# Patient Record
Sex: MALE | Race: WHITE | NOT HISPANIC OR LATINO | Employment: OTHER | URBAN - METROPOLITAN AREA
[De-identification: names, ages, dates, MRNs, and addresses within clinical notes are randomized per-mention and may not be internally consistent; named-entity substitution may affect disease eponyms.]

---

## 2019-01-01 ENCOUNTER — APPOINTMENT (OUTPATIENT)
Dept: ONCOLOGY | Facility: HOSPITAL | Age: 69
End: 2019-01-01

## 2019-01-01 ENCOUNTER — APPOINTMENT (OUTPATIENT)
Dept: GENERAL RADIOLOGY | Facility: HOSPITAL | Age: 69
End: 2019-01-01

## 2019-01-01 ENCOUNTER — APPOINTMENT (OUTPATIENT)
Dept: CT IMAGING | Facility: HOSPITAL | Age: 69
End: 2019-01-01

## 2019-01-01 ENCOUNTER — READMISSION MANAGEMENT (OUTPATIENT)
Dept: CALL CENTER | Facility: HOSPITAL | Age: 69
End: 2019-01-01

## 2019-01-01 ENCOUNTER — DOCUMENTATION (OUTPATIENT)
Dept: PULMONOLOGY | Facility: CLINIC | Age: 69
End: 2019-01-01

## 2019-01-01 ENCOUNTER — TELEPHONE (OUTPATIENT)
Dept: ONCOLOGY | Facility: CLINIC | Age: 69
End: 2019-01-01

## 2019-01-01 ENCOUNTER — HOSPITAL ENCOUNTER (OUTPATIENT)
Dept: ONCOLOGY | Facility: HOSPITAL | Age: 69
Setting detail: INFUSION SERIES
Discharge: HOME | End: 2019-10-28

## 2019-01-01 ENCOUNTER — OFFICE VISIT (OUTPATIENT)
Dept: ONCOLOGY | Facility: CLINIC | Age: 69
End: 2019-01-01

## 2019-01-01 ENCOUNTER — HOSPITAL ENCOUNTER (OUTPATIENT)
Dept: ONCOLOGY | Facility: HOSPITAL | Age: 69
Setting detail: INFUSION SERIES
Discharge: HOME | End: 2019-06-19

## 2019-01-01 ENCOUNTER — HOSPITAL ENCOUNTER (OUTPATIENT)
Dept: ONCOLOGY | Facility: HOSPITAL | Age: 69
Setting detail: INFUSION SERIES
Discharge: HOME | End: 2019-09-16

## 2019-01-01 ENCOUNTER — EDUCATION (OUTPATIENT)
Dept: ONCOLOGY | Facility: HOSPITAL | Age: 69
End: 2019-01-01

## 2019-01-01 ENCOUNTER — HOSPITAL ENCOUNTER (OUTPATIENT)
Dept: ONCOLOGY | Facility: HOSPITAL | Age: 69
Setting detail: INFUSION SERIES
Discharge: HOME | End: 2019-06-21

## 2019-01-01 ENCOUNTER — DOCUMENTATION (OUTPATIENT)
Dept: NUTRITION | Facility: HOSPITAL | Age: 69
End: 2019-01-01

## 2019-01-01 ENCOUNTER — HOSPITAL ENCOUNTER (OUTPATIENT)
Dept: ONCOLOGY | Facility: HOSPITAL | Age: 69
Setting detail: INFUSION SERIES
Discharge: HOME | End: 2019-11-19

## 2019-01-01 ENCOUNTER — HOSPITAL ENCOUNTER (OUTPATIENT)
Dept: MRI IMAGING | Facility: HOSPITAL | Age: 69
Discharge: HOME | End: 2019-06-17
Admitting: INTERNAL MEDICINE

## 2019-01-01 ENCOUNTER — HOSPITAL ENCOUNTER (OUTPATIENT)
Dept: ONCOLOGY | Facility: HOSPITAL | Age: 69
Setting detail: INFUSION SERIES
Discharge: HOME | End: 2019-07-11

## 2019-01-01 ENCOUNTER — HOSPITAL ENCOUNTER (OUTPATIENT)
Dept: ONCOLOGY | Facility: HOSPITAL | Age: 69
Discharge: HOME | End: 2019-11-19

## 2019-01-01 ENCOUNTER — HOSPITAL ENCOUNTER (INPATIENT)
Facility: HOSPITAL | Age: 69
LOS: 7 days | Discharge: HOME | End: 2019-06-11
Attending: EMERGENCY MEDICINE | Admitting: INTERNAL MEDICINE

## 2019-01-01 ENCOUNTER — HOSPITAL ENCOUNTER (OUTPATIENT)
Dept: ONCOLOGY | Facility: HOSPITAL | Age: 69
Setting detail: INFUSION SERIES
Discharge: HOME | End: 2019-08-26

## 2019-01-01 ENCOUNTER — HOSPITAL ENCOUNTER (OUTPATIENT)
Dept: NUCLEAR MEDICINE | Facility: HOSPITAL | Age: 69
Discharge: HOME | End: 2019-06-17

## 2019-01-01 ENCOUNTER — ANESTHESIA (OUTPATIENT)
Dept: GASTROENTEROLOGY | Facility: HOSPITAL | Age: 69
End: 2019-01-01

## 2019-01-01 ENCOUNTER — HOSPITAL ENCOUNTER (OUTPATIENT)
Dept: ONCOLOGY | Facility: HOSPITAL | Age: 69
Setting detail: INFUSION SERIES
Discharge: HOME | End: 2019-08-27

## 2019-01-01 ENCOUNTER — HOSPITAL ENCOUNTER (INPATIENT)
Facility: HOSPITAL | Age: 69
LOS: 7 days | Discharge: HOME | End: 2019-11-12
Attending: EMERGENCY MEDICINE | Admitting: INTERNAL MEDICINE

## 2019-01-01 ENCOUNTER — LAB (OUTPATIENT)
Dept: LAB | Facility: HOSPITAL | Age: 69
End: 2019-01-01

## 2019-01-01 ENCOUNTER — HOSPITAL ENCOUNTER (OUTPATIENT)
Dept: ONCOLOGY | Facility: HOSPITAL | Age: 69
Setting detail: INFUSION SERIES
Discharge: HOME | End: 2019-08-28

## 2019-01-01 ENCOUNTER — HOSPITAL ENCOUNTER (OUTPATIENT)
Dept: CT IMAGING | Facility: HOSPITAL | Age: 69
Discharge: HOME | End: 2019-08-21
Admitting: NURSE PRACTITIONER

## 2019-01-01 ENCOUNTER — MDT ASSESSMENT (OUTPATIENT)
Dept: ONCOLOGY | Facility: CLINIC | Age: 69
End: 2019-01-01

## 2019-01-01 ENCOUNTER — HOSPITAL ENCOUNTER (OUTPATIENT)
Dept: ONCOLOGY | Facility: HOSPITAL | Age: 69
Setting detail: INFUSION SERIES
Discharge: HOME | End: 2019-07-09

## 2019-01-01 ENCOUNTER — HOSPITAL ENCOUNTER (OUTPATIENT)
Dept: ONCOLOGY | Facility: HOSPITAL | Age: 69
Setting detail: INFUSION SERIES
Discharge: HOME | End: 2019-10-07

## 2019-01-01 ENCOUNTER — HOSPITAL ENCOUNTER (OUTPATIENT)
Dept: RADIATION ONCOLOGY | Facility: HOSPITAL | Age: 69
Setting detail: RADIATION/ONCOLOGY SERIES
Discharge: HOME | End: 2019-12-12

## 2019-01-01 ENCOUNTER — HOSPITAL ENCOUNTER (OUTPATIENT)
Dept: ONCOLOGY | Facility: HOSPITAL | Age: 69
Setting detail: INFUSION SERIES
Discharge: HOME | End: 2019-08-07

## 2019-01-01 ENCOUNTER — HOSPITAL ENCOUNTER (OUTPATIENT)
Dept: ONCOLOGY | Facility: HOSPITAL | Age: 69
Setting detail: INFUSION SERIES
Discharge: HOME | End: 2019-08-05

## 2019-01-01 ENCOUNTER — HOSPITAL ENCOUNTER (OUTPATIENT)
Dept: ONCOLOGY | Facility: HOSPITAL | Age: 69
Setting detail: INFUSION SERIES
Discharge: HOME | End: 2019-06-20

## 2019-01-01 ENCOUNTER — HOSPITAL ENCOUNTER (INPATIENT)
Facility: HOSPITAL | Age: 69
LOS: 5 days | Discharge: HOME | End: 2019-12-31
Attending: EMERGENCY MEDICINE | Admitting: INTERNAL MEDICINE

## 2019-01-01 ENCOUNTER — HOSPITAL ENCOUNTER (OUTPATIENT)
Dept: CT IMAGING | Facility: HOSPITAL | Age: 69
Discharge: HOME | End: 2019-06-17

## 2019-01-01 ENCOUNTER — HOSPITAL ENCOUNTER (OUTPATIENT)
Dept: ONCOLOGY | Facility: HOSPITAL | Age: 69
Setting detail: INFUSION SERIES
Discharge: HOME | End: 2019-07-10

## 2019-01-01 ENCOUNTER — HOSPITAL ENCOUNTER (OUTPATIENT)
Dept: ONCOLOGY | Facility: HOSPITAL | Age: 69
Setting detail: INFUSION SERIES
Discharge: HOME | End: 2019-12-18

## 2019-01-01 ENCOUNTER — HOSPITAL ENCOUNTER (OUTPATIENT)
Dept: ONCOLOGY | Facility: HOSPITAL | Age: 69
Setting detail: INFUSION SERIES
Discharge: HOME | End: 2019-08-06

## 2019-01-01 ENCOUNTER — ANESTHESIA EVENT (OUTPATIENT)
Dept: GASTROENTEROLOGY | Facility: HOSPITAL | Age: 69
End: 2019-01-01

## 2019-01-01 ENCOUNTER — HOSPITAL ENCOUNTER (OUTPATIENT)
Dept: ONCOLOGY | Facility: HOSPITAL | Age: 69
Setting detail: INFUSION SERIES
Discharge: HOME | End: 2019-07-29

## 2019-01-01 VITALS
RESPIRATION RATE: 16 BRPM | TEMPERATURE: 96.7 F | DIASTOLIC BLOOD PRESSURE: 73 MMHG | OXYGEN SATURATION: 98 % | SYSTOLIC BLOOD PRESSURE: 121 MMHG | WEIGHT: 194 LBS | HEART RATE: 86 BPM | BODY MASS INDEX: 29.4 KG/M2 | HEIGHT: 68 IN

## 2019-01-01 VITALS
SYSTOLIC BLOOD PRESSURE: 130 MMHG | DIASTOLIC BLOOD PRESSURE: 53 MMHG | RESPIRATION RATE: 16 BRPM | BODY MASS INDEX: 32.13 KG/M2 | WEIGHT: 212 LBS | HEART RATE: 74 BPM | HEIGHT: 68 IN | OXYGEN SATURATION: 94 % | TEMPERATURE: 98.3 F

## 2019-01-01 VITALS
DIASTOLIC BLOOD PRESSURE: 63 MMHG | OXYGEN SATURATION: 98 % | SYSTOLIC BLOOD PRESSURE: 129 MMHG | WEIGHT: 202 LBS | HEIGHT: 68 IN | RESPIRATION RATE: 16 BRPM | HEART RATE: 77 BPM | TEMPERATURE: 97.6 F | BODY MASS INDEX: 30.62 KG/M2

## 2019-01-01 VITALS
HEIGHT: 68 IN | TEMPERATURE: 97.6 F | SYSTOLIC BLOOD PRESSURE: 166 MMHG | HEART RATE: 65 BPM | WEIGHT: 204 LBS | RESPIRATION RATE: 20 BRPM | DIASTOLIC BLOOD PRESSURE: 74 MMHG | SYSTOLIC BLOOD PRESSURE: 120 MMHG | TEMPERATURE: 97.6 F | DIASTOLIC BLOOD PRESSURE: 64 MMHG | HEIGHT: 68 IN | RESPIRATION RATE: 20 BRPM | BODY MASS INDEX: 30.92 KG/M2 | WEIGHT: 202 LBS | HEART RATE: 98 BPM | BODY MASS INDEX: 30.62 KG/M2

## 2019-01-01 VITALS
TEMPERATURE: 96.8 F | BODY MASS INDEX: 30.62 KG/M2 | DIASTOLIC BLOOD PRESSURE: 67 MMHG | SYSTOLIC BLOOD PRESSURE: 150 MMHG | RESPIRATION RATE: 12 BRPM | OXYGEN SATURATION: 97 % | HEIGHT: 68 IN | HEART RATE: 74 BPM | WEIGHT: 202 LBS

## 2019-01-01 VITALS
BODY MASS INDEX: 31.67 KG/M2 | HEIGHT: 68 IN | WEIGHT: 209 LBS | HEART RATE: 94 BPM | RESPIRATION RATE: 18 BRPM | DIASTOLIC BLOOD PRESSURE: 60 MMHG | SYSTOLIC BLOOD PRESSURE: 116 MMHG | TEMPERATURE: 97.8 F

## 2019-01-01 VITALS
OXYGEN SATURATION: 97 % | DIASTOLIC BLOOD PRESSURE: 66 MMHG | SYSTOLIC BLOOD PRESSURE: 144 MMHG | BODY MASS INDEX: 31.33 KG/M2 | WEIGHT: 206.7 LBS | TEMPERATURE: 97.9 F | HEART RATE: 75 BPM | HEIGHT: 68 IN | RESPIRATION RATE: 18 BRPM

## 2019-01-01 VITALS
BODY MASS INDEX: 27.28 KG/M2 | RESPIRATION RATE: 16 BRPM | OXYGEN SATURATION: 95 % | DIASTOLIC BLOOD PRESSURE: 67 MMHG | HEART RATE: 85 BPM | HEIGHT: 68 IN | SYSTOLIC BLOOD PRESSURE: 105 MMHG | WEIGHT: 180 LBS | TEMPERATURE: 98 F

## 2019-01-01 VITALS
SYSTOLIC BLOOD PRESSURE: 136 MMHG | DIASTOLIC BLOOD PRESSURE: 63 MMHG | HEART RATE: 79 BPM | RESPIRATION RATE: 16 BRPM | HEIGHT: 68 IN | TEMPERATURE: 97.5 F | BODY MASS INDEX: 30.77 KG/M2 | WEIGHT: 203 LBS

## 2019-01-01 VITALS
DIASTOLIC BLOOD PRESSURE: 74 MMHG | SYSTOLIC BLOOD PRESSURE: 147 MMHG | WEIGHT: 205 LBS | HEART RATE: 101 BPM | HEIGHT: 68 IN | TEMPERATURE: 97.1 F | OXYGEN SATURATION: 99 % | BODY MASS INDEX: 31.07 KG/M2 | RESPIRATION RATE: 16 BRPM

## 2019-01-01 VITALS
HEIGHT: 68 IN | WEIGHT: 201 LBS | TEMPERATURE: 98.1 F | DIASTOLIC BLOOD PRESSURE: 64 MMHG | BODY MASS INDEX: 30.46 KG/M2 | RESPIRATION RATE: 18 BRPM | SYSTOLIC BLOOD PRESSURE: 151 MMHG | HEART RATE: 86 BPM

## 2019-01-01 VITALS
DIASTOLIC BLOOD PRESSURE: 65 MMHG | HEIGHT: 68 IN | WEIGHT: 190 LBS | RESPIRATION RATE: 18 BRPM | TEMPERATURE: 97.9 F | HEART RATE: 133 BPM | BODY MASS INDEX: 28.79 KG/M2 | SYSTOLIC BLOOD PRESSURE: 123 MMHG

## 2019-01-01 VITALS
BODY MASS INDEX: 32.13 KG/M2 | HEIGHT: 68 IN | HEART RATE: 72 BPM | RESPIRATION RATE: 16 BRPM | DIASTOLIC BLOOD PRESSURE: 63 MMHG | OXYGEN SATURATION: 99 % | SYSTOLIC BLOOD PRESSURE: 140 MMHG | TEMPERATURE: 97.4 F | WEIGHT: 212 LBS

## 2019-01-01 VITALS
WEIGHT: 204 LBS | HEIGHT: 68 IN | HEART RATE: 69 BPM | SYSTOLIC BLOOD PRESSURE: 152 MMHG | RESPIRATION RATE: 16 BRPM | BODY MASS INDEX: 30.92 KG/M2 | DIASTOLIC BLOOD PRESSURE: 65 MMHG | TEMPERATURE: 97.4 F

## 2019-01-01 VITALS
WEIGHT: 197 LBS | RESPIRATION RATE: 16 BRPM | HEIGHT: 68 IN | DIASTOLIC BLOOD PRESSURE: 52 MMHG | OXYGEN SATURATION: 95 % | HEART RATE: 93 BPM | BODY MASS INDEX: 29.86 KG/M2 | SYSTOLIC BLOOD PRESSURE: 138 MMHG

## 2019-01-01 VITALS
TEMPERATURE: 97.4 F | SYSTOLIC BLOOD PRESSURE: 148 MMHG | DIASTOLIC BLOOD PRESSURE: 63 MMHG | HEART RATE: 87 BPM | BODY MASS INDEX: 31.37 KG/M2 | DIASTOLIC BLOOD PRESSURE: 62 MMHG | HEIGHT: 68 IN | TEMPERATURE: 97.6 F | SYSTOLIC BLOOD PRESSURE: 139 MMHG | RESPIRATION RATE: 16 BRPM | RESPIRATION RATE: 20 BRPM | WEIGHT: 207 LBS | OXYGEN SATURATION: 98 % | HEART RATE: 93 BPM | BODY MASS INDEX: 30.92 KG/M2 | HEIGHT: 68 IN | WEIGHT: 204 LBS

## 2019-01-01 VITALS
BODY MASS INDEX: 30.16 KG/M2 | DIASTOLIC BLOOD PRESSURE: 61 MMHG | SYSTOLIC BLOOD PRESSURE: 139 MMHG | TEMPERATURE: 97.9 F | WEIGHT: 199 LBS | RESPIRATION RATE: 20 BRPM | HEIGHT: 68 IN | HEART RATE: 103 BPM

## 2019-01-01 VITALS
DIASTOLIC BLOOD PRESSURE: 66 MMHG | SYSTOLIC BLOOD PRESSURE: 152 MMHG | HEART RATE: 76 BPM | TEMPERATURE: 97.4 F | OXYGEN SATURATION: 96 % | BODY MASS INDEX: 32.89 KG/M2 | HEIGHT: 68 IN | WEIGHT: 217 LBS | RESPIRATION RATE: 16 BRPM

## 2019-01-01 VITALS
BODY MASS INDEX: 31.98 KG/M2 | HEIGHT: 68 IN | HEART RATE: 82 BPM | RESPIRATION RATE: 15 BRPM | DIASTOLIC BLOOD PRESSURE: 63 MMHG | TEMPERATURE: 97.2 F | WEIGHT: 211 LBS | SYSTOLIC BLOOD PRESSURE: 157 MMHG

## 2019-01-01 VITALS
DIASTOLIC BLOOD PRESSURE: 62 MMHG | HEART RATE: 75 BPM | HEIGHT: 68 IN | RESPIRATION RATE: 16 BRPM | TEMPERATURE: 97.6 F | SYSTOLIC BLOOD PRESSURE: 136 MMHG | WEIGHT: 206 LBS | BODY MASS INDEX: 31.22 KG/M2

## 2019-01-01 VITALS
WEIGHT: 199 LBS | TEMPERATURE: 97.9 F | BODY MASS INDEX: 30.16 KG/M2 | RESPIRATION RATE: 16 BRPM | DIASTOLIC BLOOD PRESSURE: 67 MMHG | HEIGHT: 68 IN | SYSTOLIC BLOOD PRESSURE: 155 MMHG | OXYGEN SATURATION: 98 % | HEART RATE: 84 BPM

## 2019-01-01 VITALS
HEART RATE: 83 BPM | SYSTOLIC BLOOD PRESSURE: 159 MMHG | DIASTOLIC BLOOD PRESSURE: 70 MMHG | BODY MASS INDEX: 29.8 KG/M2 | TEMPERATURE: 97.1 F | WEIGHT: 196 LBS | RESPIRATION RATE: 20 BRPM | OXYGEN SATURATION: 97 %

## 2019-01-01 DIAGNOSIS — C34.90 SMALL CELL LUNG CANCER (HCC): Primary | ICD-10-CM

## 2019-01-01 DIAGNOSIS — C34.90 SMALL CELL LUNG CANCER (HCC): ICD-10-CM

## 2019-01-01 DIAGNOSIS — E87.1 HYPONATREMIA: Primary | ICD-10-CM

## 2019-01-01 DIAGNOSIS — Z74.09 IMPAIRED MOBILITY AND ADLS: ICD-10-CM

## 2019-01-01 DIAGNOSIS — I10 ESSENTIAL HYPERTENSION: ICD-10-CM

## 2019-01-01 DIAGNOSIS — R50.9 FEVER, UNSPECIFIED FEVER CAUSE: ICD-10-CM

## 2019-01-01 DIAGNOSIS — J18.9 PNEUMONIA OF RIGHT LUNG DUE TO INFECTIOUS ORGANISM, UNSPECIFIED PART OF LUNG: ICD-10-CM

## 2019-01-01 DIAGNOSIS — J18.9 PNEUMONIA OF RIGHT LUNG DUE TO INFECTIOUS ORGANISM, UNSPECIFIED PART OF LUNG: Primary | ICD-10-CM

## 2019-01-01 DIAGNOSIS — Z85.118 HISTORY OF LUNG CANCER: ICD-10-CM

## 2019-01-01 DIAGNOSIS — R91.8 LUNG INFILTRATE ON CT: ICD-10-CM

## 2019-01-01 DIAGNOSIS — Z78.9 IMPAIRED MOBILITY AND ADLS: ICD-10-CM

## 2019-01-01 DIAGNOSIS — J96.01 ACUTE RESPIRATORY FAILURE WITH HYPOXIA (HCC): ICD-10-CM

## 2019-01-01 DIAGNOSIS — R91.8 LUNG MASS: ICD-10-CM

## 2019-01-01 DIAGNOSIS — R50.9 FEVER, UNSPECIFIED FEVER CAUSE: Primary | ICD-10-CM

## 2019-01-01 DIAGNOSIS — C34.90 MALIGNANT NEOPLASM OF LUNG, UNSPECIFIED LATERALITY, UNSPECIFIED PART OF LUNG (HCC): ICD-10-CM

## 2019-01-01 DIAGNOSIS — R53.1 WEAKNESS: Primary | ICD-10-CM

## 2019-01-01 DIAGNOSIS — E87.1 HYPONATREMIA: ICD-10-CM

## 2019-01-01 LAB
ALBUMIN SERPL-MCNC: 2.5 G/DL (ref 3.5–5.2)
ALBUMIN SERPL-MCNC: 2.8 G/DL (ref 3.5–5.2)
ALBUMIN SERPL-MCNC: 2.8 G/DL (ref 3.5–5.2)
ALBUMIN SERPL-MCNC: 3.5 G/DL (ref 3.5–5.2)
ALBUMIN SERPL-MCNC: 4.1 G/DL (ref 3.5–5.2)
ALBUMIN SERPL-MCNC: 4.1 G/DL (ref 3.5–5.2)
ALBUMIN SERPL-MCNC: 4.2 G/DL (ref 3.5–5.2)
ALBUMIN SERPL-MCNC: 4.3 G/DL (ref 3.5–5.2)
ALBUMIN SERPL-MCNC: 4.4 G/DL (ref 3.5–5.2)
ALBUMIN SERPL-MCNC: 4.5 G/DL (ref 3.5–5.2)
ALBUMIN SERPL-MCNC: 4.5 G/DL (ref 3.5–5.2)
ALBUMIN SERPL-MCNC: 4.6 G/DL (ref 3.5–5.2)
ALBUMIN/GLOB SERPL: 0.6 G/DL
ALBUMIN/GLOB SERPL: 0.7 G/DL
ALBUMIN/GLOB SERPL: 0.8 G/DL
ALBUMIN/GLOB SERPL: 0.9 G/DL
ALBUMIN/GLOB SERPL: 1.3 G/DL
ALBUMIN/GLOB SERPL: 1.4 G/DL
ALBUMIN/GLOB SERPL: 1.5 G/DL
ALBUMIN/GLOB SERPL: 1.6 G/DL
ALBUMIN/GLOB SERPL: 1.6 G/DL
ALBUMIN/GLOB SERPL: 1.7 G/DL
ALP SERPL-CCNC: 100 U/L (ref 39–117)
ALP SERPL-CCNC: 106 U/L (ref 39–117)
ALP SERPL-CCNC: 110 U/L (ref 39–117)
ALP SERPL-CCNC: 114 U/L (ref 39–117)
ALP SERPL-CCNC: 47 U/L (ref 39–117)
ALP SERPL-CCNC: 59 U/L (ref 39–117)
ALP SERPL-CCNC: 68 U/L (ref 39–117)
ALP SERPL-CCNC: 69 U/L (ref 39–117)
ALP SERPL-CCNC: 74 U/L (ref 39–117)
ALP SERPL-CCNC: 79 U/L (ref 39–117)
ALP SERPL-CCNC: 79 U/L (ref 39–117)
ALP SERPL-CCNC: 81 U/L (ref 39–117)
ALP SERPL-CCNC: 83 U/L (ref 39–117)
ALP SERPL-CCNC: 86 U/L (ref 39–117)
ALT SERPL W P-5'-P-CCNC: 16 U/L (ref 1–41)
ALT SERPL W P-5'-P-CCNC: 161 U/L (ref 1–41)
ALT SERPL W P-5'-P-CCNC: 18 U/L (ref 1–41)
ALT SERPL W P-5'-P-CCNC: 18 U/L (ref 1–41)
ALT SERPL W P-5'-P-CCNC: 19 U/L (ref 1–41)
ALT SERPL W P-5'-P-CCNC: 20 U/L (ref 1–41)
ALT SERPL W P-5'-P-CCNC: 21 U/L (ref 1–41)
ALT SERPL W P-5'-P-CCNC: 23 U/L (ref 1–41)
ALT SERPL W P-5'-P-CCNC: 24 U/L (ref 1–41)
ALT SERPL W P-5'-P-CCNC: 26 U/L (ref 1–41)
ALT SERPL W P-5'-P-CCNC: 51 U/L (ref 1–41)
ALT SERPL W P-5'-P-CCNC: 53 U/L (ref 1–41)
ALT SERPL W P-5'-P-CCNC: 65 U/L (ref 1–41)
ALT SERPL W P-5'-P-CCNC: 79 U/L (ref 1–41)
AMPHET+METHAMPHET UR QL: NEGATIVE
AMPHETAMINES UR QL: NEGATIVE
ANION GAP SERPL CALCULATED.3IONS-SCNC: 10 MMOL/L (ref 5–15)
ANION GAP SERPL CALCULATED.3IONS-SCNC: 11 MMOL/L
ANION GAP SERPL CALCULATED.3IONS-SCNC: 11 MMOL/L (ref 5–15)
ANION GAP SERPL CALCULATED.3IONS-SCNC: 12 MMOL/L
ANION GAP SERPL CALCULATED.3IONS-SCNC: 12 MMOL/L (ref 5–15)
ANION GAP SERPL CALCULATED.3IONS-SCNC: 13 MMOL/L
ANION GAP SERPL CALCULATED.3IONS-SCNC: 13 MMOL/L (ref 5–15)
ANION GAP SERPL CALCULATED.3IONS-SCNC: 14 MMOL/L
ANION GAP SERPL CALCULATED.3IONS-SCNC: 14 MMOL/L (ref 5–15)
ANION GAP SERPL CALCULATED.3IONS-SCNC: 15 MMOL/L
ANION GAP SERPL CALCULATED.3IONS-SCNC: 15 MMOL/L
ANION GAP SERPL CALCULATED.3IONS-SCNC: 15 MMOL/L (ref 5–15)
ANION GAP SERPL CALCULATED.3IONS-SCNC: 15 MMOL/L (ref 5–15)
ANION GAP SERPL CALCULATED.3IONS-SCNC: 16 MMOL/L (ref 5–15)
APTT PPP: 27.7 SECONDS (ref 24–37)
ARTERIAL PATENCY WRIST A: ABNORMAL
AST SERPL-CCNC: 17 U/L (ref 1–40)
AST SERPL-CCNC: 19 U/L (ref 1–40)
AST SERPL-CCNC: 20 U/L (ref 1–40)
AST SERPL-CCNC: 21 U/L (ref 1–40)
AST SERPL-CCNC: 24 U/L (ref 1–40)
AST SERPL-CCNC: 27 U/L (ref 1–40)
AST SERPL-CCNC: 32 U/L (ref 1–40)
AST SERPL-CCNC: 41 U/L (ref 1–40)
AST SERPL-CCNC: 49 U/L (ref 1–40)
AST SERPL-CCNC: 56 U/L (ref 1–40)
ATMOSPHERIC PRESS: ABNORMAL MM[HG]
B PARAPERT DNA SPEC QL NAA+PROBE: NOT DETECTED
B PERT DNA SPEC QL NAA+PROBE: NOT DETECTED
BACTERIA SPEC AEROBE CULT: ABNORMAL
BACTERIA SPEC AEROBE CULT: NORMAL
BACTERIA SPEC RESP CULT: NORMAL
BACTERIA SPEC RESP CULT: NORMAL
BARBITURATES UR QL SCN: NEGATIVE
BASE EXCESS BLDA CALC-SCNC: 1.1 MMOL/L (ref 0–2)
BASOPHILS # BLD AUTO: 0 10*3/MM3 (ref 0–0.2)
BASOPHILS # BLD AUTO: 0.01 10*3/MM3 (ref 0–0.2)
BASOPHILS # BLD AUTO: 0.02 10*3/MM3 (ref 0–0.2)
BASOPHILS # BLD MANUAL: 0 10*3/MM3 (ref 0–0.2)
BASOPHILS NFR BLD AUTO: 0 % (ref 0–1.5)
BASOPHILS NFR BLD AUTO: 0 % (ref 0–1.5)
BASOPHILS NFR BLD AUTO: 0.1 % (ref 0–1.5)
BASOPHILS NFR BLD AUTO: 0.1 % (ref 0–1.5)
BASOPHILS NFR BLD AUTO: 0.2 % (ref 0–1.5)
BASOPHILS NFR BLD AUTO: 0.2 % (ref 0–1.5)
BASOPHILS NFR BLD AUTO: 0.3 % (ref 0–1.5)
BASOPHILS NFR BLD AUTO: 0.4 % (ref 0–1.5)
BDY SITE: ABNORMAL
BENZODIAZ UR QL SCN: NEGATIVE
BILIRUB SERPL-MCNC: 0.3 MG/DL (ref 0.2–1.2)
BILIRUB SERPL-MCNC: 0.4 MG/DL (ref 0.2–1.2)
BILIRUB SERPL-MCNC: 0.5 MG/DL (ref 0.2–1.2)
BILIRUB SERPL-MCNC: 0.5 MG/DL (ref 0.2–1.2)
BILIRUB SERPL-MCNC: 0.6 MG/DL (ref 0.2–1.2)
BILIRUB SERPL-MCNC: 0.6 MG/DL (ref 0.2–1.2)
BILIRUB SERPL-MCNC: 0.7 MG/DL (ref 0.2–1.2)
BILIRUB UR QL STRIP: NEGATIVE
BODY TEMPERATURE: 37 C
BUN BLD-MCNC: 11 MG/DL (ref 8–23)
BUN BLD-MCNC: 12 MG/DL (ref 8–23)
BUN BLD-MCNC: 13 MG/DL (ref 8–23)
BUN BLD-MCNC: 14 MG/DL (ref 8–23)
BUN BLD-MCNC: 15 MG/DL (ref 8–23)
BUN BLD-MCNC: 16 MG/DL (ref 8–23)
BUN BLD-MCNC: 17 MG/DL (ref 8–23)
BUN BLD-MCNC: 17 MG/DL (ref 8–23)
BUN BLD-MCNC: 19 MG/DL (ref 8–23)
BUN BLD-MCNC: 20 MG/DL (ref 8–23)
BUN BLD-MCNC: 20 MG/DL (ref 8–23)
BUN BLD-MCNC: 21 MG/DL (ref 8–23)
BUN BLD-MCNC: 22 MG/DL (ref 8–23)
BUN BLD-MCNC: 22 MG/DL (ref 8–23)
BUN BLD-MCNC: 23 MG/DL (ref 8–23)
BUN BLD-MCNC: 24 MG/DL (ref 8–23)
BUN BLD-MCNC: 25 MG/DL (ref 8–23)
BUN BLD-MCNC: 27 MG/DL (ref 8–23)
BUN BLD-MCNC: 27 MG/DL (ref 8–23)
BUN BLD-MCNC: 33 MG/DL (ref 8–23)
BUN/CREAT SERPL: 10 (ref 7–25)
BUN/CREAT SERPL: 11.2 (ref 7–25)
BUN/CREAT SERPL: 12.1 (ref 7–25)
BUN/CREAT SERPL: 12.4 (ref 7–25)
BUN/CREAT SERPL: 12.7 (ref 7–25)
BUN/CREAT SERPL: 13 (ref 7–25)
BUN/CREAT SERPL: 13.3 (ref 7–25)
BUN/CREAT SERPL: 13.8 (ref 7–25)
BUN/CREAT SERPL: 13.9 (ref 7–25)
BUN/CREAT SERPL: 14 (ref 7–25)
BUN/CREAT SERPL: 14.3 (ref 7–25)
BUN/CREAT SERPL: 14.4 (ref 7–25)
BUN/CREAT SERPL: 14.7 (ref 7–25)
BUN/CREAT SERPL: 14.8 (ref 7–25)
BUN/CREAT SERPL: 15.1 (ref 7–25)
BUN/CREAT SERPL: 15.2 (ref 7–25)
BUN/CREAT SERPL: 15.2 (ref 7–25)
BUN/CREAT SERPL: 15.3 (ref 7–25)
BUN/CREAT SERPL: 15.4 (ref 7–25)
BUN/CREAT SERPL: 15.7 (ref 7–25)
BUN/CREAT SERPL: 15.8 (ref 7–25)
BUN/CREAT SERPL: 16 (ref 7–25)
BUN/CREAT SERPL: 16 (ref 7–25)
BUN/CREAT SERPL: 16.2 (ref 7–25)
BUN/CREAT SERPL: 16.4 (ref 7–25)
BUN/CREAT SERPL: 16.5 (ref 7–25)
BUN/CREAT SERPL: 16.5 (ref 7–25)
BUN/CREAT SERPL: 16.7 (ref 7–25)
BUN/CREAT SERPL: 17 (ref 7–25)
BUN/CREAT SERPL: 17.1 (ref 7–25)
BUN/CREAT SERPL: 17.1 (ref 7–25)
BUN/CREAT SERPL: 17.2 (ref 7–25)
BUN/CREAT SERPL: 17.6 (ref 7–25)
BUN/CREAT SERPL: 17.8 (ref 7–25)
BUN/CREAT SERPL: 17.9 (ref 7–25)
BUN/CREAT SERPL: 18.1 (ref 7–25)
BUN/CREAT SERPL: 18.6 (ref 7–25)
BUN/CREAT SERPL: 18.8 (ref 7–25)
BUN/CREAT SERPL: 19.3 (ref 7–25)
BUN/CREAT SERPL: 20.3 (ref 7–25)
BUN/CREAT SERPL: 20.4 (ref 7–25)
BUN/CREAT SERPL: 20.7 (ref 7–25)
BUN/CREAT SERPL: 22.1 (ref 7–25)
BUN/CREAT SERPL: 22.1 (ref 7–25)
BUN/CREAT SERPL: 22.2 (ref 7–25)
BUN/CREAT SERPL: 23 (ref 7–25)
BUN/CREAT SERPL: 23.9 (ref 7–25)
BUN/CREAT SERPL: 25 (ref 7–25)
BUN/CREAT SERPL: 25.3 (ref 7–25)
BUN/CREAT SERPL: 26.9 (ref 7–25)
BUN/CREAT SERPL: 27 (ref 7–25)
BUN/CREAT SERPL: 27.4 (ref 7–25)
BUN/CREAT SERPL: 30.3 (ref 7–25)
BUN/CREAT SERPL: 31 (ref 7–25)
BUN/CREAT SERPL: 37.1 (ref 7–25)
BUN/CREAT SERPL: 9.7 (ref 7–25)
BUPRENORPHINE SERPL-MCNC: NEGATIVE NG/ML
C PNEUM DNA NPH QL NAA+NON-PROBE: NOT DETECTED
CALCIUM SPEC-SCNC: 10 MG/DL (ref 8.6–10.5)
CALCIUM SPEC-SCNC: 10.1 MG/DL (ref 8.6–10.5)
CALCIUM SPEC-SCNC: 10.2 MG/DL (ref 8.6–10.5)
CALCIUM SPEC-SCNC: 8.2 MG/DL (ref 8.6–10.5)
CALCIUM SPEC-SCNC: 8.3 MG/DL (ref 8.6–10.5)
CALCIUM SPEC-SCNC: 8.4 MG/DL (ref 8.6–10.5)
CALCIUM SPEC-SCNC: 8.5 MG/DL (ref 8.6–10.5)
CALCIUM SPEC-SCNC: 8.6 MG/DL (ref 8.6–10.5)
CALCIUM SPEC-SCNC: 8.7 MG/DL (ref 8.6–10.5)
CALCIUM SPEC-SCNC: 8.8 MG/DL (ref 8.6–10.5)
CALCIUM SPEC-SCNC: 8.9 MG/DL (ref 8.6–10.5)
CALCIUM SPEC-SCNC: 9 MG/DL (ref 8.6–10.5)
CALCIUM SPEC-SCNC: 9.1 MG/DL (ref 8.6–10.5)
CALCIUM SPEC-SCNC: 9.2 MG/DL (ref 8.6–10.5)
CALCIUM SPEC-SCNC: 9.2 MG/DL (ref 8.6–10.5)
CALCIUM SPEC-SCNC: 9.3 MG/DL (ref 8.6–10.5)
CALCIUM SPEC-SCNC: 9.4 MG/DL (ref 8.6–10.5)
CALCIUM SPEC-SCNC: 9.5 MG/DL (ref 8.6–10.5)
CALCIUM SPEC-SCNC: 9.6 MG/DL (ref 8.6–10.5)
CALCIUM SPEC-SCNC: 9.7 MG/DL (ref 8.6–10.5)
CALCIUM SPEC-SCNC: 9.7 MG/DL (ref 8.6–10.5)
CALCIUM SPEC-SCNC: 9.8 MG/DL (ref 8.6–10.5)
CALCIUM SPEC-SCNC: 9.9 MG/DL (ref 8.6–10.5)
CANNABINOIDS SERPL QL: NEGATIVE
CHLORIDE SERPL-SCNC: 100 MMOL/L (ref 98–107)
CHLORIDE SERPL-SCNC: 100 MMOL/L (ref 98–107)
CHLORIDE SERPL-SCNC: 101 MMOL/L (ref 98–107)
CHLORIDE SERPL-SCNC: 81 MMOL/L (ref 98–107)
CHLORIDE SERPL-SCNC: 82 MMOL/L (ref 98–107)
CHLORIDE SERPL-SCNC: 83 MMOL/L (ref 98–107)
CHLORIDE SERPL-SCNC: 84 MMOL/L (ref 98–107)
CHLORIDE SERPL-SCNC: 85 MMOL/L (ref 98–107)
CHLORIDE SERPL-SCNC: 86 MMOL/L (ref 98–107)
CHLORIDE SERPL-SCNC: 87 MMOL/L (ref 98–107)
CHLORIDE SERPL-SCNC: 88 MMOL/L (ref 98–107)
CHLORIDE SERPL-SCNC: 89 MMOL/L (ref 98–107)
CHLORIDE SERPL-SCNC: 90 MMOL/L (ref 98–107)
CHLORIDE SERPL-SCNC: 90 MMOL/L (ref 98–107)
CHLORIDE SERPL-SCNC: 91 MMOL/L (ref 98–107)
CHLORIDE SERPL-SCNC: 91 MMOL/L (ref 98–107)
CHLORIDE SERPL-SCNC: 93 MMOL/L (ref 98–107)
CHLORIDE SERPL-SCNC: 93 MMOL/L (ref 98–107)
CHLORIDE SERPL-SCNC: 94 MMOL/L (ref 98–107)
CHLORIDE SERPL-SCNC: 95 MMOL/L (ref 98–107)
CHLORIDE SERPL-SCNC: 96 MMOL/L (ref 98–107)
CHLORIDE SERPL-SCNC: 97 MMOL/L (ref 98–107)
CHLORIDE SERPL-SCNC: 98 MMOL/L (ref 98–107)
CHLORIDE SERPL-SCNC: 98 MMOL/L (ref 98–107)
CHLORIDE SERPL-SCNC: 99 MMOL/L (ref 98–107)
CK SERPL-CCNC: 318 U/L (ref 20–200)
CLARITY UR: CLEAR
CO2 BLDA-SCNC: 24.8 MMOL/L (ref 22–33)
CO2 SERPL-SCNC: 17 MMOL/L (ref 22–29)
CO2 SERPL-SCNC: 18 MMOL/L (ref 22–29)
CO2 SERPL-SCNC: 19 MMOL/L (ref 22–29)
CO2 SERPL-SCNC: 20 MMOL/L (ref 22–29)
CO2 SERPL-SCNC: 21 MMOL/L (ref 22–29)
CO2 SERPL-SCNC: 22 MMOL/L (ref 22–29)
CO2 SERPL-SCNC: 23 MMOL/L (ref 22–29)
CO2 SERPL-SCNC: 24 MMOL/L (ref 22–29)
CO2 SERPL-SCNC: 25 MMOL/L (ref 22–29)
CO2 SERPL-SCNC: 26 MMOL/L (ref 22–29)
COCAINE UR QL: NEGATIVE
COHGB MFR BLD: 0.8 % (ref 0–2)
COLOR UR: YELLOW
CORTIS AM PEAK SERPL-MCNC: 1.52 MCG/DL
CREAT BLD-MCNC: 0.62 MG/DL (ref 0.76–1.27)
CREAT BLD-MCNC: 0.68 MG/DL (ref 0.76–1.27)
CREAT BLD-MCNC: 0.72 MG/DL (ref 0.76–1.27)
CREAT BLD-MCNC: 0.73 MG/DL (ref 0.76–1.27)
CREAT BLD-MCNC: 0.74 MG/DL (ref 0.76–1.27)
CREAT BLD-MCNC: 0.74 MG/DL (ref 0.76–1.27)
CREAT BLD-MCNC: 0.76 MG/DL (ref 0.76–1.27)
CREAT BLD-MCNC: 0.78 MG/DL (ref 0.76–1.27)
CREAT BLD-MCNC: 0.79 MG/DL (ref 0.76–1.27)
CREAT BLD-MCNC: 0.82 MG/DL (ref 0.76–1.27)
CREAT BLD-MCNC: 0.83 MG/DL (ref 0.76–1.27)
CREAT BLD-MCNC: 0.84 MG/DL (ref 0.76–1.27)
CREAT BLD-MCNC: 0.85 MG/DL (ref 0.76–1.27)
CREAT BLD-MCNC: 0.85 MG/DL (ref 0.76–1.27)
CREAT BLD-MCNC: 0.86 MG/DL (ref 0.76–1.27)
CREAT BLD-MCNC: 0.86 MG/DL (ref 0.76–1.27)
CREAT BLD-MCNC: 0.87 MG/DL (ref 0.76–1.27)
CREAT BLD-MCNC: 0.87 MG/DL (ref 0.76–1.27)
CREAT BLD-MCNC: 0.88 MG/DL (ref 0.76–1.27)
CREAT BLD-MCNC: 0.9 MG/DL (ref 0.76–1.27)
CREAT BLD-MCNC: 0.91 MG/DL (ref 0.76–1.27)
CREAT BLD-MCNC: 0.92 MG/DL (ref 0.76–1.27)
CREAT BLD-MCNC: 0.92 MG/DL (ref 0.76–1.27)
CREAT BLD-MCNC: 0.93 MG/DL (ref 0.76–1.27)
CREAT BLD-MCNC: 0.94 MG/DL (ref 0.76–1.27)
CREAT BLD-MCNC: 0.95 MG/DL (ref 0.76–1.27)
CREAT BLD-MCNC: 0.95 MG/DL (ref 0.76–1.27)
CREAT BLD-MCNC: 0.96 MG/DL (ref 0.76–1.27)
CREAT BLD-MCNC: 0.98 MG/DL (ref 0.76–1.27)
CREAT BLD-MCNC: 0.99 MG/DL (ref 0.76–1.27)
CREAT BLD-MCNC: 0.99 MG/DL (ref 0.76–1.27)
CREAT BLD-MCNC: 1 MG/DL (ref 0.76–1.27)
CREAT BLD-MCNC: 1.02 MG/DL (ref 0.76–1.27)
CREAT BLD-MCNC: 1.03 MG/DL (ref 0.76–1.27)
CREAT BLD-MCNC: 1.04 MG/DL (ref 0.76–1.27)
CREAT BLD-MCNC: 1.05 MG/DL (ref 0.76–1.27)
CREAT BLD-MCNC: 1.07 MG/DL (ref 0.76–1.27)
CREAT BLD-MCNC: 1.08 MG/DL (ref 0.76–1.27)
CREAT BLD-MCNC: 1.09 MG/DL (ref 0.76–1.27)
CREAT BLD-MCNC: 1.13 MG/DL (ref 0.76–1.27)
CREAT BLD-MCNC: 1.2 MG/DL (ref 0.76–1.27)
CREAT BLDA-MCNC: 0.7 MG/DL (ref 0.6–1.3)
CREAT BLDA-MCNC: 0.9 MG/DL
CREAT BLDA-MCNC: 0.9 MG/DL (ref 0.6–1.3)
CREAT BLDA-MCNC: 1 MG/DL (ref 0.6–1.3)
CREAT SERPL-MCNC: 0.9 MG/DL
CYTO UR: NORMAL
D-LACTATE SERPL-SCNC: 0.9 MMOL/L (ref 0.5–2)
D-LACTATE SERPL-SCNC: 1.4 MMOL/L (ref 0.5–2)
DEPRECATED RDW RBC AUTO: 34.9 FL (ref 37–54)
DEPRECATED RDW RBC AUTO: 35.2 FL (ref 37–54)
DEPRECATED RDW RBC AUTO: 35.7 FL (ref 37–54)
DEPRECATED RDW RBC AUTO: 35.9 FL (ref 37–54)
DEPRECATED RDW RBC AUTO: 36.3 FL (ref 37–54)
DEPRECATED RDW RBC AUTO: 37.8 FL (ref 37–54)
DEPRECATED RDW RBC AUTO: 38.8 FL (ref 37–54)
DEPRECATED RDW RBC AUTO: 38.8 FL (ref 37–54)
DEPRECATED RDW RBC AUTO: 41.6 FL (ref 37–54)
DEPRECATED RDW RBC AUTO: 42.6 FL (ref 37–54)
DEPRECATED RDW RBC AUTO: 43 FL (ref 37–54)
DEPRECATED RDW RBC AUTO: 43.1 FL (ref 37–54)
DEPRECATED RDW RBC AUTO: 43.5 FL (ref 37–54)
DEPRECATED RDW RBC AUTO: 46.9 FL (ref 37–54)
DEPRECATED RDW RBC AUTO: 47.9 FL (ref 37–54)
DEPRECATED RDW RBC AUTO: 49.2 FL (ref 37–54)
DEPRECATED RDW RBC AUTO: 50.1 FL (ref 37–54)
EOSINOPHIL # BLD AUTO: 0 10*3/MM3 (ref 0–0.4)
EOSINOPHIL # BLD AUTO: 0.02 10*3/MM3 (ref 0–0.4)
EOSINOPHIL # BLD MANUAL: 0 10*3/MM3 (ref 0–0.4)
EOSINOPHIL NFR BLD AUTO: 0 % (ref 0.3–6.2)
EOSINOPHIL NFR BLD AUTO: 0.2 % (ref 0.3–6.2)
EOSINOPHIL NFR BLD MANUAL: 0 % (ref 0.3–6.2)
EPAP: 0
ERYTHROCYTE [DISTWIDTH] IN BLOOD BY AUTOMATED COUNT: 11.5 % (ref 12.3–15.4)
ERYTHROCYTE [DISTWIDTH] IN BLOOD BY AUTOMATED COUNT: 11.6 % (ref 12.3–15.4)
ERYTHROCYTE [DISTWIDTH] IN BLOOD BY AUTOMATED COUNT: 11.7 % (ref 12.3–15.4)
ERYTHROCYTE [DISTWIDTH] IN BLOOD BY AUTOMATED COUNT: 11.8 % (ref 12.3–15.4)
ERYTHROCYTE [DISTWIDTH] IN BLOOD BY AUTOMATED COUNT: 11.9 % (ref 12.3–15.4)
ERYTHROCYTE [DISTWIDTH] IN BLOOD BY AUTOMATED COUNT: 12 % (ref 12.3–15.4)
ERYTHROCYTE [DISTWIDTH] IN BLOOD BY AUTOMATED COUNT: 12.6 % (ref 12.3–15.4)
ERYTHROCYTE [DISTWIDTH] IN BLOOD BY AUTOMATED COUNT: 12.8 % (ref 12.3–15.4)
ERYTHROCYTE [DISTWIDTH] IN BLOOD BY AUTOMATED COUNT: 12.9 % (ref 12.3–15.4)
ERYTHROCYTE [DISTWIDTH] IN BLOOD BY AUTOMATED COUNT: 13 % (ref 12.3–15.4)
ERYTHROCYTE [DISTWIDTH] IN BLOOD BY AUTOMATED COUNT: 13.8 % (ref 12.3–15.4)
ERYTHROCYTE [DISTWIDTH] IN BLOOD BY AUTOMATED COUNT: 14.6 % (ref 12.3–15.4)
ERYTHROCYTE [DISTWIDTH] IN BLOOD BY AUTOMATED COUNT: 14.8 % (ref 12.3–15.4)
ERYTHROCYTE [DISTWIDTH] IN BLOOD BY AUTOMATED COUNT: 14.8 % (ref 12.3–15.4)
ERYTHROCYTE [DISTWIDTH] IN BLOOD BY AUTOMATED COUNT: 14.9 % (ref 12.3–15.4)
ERYTHROCYTE [DISTWIDTH] IN BLOOD BY AUTOMATED COUNT: 15 % (ref 12.3–15.4)
ERYTHROCYTE [DISTWIDTH] IN BLOOD BY AUTOMATED COUNT: 15.1 % (ref 12.3–15.4)
ERYTHROCYTE [DISTWIDTH] IN BLOOD BY AUTOMATED COUNT: 15.5 % (ref 12.3–15.4)
ERYTHROCYTE [DISTWIDTH] IN BLOOD BY AUTOMATED COUNT: 15.8 % (ref 12.3–15.4)
ERYTHROCYTE [DISTWIDTH] IN BLOOD BY AUTOMATED COUNT: 15.8 % (ref 12.3–15.4)
ERYTHROCYTE [DISTWIDTH] IN BLOOD BY AUTOMATED COUNT: 18.2 % (ref 12.3–15.4)
ERYTHROCYTE [DISTWIDTH] IN BLOOD BY AUTOMATED COUNT: 18.3 % (ref 12.3–15.4)
ERYTHROCYTE [DISTWIDTH] IN BLOOD BY AUTOMATED COUNT: 22.5 % (ref 12.3–15.4)
ERYTHROCYTE [DISTWIDTH] IN BLOOD BY AUTOMATED COUNT: 24.8 % (ref 12.3–15.4)
ETHANOL BLD-MCNC: <10 MG/DL (ref 0–10)
FERRITIN SERPL-MCNC: 865 NG/ML (ref 30–400)
FLUAV AG NPH QL: NEGATIVE
FLUAV H1 2009 PAND RNA NPH QL NAA+PROBE: NOT DETECTED
FLUAV H1 HA GENE NPH QL NAA+PROBE: NOT DETECTED
FLUAV H3 RNA NPH QL NAA+PROBE: NOT DETECTED
FLUAV SUBTYP SPEC NAA+PROBE: NOT DETECTED
FLUBV AG NPH QL IA: NEGATIVE
FLUBV RNA ISLT QL NAA+PROBE: NOT DETECTED
FOLATE BLD-MCNC: 469.7 NG/ML
FOLATE RBC-MCNC: 1472 NG/ML
FOLATE SERPL-MCNC: 16.9 NG/ML (ref 4.78–24.2)
FUNGUS WND CULT: NORMAL
GFR SERPL CREATININE-BSD FRML MDRD: 102 ML/MIN/1.73
GFR SERPL CREATININE-BSD FRML MDRD: 105 ML/MIN/1.73
GFR SERPL CREATININE-BSD FRML MDRD: 105 ML/MIN/1.73
GFR SERPL CREATININE-BSD FRML MDRD: 107 ML/MIN/1.73
GFR SERPL CREATININE-BSD FRML MDRD: 109 ML/MIN/1.73
GFR SERPL CREATININE-BSD FRML MDRD: 116 ML/MIN/1.73
GFR SERPL CREATININE-BSD FRML MDRD: 129 ML/MIN/1.73
GFR SERPL CREATININE-BSD FRML MDRD: 60 ML/MIN/1.73
GFR SERPL CREATININE-BSD FRML MDRD: 65 ML/MIN/1.73
GFR SERPL CREATININE-BSD FRML MDRD: 67 ML/MIN/1.73
GFR SERPL CREATININE-BSD FRML MDRD: 68 ML/MIN/1.73
GFR SERPL CREATININE-BSD FRML MDRD: 69 ML/MIN/1.73
GFR SERPL CREATININE-BSD FRML MDRD: 70 ML/MIN/1.73
GFR SERPL CREATININE-BSD FRML MDRD: 71 ML/MIN/1.73
GFR SERPL CREATININE-BSD FRML MDRD: 72 ML/MIN/1.73
GFR SERPL CREATININE-BSD FRML MDRD: 73 ML/MIN/1.73
GFR SERPL CREATININE-BSD FRML MDRD: 74 ML/MIN/1.73
GFR SERPL CREATININE-BSD FRML MDRD: 75 ML/MIN/1.73
GFR SERPL CREATININE-BSD FRML MDRD: 75 ML/MIN/1.73
GFR SERPL CREATININE-BSD FRML MDRD: 76 ML/MIN/1.73
GFR SERPL CREATININE-BSD FRML MDRD: 78 ML/MIN/1.73
GFR SERPL CREATININE-BSD FRML MDRD: 79 ML/MIN/1.73
GFR SERPL CREATININE-BSD FRML MDRD: 79 ML/MIN/1.73
GFR SERPL CREATININE-BSD FRML MDRD: 80 ML/MIN/1.73
GFR SERPL CREATININE-BSD FRML MDRD: 81 ML/MIN/1.73
GFR SERPL CREATININE-BSD FRML MDRD: 82 ML/MIN/1.73
GFR SERPL CREATININE-BSD FRML MDRD: 82 ML/MIN/1.73
GFR SERPL CREATININE-BSD FRML MDRD: 83 ML/MIN/1.73
GFR SERPL CREATININE-BSD FRML MDRD: 84 ML/MIN/1.73
GFR SERPL CREATININE-BSD FRML MDRD: 86 ML/MIN/1.73
GFR SERPL CREATININE-BSD FRML MDRD: 87 ML/MIN/1.73
GFR SERPL CREATININE-BSD FRML MDRD: 87 ML/MIN/1.73
GFR SERPL CREATININE-BSD FRML MDRD: 88 ML/MIN/1.73
GFR SERPL CREATININE-BSD FRML MDRD: 88 ML/MIN/1.73
GFR SERPL CREATININE-BSD FRML MDRD: 90 ML/MIN/1.73
GFR SERPL CREATININE-BSD FRML MDRD: 90 ML/MIN/1.73
GFR SERPL CREATININE-BSD FRML MDRD: 91 ML/MIN/1.73
GFR SERPL CREATININE-BSD FRML MDRD: 92 ML/MIN/1.73
GFR SERPL CREATININE-BSD FRML MDRD: 93 ML/MIN/1.73
GFR SERPL CREATININE-BSD FRML MDRD: 98 ML/MIN/1.73
GFR SERPL CREATININE-BSD FRML MDRD: 99 ML/MIN/1.73
GIE STN SPEC: NORMAL
GIE STN SPEC: NORMAL
GLOBULIN UR ELPH-MCNC: 2.4 GM/DL
GLOBULIN UR ELPH-MCNC: 2.4 GM/DL
GLOBULIN UR ELPH-MCNC: 2.6 GM/DL
GLOBULIN UR ELPH-MCNC: 2.7 GM/DL
GLOBULIN UR ELPH-MCNC: 2.8 GM/DL
GLOBULIN UR ELPH-MCNC: 2.8 GM/DL
GLOBULIN UR ELPH-MCNC: 2.9 GM/DL
GLOBULIN UR ELPH-MCNC: 3.1 GM/DL
GLOBULIN UR ELPH-MCNC: 3.4 GM/DL
GLOBULIN UR ELPH-MCNC: 3.8 GM/DL
GLOBULIN UR ELPH-MCNC: 4 GM/DL
GLOBULIN UR ELPH-MCNC: 4.4 GM/DL
GLUCOSE BLD-MCNC: 101 MG/DL (ref 65–99)
GLUCOSE BLD-MCNC: 101 MG/DL (ref 65–99)
GLUCOSE BLD-MCNC: 103 MG/DL (ref 65–99)
GLUCOSE BLD-MCNC: 105 MG/DL (ref 65–99)
GLUCOSE BLD-MCNC: 107 MG/DL (ref 65–99)
GLUCOSE BLD-MCNC: 108 MG/DL (ref 65–99)
GLUCOSE BLD-MCNC: 108 MG/DL (ref 65–99)
GLUCOSE BLD-MCNC: 109 MG/DL (ref 65–99)
GLUCOSE BLD-MCNC: 111 MG/DL (ref 65–99)
GLUCOSE BLD-MCNC: 113 MG/DL (ref 65–99)
GLUCOSE BLD-MCNC: 113 MG/DL (ref 65–99)
GLUCOSE BLD-MCNC: 114 MG/DL (ref 65–99)
GLUCOSE BLD-MCNC: 116 MG/DL (ref 65–99)
GLUCOSE BLD-MCNC: 116 MG/DL (ref 65–99)
GLUCOSE BLD-MCNC: 117 MG/DL (ref 65–99)
GLUCOSE BLD-MCNC: 118 MG/DL (ref 65–99)
GLUCOSE BLD-MCNC: 119 MG/DL (ref 65–99)
GLUCOSE BLD-MCNC: 119 MG/DL (ref 65–99)
GLUCOSE BLD-MCNC: 121 MG/DL (ref 65–99)
GLUCOSE BLD-MCNC: 121 MG/DL (ref 65–99)
GLUCOSE BLD-MCNC: 124 MG/DL (ref 65–99)
GLUCOSE BLD-MCNC: 125 MG/DL (ref 65–99)
GLUCOSE BLD-MCNC: 128 MG/DL (ref 65–99)
GLUCOSE BLD-MCNC: 129 MG/DL (ref 65–99)
GLUCOSE BLD-MCNC: 129 MG/DL (ref 65–99)
GLUCOSE BLD-MCNC: 130 MG/DL (ref 65–99)
GLUCOSE BLD-MCNC: 131 MG/DL (ref 65–99)
GLUCOSE BLD-MCNC: 132 MG/DL (ref 65–99)
GLUCOSE BLD-MCNC: 133 MG/DL (ref 65–99)
GLUCOSE BLD-MCNC: 133 MG/DL (ref 65–99)
GLUCOSE BLD-MCNC: 135 MG/DL (ref 65–99)
GLUCOSE BLD-MCNC: 137 MG/DL (ref 65–99)
GLUCOSE BLD-MCNC: 138 MG/DL (ref 65–99)
GLUCOSE BLD-MCNC: 139 MG/DL (ref 65–99)
GLUCOSE BLD-MCNC: 140 MG/DL (ref 65–99)
GLUCOSE BLD-MCNC: 141 MG/DL (ref 65–99)
GLUCOSE BLD-MCNC: 144 MG/DL (ref 65–99)
GLUCOSE BLD-MCNC: 144 MG/DL (ref 65–99)
GLUCOSE BLD-MCNC: 145 MG/DL (ref 65–99)
GLUCOSE BLD-MCNC: 145 MG/DL (ref 65–99)
GLUCOSE BLD-MCNC: 152 MG/DL (ref 65–99)
GLUCOSE BLD-MCNC: 156 MG/DL (ref 65–99)
GLUCOSE BLD-MCNC: 159 MG/DL (ref 65–99)
GLUCOSE BLD-MCNC: 160 MG/DL (ref 65–99)
GLUCOSE BLD-MCNC: 168 MG/DL (ref 65–99)
GLUCOSE BLD-MCNC: 168 MG/DL (ref 65–99)
GLUCOSE BLD-MCNC: 171 MG/DL (ref 65–99)
GLUCOSE BLD-MCNC: 184 MG/DL (ref 65–99)
GLUCOSE BLD-MCNC: 241 MG/DL (ref 65–99)
GLUCOSE BLD-MCNC: 273 MG/DL (ref 65–99)
GLUCOSE BLD-MCNC: 396 MG/DL (ref 65–99)
GLUCOSE BLD-MCNC: 449 MG/DL (ref 65–99)
GLUCOSE BLD-MCNC: 563 MG/DL (ref 65–99)
GLUCOSE BLD-MCNC: 83 MG/DL (ref 65–99)
GLUCOSE BLD-MCNC: 90 MG/DL (ref 65–99)
GLUCOSE BLD-MCNC: 91 MG/DL (ref 65–99)
GLUCOSE BLD-MCNC: 94 MG/DL (ref 65–99)
GLUCOSE BLD-MCNC: 96 MG/DL (ref 65–99)
GLUCOSE BLD-MCNC: 96 MG/DL (ref 65–99)
GLUCOSE BLD-MCNC: 97 MG/DL (ref 65–99)
GLUCOSE BLD-MCNC: 98 MG/DL (ref 65–99)
GLUCOSE BLD-MCNC: 98 MG/DL (ref 65–99)
GLUCOSE BLDC GLUCOMTR-MCNC: 114 MG/DL (ref 70–130)
GLUCOSE BLDC GLUCOMTR-MCNC: 116 MG/DL (ref 70–130)
GLUCOSE BLDC GLUCOMTR-MCNC: 137 MG/DL (ref 70–130)
GLUCOSE BLDC GLUCOMTR-MCNC: 162 MG/DL (ref 70–130)
GLUCOSE BLDC GLUCOMTR-MCNC: 163 MG/DL (ref 70–130)
GLUCOSE BLDC GLUCOMTR-MCNC: 189 MG/DL (ref 70–130)
GLUCOSE BLDC GLUCOMTR-MCNC: 232 MG/DL (ref 70–130)
GLUCOSE BLDC GLUCOMTR-MCNC: 234 MG/DL (ref 70–130)
GLUCOSE BLDC GLUCOMTR-MCNC: 256 MG/DL (ref 70–130)
GLUCOSE BLDC GLUCOMTR-MCNC: 256 MG/DL (ref 70–130)
GLUCOSE BLDC GLUCOMTR-MCNC: 275 MG/DL (ref 70–130)
GLUCOSE BLDC GLUCOMTR-MCNC: 284 MG/DL (ref 70–130)
GLUCOSE BLDC GLUCOMTR-MCNC: 293 MG/DL (ref 70–130)
GLUCOSE BLDC GLUCOMTR-MCNC: 306 MG/DL (ref 70–130)
GLUCOSE BLDC GLUCOMTR-MCNC: 318 MG/DL (ref 70–130)
GLUCOSE BLDC GLUCOMTR-MCNC: 336 MG/DL (ref 70–130)
GLUCOSE BLDC GLUCOMTR-MCNC: 375 MG/DL (ref 70–130)
GLUCOSE BLDC GLUCOMTR-MCNC: 429 MG/DL (ref 70–130)
GLUCOSE BLDC GLUCOMTR-MCNC: 445 MG/DL (ref 70–130)
GLUCOSE BLDC GLUCOMTR-MCNC: 446 MG/DL (ref 70–130)
GLUCOSE UR STRIP-MCNC: NEGATIVE MG/DL
GRAM STN SPEC: NORMAL
HADV DNA SPEC NAA+PROBE: NOT DETECTED
HBA1C MFR BLD: 10.7 % (ref 4.8–5.6)
HBA1C MFR BLD: 5.5 % (ref 4.8–5.6)
HCO3 BLDA-SCNC: 23.9 MMOL/L (ref 20–26)
HCOV 229E RNA SPEC QL NAA+PROBE: NOT DETECTED
HCOV HKU1 RNA SPEC QL NAA+PROBE: NOT DETECTED
HCOV NL63 RNA SPEC QL NAA+PROBE: NOT DETECTED
HCOV OC43 RNA SPEC QL NAA+PROBE: NOT DETECTED
HCT VFR BLD AUTO: 24.6 % (ref 37.5–51)
HCT VFR BLD AUTO: 27.8 % (ref 37.5–51)
HCT VFR BLD AUTO: 28.8 % (ref 37.5–51)
HCT VFR BLD AUTO: 29.9 % (ref 37.5–51)
HCT VFR BLD AUTO: 30 % (ref 37.5–51)
HCT VFR BLD AUTO: 30.8 % (ref 37.5–51)
HCT VFR BLD AUTO: 31.5 % (ref 37.5–51)
HCT VFR BLD AUTO: 31.9 % (ref 37.5–51)
HCT VFR BLD AUTO: 31.9 % (ref 37.5–51)
HCT VFR BLD AUTO: 32 % (ref 37.5–51)
HCT VFR BLD AUTO: 32 % (ref 37.5–51)
HCT VFR BLD AUTO: 32.1 % (ref 37.5–51)
HCT VFR BLD AUTO: 32.1 % (ref 37.5–51)
HCT VFR BLD AUTO: 32.2 % (ref 37.5–51)
HCT VFR BLD AUTO: 32.2 % (ref 37.5–51)
HCT VFR BLD AUTO: 33.1 % (ref 37.5–51)
HCT VFR BLD AUTO: 33.3 % (ref 37.5–51)
HCT VFR BLD AUTO: 33.7 % (ref 37.5–51)
HCT VFR BLD AUTO: 33.9 % (ref 37.5–51)
HCT VFR BLD AUTO: 34.2 % (ref 37.5–51)
HCT VFR BLD AUTO: 35.1 % (ref 37.5–51)
HCT VFR BLD AUTO: 35.5 % (ref 37.5–51)
HCT VFR BLD AUTO: 36.5 % (ref 37.5–51)
HCT VFR BLD AUTO: 37 % (ref 37.5–51)
HCT VFR BLD AUTO: 37.1 % (ref 37.5–51)
HCT VFR BLD AUTO: 38.6 % (ref 37.5–51)
HCT VFR BLD AUTO: 39.6 % (ref 37.5–51)
HCT VFR BLD CALC: 30.5 %
HGB BLD-MCNC: 10.1 G/DL (ref 13–17.7)
HGB BLD-MCNC: 10.5 G/DL (ref 13–17.7)
HGB BLD-MCNC: 10.5 G/DL (ref 13–17.7)
HGB BLD-MCNC: 10.7 G/DL (ref 13–17.7)
HGB BLD-MCNC: 10.7 G/DL (ref 13–17.7)
HGB BLD-MCNC: 10.8 G/DL (ref 13–17.7)
HGB BLD-MCNC: 10.9 G/DL (ref 13–17.7)
HGB BLD-MCNC: 11 G/DL (ref 13–17.7)
HGB BLD-MCNC: 11.2 G/DL (ref 13–17.7)
HGB BLD-MCNC: 11.2 G/DL (ref 13–17.7)
HGB BLD-MCNC: 11.4 G/DL (ref 13–17.7)
HGB BLD-MCNC: 11.5 G/DL (ref 13–17.7)
HGB BLD-MCNC: 11.6 G/DL (ref 13–17.7)
HGB BLD-MCNC: 11.6 G/DL (ref 13–17.7)
HGB BLD-MCNC: 11.8 G/DL (ref 13–17.7)
HGB BLD-MCNC: 12 G/DL (ref 13–17.7)
HGB BLD-MCNC: 12.1 G/DL (ref 13–17.7)
HGB BLD-MCNC: 12.8 G/DL (ref 13–17.7)
HGB BLD-MCNC: 12.9 G/DL (ref 13–17.7)
HGB BLD-MCNC: 12.9 G/DL (ref 13–17.7)
HGB BLD-MCNC: 8.3 G/DL (ref 13–17.7)
HGB BLD-MCNC: 9 G/DL (ref 13–17.7)
HGB BLD-MCNC: 9.6 G/DL (ref 13–17.7)
HGB BLD-MCNC: 9.8 G/DL (ref 13–17.7)
HGB BLDA-MCNC: 9.9 G/DL (ref 13.5–17.5)
HGB UR QL STRIP.AUTO: NEGATIVE
HMPV RNA NPH QL NAA+NON-PROBE: NOT DETECTED
HOLD SPECIMEN: NORMAL
HOROWITZ INDEX BLD+IHG-RTO: 80 %
HPIV1 RNA SPEC QL NAA+PROBE: NOT DETECTED
HPIV2 RNA SPEC QL NAA+PROBE: NOT DETECTED
HPIV3 RNA NPH QL NAA+PROBE: NOT DETECTED
HPIV4 P GENE NPH QL NAA+PROBE: NOT DETECTED
IMM GRANULOCYTES # BLD AUTO: 0.02 10*3/MM3 (ref 0–0.05)
IMM GRANULOCYTES # BLD AUTO: 0.02 10*3/MM3 (ref 0–0.05)
IMM GRANULOCYTES # BLD AUTO: 0.03 10*3/MM3 (ref 0–0.05)
IMM GRANULOCYTES # BLD AUTO: 0.05 10*3/MM3 (ref 0–0.05)
IMM GRANULOCYTES # BLD AUTO: 0.09 10*3/MM3 (ref 0–0.05)
IMM GRANULOCYTES # BLD AUTO: 0.11 10*3/MM3 (ref 0–0.05)
IMM GRANULOCYTES # BLD AUTO: 0.14 10*3/MM3 (ref 0–0.05)
IMM GRANULOCYTES NFR BLD AUTO: 0.2 % (ref 0–0.5)
IMM GRANULOCYTES NFR BLD AUTO: 0.3 % (ref 0–0.5)
IMM GRANULOCYTES NFR BLD AUTO: 0.3 % (ref 0–0.5)
IMM GRANULOCYTES NFR BLD AUTO: 0.5 % (ref 0–0.5)
IMM GRANULOCYTES NFR BLD AUTO: 1.6 % (ref 0–0.5)
IMM GRANULOCYTES NFR BLD AUTO: 1.7 % (ref 0–0.5)
IMM GRANULOCYTES NFR BLD AUTO: 2.1 % (ref 0–0.5)
INR PPP: 1.13 (ref 0.85–1.16)
IPAP: 0
IRON 24H UR-MRATE: 48 MCG/DL (ref 59–158)
IRON SATN MFR SERPL: 15 % (ref 20–50)
KETONES UR QL STRIP: NEGATIVE
L PNEUMO1 AG UR QL IA: NEGATIVE
LAB AP CASE REPORT: NORMAL
LAB AP CASE REPORT: NORMAL
LAB AP CLINICAL INFORMATION: NORMAL
LAB AP DIAGNOSIS COMMENT: NORMAL
LDH SERPL-CCNC: 564 U/L (ref 135–225)
LEUKOCYTE ESTERASE UR QL STRIP.AUTO: NEGATIVE
LYMPHOCYTES # BLD AUTO: 0.27 10*3/MM3 (ref 0.7–3.1)
LYMPHOCYTES # BLD AUTO: 0.38 10*3/MM3 (ref 0.7–3.1)
LYMPHOCYTES # BLD AUTO: 0.4 10*3/MM3 (ref 0.7–3.1)
LYMPHOCYTES # BLD AUTO: 0.5 10*3/MM3 (ref 0.7–3.1)
LYMPHOCYTES # BLD AUTO: 0.6 10*3/MM3 (ref 0.7–3.1)
LYMPHOCYTES # BLD AUTO: 0.7 10*3/MM3 (ref 0.7–3.1)
LYMPHOCYTES # BLD AUTO: 0.78 10*3/MM3 (ref 0.7–3.1)
LYMPHOCYTES # BLD AUTO: 1.1 10*3/MM3 (ref 0.7–3.1)
LYMPHOCYTES # BLD AUTO: 1.5 10*3/MM3 (ref 0.7–3.1)
LYMPHOCYTES # BLD AUTO: 1.52 10*3/MM3 (ref 0.7–3.1)
LYMPHOCYTES # BLD AUTO: 1.6 10*3/MM3 (ref 0.7–3.1)
LYMPHOCYTES # BLD AUTO: 1.7 10*3/MM3 (ref 0.7–3.1)
LYMPHOCYTES # BLD AUTO: 1.8 10*3/MM3 (ref 0.7–3.1)
LYMPHOCYTES # BLD AUTO: 1.8 10*3/MM3 (ref 0.7–3.1)
LYMPHOCYTES # BLD AUTO: 1.9 10*3/MM3 (ref 0.7–3.1)
LYMPHOCYTES # BLD AUTO: 2.1 10*3/MM3 (ref 0.7–3.1)
LYMPHOCYTES # BLD AUTO: 2.28 10*3/MM3 (ref 0.7–3.1)
LYMPHOCYTES # BLD AUTO: 2.4 10*3/MM3 (ref 0.7–3.1)
LYMPHOCYTES # BLD MANUAL: 0.64 10*3/MM3 (ref 0.7–3.1)
LYMPHOCYTES NFR BLD AUTO: 11.5 % (ref 19.6–45.3)
LYMPHOCYTES NFR BLD AUTO: 16.1 % (ref 19.6–45.3)
LYMPHOCYTES NFR BLD AUTO: 16.7 % (ref 19.6–45.3)
LYMPHOCYTES NFR BLD AUTO: 24.6 % (ref 19.6–45.3)
LYMPHOCYTES NFR BLD AUTO: 25.8 % (ref 19.6–45.3)
LYMPHOCYTES NFR BLD AUTO: 26.1 % (ref 19.6–45.3)
LYMPHOCYTES NFR BLD AUTO: 31.3 % (ref 19.6–45.3)
LYMPHOCYTES NFR BLD AUTO: 33.1 % (ref 19.6–45.3)
LYMPHOCYTES NFR BLD AUTO: 4.3 % (ref 19.6–45.3)
LYMPHOCYTES NFR BLD AUTO: 41.9 % (ref 19.6–45.3)
LYMPHOCYTES NFR BLD AUTO: 5.2 % (ref 19.6–45.3)
LYMPHOCYTES NFR BLD AUTO: 5.3 % (ref 19.6–45.3)
LYMPHOCYTES NFR BLD AUTO: 51.6 % (ref 19.6–45.3)
LYMPHOCYTES NFR BLD AUTO: 52.1 % (ref 19.6–45.3)
LYMPHOCYTES NFR BLD AUTO: 6.5 % (ref 19.6–45.3)
LYMPHOCYTES NFR BLD AUTO: 61.1 % (ref 19.6–45.3)
LYMPHOCYTES NFR BLD AUTO: 7.9 % (ref 19.6–45.3)
LYMPHOCYTES NFR BLD AUTO: 9.5 % (ref 19.6–45.3)
LYMPHOCYTES NFR BLD MANUAL: 1 % (ref 5–12)
LYMPHOCYTES NFR BLD MANUAL: 9 % (ref 19.6–45.3)
M PNEUMO IGG SER IA-ACNC: NOT DETECTED
M PNEUMONIAE IGG ABS: <100 U/ML (ref 0–99)
M PNEUMONIAE IGM ABS: <770 U/ML (ref 0–769)
MAGNESIUM SERPL-MCNC: 1.5 MG/DL (ref 1.6–2.4)
MAGNESIUM SERPL-MCNC: 1.7 MG/DL (ref 1.6–2.4)
MAGNESIUM SERPL-MCNC: 1.7 MG/DL (ref 1.6–2.4)
MAGNESIUM SERPL-MCNC: 2.5 MG/DL (ref 1.6–2.4)
MCH RBC QN AUTO: 28.1 PG (ref 26.6–33)
MCH RBC QN AUTO: 28.2 PG (ref 26.6–33)
MCH RBC QN AUTO: 28.6 PG (ref 26.6–33)
MCH RBC QN AUTO: 28.6 PG (ref 26.6–33)
MCH RBC QN AUTO: 28.9 PG (ref 26.6–33)
MCH RBC QN AUTO: 29.1 PG (ref 26.6–33)
MCH RBC QN AUTO: 29.4 PG (ref 26.6–33)
MCH RBC QN AUTO: 29.5 PG (ref 26.6–33)
MCH RBC QN AUTO: 29.7 PG (ref 26.6–33)
MCH RBC QN AUTO: 29.9 PG (ref 26.6–33)
MCH RBC QN AUTO: 30 PG (ref 26.6–33)
MCH RBC QN AUTO: 30 PG (ref 26.6–33)
MCH RBC QN AUTO: 30.1 PG (ref 26.6–33)
MCH RBC QN AUTO: 30.3 PG (ref 26.6–33)
MCH RBC QN AUTO: 30.4 PG (ref 26.6–33)
MCH RBC QN AUTO: 30.6 PG (ref 26.6–33)
MCH RBC QN AUTO: 30.7 PG (ref 26.6–33)
MCH RBC QN AUTO: 30.8 PG (ref 26.6–33)
MCH RBC QN AUTO: 30.8 PG (ref 26.6–33)
MCH RBC QN AUTO: 31.1 PG (ref 26.6–33)
MCH RBC QN AUTO: 31.2 PG (ref 26.6–33)
MCH RBC QN AUTO: 31.4 PG (ref 26.6–33)
MCH RBC QN AUTO: 31.9 PG (ref 26.6–33)
MCH RBC QN AUTO: 32.2 PG (ref 26.6–33)
MCH RBC QN AUTO: 32.8 PG (ref 26.6–33)
MCH RBC QN AUTO: 33.5 PG (ref 26.6–33)
MCHC RBC AUTO-ENTMCNC: 30.4 G/DL (ref 31.5–35.7)
MCHC RBC AUTO-ENTMCNC: 31.5 G/DL (ref 31.5–35.7)
MCHC RBC AUTO-ENTMCNC: 32.4 G/DL (ref 31.5–35.7)
MCHC RBC AUTO-ENTMCNC: 32.4 G/DL (ref 31.5–35.7)
MCHC RBC AUTO-ENTMCNC: 32.6 G/DL (ref 31.5–35.7)
MCHC RBC AUTO-ENTMCNC: 32.7 G/DL (ref 31.5–35.7)
MCHC RBC AUTO-ENTMCNC: 32.7 G/DL (ref 31.5–35.7)
MCHC RBC AUTO-ENTMCNC: 32.9 G/DL (ref 31.5–35.7)
MCHC RBC AUTO-ENTMCNC: 33 G/DL (ref 31.5–35.7)
MCHC RBC AUTO-ENTMCNC: 33 G/DL (ref 31.5–35.7)
MCHC RBC AUTO-ENTMCNC: 33.2 G/DL (ref 31.5–35.7)
MCHC RBC AUTO-ENTMCNC: 33.2 G/DL (ref 31.5–35.7)
MCHC RBC AUTO-ENTMCNC: 33.3 G/DL (ref 31.5–35.7)
MCHC RBC AUTO-ENTMCNC: 33.5 G/DL (ref 31.5–35.7)
MCHC RBC AUTO-ENTMCNC: 33.6 G/DL (ref 31.5–35.7)
MCHC RBC AUTO-ENTMCNC: 33.8 G/DL (ref 31.5–35.7)
MCHC RBC AUTO-ENTMCNC: 33.9 G/DL (ref 31.5–35.7)
MCHC RBC AUTO-ENTMCNC: 34 G/DL (ref 31.5–35.7)
MCHC RBC AUTO-ENTMCNC: 34.1 G/DL (ref 31.5–35.7)
MCHC RBC AUTO-ENTMCNC: 34.2 G/DL (ref 31.5–35.7)
MCHC RBC AUTO-ENTMCNC: 34.2 G/DL (ref 31.5–35.7)
MCHC RBC AUTO-ENTMCNC: 34.6 G/DL (ref 31.5–35.7)
MCHC RBC AUTO-ENTMCNC: 34.8 G/DL (ref 31.5–35.7)
MCHC RBC AUTO-ENTMCNC: 35.7 G/DL (ref 31.5–35.7)
MCHC RBC AUTO-ENTMCNC: 36.1 G/DL (ref 31.5–35.7)
MCHC RBC AUTO-ENTMCNC: 36.2 G/DL (ref 31.5–35.7)
MCV RBC AUTO: 101.9 FL (ref 79–97)
MCV RBC AUTO: 82.4 FL (ref 79–97)
MCV RBC AUTO: 83.1 FL (ref 79–97)
MCV RBC AUTO: 84 FL (ref 79–97)
MCV RBC AUTO: 84.7 FL (ref 79–97)
MCV RBC AUTO: 85.5 FL (ref 79–97)
MCV RBC AUTO: 86.7 FL (ref 79–97)
MCV RBC AUTO: 88.2 FL (ref 79–97)
MCV RBC AUTO: 88.3 FL (ref 79–97)
MCV RBC AUTO: 88.3 FL (ref 79–97)
MCV RBC AUTO: 88.5 FL (ref 79–97)
MCV RBC AUTO: 88.7 FL (ref 79–97)
MCV RBC AUTO: 88.7 FL (ref 79–97)
MCV RBC AUTO: 88.8 FL (ref 79–97)
MCV RBC AUTO: 89.2 FL (ref 79–97)
MCV RBC AUTO: 89.3 FL (ref 79–97)
MCV RBC AUTO: 90.1 FL (ref 79–97)
MCV RBC AUTO: 90.4 FL (ref 79–97)
MCV RBC AUTO: 90.7 FL (ref 79–97)
MCV RBC AUTO: 90.9 FL (ref 79–97)
MCV RBC AUTO: 92.1 FL (ref 79–97)
MCV RBC AUTO: 92.2 FL (ref 79–97)
MCV RBC AUTO: 92.3 FL (ref 79–97)
MCV RBC AUTO: 93.7 FL (ref 79–97)
MCV RBC AUTO: 94 FL (ref 79–97)
MCV RBC AUTO: 95.9 FL (ref 79–97)
MCV RBC AUTO: 96.9 FL (ref 79–97)
MCV RBC AUTO: 97.7 FL (ref 79–97)
METHADONE UR QL SCN: NEGATIVE
METHGB BLD QL: 1.1 % (ref 0–1.5)
MODALITY: ABNORMAL
MONOCYTES # BLD AUTO: 0.07 10*3/MM3 (ref 0.1–0.9)
MONOCYTES # BLD AUTO: 0.1 10*3/MM3 (ref 0.1–0.9)
MONOCYTES # BLD AUTO: 0.12 10*3/MM3 (ref 0.1–0.9)
MONOCYTES # BLD AUTO: 0.13 10*3/MM3 (ref 0.1–0.9)
MONOCYTES # BLD AUTO: 0.2 10*3/MM3 (ref 0.1–0.9)
MONOCYTES # BLD AUTO: 0.22 10*3/MM3 (ref 0.1–0.9)
MONOCYTES # BLD AUTO: 0.3 10*3/MM3 (ref 0.1–0.9)
MONOCYTES # BLD AUTO: 0.4 10*3/MM3 (ref 0.1–0.9)
MONOCYTES # BLD AUTO: 0.5 10*3/MM3 (ref 0.1–0.9)
MONOCYTES # BLD AUTO: 0.5 10*3/MM3 (ref 0.1–0.9)
MONOCYTES # BLD AUTO: 0.6 10*3/MM3 (ref 0.1–0.9)
MONOCYTES # BLD AUTO: 0.61 10*3/MM3 (ref 0.1–0.9)
MONOCYTES # BLD AUTO: 0.75 10*3/MM3 (ref 0.1–0.9)
MONOCYTES # BLD AUTO: 0.87 10*3/MM3 (ref 0.1–0.9)
MONOCYTES # BLD AUTO: 0.98 10*3/MM3 (ref 0.1–0.9)
MONOCYTES NFR BLD AUTO: 10 % (ref 5–12)
MONOCYTES NFR BLD AUTO: 10.3 % (ref 5–12)
MONOCYTES NFR BLD AUTO: 10.7 % (ref 5–12)
MONOCYTES NFR BLD AUTO: 10.7 % (ref 5–12)
MONOCYTES NFR BLD AUTO: 11 % (ref 5–12)
MONOCYTES NFR BLD AUTO: 11.3 % (ref 5–12)
MONOCYTES NFR BLD AUTO: 11.4 % (ref 5–12)
MONOCYTES NFR BLD AUTO: 2 % (ref 5–12)
MONOCYTES NFR BLD AUTO: 2.3 % (ref 5–12)
MONOCYTES NFR BLD AUTO: 2.5 % (ref 5–12)
MONOCYTES NFR BLD AUTO: 2.5 % (ref 5–12)
MONOCYTES NFR BLD AUTO: 2.9 % (ref 5–12)
MONOCYTES NFR BLD AUTO: 3.1 % (ref 5–12)
MONOCYTES NFR BLD AUTO: 3.4 % (ref 5–12)
MONOCYTES NFR BLD AUTO: 6.2 % (ref 5–12)
MONOCYTES NFR BLD AUTO: 6.2 % (ref 5–12)
MONOCYTES NFR BLD AUTO: 8.1 % (ref 5–12)
MONOCYTES NFR BLD AUTO: 9.5 % (ref 5–12)
MRSA DNA SPEC QL NAA+PROBE: NEGATIVE
MRSA DNA SPEC QL NAA+PROBE: NEGATIVE
MYCOBACTERIUM SPEC CULT: NORMAL
MYELOCYTES NFR BLD MANUAL: 3 % (ref 0–0)
NEUTROPHILS # BLD AUTO: 0.7 10*3/MM3 (ref 1.7–7)
NEUTROPHILS # BLD AUTO: 1.1 10*3/MM3 (ref 1.7–7)
NEUTROPHILS # BLD AUTO: 1.4 10*3/MM3 (ref 1.7–7)
NEUTROPHILS # BLD AUTO: 11.5 10*3/MM3 (ref 1.7–7)
NEUTROPHILS # BLD AUTO: 2.1 10*3/MM3 (ref 1.7–7)
NEUTROPHILS # BLD AUTO: 3.5 10*3/MM3 (ref 1.7–7)
NEUTROPHILS # BLD AUTO: 3.7 10*3/MM3 (ref 1.7–7)
NEUTROPHILS # BLD AUTO: 4.2 10*3/MM3 (ref 1.7–7)
NEUTROPHILS # BLD AUTO: 4.38 10*3/MM3 (ref 1.7–7)
NEUTROPHILS # BLD AUTO: 4.6 10*3/MM3 (ref 1.7–7)
NEUTROPHILS # BLD AUTO: 4.67 10*3/MM3 (ref 1.7–7)
NEUTROPHILS # BLD AUTO: 4.95 10*3/MM3 (ref 1.7–7)
NEUTROPHILS # BLD AUTO: 5.58 10*3/MM3 (ref 1.7–7)
NEUTROPHILS # BLD AUTO: 6.16 10*3/MM3 (ref 1.7–7)
NEUTROPHILS # BLD AUTO: 6.55 10*3/MM3 (ref 1.7–7)
NEUTROPHILS # BLD AUTO: 7.1 10*3/MM3 (ref 1.7–7)
NEUTROPHILS # BLD AUTO: 7.5 10*3/MM3 (ref 1.7–7)
NEUTROPHILS # BLD AUTO: 8.16 10*3/MM3 (ref 1.7–7)
NEUTROPHILS # BLD AUTO: 8.38 10*3/MM3 (ref 1.7–7)
NEUTROPHILS NFR BLD AUTO: 27.5 % (ref 42.7–76)
NEUTROPHILS NFR BLD AUTO: 36.6 % (ref 42.7–76)
NEUTROPHILS NFR BLD AUTO: 37.7 % (ref 42.7–76)
NEUTROPHILS NFR BLD AUTO: 47.8 % (ref 42.7–76)
NEUTROPHILS NFR BLD AUTO: 57.4 % (ref 42.7–76)
NEUTROPHILS NFR BLD AUTO: 60.6 % (ref 42.7–76)
NEUTROPHILS NFR BLD AUTO: 63.8 % (ref 42.7–76)
NEUTROPHILS NFR BLD AUTO: 68 % (ref 42.7–76)
NEUTROPHILS NFR BLD AUTO: 71.9 % (ref 42.7–76)
NEUTROPHILS NFR BLD AUTO: 72 % (ref 42.7–76)
NEUTROPHILS NFR BLD AUTO: 72.6 % (ref 42.7–76)
NEUTROPHILS NFR BLD AUTO: 84.1 % (ref 42.7–76)
NEUTROPHILS NFR BLD AUTO: 85.4 % (ref 42.7–76)
NEUTROPHILS NFR BLD AUTO: 88.5 % (ref 42.7–76)
NEUTROPHILS NFR BLD AUTO: 90 % (ref 42.7–76)
NEUTROPHILS NFR BLD AUTO: 90.6 % (ref 42.7–76)
NEUTROPHILS NFR BLD AUTO: 91.5 % (ref 42.7–76)
NEUTROPHILS NFR BLD AUTO: 91.6 % (ref 42.7–76)
NEUTROPHILS NFR BLD MANUAL: 84 % (ref 42.7–76)
NEUTS BAND NFR BLD MANUAL: 3 % (ref 0–5)
NIGHT BLUE STAIN TISS: NORMAL
NITRITE UR QL STRIP: NEGATIVE
NOTE: ABNORMAL
NRBC BLD AUTO-RTO: 0 /100 WBC (ref 0–0.2)
NRBC BLD AUTO-RTO: 0.2 /100 WBC (ref 0–0.2)
NT-PROBNP SERPL-MCNC: 374.7 PG/ML (ref 5–900)
NT-PROBNP SERPL-MCNC: 98.3 PG/ML (ref 5–900)
OPIATES UR QL: NEGATIVE
OSMOLALITY SERPL: 247 MOSM/KG (ref 275–295)
OSMOLALITY SERPL: 257 MOSM/KG (ref 275–295)
OSMOLALITY UR: 466 MOSM/KG (ref 300–1100)
OSMOLALITY UR: 546 MOSM/KG (ref 300–1100)
OSMOLALITY UR: 589 MOSM/KG (ref 300–1100)
OSMOLALITY UR: 613 MOSM/KG (ref 300–1100)
OSMOLALITY UR: 851 MOSM/KG (ref 300–1100)
OSMOLALITY UR: 945 MOSM/KG (ref 300–1100)
OXYCODONE UR QL SCN: NEGATIVE
OXYHGB MFR BLDV: 97.9 % (ref 94–99)
PATH REPORT.FINAL DX SPEC: NORMAL
PATH REPORT.FINAL DX SPEC: NORMAL
PATH REPORT.GROSS SPEC: NORMAL
PAW @ PEAK INSP FLOW SETTING VENT: 0 CMH2O
PCO2 BLDA: 30.7 MM HG
PCO2 TEMP ADJ BLD: 30.7 MM HG (ref 35–48)
PCP UR QL SCN: NEGATIVE
PH BLDA: 7.5 PH UNITS (ref 7.35–7.45)
PH UR STRIP.AUTO: 6.5 [PH] (ref 5–8)
PH, TEMP CORRECTED: 7.5 PH UNITS
PHOSPHATE SERPL-MCNC: 2.6 MG/DL (ref 2.5–4.5)
PLAT MORPH BLD: NORMAL
PLAT MORPH BLD: NORMAL
PLATELET # BLD AUTO: 109 10*3/MM3 (ref 140–450)
PLATELET # BLD AUTO: 117 10*3/MM3 (ref 140–450)
PLATELET # BLD AUTO: 119 10*3/MM3 (ref 140–450)
PLATELET # BLD AUTO: 127 10*3/MM3 (ref 140–450)
PLATELET # BLD AUTO: 132 10*3/MM3 (ref 140–450)
PLATELET # BLD AUTO: 138 10*3/MM3 (ref 140–450)
PLATELET # BLD AUTO: 155 10*3/MM3 (ref 140–450)
PLATELET # BLD AUTO: 157 10*3/MM3 (ref 140–450)
PLATELET # BLD AUTO: 159 10*3/MM3 (ref 140–450)
PLATELET # BLD AUTO: 159 10*3/MM3 (ref 140–450)
PLATELET # BLD AUTO: 161 10*3/MM3 (ref 140–450)
PLATELET # BLD AUTO: 164 10*3/MM3 (ref 140–450)
PLATELET # BLD AUTO: 165 10*3/MM3 (ref 140–450)
PLATELET # BLD AUTO: 167 10*3/MM3 (ref 140–450)
PLATELET # BLD AUTO: 169 10*3/MM3 (ref 140–450)
PLATELET # BLD AUTO: 169 10*3/MM3 (ref 140–450)
PLATELET # BLD AUTO: 174 10*3/MM3 (ref 140–450)
PLATELET # BLD AUTO: 175 10*3/MM3 (ref 140–450)
PLATELET # BLD AUTO: 177 10*3/MM3 (ref 140–450)
PLATELET # BLD AUTO: 181 10*3/MM3 (ref 140–450)
PLATELET # BLD AUTO: 181 10*3/MM3 (ref 140–450)
PLATELET # BLD AUTO: 184 10*3/MM3 (ref 140–450)
PLATELET # BLD AUTO: 185 10*3/MM3 (ref 140–450)
PLATELET # BLD AUTO: 186 10*3/MM3 (ref 140–450)
PLATELET # BLD AUTO: 207 10*3/MM3 (ref 140–450)
PLATELET # BLD AUTO: 271 10*3/MM3 (ref 140–450)
PLATELET # BLD AUTO: 59 10*3/MM3 (ref 140–450)
PLATELET # BLD AUTO: 79 10*3/MM3 (ref 140–450)
PMV BLD AUTO: 10.3 FL (ref 6–12)
PMV BLD AUTO: 10.5 FL (ref 6–12)
PMV BLD AUTO: 10.6 FL (ref 6–12)
PMV BLD AUTO: 11.1 FL (ref 6–12)
PMV BLD AUTO: 6.1 FL (ref 6–12)
PMV BLD AUTO: 6.7 FL (ref 6–12)
PMV BLD AUTO: 6.8 FL (ref 6–12)
PMV BLD AUTO: 6.9 FL (ref 6–12)
PMV BLD AUTO: 6.9 FL (ref 6–12)
PMV BLD AUTO: 7 FL (ref 6–12)
PMV BLD AUTO: 7.2 FL (ref 6–12)
PMV BLD AUTO: 7.9 FL (ref 6–12)
PMV BLD AUTO: 8.5 FL (ref 6–12)
PMV BLD AUTO: 8.7 FL (ref 6–12)
PMV BLD AUTO: 8.8 FL (ref 6–12)
PMV BLD AUTO: 8.9 FL (ref 6–12)
PMV BLD AUTO: 9 FL (ref 6–12)
PMV BLD AUTO: 9.4 FL (ref 6–12)
PMV BLD AUTO: 9.6 FL (ref 6–12)
PMV BLD AUTO: 9.7 FL (ref 6–12)
PO2 BLDA: 227 MM HG (ref 83–108)
PO2 TEMP ADJ BLD: 227 MM HG (ref 83–108)
POTASSIUM BLD-SCNC: 3.6 MMOL/L (ref 3.5–5.2)
POTASSIUM BLD-SCNC: 3.7 MMOL/L (ref 3.5–5.2)
POTASSIUM BLD-SCNC: 3.8 MMOL/L (ref 3.5–5.2)
POTASSIUM BLD-SCNC: 3.9 MMOL/L (ref 3.5–5.2)
POTASSIUM BLD-SCNC: 4 MMOL/L (ref 3.5–5.2)
POTASSIUM BLD-SCNC: 4.1 MMOL/L (ref 3.5–5.2)
POTASSIUM BLD-SCNC: 4.2 MMOL/L (ref 3.5–5.2)
POTASSIUM BLD-SCNC: 4.3 MMOL/L (ref 3.5–5.2)
POTASSIUM BLD-SCNC: 4.4 MMOL/L (ref 3.5–5.2)
POTASSIUM BLD-SCNC: 4.5 MMOL/L (ref 3.5–5.2)
POTASSIUM BLD-SCNC: 4.6 MMOL/L (ref 3.5–5.2)
POTASSIUM BLD-SCNC: 4.7 MMOL/L (ref 3.5–5.2)
POTASSIUM BLD-SCNC: 4.7 MMOL/L (ref 3.5–5.2)
POTASSIUM BLD-SCNC: 4.8 MMOL/L (ref 3.5–5.2)
POTASSIUM BLD-SCNC: 4.9 MMOL/L (ref 3.5–5.2)
POTASSIUM BLD-SCNC: 5 MMOL/L (ref 3.5–5.2)
POTASSIUM BLD-SCNC: 5.3 MMOL/L (ref 3.5–5.2)
POTASSIUM UR-SCNC: 13.5 MMOL/L
POTASSIUM UR-SCNC: 20.9 MMOL/L
POTASSIUM UR-SCNC: 23 MMOL/L
POTASSIUM UR-SCNC: 33.3 MMOL/L
POTASSIUM UR-SCNC: 9.6 MMOL/L
PROCALCITONIN SERPL-MCNC: 0.48 NG/ML (ref 0.1–0.25)
PROCALCITONIN SERPL-MCNC: 1.9 NG/ML (ref 0.1–0.25)
PROCALCITONIN SERPL-MCNC: 6.54 NG/ML (ref 0.1–0.25)
PROPOXYPH UR QL: NEGATIVE
PROT SERPL-MCNC: 5.6 G/DL (ref 6–8.5)
PROT SERPL-MCNC: 6.5 G/DL (ref 6–8.5)
PROT SERPL-MCNC: 6.5 G/DL (ref 6–8.5)
PROT SERPL-MCNC: 6.6 G/DL (ref 6–8.5)
PROT SERPL-MCNC: 6.8 G/DL (ref 6–8.5)
PROT SERPL-MCNC: 6.9 G/DL (ref 6–8.5)
PROT SERPL-MCNC: 7 G/DL (ref 6–8.5)
PROT SERPL-MCNC: 7 G/DL (ref 6–8.5)
PROT SERPL-MCNC: 7.1 G/DL (ref 6–8.5)
PROT SERPL-MCNC: 7.2 G/DL (ref 6–8.5)
PROT SERPL-MCNC: 7.3 G/DL (ref 6–8.5)
PROT SERPL-MCNC: 7.3 G/DL (ref 6–8.5)
PROT SERPL-MCNC: 7.5 G/DL (ref 6–8.5)
PROT SERPL-MCNC: 7.9 G/DL (ref 6–8.5)
PROT UR QL STRIP: NEGATIVE
PROTHROMBIN TIME: 13.9 SECONDS (ref 11.2–14.3)
RBC # BLD AUTO: 2.52 10*6/MM3 (ref 4.14–5.8)
RBC # BLD AUTO: 3.07 10*6/MM3 (ref 4.14–5.8)
RBC # BLD AUTO: 3.14 10*6/MM3 (ref 4.14–5.8)
RBC # BLD AUTO: 3.15 10*6/MM3 (ref 4.14–5.8)
RBC # BLD AUTO: 3.47 10*6/MM3 (ref 4.14–5.8)
RBC # BLD AUTO: 3.49 10*6/MM3 (ref 4.14–5.8)
RBC # BLD AUTO: 3.53 10*6/MM3 (ref 4.14–5.8)
RBC # BLD AUTO: 3.54 10*6/MM3 (ref 4.14–5.8)
RBC # BLD AUTO: 3.55 10*6/MM3 (ref 4.14–5.8)
RBC # BLD AUTO: 3.56 10*6/MM3 (ref 4.14–5.8)
RBC # BLD AUTO: 3.57 10*6/MM3 (ref 4.14–5.8)
RBC # BLD AUTO: 3.61 10*6/MM3 (ref 4.14–5.8)
RBC # BLD AUTO: 3.71 10*6/MM3 (ref 4.14–5.8)
RBC # BLD AUTO: 3.73 10*6/MM3 (ref 4.14–5.8)
RBC # BLD AUTO: 3.73 10*6/MM3 (ref 4.14–5.8)
RBC # BLD AUTO: 3.76 10*6/MM3 (ref 4.14–5.8)
RBC # BLD AUTO: 3.79 10*6/MM3 (ref 4.14–5.8)
RBC # BLD AUTO: 3.8 10*6/MM3 (ref 4.14–5.8)
RBC # BLD AUTO: 3.81 10*6/MM3 (ref 4.14–5.8)
RBC # BLD AUTO: 3.82 10*6/MM3 (ref 4.14–5.8)
RBC # BLD AUTO: 3.85 10*6/MM3 (ref 4.14–5.8)
RBC # BLD AUTO: 3.87 10*6/MM3 (ref 4.14–5.8)
RBC # BLD AUTO: 3.94 10*6/MM3 (ref 4.14–5.8)
RBC # BLD AUTO: 3.94 10*6/MM3 (ref 4.14–5.8)
RBC # BLD AUTO: 4.02 10*6/MM3 (ref 4.14–5.8)
RBC # BLD AUTO: 4.02 10*6/MM3 (ref 4.14–5.8)
RBC # BLD AUTO: 4.19 10*6/MM3 (ref 4.14–5.8)
RBC # BLD AUTO: 4.4 10*6/MM3 (ref 4.14–5.8)
RBC MORPH BLD: NORMAL
RBC MORPH BLD: NORMAL
RETICS # AUTO: 0.06 10*6/MM3 (ref 0.02–0.13)
RETICS # AUTO: 0.1 10*6/MM3 (ref 0.02–0.13)
RETICS/RBC NFR AUTO: 1.67 % (ref 0.7–1.9)
RETICS/RBC NFR AUTO: 2.91 % (ref 0.7–1.9)
RHINOVIRUS RNA SPEC NAA+PROBE: NOT DETECTED
RSV RNA NPH QL NAA+NON-PROBE: NOT DETECTED
S PNEUM AG SPEC QL LA: NEGATIVE
SODIUM BLD-SCNC: 112 MMOL/L (ref 136–145)
SODIUM BLD-SCNC: 114 MMOL/L (ref 136–145)
SODIUM BLD-SCNC: 115 MMOL/L (ref 136–145)
SODIUM BLD-SCNC: 116 MMOL/L (ref 136–145)
SODIUM BLD-SCNC: 117 MMOL/L (ref 136–145)
SODIUM BLD-SCNC: 118 MMOL/L (ref 136–145)
SODIUM BLD-SCNC: 119 MMOL/L (ref 136–145)
SODIUM BLD-SCNC: 120 MMOL/L (ref 136–145)
SODIUM BLD-SCNC: 121 MMOL/L (ref 136–145)
SODIUM BLD-SCNC: 123 MMOL/L (ref 136–145)
SODIUM BLD-SCNC: 125 MMOL/L (ref 136–145)
SODIUM BLD-SCNC: 125 MMOL/L (ref 136–145)
SODIUM BLD-SCNC: 126 MMOL/L (ref 136–145)
SODIUM BLD-SCNC: 129 MMOL/L (ref 136–145)
SODIUM BLD-SCNC: 130 MMOL/L (ref 136–145)
SODIUM BLD-SCNC: 131 MMOL/L (ref 136–145)
SODIUM BLD-SCNC: 132 MMOL/L (ref 136–145)
SODIUM BLD-SCNC: 133 MMOL/L (ref 136–145)
SODIUM BLD-SCNC: 134 MMOL/L (ref 136–145)
SODIUM BLD-SCNC: 134 MMOL/L (ref 136–145)
SODIUM BLD-SCNC: 135 MMOL/L (ref 136–145)
SODIUM BLD-SCNC: 135 MMOL/L (ref 136–145)
SODIUM BLD-SCNC: 136 MMOL/L (ref 136–145)
SODIUM BLD-SCNC: 136 MMOL/L (ref 136–145)
SODIUM BLD-SCNC: 138 MMOL/L (ref 136–145)
SODIUM BLD-SCNC: 139 MMOL/L (ref 136–145)
SODIUM UR-SCNC: 135 MMOL/L
SODIUM UR-SCNC: 168 MMOL/L
SODIUM UR-SCNC: 170 MMOL/L
SODIUM UR-SCNC: 177 MMOL/L
SODIUM UR-SCNC: 92 MMOL/L
SODIUM UR-SCNC: 99 MMOL/L
SP GR UR STRIP: 1.01 (ref 1–1.03)
T3FREE SERPL-MCNC: 2.73 PG/ML (ref 2–4.4)
T3FREE SERPL-MCNC: 3.05 PG/ML (ref 2–4.4)
T4 FREE SERPL-MCNC: 1 NG/DL (ref 0.93–1.7)
T4 FREE SERPL-MCNC: 1.19 NG/DL (ref 0.93–1.7)
T4 FREE SERPL-MCNC: 1.29 NG/DL (ref 0.93–1.7)
T4 FREE SERPL-MCNC: 1.57 NG/DL (ref 0.93–1.7)
TIBC SERPL-MCNC: 317 MCG/DL (ref 298–536)
TOTAL RATE: 0 BREATHS/MINUTE
TRANSFERRIN SERPL-MCNC: 213 MG/DL (ref 200–360)
TRICYCLICS UR QL SCN: NEGATIVE
TROPONIN T SERPL-MCNC: 0.02 NG/ML (ref 0–0.03)
TROPONIN T SERPL-MCNC: <0.01 NG/ML (ref 0–0.03)
TSH SERPL DL<=0.05 MIU/L-ACNC: 0.28 UIU/ML (ref 0.27–4.2)
TSH SERPL DL<=0.05 MIU/L-ACNC: 0.8 MIU/ML (ref 0.27–4.2)
TSH SERPL DL<=0.05 MIU/L-ACNC: 1.31 UIU/ML (ref 0.27–4.2)
TSH SERPL DL<=0.05 MIU/L-ACNC: 1.34 MIU/ML (ref 0.27–4.2)
TSH SERPL DL<=0.05 MIU/L-ACNC: 1.44 UIU/ML (ref 0.27–4.2)
TSH SERPL DL<=0.05 MIU/L-ACNC: 2.17 UIU/ML (ref 0.27–4.2)
URATE SERPL-MCNC: 2 MG/DL (ref 3.4–7)
URATE SERPL-MCNC: 3.1 MG/DL (ref 3.4–7)
UROBILINOGEN UR QL STRIP: NORMAL
VANCOMYCIN TROUGH SERPL-MCNC: 11 MCG/ML (ref 5–20)
VANCOMYCIN TROUGH SERPL-MCNC: 15.3 MCG/ML (ref 5–20)
VIT B12 BLD-MCNC: 502 PG/ML (ref 211–946)
WBC MORPH BLD: NORMAL
WBC MORPH BLD: NORMAL
WBC NRBC COR # BLD: 10.64 10*3/MM3 (ref 3.4–10.8)
WBC NRBC COR # BLD: 11.06 10*3/MM3 (ref 3.4–10.8)
WBC NRBC COR # BLD: 11.44 10*3/MM3 (ref 3.4–10.8)
WBC NRBC COR # BLD: 11.94 10*3/MM3 (ref 3.4–10.8)
WBC NRBC COR # BLD: 12.6 10*3/MM3 (ref 3.4–10.8)
WBC NRBC COR # BLD: 2.7 10*3/MM3 (ref 3.4–10.8)
WBC NRBC COR # BLD: 2.9 10*3/MM3 (ref 3.4–10.8)
WBC NRBC COR # BLD: 3.6 10*3/MM3 (ref 3.4–10.8)
WBC NRBC COR # BLD: 4.4 10*3/MM3 (ref 3.4–10.8)
WBC NRBC COR # BLD: 4.7 10*3/MM3 (ref 3.4–10.8)
WBC NRBC COR # BLD: 5.19 10*3/MM3 (ref 3.4–10.8)
WBC NRBC COR # BLD: 5.21 10*3/MM3 (ref 3.4–10.8)
WBC NRBC COR # BLD: 5.8 10*3/MM3 (ref 3.4–10.8)
WBC NRBC COR # BLD: 6.4 10*3/MM3 (ref 3.4–10.8)
WBC NRBC COR # BLD: 6.7 10*3/MM3 (ref 3.4–10.8)
WBC NRBC COR # BLD: 6.82 10*3/MM3 (ref 3.4–10.8)
WBC NRBC COR # BLD: 7 10*3/MM3 (ref 3.4–10.8)
WBC NRBC COR # BLD: 7.08 10*3/MM3 (ref 3.4–10.8)
WBC NRBC COR # BLD: 7.24 10*3/MM3 (ref 3.4–10.8)
WBC NRBC COR # BLD: 7.37 10*3/MM3 (ref 3.4–10.8)
WBC NRBC COR # BLD: 7.84 10*3/MM3 (ref 3.4–10.8)
WBC NRBC COR # BLD: 8.3 10*3/MM3 (ref 3.4–10.8)
WBC NRBC COR # BLD: 8.52 10*3/MM3 (ref 3.4–10.8)
WBC NRBC COR # BLD: 8.74 10*3/MM3 (ref 3.4–10.8)
WBC NRBC COR # BLD: 8.91 10*3/MM3 (ref 3.4–10.8)
WBC NRBC COR # BLD: 9.12 10*3/MM3 (ref 3.4–10.8)
WBC NRBC COR # BLD: 9.82 10*3/MM3 (ref 3.4–10.8)
WBC NRBC COR # BLD: 9.9 10*3/MM3 (ref 3.4–10.8)
WHOLE BLOOD HOLD SPECIMEN: NORMAL

## 2019-01-01 PROCEDURE — 96413 CHEMO IV INFUSION 1 HR: CPT

## 2019-01-01 PROCEDURE — 25010000002 PIPERACILLIN SOD-TAZOBACTAM PER 1 G: Performed by: NURSE PRACTITIONER

## 2019-01-01 PROCEDURE — 25010000002 PALONOSETRON 0.25 MG/5ML SOLUTION PREFILLED SYRINGE: Performed by: INTERNAL MEDICINE

## 2019-01-01 PROCEDURE — 82947 ASSAY GLUCOSE BLOOD QUANT: CPT

## 2019-01-01 PROCEDURE — 25010000002 METHYLPREDNISOLONE PER 125 MG: Performed by: INTERNAL MEDICINE

## 2019-01-01 PROCEDURE — 96415 CHEMO IV INFUSION ADDL HR: CPT

## 2019-01-01 PROCEDURE — 25010000002 PIPERACILLIN SOD-TAZOBACTAM PER 1 G

## 2019-01-01 PROCEDURE — 71275 CT ANGIOGRAPHY CHEST: CPT

## 2019-01-01 PROCEDURE — 96367 TX/PROPH/DG ADDL SEQ IV INF: CPT

## 2019-01-01 PROCEDURE — 94799 UNLISTED PULMONARY SVC/PX: CPT

## 2019-01-01 PROCEDURE — 31652 BRONCH EBUS SAMPLNG 1/2 NODE: CPT | Performed by: INTERNAL MEDICINE

## 2019-01-01 PROCEDURE — 83880 ASSAY OF NATRIURETIC PEPTIDE: CPT | Performed by: NURSE PRACTITIONER

## 2019-01-01 PROCEDURE — 25010000002 ENOXAPARIN PER 10 MG: Performed by: INTERNAL MEDICINE

## 2019-01-01 PROCEDURE — 83930 ASSAY OF BLOOD OSMOLALITY: CPT | Performed by: INTERNAL MEDICINE

## 2019-01-01 PROCEDURE — 99223 1ST HOSP IP/OBS HIGH 75: CPT | Performed by: INTERNAL MEDICINE

## 2019-01-01 PROCEDURE — 25010000002 ONDANSETRON PER 1 MG: Performed by: NURSE PRACTITIONER

## 2019-01-01 PROCEDURE — 99233 SBSQ HOSP IP/OBS HIGH 50: CPT | Performed by: INTERNAL MEDICINE

## 2019-01-01 PROCEDURE — 96417 CHEMO IV INFUS EACH ADDL SEQ: CPT

## 2019-01-01 PROCEDURE — 25010000002 NIVOLUMAB 100 MG/10ML SOLUTION 10 ML VIAL: Performed by: NURSE PRACTITIONER

## 2019-01-01 PROCEDURE — 25010000002 HEPARIN (PORCINE) PER 1000 UNITS: Performed by: INTERNAL MEDICINE

## 2019-01-01 PROCEDURE — 96375 TX/PRO/DX INJ NEW DRUG ADDON: CPT

## 2019-01-01 PROCEDURE — 99232 SBSQ HOSP IP/OBS MODERATE 35: CPT | Performed by: INTERNAL MEDICINE

## 2019-01-01 PROCEDURE — 80053 COMPREHEN METABOLIC PANEL: CPT | Performed by: NURSE PRACTITIONER

## 2019-01-01 PROCEDURE — 78306 BONE IMAGING WHOLE BODY: CPT

## 2019-01-01 PROCEDURE — 25010000002 ETOPOSIDE 500 MG/25ML SOLUTION 25 ML VIAL: Performed by: INTERNAL MEDICINE

## 2019-01-01 PROCEDURE — 0100U HC BIOFIRE FILMARRAY RESP PANEL 2: CPT | Performed by: INTERNAL MEDICINE

## 2019-01-01 PROCEDURE — 87102 FUNGUS ISOLATION CULTURE: CPT | Performed by: INTERNAL MEDICINE

## 2019-01-01 PROCEDURE — 94640 AIRWAY INHALATION TREATMENT: CPT

## 2019-01-01 PROCEDURE — 71045 X-RAY EXAM CHEST 1 VIEW: CPT

## 2019-01-01 PROCEDURE — 82962 GLUCOSE BLOOD TEST: CPT

## 2019-01-01 PROCEDURE — 87086 URINE CULTURE/COLONY COUNT: CPT

## 2019-01-01 PROCEDURE — 25010000002 ATEZOLIZUMAB 1200 MG/20ML SOLUTION 20 ML VIAL: Performed by: INTERNAL MEDICINE

## 2019-01-01 PROCEDURE — 85025 COMPLETE CBC W/AUTO DIFF WBC: CPT

## 2019-01-01 PROCEDURE — 99215 OFFICE O/P EST HI 40 MIN: CPT | Performed by: INTERNAL MEDICINE

## 2019-01-01 PROCEDURE — 25010000002 PIPERACILLIN SOD-TAZOBACTAM PER 1 G: Performed by: INTERNAL MEDICINE

## 2019-01-01 PROCEDURE — 85027 COMPLETE CBC AUTOMATED: CPT | Performed by: INTERNAL MEDICINE

## 2019-01-01 PROCEDURE — 84443 ASSAY THYROID STIM HORMONE: CPT | Performed by: NURSE PRACTITIONER

## 2019-01-01 PROCEDURE — 84439 ASSAY OF FREE THYROXINE: CPT | Performed by: INTERNAL MEDICINE

## 2019-01-01 PROCEDURE — 25010000002 PALONOSETRON PER 25 MCG: Performed by: NURSE PRACTITIONER

## 2019-01-01 PROCEDURE — 63710000001 PREDNISONE PER 1 MG: Performed by: INTERNAL MEDICINE

## 2019-01-01 PROCEDURE — 83615 LACTATE (LD) (LDH) ENZYME: CPT | Performed by: INTERNAL MEDICINE

## 2019-01-01 PROCEDURE — 25010000002 CEFTRIAXONE PER 250 MG: Performed by: EMERGENCY MEDICINE

## 2019-01-01 PROCEDURE — 85025 COMPLETE CBC W/AUTO DIFF WBC: CPT | Performed by: INTERNAL MEDICINE

## 2019-01-01 PROCEDURE — 25010000002 FUROSEMIDE PER 20 MG: Performed by: INTERNAL MEDICINE

## 2019-01-01 PROCEDURE — 25010000002 PIPERACILLIN SOD-TAZOBACTAM PER 1 G: Performed by: HOSPITALIST

## 2019-01-01 PROCEDURE — 74177 CT ABD & PELVIS W/CONTRAST: CPT

## 2019-01-01 PROCEDURE — 97165 OT EVAL LOW COMPLEX 30 MIN: CPT

## 2019-01-01 PROCEDURE — 80048 BASIC METABOLIC PNL TOTAL CA: CPT | Performed by: INTERNAL MEDICINE

## 2019-01-01 PROCEDURE — 80306 DRUG TEST PRSMV INSTRMNT: CPT | Performed by: EMERGENCY MEDICINE

## 2019-01-01 PROCEDURE — 85610 PROTHROMBIN TIME: CPT | Performed by: NURSE PRACTITIONER

## 2019-01-01 PROCEDURE — 77417 THER RADIOLOGY PORT IMAGE(S): CPT | Performed by: RADIOLOGY

## 2019-01-01 PROCEDURE — 99214 OFFICE O/P EST MOD 30 MIN: CPT | Performed by: NURSE PRACTITIONER

## 2019-01-01 PROCEDURE — 84466 ASSAY OF TRANSFERRIN: CPT | Performed by: NURSE PRACTITIONER

## 2019-01-01 PROCEDURE — 25010000002 VANCOMYCIN: Performed by: INTERNAL MEDICINE

## 2019-01-01 PROCEDURE — 97162 PT EVAL MOD COMPLEX 30 MIN: CPT

## 2019-01-01 PROCEDURE — 84300 ASSAY OF URINE SODIUM: CPT | Performed by: INTERNAL MEDICINE

## 2019-01-01 PROCEDURE — 82565 ASSAY OF CREATININE: CPT

## 2019-01-01 PROCEDURE — 87040 BLOOD CULTURE FOR BACTERIA: CPT | Performed by: EMERGENCY MEDICINE

## 2019-01-01 PROCEDURE — 25010000002 VANCOMYCIN 10 G RECONSTITUTED SOLUTION: Performed by: NURSE PRACTITIONER

## 2019-01-01 PROCEDURE — 25010000002 IPILIMUMAB 50 MG/10ML SOLUTION 10 ML VIAL: Performed by: INTERNAL MEDICINE

## 2019-01-01 PROCEDURE — 99222 1ST HOSP IP/OBS MODERATE 55: CPT | Performed by: INTERNAL MEDICINE

## 2019-01-01 PROCEDURE — 25010000002 VANCOMYCIN 10 G RECONSTITUTED SOLUTION: Performed by: INTERNAL MEDICINE

## 2019-01-01 PROCEDURE — 25010000002 MAGNESIUM SULFATE 2 GM/50ML SOLUTION: Performed by: INTERNAL MEDICINE

## 2019-01-01 PROCEDURE — 25010000002 DEXAMETHASONE PER 1 MG: Performed by: NURSE ANESTHETIST, CERTIFIED REGISTERED

## 2019-01-01 PROCEDURE — 25010000002 PALONOSETRON 0.25 MG/5ML SOLUTION PREFILLED SYRINGE: Performed by: NURSE PRACTITIONER

## 2019-01-01 PROCEDURE — 85007 BL SMEAR W/DIFF WBC COUNT: CPT | Performed by: INTERNAL MEDICINE

## 2019-01-01 PROCEDURE — 71260 CT THORAX DX C+: CPT

## 2019-01-01 PROCEDURE — 25010000002 ETOPOSIDE 500 MG/25ML SOLUTION 25 ML VIAL: Performed by: NURSE PRACTITIONER

## 2019-01-01 PROCEDURE — 80202 ASSAY OF VANCOMYCIN: CPT

## 2019-01-01 PROCEDURE — G0463 HOSPITAL OUTPT CLINIC VISIT: HCPCS

## 2019-01-01 PROCEDURE — 84145 PROCALCITONIN (PCT): CPT

## 2019-01-01 PROCEDURE — 85045 AUTOMATED RETICULOCYTE COUNT: CPT | Performed by: INTERNAL MEDICINE

## 2019-01-01 PROCEDURE — 25010000002 PIPERACILLIN SOD-TAZOBACTAM PER 1 G: Performed by: EMERGENCY MEDICINE

## 2019-01-01 PROCEDURE — 25010000002 ATEZOLIZUMAB 1200 MG/20ML SOLUTION 20 ML VIAL: Performed by: NURSE PRACTITIONER

## 2019-01-01 PROCEDURE — 85025 COMPLETE CBC W/AUTO DIFF WBC: CPT | Performed by: NURSE PRACTITIONER

## 2019-01-01 PROCEDURE — 84133 ASSAY OF URINE POTASSIUM: CPT | Performed by: INTERNAL MEDICINE

## 2019-01-01 PROCEDURE — 88305 TISSUE EXAM BY PATHOLOGIST: CPT | Performed by: INTERNAL MEDICINE

## 2019-01-01 PROCEDURE — 63710000001 PROCHLORPERAZINE MALEATE PER 10 MG: Performed by: NURSE PRACTITIONER

## 2019-01-01 PROCEDURE — 84443 ASSAY THYROID STIM HORMONE: CPT | Performed by: INTERNAL MEDICINE

## 2019-01-01 PROCEDURE — 87804 INFLUENZA ASSAY W/OPTIC: CPT

## 2019-01-01 PROCEDURE — 87040 BLOOD CULTURE FOR BACTERIA: CPT | Performed by: NURSE PRACTITIONER

## 2019-01-01 PROCEDURE — 85027 COMPLETE CBC AUTOMATED: CPT | Performed by: HOSPITALIST

## 2019-01-01 PROCEDURE — 25010000002 IPILIMUMAB 200 MG/40ML SOLUTION 40 ML VIAL: Performed by: INTERNAL MEDICINE

## 2019-01-01 PROCEDURE — 83935 ASSAY OF URINE OSMOLALITY: CPT | Performed by: INTERNAL MEDICINE

## 2019-01-01 PROCEDURE — 25010000002 METHYLPREDNISOLONE PER 125 MG: Performed by: NURSE PRACTITIONER

## 2019-01-01 PROCEDURE — 80048 BASIC METABOLIC PNL TOTAL CA: CPT | Performed by: PHYSICIAN ASSISTANT

## 2019-01-01 PROCEDURE — 82533 TOTAL CORTISOL: CPT | Performed by: INTERNAL MEDICINE

## 2019-01-01 PROCEDURE — 77412 RADIATION TX DELIVERY LVL 3: CPT | Performed by: RADIOLOGY

## 2019-01-01 PROCEDURE — 97116 GAIT TRAINING THERAPY: CPT

## 2019-01-01 PROCEDURE — 80048 BASIC METABOLIC PNL TOTAL CA: CPT

## 2019-01-01 PROCEDURE — 84484 ASSAY OF TROPONIN QUANT: CPT | Performed by: EMERGENCY MEDICINE

## 2019-01-01 PROCEDURE — 80053 COMPREHEN METABOLIC PANEL: CPT | Performed by: INTERNAL MEDICINE

## 2019-01-01 PROCEDURE — 77334 RADIATION TREATMENT AID(S): CPT | Performed by: RADIOLOGY

## 2019-01-01 PROCEDURE — 84484 ASSAY OF TROPONIN QUANT: CPT | Performed by: NURSE PRACTITIONER

## 2019-01-01 PROCEDURE — 99285 EMERGENCY DEPT VISIT HI MDM: CPT

## 2019-01-01 PROCEDURE — 0 TECHNETIUM MEDRONATE KIT: Performed by: INTERNAL MEDICINE

## 2019-01-01 PROCEDURE — 63710000001 PREDNISONE PER 1 MG: Performed by: PHYSICIAN ASSISTANT

## 2019-01-01 PROCEDURE — 83605 ASSAY OF LACTIC ACID: CPT | Performed by: NURSE PRACTITIONER

## 2019-01-01 PROCEDURE — 96366 THER/PROPH/DIAG IV INF ADDON: CPT

## 2019-01-01 PROCEDURE — 63710000001 INSULIN LISPRO (HUMAN) PER 5 UNITS: Performed by: INTERNAL MEDICINE

## 2019-01-01 PROCEDURE — 93005 ELECTROCARDIOGRAM TRACING: CPT

## 2019-01-01 PROCEDURE — 63710000001 PREDNISONE PER 1 MG: Performed by: NURSE PRACTITIONER

## 2019-01-01 PROCEDURE — 25010000002 DEXAMETHASONE PER 1 MG: Performed by: INTERNAL MEDICINE

## 2019-01-01 PROCEDURE — 99215 OFFICE O/P EST HI 40 MIN: CPT | Performed by: NURSE PRACTITIONER

## 2019-01-01 PROCEDURE — 77336 RADIATION PHYSICS CONSULT: CPT | Performed by: RADIOLOGY

## 2019-01-01 PROCEDURE — 73060 X-RAY EXAM OF HUMERUS: CPT

## 2019-01-01 PROCEDURE — 99284 EMERGENCY DEPT VISIT MOD MDM: CPT

## 2019-01-01 PROCEDURE — 31625 BRONCHOSCOPY W/BIOPSY(S): CPT | Performed by: INTERNAL MEDICINE

## 2019-01-01 PROCEDURE — 83735 ASSAY OF MAGNESIUM: CPT | Performed by: NURSE PRACTITIONER

## 2019-01-01 PROCEDURE — 87899 AGENT NOS ASSAY W/OPTIC: CPT | Performed by: INTERNAL MEDICINE

## 2019-01-01 PROCEDURE — 83880 ASSAY OF NATRIURETIC PEPTIDE: CPT | Performed by: EMERGENCY MEDICINE

## 2019-01-01 PROCEDURE — 25010000002 VANCOMYCIN 10 G RECONSTITUTED SOLUTION

## 2019-01-01 PROCEDURE — 25010000002 IOPAMIDOL 61 % SOLUTION: Performed by: INTERNAL MEDICINE

## 2019-01-01 PROCEDURE — 99205 OFFICE O/P NEW HI 60 MIN: CPT | Performed by: INTERNAL MEDICINE

## 2019-01-01 PROCEDURE — 77300 RADIATION THERAPY DOSE PLAN: CPT | Performed by: RADIOLOGY

## 2019-01-01 PROCEDURE — 70553 MRI BRAIN STEM W/O & W/DYE: CPT

## 2019-01-01 PROCEDURE — 25010000002 CARBOPLATIN PER 50 MG: Performed by: NURSE PRACTITIONER

## 2019-01-01 PROCEDURE — 25010000002 METHYLPREDNISOLONE PER 40 MG: Performed by: HOSPITALIST

## 2019-01-01 PROCEDURE — 82747 ASSAY OF FOLIC ACID RBC: CPT | Performed by: NURSE PRACTITIONER

## 2019-01-01 PROCEDURE — 80053 COMPREHEN METABOLIC PANEL: CPT

## 2019-01-01 PROCEDURE — 36415 COLL VENOUS BLD VENIPUNCTURE: CPT

## 2019-01-01 PROCEDURE — 83735 ASSAY OF MAGNESIUM: CPT | Performed by: EMERGENCY MEDICINE

## 2019-01-01 PROCEDURE — 25010000002 FOSAPREPITANT PER 1 MG: Performed by: NURSE PRACTITIONER

## 2019-01-01 PROCEDURE — 82728 ASSAY OF FERRITIN: CPT | Performed by: NURSE PRACTITIONER

## 2019-01-01 PROCEDURE — 99233 SBSQ HOSP IP/OBS HIGH 50: CPT | Performed by: NURSE PRACTITIONER

## 2019-01-01 PROCEDURE — 25010000002 ONDANSETRON PER 1 MG: Performed by: INTERNAL MEDICINE

## 2019-01-01 PROCEDURE — 63710000001 INSULIN DETEMIR PER 5 UNITS: Performed by: INTERNAL MEDICINE

## 2019-01-01 PROCEDURE — 83540 ASSAY OF IRON: CPT | Performed by: NURSE PRACTITIONER

## 2019-01-01 PROCEDURE — 88112 CYTOPATH CELL ENHANCE TECH: CPT | Performed by: INTERNAL MEDICINE

## 2019-01-01 PROCEDURE — A9577 INJ MULTIHANCE: HCPCS | Performed by: INTERNAL MEDICINE

## 2019-01-01 PROCEDURE — 77295 3-D RADIOTHERAPY PLAN: CPT | Performed by: RADIOLOGY

## 2019-01-01 PROCEDURE — 25010000002 PROPOFOL 10 MG/ML EMULSION: Performed by: NURSE ANESTHETIST, CERTIFIED REGISTERED

## 2019-01-01 PROCEDURE — 63710000001 PROCHLORPERAZINE MALEATE PER 10 MG: Performed by: INTERNAL MEDICINE

## 2019-01-01 PROCEDURE — 99231 SBSQ HOSP IP/OBS SF/LOW 25: CPT | Performed by: INTERNAL MEDICINE

## 2019-01-01 PROCEDURE — 87641 MR-STAPH DNA AMP PROBE: CPT

## 2019-01-01 PROCEDURE — 85025 COMPLETE CBC W/AUTO DIFF WBC: CPT | Performed by: EMERGENCY MEDICINE

## 2019-01-01 PROCEDURE — 25010000002 FOSAPREPITANT PER 1 MG: Performed by: INTERNAL MEDICINE

## 2019-01-01 PROCEDURE — 70450 CT HEAD/BRAIN W/O DYE: CPT

## 2019-01-01 PROCEDURE — 85014 HEMATOCRIT: CPT | Performed by: NURSE PRACTITIONER

## 2019-01-01 PROCEDURE — A9503 TC99M MEDRONATE: HCPCS | Performed by: INTERNAL MEDICINE

## 2019-01-01 PROCEDURE — 93005 ELECTROCARDIOGRAM TRACING: CPT | Performed by: EMERGENCY MEDICINE

## 2019-01-01 PROCEDURE — 83036 HEMOGLOBIN GLYCOSYLATED A1C: CPT | Performed by: INTERNAL MEDICINE

## 2019-01-01 PROCEDURE — 88342 IMHCHEM/IMCYTCHM 1ST ANTB: CPT | Performed by: INTERNAL MEDICINE

## 2019-01-01 PROCEDURE — 85027 COMPLETE CBC AUTOMATED: CPT | Performed by: NURSE PRACTITIONER

## 2019-01-01 PROCEDURE — 87116 MYCOBACTERIA CULTURE: CPT | Performed by: INTERNAL MEDICINE

## 2019-01-01 PROCEDURE — 88341 IMHCHEM/IMCYTCHM EA ADD ANTB: CPT | Performed by: INTERNAL MEDICINE

## 2019-01-01 PROCEDURE — 99232 SBSQ HOSP IP/OBS MODERATE 35: CPT | Performed by: PHYSICIAN ASSISTANT

## 2019-01-01 PROCEDURE — 25010000002 FENTANYL CITRATE (PF) 100 MCG/2ML SOLUTION: Performed by: NURSE ANESTHETIST, CERTIFIED REGISTERED

## 2019-01-01 PROCEDURE — 87641 MR-STAPH DNA AMP PROBE: CPT | Performed by: INTERNAL MEDICINE

## 2019-01-01 PROCEDURE — 93005 ELECTROCARDIOGRAM TRACING: CPT | Performed by: NURSE PRACTITIONER

## 2019-01-01 PROCEDURE — 86738 MYCOPLASMA ANTIBODY: CPT | Performed by: INTERNAL MEDICINE

## 2019-01-01 PROCEDURE — 25010000002 DEXAMETHASONE PER 1 MG: Performed by: NURSE PRACTITIONER

## 2019-01-01 PROCEDURE — 36600 WITHDRAWAL OF ARTERIAL BLOOD: CPT

## 2019-01-01 PROCEDURE — 85045 AUTOMATED RETICULOCYTE COUNT: CPT | Performed by: NURSE PRACTITIONER

## 2019-01-01 PROCEDURE — 25010000002 AZITHROMYCIN PER 500 MG: Performed by: EMERGENCY MEDICINE

## 2019-01-01 PROCEDURE — 25010000002 IPILIMUMAB 200 MG/40ML SOLUTION 40 ML VIAL: Performed by: NURSE PRACTITIONER

## 2019-01-01 PROCEDURE — 25010000002 ONDANSETRON PER 1 MG: Performed by: NURSE ANESTHETIST, CERTIFIED REGISTERED

## 2019-01-01 PROCEDURE — 25010000002 NIVOLUMAB 100 MG/10ML SOLUTION 10 ML VIAL: Performed by: INTERNAL MEDICINE

## 2019-01-01 PROCEDURE — 0 GADOBENATE DIMEGLUMINE 529 MG/ML SOLUTION: Performed by: INTERNAL MEDICINE

## 2019-01-01 PROCEDURE — 84550 ASSAY OF BLOOD/URIC ACID: CPT | Performed by: INTERNAL MEDICINE

## 2019-01-01 PROCEDURE — 63710000001 PREDNISONE PER 5 MG: Performed by: INTERNAL MEDICINE

## 2019-01-01 PROCEDURE — 0 IOPAMIDOL PER 1 ML: Performed by: EMERGENCY MEDICINE

## 2019-01-01 PROCEDURE — 25010000002 NEOSTIGMINE 10 MG/10ML SOLUTION: Performed by: NURSE ANESTHETIST, CERTIFIED REGISTERED

## 2019-01-01 PROCEDURE — 81003 URINALYSIS AUTO W/O SCOPE: CPT | Performed by: EMERGENCY MEDICINE

## 2019-01-01 PROCEDURE — 99239 HOSP IP/OBS DSCHRG MGMT >30: CPT | Performed by: INTERNAL MEDICINE

## 2019-01-01 PROCEDURE — 82805 BLOOD GASES W/O2 SATURATION: CPT

## 2019-01-01 PROCEDURE — 80048 BASIC METABOLIC PNL TOTAL CA: CPT | Performed by: NURSE PRACTITIONER

## 2019-01-01 PROCEDURE — 83735 ASSAY OF MAGNESIUM: CPT | Performed by: INTERNAL MEDICINE

## 2019-01-01 PROCEDURE — 84481 FREE ASSAY (FT-3): CPT | Performed by: NURSE PRACTITIONER

## 2019-01-01 PROCEDURE — 83605 ASSAY OF LACTIC ACID: CPT | Performed by: EMERGENCY MEDICINE

## 2019-01-01 PROCEDURE — 82746 ASSAY OF FOLIC ACID SERUM: CPT | Performed by: NURSE PRACTITIONER

## 2019-01-01 PROCEDURE — 0BDF8ZX EXTRACTION OF RIGHT LOWER LUNG LOBE, VIA NATURAL OR ARTIFICIAL OPENING ENDOSCOPIC, DIAGNOSTIC: ICD-10-PCS | Performed by: INTERNAL MEDICINE

## 2019-01-01 PROCEDURE — 82550 ASSAY OF CK (CPK): CPT | Performed by: NURSE PRACTITIONER

## 2019-01-01 PROCEDURE — 25010000002 VANCOMYCIN 10 G RECONSTITUTED SOLUTION: Performed by: EMERGENCY MEDICINE

## 2019-01-01 PROCEDURE — 87206 SMEAR FLUORESCENT/ACID STAI: CPT | Performed by: INTERNAL MEDICINE

## 2019-01-01 PROCEDURE — 73030 X-RAY EXAM OF SHOULDER: CPT

## 2019-01-01 PROCEDURE — 99231 SBSQ HOSP IP/OBS SF/LOW 25: CPT | Performed by: PHYSICIAN ASSISTANT

## 2019-01-01 PROCEDURE — 87205 SMEAR GRAM STAIN: CPT | Performed by: INTERNAL MEDICINE

## 2019-01-01 PROCEDURE — 99214 OFFICE O/P EST MOD 30 MIN: CPT | Performed by: INTERNAL MEDICINE

## 2019-01-01 PROCEDURE — 87040 BLOOD CULTURE FOR BACTERIA: CPT

## 2019-01-01 PROCEDURE — 83036 HEMOGLOBIN GLYCOSYLATED A1C: CPT | Performed by: NURSE PRACTITIONER

## 2019-01-01 PROCEDURE — 84439 ASSAY OF FREE THYROXINE: CPT | Performed by: NURSE PRACTITIONER

## 2019-01-01 PROCEDURE — 85730 THROMBOPLASTIN TIME PARTIAL: CPT | Performed by: NURSE PRACTITIONER

## 2019-01-01 PROCEDURE — 25010000002 DESMOPRESSIN PER 1 MCG: Performed by: INTERNAL MEDICINE

## 2019-01-01 PROCEDURE — 99239 HOSP IP/OBS DSCHRG MGMT >30: CPT | Performed by: HOSPITALIST

## 2019-01-01 PROCEDURE — 84100 ASSAY OF PHOSPHORUS: CPT | Performed by: INTERNAL MEDICINE

## 2019-01-01 PROCEDURE — 07B73ZX EXCISION OF THORAX LYMPHATIC, PERCUTANEOUS APPROACH, DIAGNOSTIC: ICD-10-PCS | Performed by: INTERNAL MEDICINE

## 2019-01-01 PROCEDURE — 25010000002 ETOPOSIDE 1 GM/50ML SOLUTION 50 ML VIAL: Performed by: NURSE PRACTITIONER

## 2019-01-01 PROCEDURE — 25010000002 CARBOPLATIN PER 50 MG: Performed by: INTERNAL MEDICINE

## 2019-01-01 PROCEDURE — 82607 VITAMIN B-12: CPT | Performed by: NURSE PRACTITIONER

## 2019-01-01 PROCEDURE — 80053 COMPREHEN METABOLIC PANEL: CPT | Performed by: EMERGENCY MEDICINE

## 2019-01-01 PROCEDURE — 31623 DX BRONCHOSCOPE/BRUSH: CPT | Performed by: INTERNAL MEDICINE

## 2019-01-01 PROCEDURE — 99232 SBSQ HOSP IP/OBS MODERATE 35: CPT | Performed by: HOSPITALIST

## 2019-01-01 PROCEDURE — 25010000002 IOPAMIDOL 61 % SOLUTION: Performed by: NURSE PRACTITIONER

## 2019-01-01 PROCEDURE — C1726 CATH, BAL DIL, NON-VASCULAR: HCPCS | Performed by: INTERNAL MEDICINE

## 2019-01-01 PROCEDURE — 25010000002 IPILIMUMAB 50 MG/10ML SOLUTION 10 ML VIAL: Performed by: NURSE PRACTITIONER

## 2019-01-01 PROCEDURE — 96368 THER/DIAG CONCURRENT INF: CPT

## 2019-01-01 PROCEDURE — 80202 ASSAY OF VANCOMYCIN: CPT | Performed by: INTERNAL MEDICINE

## 2019-01-01 PROCEDURE — 84481 FREE ASSAY (FT-3): CPT | Performed by: INTERNAL MEDICINE

## 2019-01-01 PROCEDURE — 87070 CULTURE OTHR SPECIMN AEROBIC: CPT | Performed by: INTERNAL MEDICINE

## 2019-01-01 PROCEDURE — 97110 THERAPEUTIC EXERCISES: CPT

## 2019-01-01 PROCEDURE — 80307 DRUG TEST PRSMV CHEM ANLYZR: CPT | Performed by: EMERGENCY MEDICINE

## 2019-01-01 PROCEDURE — 85007 BL SMEAR W/DIFF WBC COUNT: CPT | Performed by: EMERGENCY MEDICINE

## 2019-01-01 RX ORDER — ONDANSETRON 2 MG/ML
4 INJECTION INTRAMUSCULAR; INTRAVENOUS EVERY 6 HOURS PRN
Status: DISCONTINUED | OUTPATIENT
Start: 2019-01-01 | End: 2019-01-01 | Stop reason: HOSPADM

## 2019-01-01 RX ORDER — PROCHLORPERAZINE MALEATE 10 MG
10 TABLET ORAL ONCE
Status: CANCELLED | OUTPATIENT
Start: 2019-01-01 | End: 2019-01-01

## 2019-01-01 RX ORDER — PREDNISONE 10 MG/1
20 TABLET ORAL DAILY
Qty: 12 TABLET | Refills: 0 | Status: SHIPPED | OUTPATIENT
Start: 2019-01-01 | End: 2019-01-01

## 2019-01-01 RX ORDER — SODIUM CHLORIDE 0.9 % (FLUSH) 0.9 %
1-10 SYRINGE (ML) INJECTION AS NEEDED
Status: DISCONTINUED | OUTPATIENT
Start: 2019-01-01 | End: 2019-01-01 | Stop reason: HOSPADM

## 2019-01-01 RX ORDER — IPRATROPIUM BROMIDE AND ALBUTEROL SULFATE 2.5; .5 MG/3ML; MG/3ML
3 SOLUTION RESPIRATORY (INHALATION)
Status: DISCONTINUED | OUTPATIENT
Start: 2019-01-01 | End: 2019-01-01

## 2019-01-01 RX ORDER — SODIUM CHLORIDE 0.9 % (FLUSH) 0.9 %
10 SYRINGE (ML) INJECTION AS NEEDED
Status: DISCONTINUED | OUTPATIENT
Start: 2019-01-01 | End: 2019-01-01 | Stop reason: HOSPADM

## 2019-01-01 RX ORDER — CEFDINIR 300 MG/1
300 CAPSULE ORAL DAILY
Qty: 5 CAPSULE | Refills: 0 | Status: SHIPPED | OUTPATIENT
Start: 2019-01-01 | End: 2020-01-01

## 2019-01-01 RX ORDER — LACTULOSE 10 G/15ML
10 SOLUTION ORAL DAILY PRN
Qty: 150 ML | Refills: 0 | Status: SHIPPED | OUTPATIENT
Start: 2019-01-01

## 2019-01-01 RX ORDER — PROCHLORPERAZINE MALEATE 10 MG
10 TABLET ORAL ONCE
Status: COMPLETED | OUTPATIENT
Start: 2019-01-01 | End: 2019-01-01

## 2019-01-01 RX ORDER — ASPIRIN 81 MG/1
81 TABLET, CHEWABLE ORAL DAILY
Status: DISCONTINUED | OUTPATIENT
Start: 2019-01-01 | End: 2019-01-01 | Stop reason: HOSPADM

## 2019-01-01 RX ORDER — SIMVASTATIN 20 MG
20 TABLET ORAL NIGHTLY
Qty: 30 TABLET | Refills: 2 | Status: SHIPPED | OUTPATIENT
Start: 2019-01-01

## 2019-01-01 RX ORDER — MAGNESIUM SULFATE HEPTAHYDRATE 40 MG/ML
2 INJECTION, SOLUTION INTRAVENOUS AS NEEDED
Status: DISCONTINUED | OUTPATIENT
Start: 2019-01-01 | End: 2019-01-01 | Stop reason: HOSPADM

## 2019-01-01 RX ORDER — ACETAMINOPHEN 325 MG/1
650 TABLET ORAL EVERY 4 HOURS PRN
Status: DISCONTINUED | OUTPATIENT
Start: 2019-01-01 | End: 2019-01-01 | Stop reason: HOSPADM

## 2019-01-01 RX ORDER — SIMVASTATIN 20 MG
20 TABLET ORAL NIGHTLY
COMMUNITY
End: 2019-01-01 | Stop reason: SDUPTHER

## 2019-01-01 RX ORDER — MORPHINE SULFATE 2 MG/ML
1 INJECTION, SOLUTION INTRAMUSCULAR; INTRAVENOUS EVERY 4 HOURS PRN
Status: DISCONTINUED | OUTPATIENT
Start: 2019-01-01 | End: 2019-01-01 | Stop reason: HOSPADM

## 2019-01-01 RX ORDER — FAMOTIDINE 10 MG/ML
20 INJECTION, SOLUTION INTRAVENOUS AS NEEDED
Status: CANCELLED | OUTPATIENT
Start: 2019-01-01

## 2019-01-01 RX ORDER — DOCUSATE SODIUM 100 MG/1
100 CAPSULE, LIQUID FILLED ORAL DAILY
Qty: 60 CAPSULE | Refills: 3 | Status: SHIPPED | OUTPATIENT
Start: 2019-01-01 | End: 2019-01-01

## 2019-01-01 RX ORDER — BUDESONIDE AND FORMOTEROL FUMARATE DIHYDRATE 160; 4.5 UG/1; UG/1
2 AEROSOL RESPIRATORY (INHALATION)
Status: DISCONTINUED | OUTPATIENT
Start: 2019-01-01 | End: 2019-01-01 | Stop reason: HOSPADM

## 2019-01-01 RX ORDER — LIDOCAINE HYDROCHLORIDE 40 MG/ML
4 INJECTION, SOLUTION RETROBULBAR; TOPICAL ONCE
Status: DISCONTINUED | OUTPATIENT
Start: 2019-01-01 | End: 2019-01-01 | Stop reason: HOSPADM

## 2019-01-01 RX ORDER — MELOXICAM 15 MG/1
15 TABLET ORAL DAILY
COMMUNITY
End: 2019-01-01 | Stop reason: HOSPADM

## 2019-01-01 RX ORDER — POTASSIUM CHLORIDE 7.45 MG/ML
10 INJECTION INTRAVENOUS
Status: DISCONTINUED | OUTPATIENT
Start: 2019-01-01 | End: 2019-01-01 | Stop reason: HOSPADM

## 2019-01-01 RX ORDER — PREDNISONE 20 MG/1
40 TABLET ORAL
Status: DISCONTINUED | OUTPATIENT
Start: 2019-01-01 | End: 2019-01-01

## 2019-01-01 RX ORDER — CALCIUM CARBONATE 200(500)MG
2 TABLET,CHEWABLE ORAL 3 TIMES DAILY PRN
Status: DISCONTINUED | OUTPATIENT
Start: 2019-01-01 | End: 2019-01-01 | Stop reason: ALTCHOICE

## 2019-01-01 RX ORDER — LEVOFLOXACIN 500 MG/1
500 TABLET, FILM COATED ORAL DAILY
Qty: 7 TABLET | Refills: 0 | Status: SHIPPED | OUTPATIENT
Start: 2019-01-01 | End: 2019-01-01

## 2019-01-01 RX ORDER — SODIUM CHLORIDE 9 MG/ML
250 INJECTION, SOLUTION INTRAVENOUS ONCE
Status: CANCELLED | OUTPATIENT
Start: 2019-01-01

## 2019-01-01 RX ORDER — LISINOPRIL 20 MG/1
20 TABLET ORAL
Status: DISCONTINUED | OUTPATIENT
Start: 2019-01-01 | End: 2019-01-01 | Stop reason: HOSPADM

## 2019-01-01 RX ORDER — METHYLPREDNISOLONE SODIUM SUCCINATE 125 MG/2ML
125 INJECTION, POWDER, LYOPHILIZED, FOR SOLUTION INTRAMUSCULAR; INTRAVENOUS DAILY
Status: DISCONTINUED | OUTPATIENT
Start: 2019-01-01 | End: 2019-01-01

## 2019-01-01 RX ORDER — MAGNESIUM SULFATE HEPTAHYDRATE 40 MG/ML
4 INJECTION, SOLUTION INTRAVENOUS AS NEEDED
Status: DISCONTINUED | OUTPATIENT
Start: 2019-01-01 | End: 2019-01-01 | Stop reason: HOSPADM

## 2019-01-01 RX ORDER — COLCHICINE 0.6 MG/1
0.6 TABLET ORAL DAILY
Status: DISCONTINUED | OUTPATIENT
Start: 2019-01-01 | End: 2019-01-01 | Stop reason: HOSPADM

## 2019-01-01 RX ORDER — LIDOCAINE HYDROCHLORIDE 10 MG/ML
INJECTION, SOLUTION EPIDURAL; INFILTRATION; INTRACAUDAL; PERINEURAL AS NEEDED
Status: DISCONTINUED | OUTPATIENT
Start: 2019-01-01 | End: 2019-01-01 | Stop reason: SURG

## 2019-01-01 RX ORDER — HEPARIN SODIUM 5000 [USP'U]/ML
5000 INJECTION, SOLUTION INTRAVENOUS; SUBCUTANEOUS EVERY 8 HOURS SCHEDULED
Status: DISCONTINUED | OUTPATIENT
Start: 2019-01-01 | End: 2019-01-01 | Stop reason: HOSPADM

## 2019-01-01 RX ORDER — SODIUM CHLORIDE 9 MG/ML
250 INJECTION, SOLUTION INTRAVENOUS ONCE
Status: COMPLETED | OUTPATIENT
Start: 2019-01-01 | End: 2019-01-01

## 2019-01-01 RX ORDER — PREDNISONE 20 MG/1
40 TABLET ORAL
Status: DISCONTINUED | OUTPATIENT
Start: 2019-01-01 | End: 2019-01-01 | Stop reason: HOSPADM

## 2019-01-01 RX ORDER — FUROSEMIDE 10 MG/ML
10 INJECTION INTRAMUSCULAR; INTRAVENOUS ONCE
Status: COMPLETED | OUTPATIENT
Start: 2019-01-01 | End: 2019-01-01

## 2019-01-01 RX ORDER — ATORVASTATIN CALCIUM 10 MG/1
10 TABLET, FILM COATED ORAL NIGHTLY
Status: DISCONTINUED | OUTPATIENT
Start: 2019-01-01 | End: 2019-01-01 | Stop reason: HOSPADM

## 2019-01-01 RX ORDER — SODIUM CHLORIDE 9 MG/ML
250 INJECTION, SOLUTION INTRAVENOUS ONCE
Status: DISCONTINUED | OUTPATIENT
Start: 2019-01-01 | End: 2019-01-01 | Stop reason: HOSPADM

## 2019-01-01 RX ORDER — METHYLPREDNISOLONE 4 MG/1
TABLET ORAL
Qty: 21 TABLET | Refills: 0 | Status: SHIPPED | OUTPATIENT
Start: 2019-01-01 | End: 2019-01-01 | Stop reason: SDUPTHER

## 2019-01-01 RX ORDER — ONDANSETRON 2 MG/ML
4 INJECTION INTRAMUSCULAR; INTRAVENOUS ONCE AS NEEDED
Status: DISCONTINUED | OUTPATIENT
Start: 2019-01-01 | End: 2019-01-01 | Stop reason: HOSPADM

## 2019-01-01 RX ORDER — BISACODYL 10 MG
10 SUPPOSITORY, RECTAL RECTAL DAILY
Status: DISCONTINUED | OUTPATIENT
Start: 2019-01-01 | End: 2019-01-01

## 2019-01-01 RX ORDER — FENTANYL CITRATE 50 UG/ML
50 INJECTION, SOLUTION INTRAMUSCULAR; INTRAVENOUS
Status: DISCONTINUED | OUTPATIENT
Start: 2019-01-01 | End: 2019-01-01 | Stop reason: HOSPADM

## 2019-01-01 RX ORDER — DIPHENHYDRAMINE HYDROCHLORIDE 50 MG/ML
50 INJECTION INTRAMUSCULAR; INTRAVENOUS AS NEEDED
Status: CANCELLED | OUTPATIENT
Start: 2019-01-01

## 2019-01-01 RX ORDER — SODIUM CHLORIDE 0.9 % (FLUSH) 0.9 %
3 SYRINGE (ML) INJECTION EVERY 12 HOURS SCHEDULED
Status: DISCONTINUED | OUTPATIENT
Start: 2019-01-01 | End: 2019-01-01 | Stop reason: HOSPADM

## 2019-01-01 RX ORDER — ONDANSETRON 4 MG/1
4 TABLET, FILM COATED ORAL EVERY 6 HOURS PRN
Status: DISCONTINUED | OUTPATIENT
Start: 2019-01-01 | End: 2019-01-01 | Stop reason: HOSPADM

## 2019-01-01 RX ORDER — FOLIC ACID 1 MG/1
1 TABLET ORAL DAILY
Status: DISPENSED | OUTPATIENT
Start: 2019-01-01 | End: 2019-01-01

## 2019-01-01 RX ORDER — THIAMINE MONONITRATE (VIT B1) 100 MG
100 TABLET ORAL DAILY
Status: DISCONTINUED | OUTPATIENT
Start: 2019-01-01 | End: 2019-01-01

## 2019-01-01 RX ORDER — IPRATROPIUM BROMIDE AND ALBUTEROL SULFATE 2.5; .5 MG/3ML; MG/3ML
3 SOLUTION RESPIRATORY (INHALATION)
Status: DISCONTINUED | OUTPATIENT
Start: 2019-01-01 | End: 2019-01-01 | Stop reason: HOSPADM

## 2019-01-01 RX ORDER — PANTOPRAZOLE SODIUM 40 MG/1
40 TABLET, DELAYED RELEASE ORAL
Status: DISCONTINUED | OUTPATIENT
Start: 2019-01-01 | End: 2019-01-01 | Stop reason: HOSPADM

## 2019-01-01 RX ORDER — GUAIFENESIN 600 MG/1
1200 TABLET, EXTENDED RELEASE ORAL EVERY 12 HOURS SCHEDULED
Status: DISCONTINUED | OUTPATIENT
Start: 2019-01-01 | End: 2019-01-01 | Stop reason: HOSPADM

## 2019-01-01 RX ORDER — SACCHAROMYCES BOULARDII 250 MG
250 CAPSULE ORAL 2 TIMES DAILY
Qty: 10 CAPSULE | Refills: 0 | Status: SHIPPED | OUTPATIENT
Start: 2019-01-01 | End: 2020-01-01

## 2019-01-01 RX ORDER — LIDOCAINE 50 MG/G
1 PATCH TOPICAL
Status: DISCONTINUED | OUTPATIENT
Start: 2019-01-01 | End: 2019-01-01

## 2019-01-01 RX ORDER — HYDROCODONE BITARTRATE AND ACETAMINOPHEN 5; 325 MG/1; MG/1
1 TABLET ORAL EVERY 4 HOURS PRN
Status: DISCONTINUED | OUTPATIENT
Start: 2019-01-01 | End: 2019-01-01 | Stop reason: HOSPADM

## 2019-01-01 RX ORDER — LACTULOSE 10 G/15ML
10 SOLUTION ORAL DAILY PRN
Status: DISCONTINUED | OUTPATIENT
Start: 2019-01-01 | End: 2019-01-01 | Stop reason: HOSPADM

## 2019-01-01 RX ORDER — SODIUM CHLORIDE 9 MG/ML
75 INJECTION, SOLUTION INTRAVENOUS CONTINUOUS
Status: DISCONTINUED | OUTPATIENT
Start: 2019-01-01 | End: 2019-01-01

## 2019-01-01 RX ORDER — AZITHROMYCIN 250 MG/1
TABLET, FILM COATED ORAL
Qty: 6 TABLET | Refills: 0 | Status: SHIPPED | OUTPATIENT
Start: 2019-01-01 | End: 2019-01-01

## 2019-01-01 RX ORDER — ATORVASTATIN CALCIUM 20 MG/1
20 TABLET, FILM COATED ORAL NIGHTLY
Status: DISCONTINUED | OUTPATIENT
Start: 2019-01-01 | End: 2019-01-01 | Stop reason: HOSPADM

## 2019-01-01 RX ORDER — ONDANSETRON 2 MG/ML
INJECTION INTRAMUSCULAR; INTRAVENOUS AS NEEDED
Status: DISCONTINUED | OUTPATIENT
Start: 2019-01-01 | End: 2019-01-01 | Stop reason: SURG

## 2019-01-01 RX ORDER — ONDANSETRON HYDROCHLORIDE 8 MG/1
8 TABLET, FILM COATED ORAL 3 TIMES DAILY PRN
Qty: 30 TABLET | Refills: 5 | Status: SHIPPED | OUTPATIENT
Start: 2019-01-01 | End: 2019-01-01

## 2019-01-01 RX ORDER — METHYLPREDNISOLONE SODIUM SUCCINATE 125 MG/2ML
125 INJECTION, POWDER, LYOPHILIZED, FOR SOLUTION INTRAMUSCULAR; INTRAVENOUS ONCE
Status: COMPLETED | OUTPATIENT
Start: 2019-01-01 | End: 2019-01-01

## 2019-01-01 RX ORDER — ONDANSETRON HYDROCHLORIDE 8 MG/1
8 TABLET, FILM COATED ORAL 3 TIMES DAILY PRN
Qty: 30 TABLET | Refills: 5 | Status: SHIPPED | OUTPATIENT
Start: 2019-01-01 | End: 2020-01-01

## 2019-01-01 RX ORDER — PALONOSETRON 0.05 MG/ML
0.25 INJECTION, SOLUTION INTRAVENOUS ONCE
Status: COMPLETED | OUTPATIENT
Start: 2019-01-01 | End: 2019-01-01

## 2019-01-01 RX ORDER — DOXYCYCLINE 100 MG/1
100 CAPSULE ORAL EVERY 12 HOURS SCHEDULED
Status: DISCONTINUED | OUTPATIENT
Start: 2019-01-01 | End: 2019-01-01 | Stop reason: HOSPADM

## 2019-01-01 RX ORDER — COLCHICINE 0.6 MG/1
0.6 TABLET ORAL DAILY
Qty: 2 TABLET | Refills: 0 | Status: SHIPPED | OUTPATIENT
Start: 2019-01-01 | End: 2019-01-01

## 2019-01-01 RX ORDER — TOLVAPTAN 15 MG/1
15 TABLET ORAL ONCE
Status: COMPLETED | OUTPATIENT
Start: 2019-01-01 | End: 2019-01-01

## 2019-01-01 RX ORDER — NICOTINE POLACRILEX 4 MG
15 LOZENGE BUCCAL
Status: DISCONTINUED | OUTPATIENT
Start: 2019-01-01 | End: 2019-01-01 | Stop reason: HOSPADM

## 2019-01-01 RX ORDER — FENTANYL CITRATE 50 UG/ML
INJECTION, SOLUTION INTRAMUSCULAR; INTRAVENOUS AS NEEDED
Status: DISCONTINUED | OUTPATIENT
Start: 2019-01-01 | End: 2019-01-01 | Stop reason: SURG

## 2019-01-01 RX ORDER — POTASSIUM CHLORIDE 1.5 G/1.77G
40 POWDER, FOR SOLUTION ORAL AS NEEDED
Status: DISCONTINUED | OUTPATIENT
Start: 2019-01-01 | End: 2019-01-01 | Stop reason: HOSPADM

## 2019-01-01 RX ORDER — AMLODIPINE BESYLATE 5 MG/1
5 TABLET ORAL
Status: DISCONTINUED | OUTPATIENT
Start: 2019-01-01 | End: 2019-01-01 | Stop reason: HOSPADM

## 2019-01-01 RX ORDER — COLCHICINE 0.6 MG/1
1.2 TABLET ORAL ONCE
Status: COMPLETED | OUTPATIENT
Start: 2019-01-01 | End: 2019-01-01

## 2019-01-01 RX ORDER — ERGOCALCIFEROL 1.25 MG/1
50000 CAPSULE ORAL WEEKLY
Refills: 0 | COMMUNITY
Start: 2019-01-01

## 2019-01-01 RX ORDER — LEVOFLOXACIN 500 MG/1
500 TABLET, FILM COATED ORAL DAILY
COMMUNITY
End: 2019-01-01 | Stop reason: HOSPADM

## 2019-01-01 RX ORDER — SODIUM CHLORIDE, SODIUM LACTATE, POTASSIUM CHLORIDE, CALCIUM CHLORIDE 600; 310; 30; 20 MG/100ML; MG/100ML; MG/100ML; MG/100ML
9 INJECTION, SOLUTION INTRAVENOUS CONTINUOUS PRN
Status: DISCONTINUED | OUTPATIENT
Start: 2019-01-01 | End: 2019-01-01 | Stop reason: HOSPADM

## 2019-01-01 RX ORDER — HYDROCODONE BITARTRATE AND ACETAMINOPHEN 7.5; 325 MG/1; MG/1
1 TABLET ORAL EVERY 4 HOURS PRN
Status: DISCONTINUED | OUTPATIENT
Start: 2019-01-01 | End: 2019-01-01 | Stop reason: HOSPADM

## 2019-01-01 RX ORDER — HEPARIN SODIUM 5000 [USP'U]/ML
5000 INJECTION, SOLUTION INTRAVENOUS; SUBCUTANEOUS EVERY 8 HOURS SCHEDULED
Status: DISCONTINUED | OUTPATIENT
Start: 2019-01-01 | End: 2019-01-01

## 2019-01-01 RX ORDER — PREDNISONE 20 MG/1
60 TABLET ORAL
Status: DISCONTINUED | OUTPATIENT
Start: 2019-01-01 | End: 2019-01-01 | Stop reason: HOSPADM

## 2019-01-01 RX ORDER — PALONOSETRON 0.05 MG/ML
0.25 INJECTION, SOLUTION INTRAVENOUS ONCE
Status: CANCELLED | OUTPATIENT
Start: 2019-01-01

## 2019-01-01 RX ORDER — FAMOTIDINE 20 MG/1
20 TABLET, FILM COATED ORAL
Status: DISCONTINUED | OUTPATIENT
Start: 2019-01-01 | End: 2019-01-01 | Stop reason: HOSPADM

## 2019-01-01 RX ORDER — ACETAMINOPHEN 650 MG/1
650 SUPPOSITORY RECTAL EVERY 4 HOURS PRN
Status: DISCONTINUED | OUTPATIENT
Start: 2019-01-01 | End: 2019-01-01 | Stop reason: HOSPADM

## 2019-01-01 RX ORDER — LACTULOSE 10 G/15ML
20 SOLUTION ORAL 3 TIMES DAILY
Qty: 240 ML | Refills: 2 | Status: SHIPPED | OUTPATIENT
Start: 2019-01-01 | End: 2019-01-01

## 2019-01-01 RX ORDER — IPRATROPIUM BROMIDE AND ALBUTEROL SULFATE 2.5; .5 MG/3ML; MG/3ML
3 SOLUTION RESPIRATORY (INHALATION) EVERY 4 HOURS PRN
Status: DISCONTINUED | OUTPATIENT
Start: 2019-01-01 | End: 2019-01-01 | Stop reason: HOSPADM

## 2019-01-01 RX ORDER — SODIUM CHLORIDE 1000 MG
2 TABLET, SOLUBLE MISCELLANEOUS
Status: DISCONTINUED | OUTPATIENT
Start: 2019-01-01 | End: 2019-01-01 | Stop reason: HOSPADM

## 2019-01-01 RX ORDER — PANTOPRAZOLE SODIUM 40 MG/10ML
40 INJECTION, POWDER, LYOPHILIZED, FOR SOLUTION INTRAVENOUS
Status: DISCONTINUED | OUTPATIENT
Start: 2019-01-01 | End: 2019-01-01

## 2019-01-01 RX ORDER — ROCURONIUM BROMIDE 10 MG/ML
INJECTION, SOLUTION INTRAVENOUS AS NEEDED
Status: DISCONTINUED | OUTPATIENT
Start: 2019-01-01 | End: 2019-01-01 | Stop reason: SURG

## 2019-01-01 RX ORDER — SODIUM CHLORIDE 0.9 % (FLUSH) 0.9 %
10 SYRINGE (ML) INJECTION EVERY 12 HOURS SCHEDULED
Status: DISCONTINUED | OUTPATIENT
Start: 2019-01-01 | End: 2019-01-01 | Stop reason: HOSPADM

## 2019-01-01 RX ORDER — ATORVASTATIN CALCIUM 10 MG/1
10 TABLET, FILM COATED ORAL DAILY
Status: DISCONTINUED | OUTPATIENT
Start: 2019-01-01 | End: 2019-01-01

## 2019-01-01 RX ORDER — LISINOPRIL 20 MG/1
20 TABLET ORAL DAILY
COMMUNITY
End: 2019-01-01 | Stop reason: HOSPADM

## 2019-01-01 RX ORDER — CYANOCOBALAMIN 1000 UG/ML
INJECTION, SOLUTION INTRAMUSCULAR; SUBCUTANEOUS
Refills: 0 | COMMUNITY
Start: 2019-01-01 | End: 2019-01-01

## 2019-01-01 RX ORDER — FENOFIBRATE 145 MG/1
TABLET, COATED ORAL
Refills: 0 | COMMUNITY
Start: 2019-01-01 | End: 2019-01-01

## 2019-01-01 RX ORDER — COLCHICINE 0.6 MG/1
0.6 TABLET ORAL DAILY PRN
Status: DISCONTINUED | OUTPATIENT
Start: 2019-01-01 | End: 2019-01-01 | Stop reason: HOSPADM

## 2019-01-01 RX ORDER — PROPOFOL 10 MG/ML
VIAL (ML) INTRAVENOUS AS NEEDED
Status: DISCONTINUED | OUTPATIENT
Start: 2019-01-01 | End: 2019-01-01 | Stop reason: SURG

## 2019-01-01 RX ORDER — SODIUM CHLORIDE 1000 MG
2 TABLET, SOLUBLE MISCELLANEOUS
Status: DISCONTINUED | OUTPATIENT
Start: 2019-01-01 | End: 2019-01-01

## 2019-01-01 RX ORDER — GUAIFENESIN 600 MG/1
600 TABLET, EXTENDED RELEASE ORAL EVERY 12 HOURS SCHEDULED
Qty: 30 TABLET | Refills: 0 | Status: SHIPPED | OUTPATIENT
Start: 2019-01-01 | End: 2019-01-01

## 2019-01-01 RX ORDER — LEVOFLOXACIN 750 MG/1
750 TABLET ORAL EVERY 24 HOURS
Qty: 4 TABLET | Refills: 0 | Status: SHIPPED | OUTPATIENT
Start: 2019-01-01 | End: 2019-01-01

## 2019-01-01 RX ORDER — PANTOPRAZOLE SODIUM 40 MG/1
40 TABLET, DELAYED RELEASE ORAL DAILY
Qty: 30 TABLET | Refills: 0 | Status: SHIPPED | OUTPATIENT
Start: 2019-01-01

## 2019-01-01 RX ORDER — LIDOCAINE 50 MG/G
1 PATCH TOPICAL EVERY 24 HOURS
Status: DISCONTINUED | OUTPATIENT
Start: 2019-01-01 | End: 2019-01-01 | Stop reason: HOSPADM

## 2019-01-01 RX ORDER — SODIUM CHLORIDE 0.9 % (FLUSH) 0.9 %
3-10 SYRINGE (ML) INJECTION AS NEEDED
Status: DISCONTINUED | OUTPATIENT
Start: 2019-01-01 | End: 2019-01-01 | Stop reason: HOSPADM

## 2019-01-01 RX ORDER — IPRATROPIUM BROMIDE AND ALBUTEROL SULFATE 2.5; .5 MG/3ML; MG/3ML
3 SOLUTION RESPIRATORY (INHALATION) EVERY 6 HOURS PRN
Status: DISCONTINUED | OUTPATIENT
Start: 2019-01-01 | End: 2019-01-01 | Stop reason: HOSPADM

## 2019-01-01 RX ORDER — POTASSIUM CHLORIDE 750 MG/1
40 CAPSULE, EXTENDED RELEASE ORAL AS NEEDED
Status: DISCONTINUED | OUTPATIENT
Start: 2019-01-01 | End: 2019-01-01 | Stop reason: HOSPADM

## 2019-01-01 RX ORDER — ALBUTEROL SULFATE 1.25 MG/3ML
1 SOLUTION RESPIRATORY (INHALATION) EVERY 6 HOURS PRN
COMMUNITY

## 2019-01-01 RX ORDER — NALOXONE HCL 0.4 MG/ML
0.4 VIAL (ML) INJECTION
Status: DISCONTINUED | OUTPATIENT
Start: 2019-01-01 | End: 2019-01-01 | Stop reason: HOSPADM

## 2019-01-01 RX ORDER — GUAIFENESIN 600 MG/1
600 TABLET, EXTENDED RELEASE ORAL EVERY 12 HOURS SCHEDULED
Status: DISCONTINUED | OUTPATIENT
Start: 2019-01-01 | End: 2019-01-01 | Stop reason: HOSPADM

## 2019-01-01 RX ORDER — GLYCOPYRROLATE 0.2 MG/ML
INJECTION INTRAMUSCULAR; INTRAVENOUS AS NEEDED
Status: DISCONTINUED | OUTPATIENT
Start: 2019-01-01 | End: 2019-01-01 | Stop reason: SURG

## 2019-01-01 RX ORDER — METHYLPREDNISOLONE 4 MG/1
TABLET ORAL
Qty: 21 TABLET | Refills: 0 | Status: SHIPPED | OUTPATIENT
Start: 2019-01-01 | End: 2019-01-01

## 2019-01-01 RX ORDER — TC 99M MEDRONATE 20 MG/10ML
25.8 INJECTION, POWDER, LYOPHILIZED, FOR SOLUTION INTRAVENOUS
Status: COMPLETED | OUTPATIENT
Start: 2019-01-01 | End: 2019-01-01

## 2019-01-01 RX ORDER — PREDNISONE 20 MG/1
20 TABLET ORAL DAILY
Status: DISCONTINUED | OUTPATIENT
Start: 2019-01-01 | End: 2019-01-01

## 2019-01-01 RX ORDER — CHOLECALCIFEROL (VITAMIN D3) 125 MCG
5 CAPSULE ORAL NIGHTLY
Status: DISCONTINUED | OUTPATIENT
Start: 2019-01-01 | End: 2019-01-01 | Stop reason: HOSPADM

## 2019-01-01 RX ORDER — METHYLPREDNISOLONE SODIUM SUCCINATE 40 MG/ML
40 INJECTION, POWDER, LYOPHILIZED, FOR SOLUTION INTRAMUSCULAR; INTRAVENOUS ONCE
Status: COMPLETED | OUTPATIENT
Start: 2019-01-01 | End: 2019-01-01

## 2019-01-01 RX ORDER — METHYLPREDNISOLONE SODIUM SUCCINATE 125 MG/2ML
125 INJECTION, POWDER, LYOPHILIZED, FOR SOLUTION INTRAMUSCULAR; INTRAVENOUS EVERY 24 HOURS
Status: DISCONTINUED | OUTPATIENT
Start: 2019-01-01 | End: 2019-01-01

## 2019-01-01 RX ORDER — SODIUM CHLORIDE 9 MG/ML
10 INJECTION, SOLUTION INTRAVENOUS CONTINUOUS
Status: DISCONTINUED | OUTPATIENT
Start: 2019-01-01 | End: 2019-01-01 | Stop reason: HOSPADM

## 2019-01-01 RX ORDER — BUDESONIDE AND FORMOTEROL FUMARATE DIHYDRATE 160; 4.5 UG/1; UG/1
2 AEROSOL RESPIRATORY (INHALATION)
Qty: 10.2 G | Refills: 12 | Status: SHIPPED | OUTPATIENT
Start: 2019-01-01 | End: 2019-01-01

## 2019-01-01 RX ORDER — PANTOPRAZOLE SODIUM 40 MG/1
40 TABLET, DELAYED RELEASE ORAL ONCE
Status: COMPLETED | OUTPATIENT
Start: 2019-01-01 | End: 2019-01-01

## 2019-01-01 RX ORDER — LIDOCAINE HYDROCHLORIDE 10 MG/ML
0.5 INJECTION, SOLUTION EPIDURAL; INFILTRATION; INTRACAUDAL; PERINEURAL ONCE AS NEEDED
Status: DISCONTINUED | OUTPATIENT
Start: 2019-01-01 | End: 2019-01-01 | Stop reason: HOSPADM

## 2019-01-01 RX ORDER — DEXTROSE MONOHYDRATE 50 MG/ML
75 INJECTION, SOLUTION INTRAVENOUS CONTINUOUS
Status: DISCONTINUED | OUTPATIENT
Start: 2019-01-01 | End: 2019-01-01

## 2019-01-01 RX ORDER — PREDNISONE 20 MG/1
TABLET ORAL
Qty: 12 TABLET | Refills: 0 | Status: SHIPPED | OUTPATIENT
Start: 2019-01-01 | End: 2019-01-01

## 2019-01-01 RX ORDER — LEVOFLOXACIN 750 MG/1
750 TABLET ORAL EVERY 24 HOURS
Status: DISCONTINUED | OUTPATIENT
Start: 2019-01-01 | End: 2019-01-01 | Stop reason: HOSPADM

## 2019-01-01 RX ORDER — COLCHICINE 0.6 MG/1
0.6 TABLET ORAL DAILY PRN
COMMUNITY

## 2019-01-01 RX ORDER — IPRATROPIUM BROMIDE AND ALBUTEROL SULFATE 2.5; .5 MG/3ML; MG/3ML
3 SOLUTION RESPIRATORY (INHALATION) ONCE
Status: COMPLETED | OUTPATIENT
Start: 2019-01-01 | End: 2019-01-01

## 2019-01-01 RX ORDER — AMLODIPINE BESYLATE 5 MG/1
5 TABLET ORAL
Status: DISCONTINUED | OUTPATIENT
Start: 2019-01-01 | End: 2019-01-01

## 2019-01-01 RX ORDER — ASPIRIN 81 MG/1
81 TABLET, CHEWABLE ORAL DAILY
COMMUNITY

## 2019-01-01 RX ORDER — AMLODIPINE BESYLATE 5 MG/1
5 TABLET ORAL
Qty: 30 TABLET | Refills: 0 | Status: SHIPPED | OUTPATIENT
Start: 2019-01-01

## 2019-01-01 RX ORDER — HYDROCODONE BITARTRATE AND ACETAMINOPHEN 7.5; 325 MG/1; MG/1
1 TABLET ORAL EVERY 4 HOURS PRN
Qty: 18 TABLET | Refills: 0 | Status: SHIPPED | OUTPATIENT
Start: 2019-01-01 | End: 2019-01-01

## 2019-01-01 RX ORDER — NEOSTIGMINE METHYLSULFATE 1 MG/ML
INJECTION, SOLUTION INTRAVENOUS AS NEEDED
Status: DISCONTINUED | OUTPATIENT
Start: 2019-01-01 | End: 2019-01-01 | Stop reason: SURG

## 2019-01-01 RX ORDER — AMOXICILLIN AND CLAVULANATE POTASSIUM 875; 125 MG/1; MG/1
1 TABLET, FILM COATED ORAL 2 TIMES DAILY
Qty: 20 TABLET | Refills: 0 | Status: SHIPPED | OUTPATIENT
Start: 2019-01-01 | End: 2019-01-01 | Stop reason: HOSPADM

## 2019-01-01 RX ORDER — LISINOPRIL 20 MG/1
20 TABLET ORAL DAILY
Status: DISCONTINUED | OUTPATIENT
Start: 2019-01-01 | End: 2019-01-01

## 2019-01-01 RX ORDER — CEFTRIAXONE SODIUM 1 G/50ML
1 INJECTION, SOLUTION INTRAVENOUS ONCE
Status: COMPLETED | OUTPATIENT
Start: 2019-01-01 | End: 2019-01-01

## 2019-01-01 RX ORDER — SODIUM CHLORIDE 1000 MG
2 TABLET, SOLUBLE MISCELLANEOUS
Qty: 180 TABLET | Refills: 1 | Status: SHIPPED | OUTPATIENT
Start: 2019-01-01 | End: 2019-01-01

## 2019-01-01 RX ORDER — DESMOPRESSIN ACETATE 4 UG/ML
1 INJECTION, SOLUTION INTRAVENOUS; SUBCUTANEOUS ONCE
Status: COMPLETED | OUTPATIENT
Start: 2019-01-01 | End: 2019-01-01

## 2019-01-01 RX ORDER — BISACODYL 10 MG
10 SUPPOSITORY, RECTAL RECTAL DAILY PRN
Status: DISCONTINUED | OUTPATIENT
Start: 2019-01-01 | End: 2019-01-01 | Stop reason: HOSPADM

## 2019-01-01 RX ORDER — ACETAMINOPHEN 160 MG/5ML
650 SOLUTION ORAL EVERY 4 HOURS PRN
Status: DISCONTINUED | OUTPATIENT
Start: 2019-01-01 | End: 2019-01-01 | Stop reason: HOSPADM

## 2019-01-01 RX ORDER — THIAMINE MONONITRATE (VIT B1) 100 MG
100 TABLET ORAL DAILY
Status: DISPENSED | OUTPATIENT
Start: 2019-01-01 | End: 2019-01-01

## 2019-01-01 RX ORDER — LANOLIN ALCOHOL/MO/W.PET/CERES
400 CREAM (GRAM) TOPICAL DAILY
Status: DISCONTINUED | OUTPATIENT
Start: 2019-01-01 | End: 2019-01-01

## 2019-01-01 RX ORDER — LISINOPRIL 20 MG/1
20 TABLET ORAL DAILY
Qty: 90 TABLET | Refills: 3 | Status: SHIPPED | OUTPATIENT
Start: 2019-01-01 | End: 2019-01-01

## 2019-01-01 RX ORDER — DEXTROSE MONOHYDRATE 25 G/50ML
25 INJECTION, SOLUTION INTRAVENOUS
Status: DISCONTINUED | OUTPATIENT
Start: 2019-01-01 | End: 2019-01-01 | Stop reason: HOSPADM

## 2019-01-01 RX ORDER — PREDNISONE 20 MG/1
TABLET ORAL
Qty: 5 TABLET | Refills: 0 | Status: SHIPPED | OUTPATIENT
Start: 2019-01-01 | End: 2019-01-01

## 2019-01-01 RX ORDER — PREDNISONE 10 MG/1
10 TABLET ORAL DAILY
Qty: 30 TABLET | Refills: 2 | Status: SHIPPED | OUTPATIENT
Start: 2019-01-01 | End: 2019-01-01 | Stop reason: HOSPADM

## 2019-01-01 RX ORDER — LISINOPRIL 20 MG/1
20 TABLET ORAL DAILY
COMMUNITY
End: 2019-01-01 | Stop reason: SDUPTHER

## 2019-01-01 RX ORDER — SODIUM CHLORIDE 1000 MG
1 TABLET, SOLUBLE MISCELLANEOUS 3 TIMES DAILY
COMMUNITY
End: 2019-01-01

## 2019-01-01 RX ORDER — BUDESONIDE AND FORMOTEROL FUMARATE DIHYDRATE 160; 4.5 UG/1; UG/1
2 AEROSOL RESPIRATORY (INHALATION)
Qty: 10.2 G | Refills: 12 | Status: SHIPPED | OUTPATIENT
Start: 2019-01-01

## 2019-01-01 RX ORDER — PREDNISONE 20 MG/1
60 TABLET ORAL
Qty: 90 TABLET | Refills: 1 | Status: SHIPPED | OUTPATIENT
Start: 2020-01-01

## 2019-01-01 RX ORDER — PREDNISONE 20 MG/1
60 TABLET ORAL
Status: DISCONTINUED | OUTPATIENT
Start: 2019-01-01 | End: 2019-01-01

## 2019-01-01 RX ORDER — PREDNISONE 10 MG/1
10 TABLET ORAL
Status: COMPLETED | OUTPATIENT
Start: 2019-01-01 | End: 2019-01-01

## 2019-01-01 RX ORDER — VITS A,C,E/LUTEIN/MINERALS 300MCG-200
1 TABLET ORAL DAILY
Status: DISCONTINUED | OUTPATIENT
Start: 2019-01-01 | End: 2019-01-01 | Stop reason: HOSPADM

## 2019-01-01 RX ORDER — CALCIUM CARBONATE 750 MG/1
1500 TABLET, CHEWABLE ORAL 3 TIMES DAILY PRN
Status: DISCONTINUED | OUTPATIENT
Start: 2019-01-01 | End: 2019-01-01 | Stop reason: HOSPADM

## 2019-01-01 RX ORDER — DIPHENOXYLATE HYDROCHLORIDE AND ATROPINE SULFATE 2.5; .025 MG/1; MG/1
1 TABLET ORAL DAILY
Status: DISCONTINUED | OUTPATIENT
Start: 2019-01-01 | End: 2019-01-01 | Stop reason: HOSPADM

## 2019-01-01 RX ORDER — ONDANSETRON 4 MG/1
4 TABLET, FILM COATED ORAL EVERY 6 HOURS PRN
Qty: 30 TABLET | Refills: 0 | Status: SHIPPED | OUTPATIENT
Start: 2019-01-01 | End: 2019-01-01 | Stop reason: SDUPTHER

## 2019-01-01 RX ORDER — PREDNISONE 20 MG/1
20 TABLET ORAL DAILY
Status: ON HOLD | COMMUNITY
End: 2019-01-01 | Stop reason: SDUPTHER

## 2019-01-01 RX ORDER — DEXAMETHASONE SODIUM PHOSPHATE 4 MG/ML
INJECTION, SOLUTION INTRA-ARTICULAR; INTRALESIONAL; INTRAMUSCULAR; INTRAVENOUS; SOFT TISSUE AS NEEDED
Status: DISCONTINUED | OUTPATIENT
Start: 2019-01-01 | End: 2019-01-01 | Stop reason: SURG

## 2019-01-01 RX ADMIN — ONDANSETRON 16 MG: 2 INJECTION INTRAMUSCULAR; INTRAVENOUS at 10:59

## 2019-01-01 RX ADMIN — SODIUM CHLORIDE, PRESERVATIVE FREE 10 ML: 5 INJECTION INTRAVENOUS at 08:33

## 2019-01-01 RX ADMIN — ONDANSETRON 4 MG: 2 INJECTION INTRAMUSCULAR; INTRAVENOUS at 14:40

## 2019-01-01 RX ADMIN — Medication 2 G: at 18:21

## 2019-01-01 RX ADMIN — IPRATROPIUM BROMIDE AND ALBUTEROL SULFATE 3 ML: 2.5; .5 SOLUTION RESPIRATORY (INHALATION) at 08:19

## 2019-01-01 RX ADMIN — ETOPOSIDE 210 MG: 20 INJECTION, SOLUTION INTRAVENOUS at 10:06

## 2019-01-01 RX ADMIN — ATORVASTATIN CALCIUM 20 MG: 20 TABLET, FILM COATED ORAL at 20:41

## 2019-01-01 RX ADMIN — BUDESONIDE AND FORMOTEROL FUMARATE DIHYDRATE 2 PUFF: 160; 4.5 AEROSOL RESPIRATORY (INHALATION) at 09:20

## 2019-01-01 RX ADMIN — TAZOBACTAM SODIUM AND PIPERACILLIN SODIUM 3.38 G: 375; 3 INJECTION, SOLUTION INTRAVENOUS at 17:22

## 2019-01-01 RX ADMIN — HEPARIN SODIUM 5000 UNITS: 5000 INJECTION, SOLUTION INTRAVENOUS; SUBCUTANEOUS at 05:20

## 2019-01-01 RX ADMIN — SODIUM CHLORIDE, PRESERVATIVE FREE 10 ML: 5 INJECTION INTRAVENOUS at 08:39

## 2019-01-01 RX ADMIN — ASPIRIN 81 MG 81 MG: 81 TABLET ORAL at 09:32

## 2019-01-01 RX ADMIN — IPRATROPIUM BROMIDE AND ALBUTEROL SULFATE 3 ML: 2.5; .5 SOLUTION RESPIRATORY (INHALATION) at 20:09

## 2019-01-01 RX ADMIN — DESMOPRESSIN ACETATE 1 MCG: 4 SOLUTION INTRAVENOUS at 13:43

## 2019-01-01 RX ADMIN — INSULIN LISPRO 2 UNITS: 100 INJECTION, SOLUTION INTRAVENOUS; SUBCUTANEOUS at 12:09

## 2019-01-01 RX ADMIN — Medication 1 TABLET: at 08:39

## 2019-01-01 RX ADMIN — GUAIFENESIN 1200 MG: 600 TABLET, EXTENDED RELEASE ORAL at 08:33

## 2019-01-01 RX ADMIN — SODIUM CHLORIDE, PRESERVATIVE FREE 3 ML: 5 INJECTION INTRAVENOUS at 20:14

## 2019-01-01 RX ADMIN — ASPIRIN 81 MG 81 MG: 81 TABLET ORAL at 08:34

## 2019-01-01 RX ADMIN — IPRATROPIUM BROMIDE AND ALBUTEROL SULFATE 3 ML: 2.5; .5 SOLUTION RESPIRATORY (INHALATION) at 11:29

## 2019-01-01 RX ADMIN — Medication 1 TABLET: at 08:06

## 2019-01-01 RX ADMIN — ENOXAPARIN SODIUM 40 MG: 40 INJECTION SUBCUTANEOUS at 09:32

## 2019-01-01 RX ADMIN — LIDOCAINE 1 PATCH: 50 PATCH TOPICAL at 20:44

## 2019-01-01 RX ADMIN — PREDNISONE 60 MG: 20 TABLET ORAL at 08:38

## 2019-01-01 RX ADMIN — LEVOFLOXACIN 750 MG: 750 TABLET, FILM COATED ORAL at 09:55

## 2019-01-01 RX ADMIN — ONDANSETRON 16 MG: 2 INJECTION INTRAMUSCULAR; INTRAVENOUS at 09:48

## 2019-01-01 RX ADMIN — INSULIN LISPRO 4 UNITS: 100 INJECTION, SOLUTION INTRAVENOUS; SUBCUTANEOUS at 08:44

## 2019-01-01 RX ADMIN — IPRATROPIUM BROMIDE AND ALBUTEROL SULFATE 3 ML: 2.5; .5 SOLUTION RESPIRATORY (INHALATION) at 11:43

## 2019-01-01 RX ADMIN — ATORVASTATIN CALCIUM 20 MG: 20 TABLET, FILM COATED ORAL at 21:37

## 2019-01-01 RX ADMIN — ATEZOLIZUMAB 1200 MG: 1200 INJECTION, SOLUTION INTRAVENOUS at 10:25

## 2019-01-01 RX ADMIN — BUDESONIDE AND FORMOTEROL FUMARATE DIHYDRATE 2 PUFF: 160; 4.5 AEROSOL RESPIRATORY (INHALATION) at 07:51

## 2019-01-01 RX ADMIN — SODIUM CHLORIDE, PRESERVATIVE FREE 10 ML: 5 INJECTION INTRAVENOUS at 21:43

## 2019-01-01 RX ADMIN — ATORVASTATIN CALCIUM 10 MG: 10 TABLET, FILM COATED ORAL at 20:13

## 2019-01-01 RX ADMIN — METHYLPREDNISOLONE SODIUM SUCCINATE 125 MG: 125 INJECTION, POWDER, FOR SOLUTION INTRAMUSCULAR; INTRAVENOUS at 12:00

## 2019-01-01 RX ADMIN — BUDESONIDE AND FORMOTEROL FUMARATE DIHYDRATE 2 PUFF: 160; 4.5 AEROSOL RESPIRATORY (INHALATION) at 07:53

## 2019-01-01 RX ADMIN — LISINOPRIL 20 MG: 20 TABLET ORAL at 08:22

## 2019-01-01 RX ADMIN — SODIUM CHLORIDE, PRESERVATIVE FREE 3 ML: 5 INJECTION INTRAVENOUS at 20:43

## 2019-01-01 RX ADMIN — IPRATROPIUM BROMIDE AND ALBUTEROL SULFATE 3 ML: 2.5; .5 SOLUTION RESPIRATORY (INHALATION) at 07:28

## 2019-01-01 RX ADMIN — BUDESONIDE AND FORMOTEROL FUMARATE DIHYDRATE 2 PUFF: 160; 4.5 AEROSOL RESPIRATORY (INHALATION) at 07:52

## 2019-01-01 RX ADMIN — INSULIN LISPRO 2 UNITS: 100 INJECTION, SOLUTION INTRAVENOUS; SUBCUTANEOUS at 20:46

## 2019-01-01 RX ADMIN — INSULIN DETEMIR 15 UNITS: 100 INJECTION, SOLUTION SUBCUTANEOUS at 20:34

## 2019-01-01 RX ADMIN — COLCHICINE 0.6 MG: 0.6 TABLET, FILM COATED ORAL at 08:11

## 2019-01-01 RX ADMIN — TAZOBACTAM SODIUM AND PIPERACILLIN SODIUM 3.38 G: 375; 3 INJECTION, SOLUTION INTRAVENOUS at 02:41

## 2019-01-01 RX ADMIN — SODIUM CHLORIDE, PRESERVATIVE FREE 10 ML: 5 INJECTION INTRAVENOUS at 09:33

## 2019-01-01 RX ADMIN — SODIUM CHLORIDE 1000 ML: 9 INJECTION, SOLUTION INTRAVENOUS at 11:33

## 2019-01-01 RX ADMIN — IPRATROPIUM BROMIDE AND ALBUTEROL SULFATE 3 ML: 2.5; .5 SOLUTION RESPIRATORY (INHALATION) at 11:55

## 2019-01-01 RX ADMIN — SODIUM CHLORIDE, PRESERVATIVE FREE 3 ML: 5 INJECTION INTRAVENOUS at 08:11

## 2019-01-01 RX ADMIN — LISINOPRIL 20 MG: 20 TABLET ORAL at 09:23

## 2019-01-01 RX ADMIN — BUDESONIDE AND FORMOTEROL FUMARATE DIHYDRATE 2 PUFF: 160; 4.5 AEROSOL RESPIRATORY (INHALATION) at 19:32

## 2019-01-01 RX ADMIN — AMLODIPINE BESYLATE 5 MG: 5 TABLET ORAL at 20:53

## 2019-01-01 RX ADMIN — GUAIFENESIN 600 MG: 600 TABLET, EXTENDED RELEASE ORAL at 08:21

## 2019-01-01 RX ADMIN — IPRATROPIUM BROMIDE AND ALBUTEROL SULFATE 3 ML: 2.5; .5 SOLUTION RESPIRATORY (INHALATION) at 12:05

## 2019-01-01 RX ADMIN — ACETAMINOPHEN 650 MG: 325 TABLET ORAL at 21:22

## 2019-01-01 RX ADMIN — ENOXAPARIN SODIUM 40 MG: 40 INJECTION SUBCUTANEOUS at 08:21

## 2019-01-01 RX ADMIN — PROCHLORPERAZINE MALEATE 10 MG: 10 TABLET ORAL at 09:13

## 2019-01-01 RX ADMIN — TAZOBACTAM SODIUM AND PIPERACILLIN SODIUM 3.38 G: 375; 3 INJECTION, SOLUTION INTRAVENOUS at 08:05

## 2019-01-01 RX ADMIN — PANTOPRAZOLE SODIUM 40 MG: 40 TABLET, DELAYED RELEASE ORAL at 05:20

## 2019-01-01 RX ADMIN — ETOPOSIDE 210 MG: 20 INJECTION, SOLUTION INTRAVENOUS at 08:08

## 2019-01-01 RX ADMIN — HEPARIN SODIUM 5000 UNITS: 5000 INJECTION, SOLUTION INTRAVENOUS; SUBCUTANEOUS at 13:19

## 2019-01-01 RX ADMIN — GUAIFENESIN 1200 MG: 600 TABLET, EXTENDED RELEASE ORAL at 09:11

## 2019-01-01 RX ADMIN — SODIUM CHLORIDE, PRESERVATIVE FREE 10 ML: 5 INJECTION INTRAVENOUS at 22:00

## 2019-01-01 RX ADMIN — PANTOPRAZOLE SODIUM 40 MG: 40 TABLET, DELAYED RELEASE ORAL at 05:27

## 2019-01-01 RX ADMIN — HYDROCODONE BITARTRATE AND ACETAMINOPHEN 1 TABLET: 5; 325 TABLET ORAL at 01:48

## 2019-01-01 RX ADMIN — BUDESONIDE AND FORMOTEROL FUMARATE DIHYDRATE 2 PUFF: 160; 4.5 AEROSOL RESPIRATORY (INHALATION) at 18:57

## 2019-01-01 RX ADMIN — BUDESONIDE AND FORMOTEROL FUMARATE DIHYDRATE 2 PUFF: 160; 4.5 AEROSOL RESPIRATORY (INHALATION) at 20:17

## 2019-01-01 RX ADMIN — DOXYCYCLINE 100 MG: 100 INJECTION, POWDER, LYOPHILIZED, FOR SOLUTION INTRAVENOUS at 08:43

## 2019-01-01 RX ADMIN — SODIUM CHLORIDE: 9 INJECTION, SOLUTION INTRAVENOUS at 13:52

## 2019-01-01 RX ADMIN — TAZOBACTAM SODIUM AND PIPERACILLIN SODIUM 3.38 G: 375; 3 INJECTION, SOLUTION INTRAVENOUS at 10:26

## 2019-01-01 RX ADMIN — BUDESONIDE AND FORMOTEROL FUMARATE DIHYDRATE 2 PUFF: 160; 4.5 AEROSOL RESPIRATORY (INHALATION) at 07:18

## 2019-01-01 RX ADMIN — SODIUM CHLORIDE 250 ML: 9 INJECTION, SOLUTION INTRAVENOUS at 11:15

## 2019-01-01 RX ADMIN — SODIUM CHLORIDE 500 ML: 9 INJECTION, SOLUTION INTRAVENOUS at 11:09

## 2019-01-01 RX ADMIN — IPRATROPIUM BROMIDE AND ALBUTEROL SULFATE 3 ML: 2.5; .5 SOLUTION RESPIRATORY (INHALATION) at 07:26

## 2019-01-01 RX ADMIN — BUDESONIDE AND FORMOTEROL FUMARATE DIHYDRATE 2 PUFF: 160; 4.5 AEROSOL RESPIRATORY (INHALATION) at 20:26

## 2019-01-01 RX ADMIN — HEPARIN SODIUM 5000 UNITS: 5000 INJECTION, SOLUTION INTRAVENOUS; SUBCUTANEOUS at 12:34

## 2019-01-01 RX ADMIN — DOXYCYCLINE 100 MG: 100 INJECTION, POWDER, LYOPHILIZED, FOR SOLUTION INTRAVENOUS at 20:28

## 2019-01-01 RX ADMIN — PALONOSETRON 0.25 MG: 0.25 INJECTION, SOLUTION INTRAVENOUS at 09:57

## 2019-01-01 RX ADMIN — PREDNISONE 10 MG: 10 TABLET ORAL at 08:37

## 2019-01-01 RX ADMIN — IPRATROPIUM BROMIDE AND ALBUTEROL SULFATE 3 ML: 2.5; .5 SOLUTION RESPIRATORY (INHALATION) at 07:44

## 2019-01-01 RX ADMIN — TAZOBACTAM SODIUM AND PIPERACILLIN SODIUM 3.38 G: 375; 3 INJECTION, SOLUTION INTRAVENOUS at 19:18

## 2019-01-01 RX ADMIN — Medication 100 MG: at 08:50

## 2019-01-01 RX ADMIN — IPRATROPIUM BROMIDE AND ALBUTEROL SULFATE 3 ML: 2.5; .5 SOLUTION RESPIRATORY (INHALATION) at 07:35

## 2019-01-01 RX ADMIN — LISINOPRIL 20 MG: 20 TABLET ORAL at 09:32

## 2019-01-01 RX ADMIN — HYDROCODONE BITARTRATE AND ACETAMINOPHEN 1 TABLET: 5; 325 TABLET ORAL at 12:30

## 2019-01-01 RX ADMIN — INSULIN LISPRO 5 UNITS: 100 INJECTION, SOLUTION INTRAVENOUS; SUBCUTANEOUS at 21:34

## 2019-01-01 RX ADMIN — AZITHROMYCIN MONOHYDRATE 500 MG: 500 INJECTION, POWDER, LYOPHILIZED, FOR SOLUTION INTRAVENOUS at 01:02

## 2019-01-01 RX ADMIN — ACETAMINOPHEN 650 MG: 325 TABLET ORAL at 20:34

## 2019-01-01 RX ADMIN — PANTOPRAZOLE SODIUM 40 MG: 40 INJECTION, POWDER, FOR SOLUTION INTRAVENOUS at 05:43

## 2019-01-01 RX ADMIN — ATORVASTATIN CALCIUM 20 MG: 20 TABLET, FILM COATED ORAL at 22:02

## 2019-01-01 RX ADMIN — ETOPOSIDE 210 MG: 20 INJECTION, SOLUTION INTRAVENOUS at 12:08

## 2019-01-01 RX ADMIN — PREDNISONE 20 MG: 20 TABLET ORAL at 09:07

## 2019-01-01 RX ADMIN — MAGNESIUM SULFATE 2 G: 2 INJECTION INTRAVENOUS at 16:50

## 2019-01-01 RX ADMIN — AMLODIPINE BESYLATE 5 MG: 5 TABLET ORAL at 08:06

## 2019-01-01 RX ADMIN — ATORVASTATIN CALCIUM 10 MG: 10 TABLET, FILM COATED ORAL at 20:07

## 2019-01-01 RX ADMIN — Medication 1 TABLET: at 08:59

## 2019-01-01 RX ADMIN — IPRATROPIUM BROMIDE AND ALBUTEROL SULFATE 3 ML: 2.5; .5 SOLUTION RESPIRATORY (INHALATION) at 07:14

## 2019-01-01 RX ADMIN — IPRATROPIUM BROMIDE AND ALBUTEROL SULFATE 3 ML: 2.5; .5 SOLUTION RESPIRATORY (INHALATION) at 20:14

## 2019-01-01 RX ADMIN — IPRATROPIUM BROMIDE AND ALBUTEROL SULFATE 3 ML: 2.5; .5 SOLUTION RESPIRATORY (INHALATION) at 15:55

## 2019-01-01 RX ADMIN — SODIUM CHLORIDE 250 ML: 9 INJECTION, SOLUTION INTRAVENOUS at 10:22

## 2019-01-01 RX ADMIN — DEXAMETHASONE SODIUM PHOSPHATE 4 MG: 4 INJECTION, SOLUTION INTRAMUSCULAR; INTRAVENOUS at 14:05

## 2019-01-01 RX ADMIN — ATEZOLIZUMAB 1200 MG: 1200 INJECTION, SOLUTION INTRAVENOUS at 10:06

## 2019-01-01 RX ADMIN — IPRATROPIUM BROMIDE AND ALBUTEROL SULFATE 3 ML: 2.5; .5 SOLUTION RESPIRATORY (INHALATION) at 16:14

## 2019-01-01 RX ADMIN — IPRATROPIUM BROMIDE AND ALBUTEROL SULFATE 3 ML: 2.5; .5 SOLUTION RESPIRATORY (INHALATION) at 12:46

## 2019-01-01 RX ADMIN — MELATONIN TAB 5 MG 5 MG: 5 TAB at 21:22

## 2019-01-01 RX ADMIN — HEPARIN SODIUM 5000 UNITS: 5000 INJECTION, SOLUTION INTRAVENOUS; SUBCUTANEOUS at 05:28

## 2019-01-01 RX ADMIN — POLYETHYLENE GLYCOL 3350 17 G: 17 POWDER, FOR SOLUTION ORAL at 19:58

## 2019-01-01 RX ADMIN — ONDANSETRON HYDROCHLORIDE 4 MG: 4 TABLET, FILM COATED ORAL at 21:03

## 2019-01-01 RX ADMIN — ETOPOSIDE 210 MG: 20 INJECTION, SOLUTION INTRAVENOUS at 10:00

## 2019-01-01 RX ADMIN — DEXAMETHASONE SODIUM PHOSPHATE 12 MG: 4 INJECTION, SOLUTION INTRAMUSCULAR; INTRAVENOUS at 11:00

## 2019-01-01 RX ADMIN — BUDESONIDE AND FORMOTEROL FUMARATE DIHYDRATE 2 PUFF: 160; 4.5 AEROSOL RESPIRATORY (INHALATION) at 19:30

## 2019-01-01 RX ADMIN — VANCOMYCIN HYDROCHLORIDE 1250 MG: 10 INJECTION, POWDER, LYOPHILIZED, FOR SOLUTION INTRAVENOUS at 02:49

## 2019-01-01 RX ADMIN — IPRATROPIUM BROMIDE AND ALBUTEROL SULFATE 3 ML: 2.5; .5 SOLUTION RESPIRATORY (INHALATION) at 15:49

## 2019-01-01 RX ADMIN — SODIUM CHLORIDE, PRESERVATIVE FREE 3 ML: 5 INJECTION INTRAVENOUS at 08:22

## 2019-01-01 RX ADMIN — VANCOMYCIN HYDROCHLORIDE 2500 MG: 1 INJECTION, POWDER, LYOPHILIZED, FOR SOLUTION INTRAVENOUS at 03:58

## 2019-01-01 RX ADMIN — LACTULOSE 10 G: 10 SOLUTION ORAL at 18:18

## 2019-01-01 RX ADMIN — HEPARIN SODIUM 5000 UNITS: 5000 INJECTION, SOLUTION INTRAVENOUS; SUBCUTANEOUS at 20:18

## 2019-01-01 RX ADMIN — TAZOBACTAM SODIUM AND PIPERACILLIN SODIUM 3.38 G: 375; 3 INJECTION, SOLUTION INTRAVENOUS at 23:48

## 2019-01-01 RX ADMIN — TAZOBACTAM SODIUM AND PIPERACILLIN SODIUM 3.38 G: 375; 3 INJECTION, SOLUTION INTRAVENOUS at 08:58

## 2019-01-01 RX ADMIN — AMLODIPINE BESYLATE 5 MG: 5 TABLET ORAL at 09:12

## 2019-01-01 RX ADMIN — IPRATROPIUM BROMIDE AND ALBUTEROL SULFATE 3 ML: 2.5; .5 SOLUTION RESPIRATORY (INHALATION) at 07:53

## 2019-01-01 RX ADMIN — Medication 2 G: at 13:04

## 2019-01-01 RX ADMIN — TAZOBACTAM SODIUM AND PIPERACILLIN SODIUM 3.38 G: 375; 3 INJECTION, SOLUTION INTRAVENOUS at 04:08

## 2019-01-01 RX ADMIN — SODIUM CHLORIDE 250 MG: 9 INJECTION, SOLUTION INTRAVENOUS at 11:41

## 2019-01-01 RX ADMIN — Medication 1 TABLET: at 08:41

## 2019-01-01 RX ADMIN — FUROSEMIDE 10 MG: 10 INJECTION, SOLUTION INTRAMUSCULAR; INTRAVENOUS at 11:20

## 2019-01-01 RX ADMIN — HYDROCODONE BITARTRATE AND ACETAMINOPHEN 1 TABLET: 5; 325 TABLET ORAL at 19:04

## 2019-01-01 RX ADMIN — SODIUM CHLORIDE, PRESERVATIVE FREE 10 ML: 5 INJECTION INTRAVENOUS at 21:35

## 2019-01-01 RX ADMIN — IPRATROPIUM BROMIDE AND ALBUTEROL SULFATE 3 ML: 2.5; .5 SOLUTION RESPIRATORY (INHALATION) at 19:48

## 2019-01-01 RX ADMIN — INSULIN LISPRO 4 UNITS: 100 INJECTION, SOLUTION INTRAVENOUS; SUBCUTANEOUS at 08:32

## 2019-01-01 RX ADMIN — BUDESONIDE AND FORMOTEROL FUMARATE DIHYDRATE 2 PUFF: 160; 4.5 AEROSOL RESPIRATORY (INHALATION) at 07:44

## 2019-01-01 RX ADMIN — ASPIRIN 81 MG 81 MG: 81 TABLET ORAL at 08:06

## 2019-01-01 RX ADMIN — ASPIRIN 81 MG 81 MG: 81 TABLET ORAL at 08:46

## 2019-01-01 RX ADMIN — Medication 2 G: at 17:22

## 2019-01-01 RX ADMIN — ONDANSETRON 16 MG: 2 INJECTION INTRAMUSCULAR; INTRAVENOUS at 09:42

## 2019-01-01 RX ADMIN — Medication 1 TABLET: at 09:32

## 2019-01-01 RX ADMIN — Medication 100 MG: at 08:46

## 2019-01-01 RX ADMIN — Medication 1 TABLET: at 08:50

## 2019-01-01 RX ADMIN — LISINOPRIL 20 MG: 20 TABLET ORAL at 08:37

## 2019-01-01 RX ADMIN — HYDROCODONE BITARTRATE AND ACETAMINOPHEN 1 TABLET: 7.5; 325 TABLET ORAL at 23:53

## 2019-01-01 RX ADMIN — IPRATROPIUM BROMIDE AND ALBUTEROL SULFATE 3 ML: 2.5; .5 SOLUTION RESPIRATORY (INHALATION) at 17:23

## 2019-01-01 RX ADMIN — TAZOBACTAM SODIUM AND PIPERACILLIN SODIUM 3.38 G: 375; 3 INJECTION, SOLUTION INTRAVENOUS at 17:44

## 2019-01-01 RX ADMIN — Medication 2 G: at 08:32

## 2019-01-01 RX ADMIN — PANTOPRAZOLE SODIUM 40 MG: 40 TABLET, DELAYED RELEASE ORAL at 05:41

## 2019-01-01 RX ADMIN — VANCOMYCIN HYDROCHLORIDE 1500 MG: 10 INJECTION, POWDER, LYOPHILIZED, FOR SOLUTION INTRAVENOUS at 04:30

## 2019-01-01 RX ADMIN — BUDESONIDE AND FORMOTEROL FUMARATE DIHYDRATE 2 PUFF: 160; 4.5 AEROSOL RESPIRATORY (INHALATION) at 09:02

## 2019-01-01 RX ADMIN — GUAIFENESIN 1200 MG: 600 TABLET, EXTENDED RELEASE ORAL at 08:46

## 2019-01-01 RX ADMIN — ACETAMINOPHEN 650 MG: 325 TABLET ORAL at 09:00

## 2019-01-01 RX ADMIN — GUAIFENESIN 1200 MG: 600 TABLET, EXTENDED RELEASE ORAL at 20:33

## 2019-01-01 RX ADMIN — TAZOBACTAM SODIUM AND PIPERACILLIN SODIUM 3.38 G: 375; 3 INJECTION, SOLUTION INTRAVENOUS at 18:52

## 2019-01-01 RX ADMIN — ASPIRIN 81 MG 81 MG: 81 TABLET ORAL at 09:23

## 2019-01-01 RX ADMIN — TAZOBACTAM SODIUM AND PIPERACILLIN SODIUM 3.38 G: 375; 3 INJECTION, SOLUTION INTRAVENOUS at 16:33

## 2019-01-01 RX ADMIN — TAZOBACTAM SODIUM AND PIPERACILLIN SODIUM 3.38 G: 375; 3 INJECTION, SOLUTION INTRAVENOUS at 19:10

## 2019-01-01 RX ADMIN — POTASSIUM CHLORIDE 40 MEQ: 750 CAPSULE, EXTENDED RELEASE ORAL at 01:48

## 2019-01-01 RX ADMIN — BISACODYL 10 MG: 10 SUPPOSITORY RECTAL at 01:03

## 2019-01-01 RX ADMIN — TAZOBACTAM SODIUM AND PIPERACILLIN SODIUM 3.38 G: 375; 3 INJECTION, SOLUTION INTRAVENOUS at 08:59

## 2019-01-01 RX ADMIN — AMLODIPINE BESYLATE 5 MG: 5 TABLET ORAL at 08:38

## 2019-01-01 RX ADMIN — BUDESONIDE AND FORMOTEROL FUMARATE DIHYDRATE 2 PUFF: 160; 4.5 AEROSOL RESPIRATORY (INHALATION) at 19:48

## 2019-01-01 RX ADMIN — ACETAMINOPHEN 650 MG: 325 TABLET ORAL at 01:05

## 2019-01-01 RX ADMIN — Medication 1 TABLET: at 08:21

## 2019-01-01 RX ADMIN — INSULIN LISPRO 4 UNITS: 100 INJECTION, SOLUTION INTRAVENOUS; SUBCUTANEOUS at 21:24

## 2019-01-01 RX ADMIN — PALONOSETRON 0.25 MG: 0.25 INJECTION, SOLUTION INTRAVENOUS at 10:32

## 2019-01-01 RX ADMIN — TAZOBACTAM SODIUM AND PIPERACILLIN SODIUM 3.38 G: 375; 3 INJECTION, SOLUTION INTRAVENOUS at 23:52

## 2019-01-01 RX ADMIN — IOPAMIDOL 85 ML: 612 INJECTION, SOLUTION INTRAVENOUS at 09:25

## 2019-01-01 RX ADMIN — ATORVASTATIN CALCIUM 20 MG: 20 TABLET, FILM COATED ORAL at 21:43

## 2019-01-01 RX ADMIN — LIDOCAINE HYDROCHLORIDE 50 MG: 10 INJECTION, SOLUTION EPIDURAL; INFILTRATION; INTRACAUDAL; PERINEURAL at 13:55

## 2019-01-01 RX ADMIN — HEPARIN SODIUM 5000 UNITS: 5000 INJECTION, SOLUTION INTRAVENOUS; SUBCUTANEOUS at 06:24

## 2019-01-01 RX ADMIN — HYDROCODONE BITARTRATE AND ACETAMINOPHEN 1 TABLET: 5; 325 TABLET ORAL at 14:45

## 2019-01-01 RX ADMIN — TAZOBACTAM SODIUM AND PIPERACILLIN SODIUM 3.38 G: 375; 3 INJECTION, SOLUTION INTRAVENOUS at 00:40

## 2019-01-01 RX ADMIN — ATEZOLIZUMAB 1200 MG: 1200 INJECTION, SOLUTION INTRAVENOUS at 10:45

## 2019-01-01 RX ADMIN — IPRATROPIUM BROMIDE AND ALBUTEROL SULFATE 3 ML: 2.5; .5 SOLUTION RESPIRATORY (INHALATION) at 19:12

## 2019-01-01 RX ADMIN — VANCOMYCIN HYDROCHLORIDE 1750 MG: 10 INJECTION, POWDER, LYOPHILIZED, FOR SOLUTION INTRAVENOUS at 13:13

## 2019-01-01 RX ADMIN — BUDESONIDE AND FORMOTEROL FUMARATE DIHYDRATE 2 PUFF: 160; 4.5 AEROSOL RESPIRATORY (INHALATION) at 09:19

## 2019-01-01 RX ADMIN — SODIUM CHLORIDE 100 ML/HR: 9 INJECTION, SOLUTION INTRAVENOUS at 16:50

## 2019-01-01 RX ADMIN — Medication 1 TABLET: at 08:12

## 2019-01-01 RX ADMIN — IPRATROPIUM BROMIDE AND ALBUTEROL SULFATE 3 ML: 2.5; .5 SOLUTION RESPIRATORY (INHALATION) at 13:10

## 2019-01-01 RX ADMIN — IPRATROPIUM BROMIDE AND ALBUTEROL SULFATE 3 ML: 2.5; .5 SOLUTION RESPIRATORY (INHALATION) at 11:16

## 2019-01-01 RX ADMIN — MAGNESIUM SULFATE 2 G: 2 INJECTION INTRAVENOUS at 00:10

## 2019-01-01 RX ADMIN — IPRATROPIUM BROMIDE AND ALBUTEROL SULFATE 3 ML: 2.5; .5 SOLUTION RESPIRATORY (INHALATION) at 07:10

## 2019-01-01 RX ADMIN — METHYLPREDNISOLONE SODIUM SUCCINATE 125 MG: 125 INJECTION, POWDER, FOR SOLUTION INTRAMUSCULAR; INTRAVENOUS at 09:23

## 2019-01-01 RX ADMIN — HEPARIN SODIUM 5000 UNITS: 5000 INJECTION, SOLUTION INTRAVENOUS; SUBCUTANEOUS at 20:47

## 2019-01-01 RX ADMIN — PROCHLORPERAZINE MALEATE 10 MG: 10 TABLET ORAL at 09:15

## 2019-01-01 RX ADMIN — IOPAMIDOL 95 ML: 612 INJECTION, SOLUTION INTRAVENOUS at 12:25

## 2019-01-01 RX ADMIN — LISINOPRIL 20 MG: 20 TABLET ORAL at 08:47

## 2019-01-01 RX ADMIN — FOLIC ACID 1 MG: 1 TABLET ORAL at 08:47

## 2019-01-01 RX ADMIN — IPRATROPIUM BROMIDE AND ALBUTEROL SULFATE 3 ML: 2.5; .5 SOLUTION RESPIRATORY (INHALATION) at 11:48

## 2019-01-01 RX ADMIN — MELATONIN TAB 5 MG 5 MG: 5 TAB at 20:33

## 2019-01-01 RX ADMIN — ATORVASTATIN CALCIUM 10 MG: 10 TABLET, FILM COATED ORAL at 20:28

## 2019-01-01 RX ADMIN — ETOPOSIDE 200 MG: 20 INJECTION, SOLUTION INTRAVENOUS at 10:50

## 2019-01-01 RX ADMIN — COLCHICINE 0.6 MG: 0.6 TABLET, FILM COATED ORAL at 12:19

## 2019-01-01 RX ADMIN — VANCOMYCIN HYDROCHLORIDE 2500 MG: 1 INJECTION, POWDER, LYOPHILIZED, FOR SOLUTION INTRAVENOUS at 04:11

## 2019-01-01 RX ADMIN — SODIUM CHLORIDE 150 MG: 9 INJECTION, SOLUTION INTRAVENOUS at 08:26

## 2019-01-01 RX ADMIN — PREDNISONE 60 MG: 20 TABLET ORAL at 09:11

## 2019-01-01 RX ADMIN — SODIUM CHLORIDE 1000 ML: 9 INJECTION, SOLUTION INTRAVENOUS at 11:36

## 2019-01-01 RX ADMIN — TAZOBACTAM SODIUM AND PIPERACILLIN SODIUM 3.38 G: 375; 3 INJECTION, SOLUTION INTRAVENOUS at 09:32

## 2019-01-01 RX ADMIN — Medication 25.8 MILLICURIE: at 10:35

## 2019-01-01 RX ADMIN — PREDNISONE 10 MG: 10 TABLET ORAL at 08:41

## 2019-01-01 RX ADMIN — POLYETHYLENE GLYCOL 3350 17 G: 17 POWDER, FOR SOLUTION ORAL at 17:30

## 2019-01-01 RX ADMIN — SODIUM CHLORIDE, PRESERVATIVE FREE 10 ML: 5 INJECTION INTRAVENOUS at 08:34

## 2019-01-01 RX ADMIN — BUDESONIDE AND FORMOTEROL FUMARATE DIHYDRATE 2 PUFF: 160; 4.5 AEROSOL RESPIRATORY (INHALATION) at 20:14

## 2019-01-01 RX ADMIN — PALONOSETRON HYDROCHLORIDE 0.25 MG: 0.25 INJECTION, SOLUTION INTRAVENOUS at 08:24

## 2019-01-01 RX ADMIN — NEOSTIGMINE METHYLSULFATE 2 MG: 1 INJECTION, SOLUTION INTRAVENOUS at 14:44

## 2019-01-01 RX ADMIN — AMLODIPINE BESYLATE 5 MG: 5 TABLET ORAL at 20:18

## 2019-01-01 RX ADMIN — SODIUM CHLORIDE, PRESERVATIVE FREE 10 ML: 5 INJECTION INTRAVENOUS at 20:41

## 2019-01-01 RX ADMIN — AMLODIPINE BESYLATE 5 MG: 5 TABLET ORAL at 08:33

## 2019-01-01 RX ADMIN — DOXYCYCLINE 100 MG: 100 INJECTION, POWDER, LYOPHILIZED, FOR SOLUTION INTRAVENOUS at 15:23

## 2019-01-01 RX ADMIN — GUAIFENESIN 1200 MG: 600 TABLET, EXTENDED RELEASE ORAL at 20:34

## 2019-01-01 RX ADMIN — INSULIN LISPRO 4 UNITS: 100 INJECTION, SOLUTION INTRAVENOUS; SUBCUTANEOUS at 09:12

## 2019-01-01 RX ADMIN — ROCURONIUM BROMIDE 40 MG: 10 INJECTION INTRAVENOUS at 13:55

## 2019-01-01 RX ADMIN — LIDOCAINE 1 PATCH: 50 PATCH TOPICAL at 21:23

## 2019-01-01 RX ADMIN — SODIUM CHLORIDE 250 ML: 9 INJECTION, SOLUTION INTRAVENOUS at 09:45

## 2019-01-01 RX ADMIN — INSULIN LISPRO 5 UNITS: 100 INJECTION, SOLUTION INTRAVENOUS; SUBCUTANEOUS at 13:07

## 2019-01-01 RX ADMIN — INSULIN LISPRO 4 UNITS: 100 INJECTION, SOLUTION INTRAVENOUS; SUBCUTANEOUS at 12:09

## 2019-01-01 RX ADMIN — AMLODIPINE BESYLATE 5 MG: 5 TABLET ORAL at 08:46

## 2019-01-01 RX ADMIN — VANCOMYCIN HYDROCHLORIDE 1250 MG: 10 INJECTION, POWDER, LYOPHILIZED, FOR SOLUTION INTRAVENOUS at 15:45

## 2019-01-01 RX ADMIN — IPRATROPIUM BROMIDE AND ALBUTEROL SULFATE 3 ML: 2.5; .5 SOLUTION RESPIRATORY (INHALATION) at 09:02

## 2019-01-01 RX ADMIN — IPRATROPIUM BROMIDE AND ALBUTEROL SULFATE 3 ML: 2.5; .5 SOLUTION RESPIRATORY (INHALATION) at 20:58

## 2019-01-01 RX ADMIN — BUDESONIDE AND FORMOTEROL FUMARATE DIHYDRATE 2 PUFF: 160; 4.5 AEROSOL RESPIRATORY (INHALATION) at 20:11

## 2019-01-01 RX ADMIN — DEXTROSE MONOHYDRATE 75 ML/HR: 50 INJECTION, SOLUTION INTRAVENOUS at 17:35

## 2019-01-01 RX ADMIN — FENTANYL CITRATE 100 MCG: 50 INJECTION, SOLUTION INTRAMUSCULAR; INTRAVENOUS at 13:55

## 2019-01-01 RX ADMIN — Medication 1 TABLET: at 08:46

## 2019-01-01 RX ADMIN — TAZOBACTAM SODIUM AND PIPERACILLIN SODIUM 3.38 G: 375; 3 INJECTION, SOLUTION INTRAVENOUS at 09:27

## 2019-01-01 RX ADMIN — TAZOBACTAM SODIUM AND PIPERACILLIN SODIUM 3.38 G: 375; 3 INJECTION, SOLUTION INTRAVENOUS at 17:00

## 2019-01-01 RX ADMIN — IPRATROPIUM BROMIDE AND ALBUTEROL SULFATE 3 ML: 2.5; .5 SOLUTION RESPIRATORY (INHALATION) at 16:49

## 2019-01-01 RX ADMIN — DEXTROSE MONOHYDRATE 75 ML/HR: 50 INJECTION, SOLUTION INTRAVENOUS at 05:32

## 2019-01-01 RX ADMIN — PREDNISONE 10 MG: 10 TABLET ORAL at 08:21

## 2019-01-01 RX ADMIN — IPRATROPIUM BROMIDE AND ALBUTEROL SULFATE 3 ML: 2.5; .5 SOLUTION RESPIRATORY (INHALATION) at 18:52

## 2019-01-01 RX ADMIN — AMLODIPINE BESYLATE 5 MG: 5 TABLET ORAL at 08:34

## 2019-01-01 RX ADMIN — IPRATROPIUM BROMIDE AND ALBUTEROL SULFATE 3 ML: 2.5; .5 SOLUTION RESPIRATORY (INHALATION) at 20:03

## 2019-01-01 RX ADMIN — ONDANSETRON 4 MG: 2 INJECTION INTRAMUSCULAR; INTRAVENOUS at 08:33

## 2019-01-01 RX ADMIN — AMLODIPINE BESYLATE 5 MG: 5 TABLET ORAL at 20:13

## 2019-01-01 RX ADMIN — SODIUM CHLORIDE, PRESERVATIVE FREE 10 ML: 5 INJECTION INTRAVENOUS at 08:59

## 2019-01-01 RX ADMIN — INSULIN LISPRO 4 UNITS: 100 INJECTION, SOLUTION INTRAVENOUS; SUBCUTANEOUS at 12:26

## 2019-01-01 RX ADMIN — TAZOBACTAM SODIUM AND PIPERACILLIN SODIUM 3.38 G: 375; 3 INJECTION, SOLUTION INTRAVENOUS at 23:25

## 2019-01-01 RX ADMIN — INSULIN LISPRO 2 UNITS: 100 INJECTION, SOLUTION INTRAVENOUS; SUBCUTANEOUS at 08:34

## 2019-01-01 RX ADMIN — IPRATROPIUM BROMIDE AND ALBUTEROL SULFATE 3 ML: 2.5; .5 SOLUTION RESPIRATORY (INHALATION) at 03:40

## 2019-01-01 RX ADMIN — Medication 1 TABLET: at 09:23

## 2019-01-01 RX ADMIN — ENOXAPARIN SODIUM 40 MG: 40 INJECTION SUBCUTANEOUS at 08:59

## 2019-01-01 RX ADMIN — PREDNISONE 60 MG: 20 TABLET ORAL at 12:29

## 2019-01-01 RX ADMIN — SODIUM CHLORIDE, PRESERVATIVE FREE 3 ML: 5 INJECTION INTRAVENOUS at 20:00

## 2019-01-01 RX ADMIN — VANCOMYCIN HYDROCHLORIDE 1500 MG: 10 INJECTION, POWDER, LYOPHILIZED, FOR SOLUTION INTRAVENOUS at 06:10

## 2019-01-01 RX ADMIN — IPRATROPIUM BROMIDE AND ALBUTEROL SULFATE 3 ML: 2.5; .5 SOLUTION RESPIRATORY (INHALATION) at 12:14

## 2019-01-01 RX ADMIN — INSULIN LISPRO 4 UNITS: 100 INJECTION, SOLUTION INTRAVENOUS; SUBCUTANEOUS at 12:29

## 2019-01-01 RX ADMIN — TAZOBACTAM SODIUM AND PIPERACILLIN SODIUM 3.38 G: 375; 3 INJECTION, SOLUTION INTRAVENOUS at 11:36

## 2019-01-01 RX ADMIN — MAGNESIUM SULFATE 2 G: 2 INJECTION INTRAVENOUS at 20:18

## 2019-01-01 RX ADMIN — IPRATROPIUM BROMIDE AND ALBUTEROL SULFATE 3 ML: 2.5; .5 SOLUTION RESPIRATORY (INHALATION) at 07:24

## 2019-01-01 RX ADMIN — BUDESONIDE AND FORMOTEROL FUMARATE DIHYDRATE 2 PUFF: 160; 4.5 AEROSOL RESPIRATORY (INHALATION) at 21:08

## 2019-01-01 RX ADMIN — METHYLPREDNISOLONE SODIUM SUCCINATE 125 MG: 125 INJECTION, POWDER, FOR SOLUTION INTRAMUSCULAR; INTRAVENOUS at 08:06

## 2019-01-01 RX ADMIN — HEPARIN SODIUM 5000 UNITS: 5000 INJECTION, SOLUTION INTRAVENOUS; SUBCUTANEOUS at 14:45

## 2019-01-01 RX ADMIN — SODIUM CHLORIDE, PRESERVATIVE FREE 10 ML: 5 INJECTION INTRAVENOUS at 20:30

## 2019-01-01 RX ADMIN — ONDANSETRON 4 MG: 2 INJECTION INTRAMUSCULAR; INTRAVENOUS at 09:09

## 2019-01-01 RX ADMIN — IPRATROPIUM BROMIDE AND ALBUTEROL SULFATE 3 ML: 2.5; .5 SOLUTION RESPIRATORY (INHALATION) at 07:18

## 2019-01-01 RX ADMIN — TAZOBACTAM SODIUM AND PIPERACILLIN SODIUM 3.38 G: 375; 3 INJECTION, SOLUTION INTRAVENOUS at 16:26

## 2019-01-01 RX ADMIN — IPRATROPIUM BROMIDE AND ALBUTEROL SULFATE 3 ML: 2.5; .5 SOLUTION RESPIRATORY (INHALATION) at 15:40

## 2019-01-01 RX ADMIN — SODIUM CHLORIDE 75 ML/HR: 9 INJECTION, SOLUTION INTRAVENOUS at 15:02

## 2019-01-01 RX ADMIN — IPRATROPIUM BROMIDE AND ALBUTEROL SULFATE 3 ML: 2.5; .5 SOLUTION RESPIRATORY (INHALATION) at 16:13

## 2019-01-01 RX ADMIN — IOPAMIDOL 100 ML: 755 INJECTION, SOLUTION INTRAVENOUS at 12:08

## 2019-01-01 RX ADMIN — LIDOCAINE 1 PATCH: 50 PATCH TOPICAL at 20:35

## 2019-01-01 RX ADMIN — INSULIN DETEMIR 10 UNITS: 100 INJECTION, SOLUTION SUBCUTANEOUS at 20:37

## 2019-01-01 RX ADMIN — SODIUM CHLORIDE 150 MG: 9 INJECTION, SOLUTION INTRAVENOUS at 10:30

## 2019-01-01 RX ADMIN — SODIUM CHLORIDE 250 ML: 9 INJECTION, SOLUTION INTRAVENOUS at 11:00

## 2019-01-01 RX ADMIN — IPRATROPIUM BROMIDE AND ALBUTEROL SULFATE 3 ML: 2.5; .5 SOLUTION RESPIRATORY (INHALATION) at 18:57

## 2019-01-01 RX ADMIN — VANCOMYCIN HYDROCHLORIDE 1500 MG: 10 INJECTION, POWDER, LYOPHILIZED, FOR SOLUTION INTRAVENOUS at 05:21

## 2019-01-01 RX ADMIN — ATORVASTATIN CALCIUM 10 MG: 10 TABLET, FILM COATED ORAL at 20:53

## 2019-01-01 RX ADMIN — BUDESONIDE AND FORMOTEROL FUMARATE DIHYDRATE 2 PUFF: 160; 4.5 AEROSOL RESPIRATORY (INHALATION) at 07:28

## 2019-01-01 RX ADMIN — VANCOMYCIN HYDROCHLORIDE 1500 MG: 10 INJECTION, POWDER, LYOPHILIZED, FOR SOLUTION INTRAVENOUS at 16:00

## 2019-01-01 RX ADMIN — HEPARIN SODIUM 5000 UNITS: 5000 INJECTION, SOLUTION INTRAVENOUS; SUBCUTANEOUS at 15:23

## 2019-01-01 RX ADMIN — DEXAMETHASONE SODIUM PHOSPHATE 12 MG: 4 INJECTION, SOLUTION INTRAMUSCULAR; INTRAVENOUS at 11:13

## 2019-01-01 RX ADMIN — IPRATROPIUM BROMIDE AND ALBUTEROL SULFATE 3 ML: 2.5; .5 SOLUTION RESPIRATORY (INHALATION) at 12:24

## 2019-01-01 RX ADMIN — COLCHICINE 0.6 MG: 0.6 TABLET, FILM COATED ORAL at 08:37

## 2019-01-01 RX ADMIN — ATORVASTATIN CALCIUM 10 MG: 10 TABLET, FILM COATED ORAL at 20:35

## 2019-01-01 RX ADMIN — PROCHLORPERAZINE MALEATE 10 MG: 10 TABLET ORAL at 09:51

## 2019-01-01 RX ADMIN — IPRATROPIUM BROMIDE AND ALBUTEROL SULFATE 3 ML: 2.5; .5 SOLUTION RESPIRATORY (INHALATION) at 03:05

## 2019-01-01 RX ADMIN — CARBOPLATIN 750 MG: 10 INJECTION, SOLUTION INTRAVENOUS at 12:16

## 2019-01-01 RX ADMIN — IPRATROPIUM BROMIDE AND ALBUTEROL SULFATE 3 ML: 2.5; .5 SOLUTION RESPIRATORY (INHALATION) at 16:33

## 2019-01-01 RX ADMIN — DOXYCYCLINE 100 MG: 100 INJECTION, POWDER, LYOPHILIZED, FOR SOLUTION INTRAVENOUS at 10:03

## 2019-01-01 RX ADMIN — ASPIRIN 81 MG 81 MG: 81 TABLET ORAL at 08:59

## 2019-01-01 RX ADMIN — IPRATROPIUM BROMIDE AND ALBUTEROL SULFATE 3 ML: 2.5; .5 SOLUTION RESPIRATORY (INHALATION) at 15:15

## 2019-01-01 RX ADMIN — HEPARIN SODIUM 5000 UNITS: 5000 INJECTION, SOLUTION INTRAVENOUS; SUBCUTANEOUS at 06:18

## 2019-01-01 RX ADMIN — BUDESONIDE AND FORMOTEROL FUMARATE DIHYDRATE 2 PUFF: 160; 4.5 AEROSOL RESPIRATORY (INHALATION) at 07:26

## 2019-01-01 RX ADMIN — BUDESONIDE AND FORMOTEROL FUMARATE DIHYDRATE 2 PUFF: 160; 4.5 AEROSOL RESPIRATORY (INHALATION) at 20:03

## 2019-01-01 RX ADMIN — DOXYCYCLINE 100 MG: 100 INJECTION, POWDER, LYOPHILIZED, FOR SOLUTION INTRAVENOUS at 20:35

## 2019-01-01 RX ADMIN — IPRATROPIUM BROMIDE AND ALBUTEROL SULFATE 3 ML: 2.5; .5 SOLUTION RESPIRATORY (INHALATION) at 22:00

## 2019-01-01 RX ADMIN — TAZOBACTAM SODIUM AND PIPERACILLIN SODIUM 3.38 G: 375; 3 INJECTION, SOLUTION INTRAVENOUS at 20:18

## 2019-01-01 RX ADMIN — TAZOBACTAM SODIUM AND PIPERACILLIN SODIUM 3.38 G: 375; 3 INJECTION, SOLUTION INTRAVENOUS at 08:21

## 2019-01-01 RX ADMIN — PANTOPRAZOLE SODIUM 40 MG: 40 TABLET, DELAYED RELEASE ORAL at 05:34

## 2019-01-01 RX ADMIN — Medication 1 TABLET: at 09:12

## 2019-01-01 RX ADMIN — AMLODIPINE BESYLATE 5 MG: 5 TABLET ORAL at 11:20

## 2019-01-01 RX ADMIN — INSULIN LISPRO 7 UNITS: 100 INJECTION, SOLUTION INTRAVENOUS; SUBCUTANEOUS at 16:25

## 2019-01-01 RX ADMIN — PROCHLORPERAZINE MALEATE 10 MG: 10 TABLET ORAL at 09:30

## 2019-01-01 RX ADMIN — TAZOBACTAM SODIUM AND PIPERACILLIN SODIUM 3.38 G: 375; 3 INJECTION, SOLUTION INTRAVENOUS at 08:07

## 2019-01-01 RX ADMIN — ATORVASTATIN CALCIUM 10 MG: 10 TABLET, FILM COATED ORAL at 20:18

## 2019-01-01 RX ADMIN — TAZOBACTAM SODIUM AND PIPERACILLIN SODIUM 3.38 G: 375; 3 INJECTION, SOLUTION INTRAVENOUS at 01:55

## 2019-01-01 RX ADMIN — TAZOBACTAM SODIUM AND PIPERACILLIN SODIUM 3.38 G: 375; 3 INJECTION, SOLUTION INTRAVENOUS at 16:48

## 2019-01-01 RX ADMIN — BUDESONIDE AND FORMOTEROL FUMARATE DIHYDRATE 2 PUFF: 160; 4.5 AEROSOL RESPIRATORY (INHALATION) at 07:48

## 2019-01-01 RX ADMIN — INSULIN LISPRO 5 UNITS: 100 INJECTION, SOLUTION INTRAVENOUS; SUBCUTANEOUS at 12:29

## 2019-01-01 RX ADMIN — TAZOBACTAM SODIUM AND PIPERACILLIN SODIUM 3.38 G: 375; 3 INJECTION, SOLUTION INTRAVENOUS at 11:24

## 2019-01-01 RX ADMIN — IPRATROPIUM BROMIDE AND ALBUTEROL SULFATE 3 ML: 2.5; .5 SOLUTION RESPIRATORY (INHALATION) at 13:33

## 2019-01-01 RX ADMIN — TAZOBACTAM SODIUM AND PIPERACILLIN SODIUM 3.38 G: 375; 3 INJECTION, SOLUTION INTRAVENOUS at 20:53

## 2019-01-01 RX ADMIN — IPRATROPIUM BROMIDE AND ALBUTEROL SULFATE 3 ML: 2.5; .5 SOLUTION RESPIRATORY (INHALATION) at 08:30

## 2019-01-01 RX ADMIN — DOXYCYCLINE 100 MG: 100 CAPSULE ORAL at 21:23

## 2019-01-01 RX ADMIN — POTASSIUM CHLORIDE 40 MEQ: 750 CAPSULE, EXTENDED RELEASE ORAL at 05:31

## 2019-01-01 RX ADMIN — HEPARIN SODIUM 5000 UNITS: 5000 INJECTION, SOLUTION INTRAVENOUS; SUBCUTANEOUS at 22:00

## 2019-01-01 RX ADMIN — INSULIN LISPRO 7 UNITS: 100 INJECTION, SOLUTION INTRAVENOUS; SUBCUTANEOUS at 20:36

## 2019-01-01 RX ADMIN — SODIUM CHLORIDE, PRESERVATIVE FREE 3 ML: 5 INJECTION INTRAVENOUS at 20:07

## 2019-01-01 RX ADMIN — TAZOBACTAM SODIUM AND PIPERACILLIN SODIUM 3.38 G: 375; 3 INJECTION, SOLUTION INTRAVENOUS at 23:45

## 2019-01-01 RX ADMIN — ETOPOSIDE 200 MG: 20 INJECTION, SOLUTION INTRAVENOUS at 11:48

## 2019-01-01 RX ADMIN — ACETAMINOPHEN 650 MG: 325 TABLET ORAL at 16:50

## 2019-01-01 RX ADMIN — MAGNESIUM SULFATE IN WATER 4 G: 40 INJECTION, SOLUTION INTRAVENOUS at 14:00

## 2019-01-01 RX ADMIN — IPRATROPIUM BROMIDE AND ALBUTEROL SULFATE 3 ML: 2.5; .5 SOLUTION RESPIRATORY (INHALATION) at 07:51

## 2019-01-01 RX ADMIN — TAZOBACTAM SODIUM AND PIPERACILLIN SODIUM 3.38 G: 375; 3 INJECTION, SOLUTION INTRAVENOUS at 16:50

## 2019-01-01 RX ADMIN — INSULIN LISPRO 4 UNITS: 100 INJECTION, SOLUTION INTRAVENOUS; SUBCUTANEOUS at 08:38

## 2019-01-01 RX ADMIN — TAZOBACTAM SODIUM AND PIPERACILLIN SODIUM 3.38 G: 375; 3 INJECTION, SOLUTION INTRAVENOUS at 16:08

## 2019-01-01 RX ADMIN — TAZOBACTAM SODIUM AND PIPERACILLIN SODIUM 3.38 G: 375; 3 INJECTION, SOLUTION INTRAVENOUS at 01:51

## 2019-01-01 RX ADMIN — ATEZOLIZUMAB 1200 MG: 1200 INJECTION, SOLUTION INTRAVENOUS at 11:08

## 2019-01-01 RX ADMIN — LISINOPRIL 20 MG: 20 TABLET ORAL at 08:32

## 2019-01-01 RX ADMIN — SODIUM CHLORIDE, PRESERVATIVE FREE 10 ML: 5 INJECTION INTRAVENOUS at 08:22

## 2019-01-01 RX ADMIN — IPRATROPIUM BROMIDE AND ALBUTEROL SULFATE 3 ML: 2.5; .5 SOLUTION RESPIRATORY (INHALATION) at 13:16

## 2019-01-01 RX ADMIN — CEFTRIAXONE SODIUM 1 G: 1 INJECTION, SOLUTION INTRAVENOUS at 00:01

## 2019-01-01 RX ADMIN — SODIUM CHLORIDE 90 MG: 9 INJECTION, SOLUTION INTRAVENOUS at 10:47

## 2019-01-01 RX ADMIN — PANTOPRAZOLE SODIUM 40 MG: 40 TABLET, DELAYED RELEASE ORAL at 06:10

## 2019-01-01 RX ADMIN — GUAIFENESIN 1200 MG: 600 TABLET, EXTENDED RELEASE ORAL at 08:34

## 2019-01-01 RX ADMIN — AMLODIPINE BESYLATE 5 MG: 5 TABLET ORAL at 20:43

## 2019-01-01 RX ADMIN — ASPIRIN 81 MG 81 MG: 81 TABLET ORAL at 08:33

## 2019-01-01 RX ADMIN — AMLODIPINE BESYLATE 5 MG: 5 TABLET ORAL at 08:21

## 2019-01-01 RX ADMIN — FOLIC ACID 1 MG: 1 TABLET ORAL at 08:50

## 2019-01-01 RX ADMIN — METHYLPREDNISOLONE SODIUM SUCCINATE 125 MG: 125 INJECTION, POWDER, FOR SOLUTION INTRAMUSCULAR; INTRAVENOUS at 12:26

## 2019-01-01 RX ADMIN — SODIUM CHLORIDE 250 ML: 9 INJECTION, SOLUTION INTRAVENOUS at 09:38

## 2019-01-01 RX ADMIN — ATORVASTATIN CALCIUM 20 MG: 20 TABLET, FILM COATED ORAL at 22:00

## 2019-01-01 RX ADMIN — IPRATROPIUM BROMIDE AND ALBUTEROL SULFATE 3 ML: 2.5; .5 SOLUTION RESPIRATORY (INHALATION) at 07:47

## 2019-01-01 RX ADMIN — PANTOPRAZOLE SODIUM 40 MG: 40 TABLET, DELAYED RELEASE ORAL at 05:24

## 2019-01-01 RX ADMIN — GUAIFENESIN 1200 MG: 600 TABLET, EXTENDED RELEASE ORAL at 20:28

## 2019-01-01 RX ADMIN — LISINOPRIL 20 MG: 20 TABLET ORAL at 08:11

## 2019-01-01 RX ADMIN — SODIUM CHLORIDE 250 ML: 9 INJECTION, SOLUTION INTRAVENOUS at 08:59

## 2019-01-01 RX ADMIN — COLCHICINE 1.2 MG: 0.6 TABLET, FILM COATED ORAL at 08:41

## 2019-01-01 RX ADMIN — PREDNISONE 60 MG: 20 TABLET ORAL at 08:21

## 2019-01-01 RX ADMIN — PANTOPRAZOLE SODIUM 40 MG: 40 TABLET, DELAYED RELEASE ORAL at 05:38

## 2019-01-01 RX ADMIN — IPRATROPIUM BROMIDE AND ALBUTEROL SULFATE 3 ML: 2.5; .5 SOLUTION RESPIRATORY (INHALATION) at 08:22

## 2019-01-01 RX ADMIN — SODIUM CHLORIDE, PRESERVATIVE FREE 10 ML: 5 INJECTION INTRAVENOUS at 09:29

## 2019-01-01 RX ADMIN — PROPOFOL 150 MG: 10 INJECTION, EMULSION INTRAVENOUS at 13:55

## 2019-01-01 RX ADMIN — LISINOPRIL 20 MG: 20 TABLET ORAL at 08:50

## 2019-01-01 RX ADMIN — IPRATROPIUM BROMIDE AND ALBUTEROL SULFATE 3 ML: 2.5; .5 SOLUTION RESPIRATORY (INHALATION) at 15:45

## 2019-01-01 RX ADMIN — IPRATROPIUM BROMIDE AND ALBUTEROL SULFATE 3 ML: 2.5; .5 SOLUTION RESPIRATORY (INHALATION) at 19:30

## 2019-01-01 RX ADMIN — BUDESONIDE AND FORMOTEROL FUMARATE DIHYDRATE 2 PUFF: 160; 4.5 AEROSOL RESPIRATORY (INHALATION) at 19:12

## 2019-01-01 RX ADMIN — ASPIRIN 81 MG 81 MG: 81 TABLET ORAL at 08:39

## 2019-01-01 RX ADMIN — INSULIN LISPRO 4 UNITS: 100 INJECTION, SOLUTION INTRAVENOUS; SUBCUTANEOUS at 18:52

## 2019-01-01 RX ADMIN — TAZOBACTAM SODIUM AND PIPERACILLIN SODIUM 3.38 G: 375; 3 INJECTION, SOLUTION INTRAVENOUS at 02:35

## 2019-01-01 RX ADMIN — AMLODIPINE BESYLATE 5 MG: 5 TABLET ORAL at 08:59

## 2019-01-01 RX ADMIN — CARBOPLATIN 650 MG: 10 INJECTION, SOLUTION INTRAVENOUS at 11:32

## 2019-01-01 RX ADMIN — DEXAMETHASONE SODIUM PHOSPHATE 12 MG: 4 INJECTION, SOLUTION INTRAMUSCULAR; INTRAVENOUS at 10:22

## 2019-01-01 RX ADMIN — GADOBENATE DIMEGLUMINE 15 ML: 529 INJECTION, SOLUTION INTRAVENOUS at 10:33

## 2019-01-01 RX ADMIN — MELATONIN TAB 5 MG 5 MG: 5 TAB at 20:28

## 2019-01-01 RX ADMIN — ENOXAPARIN SODIUM 40 MG: 40 INJECTION SUBCUTANEOUS at 16:33

## 2019-01-01 RX ADMIN — SODIUM CHLORIDE, PRESERVATIVE FREE 10 ML: 5 INJECTION INTRAVENOUS at 09:12

## 2019-01-01 RX ADMIN — IPRATROPIUM BROMIDE AND ALBUTEROL SULFATE 3 ML: 2.5; .5 SOLUTION RESPIRATORY (INHALATION) at 23:52

## 2019-01-01 RX ADMIN — VANCOMYCIN HYDROCHLORIDE 1500 MG: 10 INJECTION, POWDER, LYOPHILIZED, FOR SOLUTION INTRAVENOUS at 05:41

## 2019-01-01 RX ADMIN — ENOXAPARIN SODIUM 40 MG: 40 INJECTION SUBCUTANEOUS at 08:05

## 2019-01-01 RX ADMIN — CARBOPLATIN 610 MG: 10 INJECTION, SOLUTION INTRAVENOUS at 10:02

## 2019-01-01 RX ADMIN — ETOPOSIDE 210 MG: 20 INJECTION, SOLUTION INTRAVENOUS at 10:08

## 2019-01-01 RX ADMIN — SODIUM CHLORIDE 250 ML: 9 INJECTION, SOLUTION INTRAVENOUS at 10:15

## 2019-01-01 RX ADMIN — ATORVASTATIN CALCIUM 20 MG: 20 TABLET, FILM COATED ORAL at 21:00

## 2019-01-01 RX ADMIN — ATORVASTATIN CALCIUM 10 MG: 10 TABLET, FILM COATED ORAL at 21:23

## 2019-01-01 RX ADMIN — IOPAMIDOL 50 ML: 755 INJECTION, SOLUTION INTRAVENOUS at 22:25

## 2019-01-01 RX ADMIN — TAZOBACTAM SODIUM AND PIPERACILLIN SODIUM 3.38 G: 375; 3 INJECTION, SOLUTION INTRAVENOUS at 09:41

## 2019-01-01 RX ADMIN — SODIUM CHLORIDE 250 ML: 9 INJECTION, SOLUTION INTRAVENOUS at 10:31

## 2019-01-01 RX ADMIN — BUDESONIDE AND FORMOTEROL FUMARATE DIHYDRATE 2 PUFF: 160; 4.5 AEROSOL RESPIRATORY (INHALATION) at 18:52

## 2019-01-01 RX ADMIN — INSULIN LISPRO 5 UNITS: 100 INJECTION, SOLUTION INTRAVENOUS; SUBCUTANEOUS at 19:17

## 2019-01-01 RX ADMIN — TAZOBACTAM SODIUM AND PIPERACILLIN SODIUM 3.38 MG OF PIPERACILLIN: 375; 3 INJECTION, SOLUTION INTRAVENOUS at 13:57

## 2019-01-01 RX ADMIN — LIDOCAINE 1 PATCH: 50 PATCH TOPICAL at 20:32

## 2019-01-01 RX ADMIN — TAZOBACTAM SODIUM AND PIPERACILLIN SODIUM 3.38 G: 375; 3 INJECTION, SOLUTION INTRAVENOUS at 16:45

## 2019-01-01 RX ADMIN — SODIUM CHLORIDE 150 MG: 9 INJECTION, SOLUTION INTRAVENOUS at 10:25

## 2019-01-01 RX ADMIN — GUAIFENESIN 600 MG: 600 TABLET, EXTENDED RELEASE ORAL at 08:38

## 2019-01-01 RX ADMIN — PREDNISONE 20 MG: 20 TABLET ORAL at 08:32

## 2019-01-01 RX ADMIN — SODIUM CHLORIDE, PRESERVATIVE FREE 3 ML: 5 INJECTION INTRAVENOUS at 13:04

## 2019-01-01 RX ADMIN — INSULIN LISPRO 4 UNITS: 100 INJECTION, SOLUTION INTRAVENOUS; SUBCUTANEOUS at 17:45

## 2019-01-01 RX ADMIN — HYDROCODONE BITARTRATE AND ACETAMINOPHEN 1 TABLET: 7.5; 325 TABLET ORAL at 01:02

## 2019-01-01 RX ADMIN — SODIUM CHLORIDE, PRESERVATIVE FREE 10 ML: 5 INJECTION INTRAVENOUS at 09:01

## 2019-01-01 RX ADMIN — PREDNISONE 20 MG: 20 TABLET ORAL at 08:50

## 2019-01-01 RX ADMIN — INSULIN LISPRO 5 UNITS: 100 INJECTION, SOLUTION INTRAVENOUS; SUBCUTANEOUS at 20:47

## 2019-01-01 RX ADMIN — SODIUM CHLORIDE 1000 ML: 9 INJECTION, SOLUTION INTRAVENOUS at 22:27

## 2019-01-01 RX ADMIN — SODIUM CHLORIDE 150 MG: 9 INJECTION, SOLUTION INTRAVENOUS at 11:00

## 2019-01-01 RX ADMIN — ATORVASTATIN CALCIUM 10 MG: 10 TABLET, FILM COATED ORAL at 20:45

## 2019-01-01 RX ADMIN — TAZOBACTAM SODIUM AND PIPERACILLIN SODIUM 3.38 G: 375; 3 INJECTION, SOLUTION INTRAVENOUS at 11:15

## 2019-01-01 RX ADMIN — ENOXAPARIN SODIUM 40 MG: 40 INJECTION SUBCUTANEOUS at 08:38

## 2019-01-01 RX ADMIN — INSULIN LISPRO 7 UNITS: 100 INJECTION, SOLUTION INTRAVENOUS; SUBCUTANEOUS at 12:30

## 2019-01-01 RX ADMIN — METHYLPREDNISOLONE SODIUM SUCCINATE 125 MG: 125 INJECTION, POWDER, FOR SOLUTION INTRAMUSCULAR; INTRAVENOUS at 09:28

## 2019-01-01 RX ADMIN — ATORVASTATIN CALCIUM 20 MG: 20 TABLET, FILM COATED ORAL at 20:18

## 2019-01-01 RX ADMIN — BUDESONIDE AND FORMOTEROL FUMARATE DIHYDRATE 2 PUFF: 160; 4.5 AEROSOL RESPIRATORY (INHALATION) at 08:30

## 2019-01-01 RX ADMIN — DOXYCYCLINE 100 MG: 100 CAPSULE ORAL at 08:34

## 2019-01-01 RX ADMIN — DEXTROSE MONOHYDRATE 50 ML/HR: 50 INJECTION, SOLUTION INTRAVENOUS at 00:04

## 2019-01-01 RX ADMIN — ENOXAPARIN SODIUM 40 MG: 40 INJECTION SUBCUTANEOUS at 08:41

## 2019-01-01 RX ADMIN — PANTOPRAZOLE SODIUM 40 MG: 40 TABLET, DELAYED RELEASE ORAL at 01:02

## 2019-01-01 RX ADMIN — IPRATROPIUM BROMIDE AND ALBUTEROL SULFATE 3 ML: 2.5; .5 SOLUTION RESPIRATORY (INHALATION) at 22:44

## 2019-01-01 RX ADMIN — PROCHLORPERAZINE MALEATE 10 MG: 10 TABLET ORAL at 09:45

## 2019-01-01 RX ADMIN — SODIUM CHLORIDE 100 MG: 9 INJECTION, SOLUTION INTRAVENOUS at 10:53

## 2019-01-01 RX ADMIN — TAZOBACTAM SODIUM AND PIPERACILLIN SODIUM 3.38 G: 375; 3 INJECTION, SOLUTION INTRAVENOUS at 08:38

## 2019-01-01 RX ADMIN — TOLVAPTAN 15 MG: 15 TABLET ORAL at 14:21

## 2019-01-01 RX ADMIN — HYDROCODONE BITARTRATE AND ACETAMINOPHEN 1 TABLET: 5; 325 TABLET ORAL at 14:54

## 2019-01-01 RX ADMIN — ETOPOSIDE 210 MG: 20 INJECTION, SOLUTION INTRAVENOUS at 09:21

## 2019-01-01 RX ADMIN — POLYETHYLENE GLYCOL 3350 17 G: 17 POWDER, FOR SOLUTION ORAL at 08:41

## 2019-01-01 RX ADMIN — DEXAMETHASONE SODIUM PHOSPHATE 12 MG: 4 INJECTION, SOLUTION INTRAMUSCULAR; INTRAVENOUS at 08:26

## 2019-01-01 RX ADMIN — DOXYCYCLINE 100 MG: 100 CAPSULE ORAL at 20:33

## 2019-01-01 RX ADMIN — ATEZOLIZUMAB 1200 MG: 1200 INJECTION, SOLUTION INTRAVENOUS at 09:58

## 2019-01-01 RX ADMIN — BUDESONIDE AND FORMOTEROL FUMARATE DIHYDRATE 2 PUFF: 160; 4.5 AEROSOL RESPIRATORY (INHALATION) at 08:22

## 2019-01-01 RX ADMIN — Medication 1 TABLET: at 09:28

## 2019-01-01 RX ADMIN — TAZOBACTAM SODIUM AND PIPERACILLIN SODIUM 3.38 G: 375; 3 INJECTION, SOLUTION INTRAVENOUS at 04:55

## 2019-01-01 RX ADMIN — IPRATROPIUM BROMIDE AND ALBUTEROL SULFATE 3 ML: 2.5; .5 SOLUTION RESPIRATORY (INHALATION) at 17:25

## 2019-01-01 RX ADMIN — ENOXAPARIN SODIUM 40 MG: 40 INJECTION SUBCUTANEOUS at 08:22

## 2019-01-01 RX ADMIN — BUDESONIDE AND FORMOTEROL FUMARATE DIHYDRATE 2 PUFF: 160; 4.5 AEROSOL RESPIRATORY (INHALATION) at 20:33

## 2019-01-01 RX ADMIN — BUDESONIDE AND FORMOTEROL FUMARATE DIHYDRATE 2 PUFF: 160; 4.5 AEROSOL RESPIRATORY (INHALATION) at 07:14

## 2019-01-01 RX ADMIN — BUDESONIDE AND FORMOTEROL FUMARATE DIHYDRATE 2 PUFF: 160; 4.5 AEROSOL RESPIRATORY (INHALATION) at 07:15

## 2019-01-01 RX ADMIN — IPRATROPIUM BROMIDE AND ALBUTEROL SULFATE 3 ML: 2.5; .5 SOLUTION RESPIRATORY (INHALATION) at 19:32

## 2019-01-01 RX ADMIN — IPRATROPIUM BROMIDE AND ALBUTEROL SULFATE 3 ML: 2.5; .5 SOLUTION RESPIRATORY (INHALATION) at 09:20

## 2019-01-01 RX ADMIN — ATEZOLIZUMAB 1200 MG: 1200 INJECTION, SOLUTION INTRAVENOUS at 11:18

## 2019-01-01 RX ADMIN — IPRATROPIUM BROMIDE AND ALBUTEROL SULFATE 3 ML: 2.5; .5 SOLUTION RESPIRATORY (INHALATION) at 21:08

## 2019-01-01 RX ADMIN — SODIUM CHLORIDE, PRESERVATIVE FREE 3 ML: 5 INJECTION INTRAVENOUS at 08:52

## 2019-01-01 RX ADMIN — SODIUM CHLORIDE 295 MG: 9 INJECTION, SOLUTION INTRAVENOUS at 11:35

## 2019-01-01 RX ADMIN — TAZOBACTAM SODIUM AND PIPERACILLIN SODIUM 3.38 G: 375; 3 INJECTION, SOLUTION INTRAVENOUS at 20:07

## 2019-01-01 RX ADMIN — BUDESONIDE AND FORMOTEROL FUMARATE DIHYDRATE 2 PUFF: 160; 4.5 AEROSOL RESPIRATORY (INHALATION) at 20:31

## 2019-01-01 RX ADMIN — IPRATROPIUM BROMIDE AND ALBUTEROL SULFATE 3 ML: 2.5; .5 SOLUTION RESPIRATORY (INHALATION) at 13:22

## 2019-01-01 RX ADMIN — CARBOPLATIN 630 MG: 10 INJECTION, SOLUTION INTRAVENOUS at 11:34

## 2019-01-01 RX ADMIN — TAZOBACTAM SODIUM AND PIPERACILLIN SODIUM 3.38 G: 375; 3 INJECTION, SOLUTION INTRAVENOUS at 09:12

## 2019-01-01 RX ADMIN — PANTOPRAZOLE SODIUM 40 MG: 40 TABLET, DELAYED RELEASE ORAL at 06:18

## 2019-01-01 RX ADMIN — TAZOBACTAM SODIUM AND PIPERACILLIN SODIUM 3.38 G: 375; 3 INJECTION, SOLUTION INTRAVENOUS at 18:39

## 2019-01-01 RX ADMIN — LISINOPRIL 20 MG: 20 TABLET ORAL at 08:41

## 2019-01-01 RX ADMIN — IPRATROPIUM BROMIDE AND ALBUTEROL SULFATE 3 ML: 2.5; .5 SOLUTION RESPIRATORY (INHALATION) at 20:19

## 2019-01-01 RX ADMIN — ATORVASTATIN CALCIUM 10 MG: 10 TABLET, FILM COATED ORAL at 20:43

## 2019-01-01 RX ADMIN — ACETAMINOPHEN 650 MG: 325 TABLET ORAL at 20:33

## 2019-01-01 RX ADMIN — SODIUM CHLORIDE, PRESERVATIVE FREE 10 ML: 5 INJECTION INTRAVENOUS at 08:07

## 2019-01-01 RX ADMIN — VANCOMYCIN HYDROCHLORIDE 1500 MG: 10 INJECTION, POWDER, LYOPHILIZED, FOR SOLUTION INTRAVENOUS at 17:30

## 2019-01-01 RX ADMIN — ENOXAPARIN SODIUM 40 MG: 40 INJECTION SUBCUTANEOUS at 14:21

## 2019-01-01 RX ADMIN — ATORVASTATIN CALCIUM 10 MG: 10 TABLET, FILM COATED ORAL at 20:33

## 2019-01-01 RX ADMIN — HYDROCODONE BITARTRATE AND ACETAMINOPHEN 1 TABLET: 7.5; 325 TABLET ORAL at 23:48

## 2019-01-01 RX ADMIN — BUDESONIDE AND FORMOTEROL FUMARATE DIHYDRATE 2 PUFF: 160; 4.5 AEROSOL RESPIRATORY (INHALATION) at 07:24

## 2019-01-01 RX ADMIN — ETOPOSIDE 200 MG: 20 INJECTION, SOLUTION INTRAVENOUS at 08:59

## 2019-01-01 RX ADMIN — NEOSTIGMINE METHYLSULFATE 1 MG: 1 INJECTION, SOLUTION INTRAVENOUS at 14:46

## 2019-01-01 RX ADMIN — GLYCOPYRROLATE 0.2 MG: 0.2 INJECTION, SOLUTION INTRAMUSCULAR; INTRAVENOUS at 14:44

## 2019-01-01 RX ADMIN — SODIUM CHLORIDE 100 ML/HR: 9 INJECTION, SOLUTION INTRAVENOUS at 08:13

## 2019-01-01 RX ADMIN — IPRATROPIUM BROMIDE AND ALBUTEROL SULFATE 3 ML: 2.5; .5 SOLUTION RESPIRATORY (INHALATION) at 12:45

## 2019-01-01 RX ADMIN — TAZOBACTAM SODIUM AND PIPERACILLIN SODIUM 3.38 G: 375; 3 INJECTION, SOLUTION INTRAVENOUS at 00:10

## 2019-01-01 RX ADMIN — GUAIFENESIN 600 MG: 600 TABLET, EXTENDED RELEASE ORAL at 20:41

## 2019-01-01 RX ADMIN — TAZOBACTAM SODIUM AND PIPERACILLIN SODIUM 3.38 G: 375; 3 INJECTION, SOLUTION INTRAVENOUS at 12:25

## 2019-01-01 RX ADMIN — AMLODIPINE BESYLATE 5 MG: 5 TABLET ORAL at 20:07

## 2019-01-01 RX ADMIN — GUAIFENESIN 1200 MG: 600 TABLET, EXTENDED RELEASE ORAL at 20:44

## 2019-01-01 RX ADMIN — ETOPOSIDE 210 MG: 20 INJECTION, SOLUTION INTRAVENOUS at 09:40

## 2019-01-01 RX ADMIN — TAZOBACTAM SODIUM AND PIPERACILLIN SODIUM 3.38 G: 375; 3 INJECTION, SOLUTION INTRAVENOUS at 04:39

## 2019-01-01 RX ADMIN — HEPARIN SODIUM 5000 UNITS: 5000 INJECTION, SOLUTION INTRAVENOUS; SUBCUTANEOUS at 20:28

## 2019-01-01 RX ADMIN — HYDROCODONE BITARTRATE AND ACETAMINOPHEN 1 TABLET: 7.5; 325 TABLET ORAL at 21:50

## 2019-01-01 RX ADMIN — IOPAMIDOL 60 ML: 755 INJECTION, SOLUTION INTRAVENOUS at 12:08

## 2019-01-01 RX ADMIN — SODIUM CHLORIDE, PRESERVATIVE FREE 10 ML: 5 INJECTION INTRAVENOUS at 22:03

## 2019-01-01 RX ADMIN — ETOPOSIDE 210 MG: 20 INJECTION, SOLUTION INTRAVENOUS at 12:50

## 2019-01-01 RX ADMIN — SODIUM CHLORIDE 250 ML: 9 INJECTION, SOLUTION INTRAVENOUS at 09:55

## 2019-01-01 RX ADMIN — HEPARIN SODIUM 5000 UNITS: 5000 INJECTION, SOLUTION INTRAVENOUS; SUBCUTANEOUS at 16:39

## 2019-01-01 RX ADMIN — GUAIFENESIN 600 MG: 600 TABLET, EXTENDED RELEASE ORAL at 21:43

## 2019-01-01 RX ADMIN — Medication 2 G: at 08:47

## 2019-01-01 RX ADMIN — HEPARIN SODIUM 5000 UNITS: 5000 INJECTION, SOLUTION INTRAVENOUS; SUBCUTANEOUS at 06:10

## 2019-01-01 RX ADMIN — IPRATROPIUM BROMIDE AND ALBUTEROL SULFATE 3 ML: 2.5; .5 SOLUTION RESPIRATORY (INHALATION) at 16:31

## 2019-01-01 RX ADMIN — IPRATROPIUM BROMIDE AND ALBUTEROL SULFATE 3 ML: 2.5; .5 SOLUTION RESPIRATORY (INHALATION) at 09:18

## 2019-01-01 RX ADMIN — IPRATROPIUM BROMIDE AND ALBUTEROL SULFATE 3 ML: 2.5; .5 SOLUTION RESPIRATORY (INHALATION) at 20:33

## 2019-01-01 RX ADMIN — GLYCOPYRROLATE 0.2 MG: 0.2 INJECTION, SOLUTION INTRAMUSCULAR; INTRAVENOUS at 14:46

## 2019-01-01 RX ADMIN — LACTULOSE 10 G: 10 SOLUTION ORAL at 15:03

## 2019-01-01 RX ADMIN — BUDESONIDE AND FORMOTEROL FUMARATE DIHYDRATE 2 PUFF: 160; 4.5 AEROSOL RESPIRATORY (INHALATION) at 20:58

## 2019-01-01 RX ADMIN — METHYLPREDNISOLONE SODIUM SUCCINATE 125 MG: 125 INJECTION, POWDER, FOR SOLUTION INTRAMUSCULAR; INTRAVENOUS at 09:32

## 2019-01-01 RX ADMIN — IPRATROPIUM BROMIDE AND ALBUTEROL SULFATE 3 ML: 2.5; .5 SOLUTION RESPIRATORY (INHALATION) at 20:17

## 2019-01-01 RX ADMIN — IPRATROPIUM BROMIDE AND ALBUTEROL SULFATE 3 ML: 2.5; .5 SOLUTION RESPIRATORY (INHALATION) at 17:04

## 2019-01-01 RX ADMIN — BUDESONIDE AND FORMOTEROL FUMARATE DIHYDRATE 2 PUFF: 160; 4.5 AEROSOL RESPIRATORY (INHALATION) at 07:10

## 2019-01-01 RX ADMIN — HEPARIN SODIUM 5000 UNITS: 5000 INJECTION, SOLUTION INTRAVENOUS; SUBCUTANEOUS at 21:32

## 2019-01-01 RX ADMIN — ASPIRIN 81 MG 81 MG: 81 TABLET ORAL at 09:28

## 2019-01-01 RX ADMIN — IPRATROPIUM BROMIDE AND ALBUTEROL SULFATE 3 ML: 2.5; .5 SOLUTION RESPIRATORY (INHALATION) at 20:31

## 2019-01-01 RX ADMIN — LISINOPRIL 20 MG: 20 TABLET ORAL at 09:28

## 2019-01-01 RX ADMIN — ASPIRIN 81 MG 81 MG: 81 TABLET ORAL at 09:11

## 2019-01-01 RX ADMIN — DEXAMETHASONE SODIUM PHOSPHATE 12 MG: 4 INJECTION, SOLUTION INTRAMUSCULAR; INTRAVENOUS at 10:32

## 2019-01-01 RX ADMIN — TAZOBACTAM SODIUM AND PIPERACILLIN SODIUM 3.38 G: 375; 3 INJECTION, SOLUTION INTRAVENOUS at 03:03

## 2019-01-01 RX ADMIN — AMLODIPINE BESYLATE 5 MG: 5 TABLET ORAL at 21:12

## 2019-01-01 RX ADMIN — IPRATROPIUM BROMIDE AND ALBUTEROL SULFATE 3 ML: 2.5; .5 SOLUTION RESPIRATORY (INHALATION) at 07:15

## 2019-01-01 RX ADMIN — HYDROCODONE BITARTRATE AND ACETAMINOPHEN 1 TABLET: 5; 325 TABLET ORAL at 09:45

## 2019-01-01 RX ADMIN — HEPARIN SODIUM 5000 UNITS: 5000 INJECTION, SOLUTION INTRAVENOUS; SUBCUTANEOUS at 20:35

## 2019-01-01 RX ADMIN — GUAIFENESIN 600 MG: 600 TABLET, EXTENDED RELEASE ORAL at 10:22

## 2019-01-01 RX ADMIN — VANCOMYCIN HYDROCHLORIDE 1500 MG: 10 INJECTION, POWDER, LYOPHILIZED, FOR SOLUTION INTRAVENOUS at 17:52

## 2019-01-01 RX ADMIN — IPRATROPIUM BROMIDE AND ALBUTEROL SULFATE 3 ML: 2.5; .5 SOLUTION RESPIRATORY (INHALATION) at 04:08

## 2019-01-01 RX ADMIN — BUDESONIDE AND FORMOTEROL FUMARATE DIHYDRATE 2 PUFF: 160; 4.5 AEROSOL RESPIRATORY (INHALATION) at 08:19

## 2019-01-01 RX ADMIN — PROCHLORPERAZINE MALEATE 10 MG: 10 TABLET ORAL at 07:37

## 2019-01-01 RX ADMIN — SODIUM CHLORIDE, PRESERVATIVE FREE 10 ML: 5 INJECTION INTRAVENOUS at 20:45

## 2019-01-01 RX ADMIN — TAZOBACTAM SODIUM AND PIPERACILLIN SODIUM 3.38 G: 375; 3 INJECTION, SOLUTION INTRAVENOUS at 09:45

## 2019-01-01 RX ADMIN — ACETAMINOPHEN 650 MG: 325 TABLET ORAL at 20:45

## 2019-01-01 RX ADMIN — PANTOPRAZOLE SODIUM 40 MG: 40 TABLET, DELAYED RELEASE ORAL at 06:24

## 2019-01-01 RX ADMIN — METHYLPREDNISOLONE SODIUM SUCCINATE 125 MG: 125 INJECTION, POWDER, FOR SOLUTION INTRAMUSCULAR; INTRAVENOUS at 11:08

## 2019-01-01 RX ADMIN — INSULIN DETEMIR 8 UNITS: 100 INJECTION, SOLUTION SUBCUTANEOUS at 21:24

## 2019-01-01 RX ADMIN — ACETAMINOPHEN 650 MG: 325 TABLET ORAL at 07:31

## 2019-01-01 RX ADMIN — PALONOSETRON 0.25 MG: 0.25 INJECTION, SOLUTION INTRAVENOUS at 10:17

## 2019-01-01 RX ADMIN — DOXYCYCLINE 100 MG: 100 CAPSULE ORAL at 09:12

## 2019-01-01 RX ADMIN — PREDNISONE 60 MG: 20 TABLET ORAL at 08:34

## 2019-01-01 RX ADMIN — INSULIN LISPRO 4 UNITS: 100 INJECTION, SOLUTION INTRAVENOUS; SUBCUTANEOUS at 19:17

## 2019-01-01 RX ADMIN — BUDESONIDE AND FORMOTEROL FUMARATE DIHYDRATE 2 PUFF: 160; 4.5 AEROSOL RESPIRATORY (INHALATION) at 07:35

## 2019-01-01 RX ADMIN — LIDOCAINE 1 PATCH: 50 PATCH TOPICAL at 20:27

## 2019-01-01 RX ADMIN — INSULIN LISPRO 4 UNITS: 100 INJECTION, SOLUTION INTRAVENOUS; SUBCUTANEOUS at 17:31

## 2019-01-01 RX ADMIN — SODIUM CHLORIDE 10 ML/HR: 9 INJECTION, SOLUTION INTRAVENOUS at 13:05

## 2019-01-01 RX ADMIN — INSULIN DETEMIR 15 UNITS: 100 INJECTION, SOLUTION SUBCUTANEOUS at 21:35

## 2019-01-01 RX ADMIN — PANTOPRAZOLE SODIUM 40 MG: 40 INJECTION, POWDER, FOR SOLUTION INTRAVENOUS at 06:42

## 2019-01-01 RX ADMIN — GUAIFENESIN 1200 MG: 600 TABLET, EXTENDED RELEASE ORAL at 21:23

## 2019-01-01 RX ADMIN — HEPARIN SODIUM 5000 UNITS: 5000 INJECTION, SOLUTION INTRAVENOUS; SUBCUTANEOUS at 06:41

## 2019-01-01 RX ADMIN — INSULIN LISPRO 3 UNITS: 100 INJECTION, SOLUTION INTRAVENOUS; SUBCUTANEOUS at 17:44

## 2019-01-01 RX ADMIN — BUDESONIDE AND FORMOTEROL FUMARATE DIHYDRATE 2 PUFF: 160; 4.5 AEROSOL RESPIRATORY (INHALATION) at 18:36

## 2019-01-01 RX ADMIN — ASPIRIN 81 MG 81 MG: 81 TABLET ORAL at 08:21

## 2019-01-01 RX ADMIN — TAZOBACTAM SODIUM AND PIPERACILLIN SODIUM 3.38 G: 375; 3 INJECTION, SOLUTION INTRAVENOUS at 00:53

## 2019-01-01 RX ADMIN — ETOPOSIDE 210 MG: 20 INJECTION, SOLUTION, CONCENTRATE INTRAVENOUS at 12:18

## 2019-01-01 RX ADMIN — DEXAMETHASONE SODIUM PHOSPHATE 12 MG: 4 INJECTION, SOLUTION INTRAMUSCULAR; INTRAVENOUS at 10:23

## 2019-01-01 RX ADMIN — SODIUM CHLORIDE, PRESERVATIVE FREE 10 ML: 5 INJECTION INTRAVENOUS at 20:37

## 2019-01-01 RX ADMIN — HYDROCODONE BITARTRATE AND ACETAMINOPHEN 1 TABLET: 5; 325 TABLET ORAL at 21:30

## 2019-01-01 RX ADMIN — TAZOBACTAM SODIUM AND PIPERACILLIN SODIUM 3.38 G: 375; 3 INJECTION, SOLUTION INTRAVENOUS at 05:36

## 2019-01-01 RX ADMIN — IPRATROPIUM BROMIDE AND ALBUTEROL SULFATE 3 ML: 2.5; .5 SOLUTION RESPIRATORY (INHALATION) at 18:36

## 2019-01-01 RX ADMIN — TAZOBACTAM SODIUM AND PIPERACILLIN SODIUM 3.38 G: 375; 3 INJECTION, SOLUTION INTRAVENOUS at 12:19

## 2019-01-01 RX ADMIN — ENOXAPARIN SODIUM 40 MG: 40 INJECTION SUBCUTANEOUS at 09:27

## 2019-01-01 RX ADMIN — INSULIN LISPRO 4 UNITS: 100 INJECTION, SOLUTION INTRAVENOUS; SUBCUTANEOUS at 08:43

## 2019-01-01 RX ADMIN — TAZOBACTAM SODIUM AND PIPERACILLIN SODIUM 3.38 G: 375; 3 INJECTION, SOLUTION INTRAVENOUS at 02:50

## 2019-01-01 RX ADMIN — TAZOBACTAM SODIUM AND PIPERACILLIN SODIUM 3.38 G: 375; 3 INJECTION, SOLUTION INTRAVENOUS at 01:21

## 2019-01-01 RX ADMIN — FUROSEMIDE 10 MG: 10 INJECTION, SOLUTION INTRAMUSCULAR; INTRAVENOUS at 16:08

## 2019-01-01 RX ADMIN — VANCOMYCIN HYDROCHLORIDE 1500 MG: 10 INJECTION, POWDER, LYOPHILIZED, FOR SOLUTION INTRAVENOUS at 15:02

## 2019-01-01 RX ADMIN — PANTOPRAZOLE SODIUM 40 MG: 40 TABLET, DELAYED RELEASE ORAL at 06:00

## 2019-01-01 RX ADMIN — METHYLPREDNISOLONE SODIUM SUCCINATE 40 MG: 40 INJECTION, POWDER, FOR SOLUTION INTRAMUSCULAR; INTRAVENOUS at 11:22

## 2019-01-01 RX ADMIN — PREDNISONE 40 MG: 20 TABLET ORAL at 08:33

## 2019-01-01 RX ADMIN — METHYLPREDNISOLONE SODIUM SUCCINATE 125 MG: 125 INJECTION, POWDER, FOR SOLUTION INTRAMUSCULAR; INTRAVENOUS at 08:58

## 2019-06-04 PROBLEM — J18.9 PNA (PNEUMONIA): Status: ACTIVE | Noted: 2019-01-01

## 2019-06-05 PROBLEM — E87.1 HYPONATREMIA: Status: ACTIVE | Noted: 2019-01-01

## 2019-06-05 PROBLEM — R06.02 SHORTNESS OF BREATH: Status: ACTIVE | Noted: 2019-01-01

## 2019-06-05 PROBLEM — J60 BLACK LUNG DISEASE (HCC): Status: ACTIVE | Noted: 2019-01-01

## 2019-06-05 PROBLEM — E78.5 HLD (HYPERLIPIDEMIA): Status: ACTIVE | Noted: 2019-01-01

## 2019-06-05 PROBLEM — I10 HTN (HYPERTENSION): Status: ACTIVE | Noted: 2019-01-01

## 2019-06-05 PROBLEM — E83.42 HYPOMAGNESEMIA: Status: ACTIVE | Noted: 2019-01-01

## 2019-06-05 PROBLEM — R91.8 LUNG MASS: Status: ACTIVE | Noted: 2019-01-01

## 2019-06-05 PROBLEM — D64.9 ANEMIA: Status: ACTIVE | Noted: 2019-01-01

## 2019-06-05 PROBLEM — F10.11 HISTORY OF ALCOHOL ABUSE: Status: ACTIVE | Noted: 2019-01-01

## 2019-06-05 NOTE — PLAN OF CARE
Problem: Pain, Chronic (Adult)  Goal: Identify Related Risk Factors and Signs and Symptoms  Outcome: Ongoing (interventions implemented as appropriate)    Goal: Acceptable Pain/Comfort Level and Functional Ability  Outcome: Ongoing (interventions implemented as appropriate)

## 2019-06-05 NOTE — CONSULTS
Referring Provider: Dr. Nunez  Reason for Consultation: Persistent pulmonary infiltrate/mass    Patient Care Team:  Provider, No Known as PCP - General    Chief complaint: Cough and unsteady gait    Subjective .     History of present illness:  69-year-old male admitted through the emergency room on 6/4.  He presented with a dry cough, mild shortness of breath, and generalized weakness.    He was recently admitted to [facility] about 2 weeks ago for 5 days with pneumonia and hyponatremia.  He was treated with antibiotics and his sodium was eventually increased to 130 on discharge.  He never had any fever or purulent secretions.  His main complaint was an unsteady gait.  This improved by the time of discharge and then about 3-4 days ago he developed increasing weakness and unsteadiness.  He saw his primary care physician yesterday and labs revealed recurrence of his hyponatremia and he was instructed to come to the emergency department.  He initially went back to [facility] but then came here for further evaluation.  He denies any chest pain, nausea, vomiting, or abdominal pain.  He is not producing any sputum and he denies any hemoptysis.    His sodium level here was 115.  A chest CT revealed extensive right lower lobe density which could be a very dense infiltrate versus a more solid, space-occupying lesion.  There is also right hilar and subcarinal adenopathy.    He was admitted for further treatment and evaluation    I was asked to see him in consultation.    Review of Systems  Pertinent items are noted in HPI, all other systems reviewed and negative    History  Past Medical History:   Diagnosis Date   • Arthritis    • Black lung disease (CMS/HCC)    • History of alcohol abuse    • Hyperlipidemia    • Hypertension    ,   Past Surgical History:   Procedure Laterality Date   • NO PAST SURGERIES     ,   Family History   Problem Relation Age of Onset   • COPD Mother    • Heart disease Father   "   and   Social History     Tobacco Use   • Smoking status: Former Smoker   • Smokeless tobacco: Never Used   • Tobacco comment: smoked 1ppd x 15 years; quit in 2009   Substance Use Topics   • Alcohol use: Not on file     Comment: hx of abuse; drank 2-3 beers and 2-3 mixed drinks about 3/4 x/week; stopped 1 month ago (had tapered drinking over past year)    • Drug use: No       Objective     Vital Signs   Temp:  [98 °F (36.7 °C)-99.1 °F (37.3 °C)] 99.1 °F (37.3 °C)  Heart Rate:  [64-80] 80  Resp:  [16-20] 18  BP: (140-185)/(58-87) 156/63    Physical Exam:   Objective:  General Appearance:  In no acute distress.    Vital signs: (most recent): Blood pressure 151/65, pulse 67, temperature 98.3 °F (36.8 °C), temperature source Oral, resp. rate 16, height 172.7 cm (68\"), weight 96.2 kg (212 lb), SpO2 95 %.    HEENT: Normal HEENT exam.  (Nasal cannula O2)    Lungs:  Normal effort and normal respiratory rate.  He is not in respiratory distress.  There are decreased breath sounds and rhonchi.  No rales or wheezes.  (Decreased breath sounds and rhonchi right lower lung field)  Heart: Normal rate.  Regular rhythm.  S1 normal and S2 normal.  No murmur, gallop or friction rub.   Chest: Symmetric chest wall expansion.   Abdomen: Abdomen is soft and non-distended.  Bowel sounds are normal.   There is no abdominal tenderness.   There is no mass. There is no splenomegaly. There is no hepatomegaly.   Extremities: There is no deformity or dependent edema.    Neurological: Patient is alert and oriented to person, place and time.    Pupils:  Pupils are equal, round, and reactive to light.    Skin:  Warm and dry.              Results Review:   I reviewed the patient's new clinical results.  I reviewed the patient's new imaging results and agree with the interpretation.      Assessment/Plan     Active Hospital Problems    Diagnosis   • **PNA (pneumonia)   • Lung mass   • Hyponatremia     Initial Sodium 115 on 6/4/19      • Shortness of " breath   • Black lung disease (CMS/HCC)   • HTN (hypertension)   • HLD (hyperlipidemia)   • Anemia   • History of alcohol abuse     Quit drinking 1 month ago. Pt reports drinking about 2-3 beers and 2-3 mixed drinks about 3-4 times a week on average. Tapered his alcohol intake over the past year in order to quit.      • Hypomagnesemia       69-year-old male with dense infiltrate versus mass in the right lower lobe with right hilar and subcarinal adenopathy.  Given his lack of fever and prominent hyponatremia we may be seeing a primary bronchogenic carcinoma with a paraneoplastic syndrome causing hyponatremia.    Plan a diagnostic bronchoscopy but would favor an EBUS bronchoscopy to sample mediastinal nodes and this would require general anesthesia so would definitely want some improvement in his serum sodium level prior to that.  Will tentatively schedule him for tomorrow.  Discussed the procedure including risks and benefits with the patient and his family and he was agreeable to proceed.      I discussed the patients findings and my recommendations with patient and family    Bar Gage MD  Pulmonary and Critical Care Medicine  06/05/19  4:14 PM

## 2019-06-05 NOTE — PROGRESS NOTES
[facility] Medicine Services  PROGRESS NOTE    Patient Name: Florentin Mcallister  : 1950  MRN: [id number]    Date of Admission: 2019  Length of Stay: 1  Primary Care Physician: Provider, No Known    Subjective   Subjective     CC:  SOA    HPI:  Doing well this am, just tired. Admitted earlier this am by my colleague, Dr. Curry.       Review of Systems  Gen- No fevers, chills  CV- No chest pain, palpitations  Resp- No cough, dyspnea  GI- No N/V/D, abd pain  Otherwise ROS is negative except as mentioned in the HPI.    Objective   Objective     Vital Signs:   Temp:  [98 °F (36.7 °C)] 98 °F (36.7 °C)  Heart Rate:  [64-76] 68  Resp:  [18-20] 18  BP: (140-185)/(58-87) 154/58        Physical Exam:  Constitutional: No acute distress, awake, alert  HENT: NCAT, mucous membranes moist  Respiratory: rhonchi right lower lobe otherwise clear  Cardiovascular: RRR, no murmurs, rubs, or gallops, palpable pedal pulses bilaterally  Gastrointestinal: Positive bowel sounds, soft, nontender, nondistended  Musculoskeletal: No bilateral ankle edema  Psychiatric: Appropriate affect, cooperative  Neurologic: Oriented x 3, strength symmetric in all extremities, Cranial Nerves grossly intact to confrontation, speech clear  Skin: No rashes    Results Reviewed:  I have personally reviewed current lab, radiology, and data and agree.    Results from last 7 days   Lab Units 19  2141   WBC 10*3/mm3 8.74   HEMOGLOBIN g/dL 11.5*   HEMATOCRIT % 31.9*   PLATELETS 10*3/mm3 184     Results from last 7 days   Lab Units 19  2141   SODIUM mmol/L 115*   POTASSIUM mmol/L 4.0   CHLORIDE mmol/L 82*   CO2 mmol/L 21.0*   BUN mg/dL 17   CREATININE mg/dL 0.88   GLUCOSE mg/dL 137*   CALCIUM mg/dL 9.2   ALT (SGPT) U/L 23   AST (SGOT) U/L 17   TROPONIN T ng/mL <0.010     Estimated Creatinine Clearance: 90.3 mL/min (by C-G formula based on SCr of 0.88 mg/dL).    Microbiology Results Abnormal     None          Imaging  Results (last 24 hours)     Procedure Component Value Units Date/Time    CT Angiogram Chest With Contrast [[id number]] Collected:  06/04/19 2255     Updated:  06/04/19 2257    Narrative:       CTA Chest    INDICATION:   Weakness and cough. Recently diagnosed with pneumonia 2 weeks ago.    TECHNIQUE:   CT angiogram of the chest with contrast. 3-D postprocessing was performed and reviewed.   Radiation dose reduction techniques included automated exposure control or exposure modulation based on body size. Count of known CT and cardiac nuc med studies  performed in previous 12 months: 0.     COMPARISON:   None available.    FINDINGS:   Right lower lobe parenchymal opacity partially encasing the right lower lobe bronchi. Mass measures roughly 9.1 x 5.4 x 7.2 cm and though could represent airspace disease and pneumonia given its masslike appearance is highly concerning for primary lung  malignancy. Background diffuse lung disease with underlying fibrosis. Minimal right effusion.    4.2 x 2.2 cm subcarinal lymphadenopathy. Small amount of bilateral hilar lymphadenopathy with a 2.3 x 1.4 cm right hilar lymph node. No evidence of pulmonary embolus. Heart and aorta are unremarkable. The visualized upper abdomen unremarkable. Osseous  structures and thoracic inlet appear normal.      Impression:       Extensive right hilar and right lower lobe parenchymal opacity which could represent airspace disease and pneumonia but appearance and history favors primary lung malignancy. Partial encasement of the right lower lobe bronchi.    No evidence of pulmonary embolus.    Diffuse interstitial lung disease.    Signer Name: KEN Munguia MD   Signed: 6/4/2019 10:55 PM   Workstation Name: [id number]       CT Head Without Contrast [[id number]] Collected:  06/04/19 2247     Updated:  06/04/19 2249    Narrative:       CT Head WO    HISTORY:   Decreased level of alertness. Hypertension and chest pain.    TECHNIQUE:   Axial unenhanced head  CT. Radiation dose reduction techniques included automated exposure control or exposure modulation based on body size. Count of known CT and cardiac nuc med studies performed in previous 12 months: 0.     Time of scan: 2231 hours    COMPARISON:   None.    FINDINGS:   No intracranial hemorrhage, mass, or infarct. No hydrocephalus or extra-axial fluid collection. Brain parenchymal density is normal. The skull base, calvarium, and extracranial soft tissues are normal.      Impression:       Normal, negative unenhanced head CT.          Signer Name: KEN Munguia MD   Signed: 6/4/2019 10:47 PM   Workstation Name: [email or web address]       XR Chest 1 View [[id number]] Collected:  06/04/19 2210     Updated:  06/04/19 2212    Narrative:       CR Chest 1 Vw    INDICATION:   Weakness and cough. Patient diagnosed with pneumonia 2 weeks ago.     COMPARISON:    None available.    FINDINGS:  Single portable AP view of the chest.  Right perihilar and mid lung infiltrate and/or mass. No effusions. Heart and mediastinum unremarkable. Osseous structures appear normal.      Impression:       Right perihilar parenchymal opacity with some density extending into the right midlung. May represent a combination of infiltrate, adenopathy or mass. Patient reportedly with recently diagnosed pneumonia. Correlation with outside studies may be of  benefit to assess for resolution/progression of disease. Further evaluation with chest CT with contrast may be warranted to exclude underlying mass lesion.    Signer Name: KEN Munguia MD   Signed: 6/4/2019 10:10 PM   Workstation Name: [id number]                  I have reviewed the medications:    Current Facility-Administered Medications:   •  [START ON 6/6/2019] ! pharmacy to happen, , Does not apply, Continuous PRN, Saskia Flores, EARNEST  •  acetaminophen (TYLENOL) tablet 650 mg, 650 mg, Oral, Q4H PRN, Saskia Flores, EARNEST  •  [START ON 6/6/2019] atorvastatin (LIPITOR) tablet 10 mg, 10 mg, Oral,  Nightly, Saskia Flores APRN  •  budesonide-formoterol (SYMBICORT) 160-4.5 MCG/ACT inhaler 2 puff, 2 puff, Inhalation, BID - RT, Saskia Flores APRN  •  folic acid (FOLVITE) tablet 1 mg, 1 mg, Oral, Daily, Saskia Flores APRN  •  ipratropium-albuterol (DUO-NEB) nebulizer solution 3 mL, 3 mL, Nebulization, Q4H PRN, Saskia Flores APRN  •  ipratropium-albuterol (DUO-NEB) nebulizer solution 3 mL, 3 mL, Nebulization, Q4H - RT, Saskia Flores APRN, 3 mL at 06/05/19 0408  •  Magnesium Sulfate 2 gram Bolus, followed by 8 gram infusion (total Mg dose 10 grams)- Mg less than or equal to 1mg/dL, 2 g, Intravenous, PRN **OR** Magnesium Sulfate 2 gram / 50mL Infusion (GIVE X 3 BAGS TO EQUAL 6GM TOTAL DOSE) - Mg 1.1 - 1.5 mg/dl, 2 g, Intravenous, PRN **OR** Magnesium Sulfate 4 gram infusion- Mg 1.6-1.9 mg/dL, 4 g, Intravenous, PRN, Shannon Curry, DO  •  multivitamin (THERAGRAN) tablet 1 tablet, 1 tablet, Oral, Daily, Saskia Flores APRN  •  ondansetron (ZOFRAN) tablet 4 mg, 4 mg, Oral, Q6H PRN **OR** ondansetron (ZOFRAN) injection 4 mg, 4 mg, Intravenous, Q6H PRN, Saskia Flores APRN  •  Pharmacy to dose vancomycin, , Does not apply, Continuous PRN, Saksia Flores APRN  •  piperacillin-tazobactam (ZOSYN) 3.375 g in iso-osmotic dextrose 50 ml (premix), 3.375 g, Intravenous, Q8H, Saskia Flores APRN  •  potassium chloride (MICRO-K) CR capsule 40 mEq, 40 mEq, Oral, PRN **OR** potassium chloride (KLOR-CON) packet 40 mEq, 40 mEq, Oral, PRN **OR** potassium chloride 10 mEq in 100 mL IVPB, 10 mEq, Intravenous, Q1H PRN, Shannon Curry DO  •  predniSONE (DELTASONE) tablet 20 mg, 20 mg, Oral, Daily, Saskia Flores APRN  •  sodium chloride 0.9 % flush 10 mL, 10 mL, Intravenous, PRN, Clarke Kwok MD  •  sodium chloride 0.9 % flush 3 mL, 3 mL, Intravenous, Q12H, Saskia Flores APRN  •  sodium chloride 0.9 % flush 3-10 mL, 3-10 mL, Intravenous, PRN, Saskia Flores APRN  •  sodium chloride 0.9 % infusion, 75 mL/hr,  Intravenous, Continuous, Saskia Flores APRN, Stopped at 06/05/19 0606  •  thiamine (VITAMIN B-1) tablet 100 mg, 100 mg, Oral, Daily, Saskia Flores APRN  •  vancomycin 1500 mg/500 mL 0.9% NS IVPB ([facility]), 1,500 mg, Intravenous, Q12H, Saskia Flores APRN      Assessment/Plan   Assessment / Plan     Active Hospital Problems    Diagnosis POA   • **PNA (pneumonia) [J18.9] Yes     Priority: High   • Hyponatremia [E87.1] Yes     Priority: High     Initial Sodium 115 on 6/4/19      • Lung mass [R91.8] Yes     Priority: High   • Shortness of breath [R06.02] Yes     Priority: Medium   • Anemia [D64.9] Yes     Priority: Medium   • Hypomagnesemia [E83.42] Unknown     Priority: Medium   • HTN (hypertension) [I10] Yes     Priority: Low   • HLD (hyperlipidemia) [E78.5] Yes     Priority: Low   • Black lung disease (CMS/HCC) [J60] Yes     Priority: Low   • History of alcohol abuse [Z87.898] Yes     Priority: Low     Quit drinking 1 month ago. Pt reports drinking about 2-3 beers and 2-3 mixed drinks about 3-4 times a week on average. Tapered his alcohol intake over the past year in order to quit.             Brief Hospital Course to date:  Florentin Mcallister is a 69 y.o. male with past medical history significant for black lung, remote tobacco abuse who presents to the ED due to difficulty breathing. He was recently treated (two weeks prior to this admission) for PNA and hyponatremia (Na+ 115 at OSH) at an OSH.  He had been doing well until three days prior to this admission when his SOA increased and he became weak. On presentation to our ED, he was significantly hyponatremic and imaging showed RLL mass vs PNA.  He was admitted to our service for further evaluation.      Pneumonia vs lung malignancy  -CXR, CTA chest obtained; reviewed  -pt treated for PNA 2 weeks ago at [facility] where he was admitted for 5 days- records requested   -blood and sputum cx ordered  -pt given IV Rocephin and Azithro in ED; will broaden coverage given  "recent PNA diagnoses and hospitalization; continue IV Vanc and Zosyn  --Pulm consulted for possible bronch for diagnosis, appreciate their input. Plan for bronch in am, NPO after MN for procedure. Will order diet for today  -duo nebs scheduled and PRN  -pt on prednisone 20mg/daily (from recent hospitalization??); continue for now  -symptom mgt  -currently maintaining saturations on room air; PRN oxygen         Hyponatremia  -? SIADH vs other. Per pt's history, sounds like his Na+ improved with IVFs at OSH, which would go against SIADH.  Awaiting repeat labs. Need to monitor closely-unfortunately orders were not released while patient was boarding in ED, so do not have Q4 hour BMP as ordered on admission.         Hx of Black Lung Disease  -per patient he takes albuterol treatments Prn at home and Symbicort (although Symbicort not listed on med rec); Symbicort ordered  -PRN and scheduled duo nebs  -on room air at baseline  -pulm to see this morning     Anemia  -baseline H/H unclear  -anemia workup/labs this am     HTN  -stable  -routine Lisinopril held for now     HLD  -continue routine Zocor     Hx of alcohol abuse  -pt quit 1 month ago  -tapered his intake over the past year in order to quit; quit because he \"felt so bad\"  -previously drank 2-3 beers and 2-3 mixed drinks about 3 to 4 days a week   -no hx of withdrawal seizures   -start daily thiamine, MVI and folic acid      Hypomagnesemia  -electrolyte replacement protocol             DVT Prophylaxis:  mechanical    Disposition: I expect the patient to be discharged TBD    CODE STATUS:   Code Status and Medical Interventions:   Ordered at: 06/05/19 0150     Level Of Support Discussed With:    Patient     Code Status:    CPR     Medical Interventions (Level of Support Prior to Arrest):    Full         Electronically signed by Maria Del Rosario Nunez MD, 06/05/19, 7:48 AM.    "

## 2019-06-05 NOTE — ED PROVIDER NOTES
Subjective   Mr. Florentin Mcallister is a 69 year old male with a history of hypertension, hyperlipidemia, and coal workers pneumoconiosis who presents to the ED with c/o generalized weakness and shortness of breath.  Patient is quite unforthcoming and allows his wife and daughter at bedside to provide much of the history.  They state patient was seen 2 weeks ago at [facility] ED for sudden and progressively worsening weakness and shortness of breath.  Per family he was diagnosed with pneumonia and hyponatremia (118) and admitted for 5 days.  Was treated with antibiotics and saline solution during this time there.  Sodium improved to 130 and he was discharged home.  Patient began complaining of worsening shortness of breath and weakness and generalized unsteadiness again about 3 days ago.  Seen by his PCP yesterday, who lorie labs and made several medication changes.  However, his sodium was discovered to be low again and he was instructed to go to the ED once more.  Patient reworked up last night at [facility] ED and was going to be admitted but left AMA.  Presents to [facility] ED tonight for re-evaluation and probable admission.  In the ED, patient's largely without complaint.   He denies any abdominal pain, chest pain, bladder or bowel changes, fevers, chills, weight changes, nausea, vomiting, or loss of appetite.  Patient and family are unsure what is causing his symptoms.  Denies a history of cancer.  Denies history of drug abuse.  Non-surgical chest/abdomen.  No history of cardiac diease.  Does have a history of alcohol abuse but quit a month ago.        History provided by:  Patient and relative  Weakness - Generalized   Severity:  Moderate  Onset quality:  Gradual  Duration: first began 2 weeks ago, back for the past 3 days.  Progression:  Worsening  Chronicity:  Recurrent  Context: alcohol use (hx of alcohol abuse) and recent infection (PNA)    Relieved by:  Nothing  Associated symptoms: difficulty walking  "and shortness of breath    Associated symptoms: no abdominal pain, no anorexia, no chest pain, no cough, no diarrhea, no dysuria, no fever, no frequency, no hematochezia, no melena, no nausea, no urgency and no vomiting        Review of Systems   Constitutional: Negative for appetite change, chills, fever and unexpected weight change.   Respiratory: Positive for shortness of breath and wheezing. Negative for cough.    Cardiovascular: Negative for chest pain and palpitations.   Gastrointestinal: Negative for abdominal pain, anorexia, blood in stool, diarrhea, hematochezia, melena, nausea and vomiting.   Genitourinary: Negative.  Negative for decreased urine volume, difficulty urinating, dysuria, flank pain, frequency, hematuria and urgency.   Musculoskeletal: Positive for gait problem (\"stumbling\").   Neurological: Positive for weakness.   All other systems reviewed and are negative.      Past Medical History:   Diagnosis Date   • Arthritis    • Black lung disease (CMS/HCC)    • Hyperlipidemia    • Hypertension        No Known Allergies    History reviewed. No pertinent surgical history.    History reviewed. No pertinent family history.    Social History     Socioeconomic History   • Marital status:      Spouse name: Not on file   • Number of children: Not on file   • Years of education: Not on file   • Highest education level: Not on file   Tobacco Use   • Smoking status: Former Smoker   Substance and Sexual Activity   • Drug use: No         Objective   Physical Exam   Constitutional: He is oriented to person, place, and time. He appears well-developed and well-nourished. No distress.   HENT:   Head: Normocephalic and atraumatic.   Eyes: Conjunctivae are normal. No scleral icterus.   Neck: Normal range of motion. Neck supple.   Cardiovascular: Normal rate, regular rhythm, normal heart sounds and intact distal pulses. Exam reveals no gallop and no friction rub.   No murmur heard.  Regular cardiac exam. "   Pulmonary/Chest: Effort normal. No respiratory distress. He has wheezes. He has no rales.   Mild inspiratory and expiratory wheezes diffusely, most prominent in the right lower lung.   Abdominal: Soft. Bowel sounds are normal. There is no tenderness. There is no guarding.   Abdomen soft and non-tender.   Musculoskeletal: Normal range of motion. He exhibits edema.   1+ edema at the level of the ankles bilaterally.  Calves equal.  Able to stand and ambulate without assistance.   Neurological: He is alert and oriented to person, place, and time.   Mentation baseline.   Skin: Skin is warm and dry. He is not diaphoretic.   Psychiatric: He has a normal mood and affect. His behavior is normal.   Nursing note and vitals reviewed.      Critical Care  Performed by: Clarke Kwok MD  Authorized by: Clarke Kwok MD     Critical care provider statement:     Critical care time (minutes):  30    Critical care time was exclusive of:  Separately billable procedures and treating other patients    Critical care was necessary to treat or prevent imminent or life-threatening deterioration of the following conditions:  Metabolic crisis    Critical care was time spent personally by me on the following activities:  Blood draw for specimens, development of treatment plan with patient or surrogate, discussions with consultants, evaluation of patient's response to treatment, examination of patient, obtaining history from patient or surrogate, ordering and performing treatments and interventions, ordering and review of laboratory studies, ordering and review of radiographic studies, pulse oximetry, re-evaluation of patient's condition and review of old charts             ED Course  ED Course as of Jun 04 8370   Tue Jun 04, 2019   2317 Paged on-call hospitalist. -NKS  [MU]      ED Course User Index  [MU] Curt Pino       Recent Results (from the past 24 hour(s))   Comprehensive Metabolic Panel    Collection Time: 06/04/19   9:41 PM   Result Value Ref Range    Glucose 137 (H) 65 - 99 mg/dL    BUN 17 8 - 23 mg/dL    Creatinine 0.88 0.76 - 1.27 mg/dL    Sodium 115 (C) 136 - 145 mmol/L    Potassium 4.0 3.5 - 5.2 mmol/L    Chloride 82 (L) 98 - 107 mmol/L    CO2 21.0 (L) 22.0 - 29.0 mmol/L    Calcium 9.2 8.6 - 10.5 mg/dL    Total Protein 6.5 6.0 - 8.5 g/dL    Albumin 4.10 3.50 - 5.20 g/dL    ALT (SGPT) 23 1 - 41 U/L    AST (SGOT) 17 1 - 40 U/L    Alkaline Phosphatase 74 39 - 117 U/L    Total Bilirubin 0.6 0.2 - 1.2 mg/dL    eGFR Non African Amer 86 >60 mL/min/1.73    Globulin 2.4 gm/dL    A/G Ratio 1.7 g/dL    BUN/Creatinine Ratio 19.3 7.0 - 25.0    Anion Gap 12.0 mmol/L   Troponin    Collection Time: 06/04/19  9:41 PM   Result Value Ref Range    Troponin T <0.010 0.000 - 0.030 ng/mL   Magnesium    Collection Time: 06/04/19  9:41 PM   Result Value Ref Range    Magnesium 1.7 1.6 - 2.4 mg/dL   Light Blue Top    Collection Time: 06/04/19  9:41 PM   Result Value Ref Range    Extra Tube hold for add-on    Green Top (Gel)    Collection Time: 06/04/19  9:41 PM   Result Value Ref Range    Extra Tube Hold for add-ons.    Lavender Top    Collection Time: 06/04/19  9:41 PM   Result Value Ref Range    Extra Tube hold for add-on    Gold Top - SST    Collection Time: 06/04/19  9:41 PM   Result Value Ref Range    Extra Tube Hold for add-ons.    CBC Auto Differential    Collection Time: 06/04/19  9:41 PM   Result Value Ref Range    WBC 8.74 3.40 - 10.80 10*3/mm3    RBC 3.87 (L) 4.14 - 5.80 10*6/mm3    Hemoglobin 11.5 (L) 13.0 - 17.7 g/dL    Hematocrit 31.9 (L) 37.5 - 51.0 %    MCV 82.4 79.0 - 97.0 fL    MCH 29.7 26.6 - 33.0 pg    MCHC 36.1 (H) 31.5 - 35.7 g/dL    RDW 11.7 (L) 12.3 - 15.4 %    RDW-SD 34.9 (L) 37.0 - 54.0 fl    MPV 8.7 6.0 - 12.0 fL    Platelets 184 140 - 450 10*3/mm3    Neutrophil % 63.8 42.7 - 76.0 %    Lymphocyte % 26.1 19.6 - 45.3 %    Monocyte % 10.0 5.0 - 12.0 %    Eosinophil % 0.0 (L) 0.3 - 6.2 %    Basophil % 0.1 0.0 - 1.5 %     Immature Grans % 0.2 0.0 - 0.5 %    Neutrophils, Absolute 5.58 1.70 - 7.00 10*3/mm3    Lymphocytes, Absolute 2.28 0.70 - 3.10 10*3/mm3    Monocytes, Absolute 0.87 0.10 - 0.90 10*3/mm3    Eosinophils, Absolute 0.00 0.00 - 0.40 10*3/mm3    Basophils, Absolute 0.01 0.00 - 0.20 10*3/mm3    Immature Grans, Absolute 0.02 0.00 - 0.05 10*3/mm3   Ethanol    Collection Time: 06/04/19  9:41 PM   Result Value Ref Range    Ethanol <10 0 - 10 mg/dL   Urinalysis With Microscopic If Indicated (No Culture) - Urine, Clean Catch    Collection Time: 06/04/19 11:30 PM   Result Value Ref Range    Color, UA Yellow Yellow, Straw    Appearance, UA Clear Clear    pH, UA 6.5 5.0 - 8.0    Specific Gravity, UA 1.015 1.005 - 1.030    Glucose, UA Negative Negative    Ketones, UA Negative Negative    Bilirubin, UA Negative Negative    Blood, UA Negative Negative    Protein, UA Negative Negative    Leuk Esterase, UA Negative Negative    Nitrite, UA Negative Negative    Urobilinogen, UA 0.2 E.U./dL 0.2 - 1.0 E.U./dL     Note: In addition to lab results from this visit, the labs listed above may include labs taken at another facility or during a different encounter within the last 24 hours. Please correlate lab times with ED admission and discharge times for further clarification of the services performed during this visit.    CT Angiogram Chest With Contrast   Final Result   Extensive right hilar and right lower lobe parenchymal opacity which could represent airspace disease and pneumonia but appearance and history favors primary lung malignancy. Partial encasement of the right lower lobe bronchi.      No evidence of pulmonary embolus.      Diffuse interstitial lung disease.      Signer Name: KEN Munguia MD    Signed: 6/4/2019 10:55 PM    Workstation Name: [id number]          CT Head Without Contrast   Final Result   Normal, negative unenhanced head CT.               Signer Name: KEN Munguia MD    Signed: 6/4/2019 10:47 PM     "Workstation Name: [id number]          XR Chest 1 View   Final Result   Right perihilar parenchymal opacity with some density extending into the right midlung. May represent a combination of infiltrate, adenopathy or mass. Patient reportedly with recently diagnosed pneumonia. Correlation with outside studies may be of   benefit to assess for resolution/progression of disease. Further evaluation with chest CT with contrast may be warranted to exclude underlying mass lesion.      Signer Name: KEN Munguia MD    Signed: 6/4/2019 10:10 PM    Workstation Name: [id number]            Vitals:    06/04/19 2128 06/04/19 2200 06/04/19 2330   BP: (!) 185/78 160/72 165/72   BP Location: Left arm     Patient Position: Sitting     Pulse: 76 69    Resp: 18 20    Temp: 98 °F (36.7 °C)     TempSrc: Oral     SpO2: 98% 100% 97%   Weight: 96.2 kg (212 lb)     Height: 172.7 cm (68\")       Medications   sodium chloride 0.9 % flush 10 mL (not administered)   cefTRIAXone (ROCEPHIN) IVPB 1 g (not administered)   azithromycin 500 MG/250 ML 0.9% NS IVPB (MBP) (not administered)   sodium chloride 0.9 % bolus 1,000 mL (0 mL Intravenous Stopped 6/4/19 2257)   ipratropium-albuterol (DUO-NEB) nebulizer solution 3 mL (3 mL Nebulization Given 6/4/19 2200)   iopamidol (ISOVUE-370) 76 % injection 100 mL (50 mL Intravenous Given 6/4/19 2225)     ECG/EMG Results (last 24 hours)     ** No results found for the last 24 hours. **        ECG 12 Lead   Final Result   Test Reason : Weak/Dizzy/AMS protocol   Blood Pressure : **/** mmHG   Vent. Rate : 075 BPM     Atrial Rate : 075 BPM      P-R Int : 200 ms          QRS Dur : 092 ms       QT Int : 364 ms       P-R-T Axes : 050 000 012 degrees      QTc Int : 406 ms      Sinus rhythm with premature supraventricular complexes   Otherwise normal ECG   No previous ECGs available   Confirmed by SHUN HUSSEIN ([id number]) on 6/4/2019 9:48:40 PM      Referred By:  edmd           Confirmed By:SHUN HUSSEIN    "                     MDM  Number of Diagnoses or Management Options  Hyponatremia: new and requires workup  Lung infiltrate on CT: new and requires workup  Lung mass: new and requires workup  Weakness: new and requires workup  Diagnosis management comments: Shows a sodium of 115.    Chest x-ray is concerning for infiltrate versus right lung mass.    CT scan of the chest is more consistent with right lung mass/primary lung malignancy with associated lymphadenopathy identified throughout the thoracic cavity.    Patient is afebrile and oxygen saturations are 97% or better.  Blood cultures have been drawn and initial dose of antibiotics will be given.    Patient was initiated on IV fluids.    I discussed the patient with the hospitalist, Dr. Curry, who will admit.       Amount and/or Complexity of Data Reviewed  Clinical lab tests: ordered and reviewed  Tests in the radiology section of CPT®: ordered and reviewed  Obtain history from someone other than the patient: yes  Review and summarize past medical records: yes  Discuss the patient with other providers: yes  Independent visualization of images, tracings, or specimens: yes    Risk of Complications, Morbidity, and/or Mortality  Presenting problems: high  Diagnostic procedures: high  Management options: high    Critical Care  Total time providing critical care: 30-74 minutes    Patient Progress  Patient progress: stable      Final diagnoses:   Weakness   Hyponatremia   Lung infiltrate on CT   Lung mass       Documentation assistance provided by allegra Pino.  Information recorded by the allegra was done at my direction and has been verified and validated by me.     Curt Pino  06/04/19 [id number]       Clarke Kwok MD  06/04/19 8114

## 2019-06-05 NOTE — PROGRESS NOTES
Discharge Planning Assessment  [facility]     Patient Name: Florentin Mcallister  MRN: [id number]  Today's Date: 6/5/2019    Admit Date: 6/4/2019    Discharge Needs Assessment     Row Name 06/05/19 0925       Living Environment    Lives With  spouse    Current Living Arrangements  home/apartment/condo    Living Arrangement Comments  Spouse can assist as needed.       Discharge Needs Assessment    Equipment Currently Used at Home  cane, straight    Equipment Needed After Discharge  none    Discharge Coordination/Progress  Has a cane for prn use.        Discharge Plan     Row Name 06/05/19 0925       Plan    Plan  Home at DC    Patient/Family in Agreement with Plan  yes    Plan Comments  I spoke with the pt. He has no DC needs at this time.    Row Name 06/05/19 0816       Plan    Final Discharge Disposition Code  01 - home or self-care        Destination      No service coordination in this encounter.      Durable Medical Equipment      No service coordination in this encounter.      Dialysis/Infusion      No service coordination in this encounter.      Home Medical Care      No service coordination in this encounter.      Therapy      No service coordination in this encounter.      Community Resources      No service coordination in this encounter.        Expected Discharge Date and Time     Expected Discharge Date Expected Discharge Time    Jun 8, 2019         Demographic Summary    No documentation.       Functional Status     Row Name 06/05/19 0924       Functional Status    Usual Activity Tolerance  good       Functional Status, IADL    Medications  independent    Meal Preparation  independent    Housekeeping  independent    Laundry  independent    Shopping  independent        Psychosocial    No documentation.       Abuse/Neglect    No documentation.       Legal    No documentation.       Substance Abuse    No documentation.       Patient Forms    No documentation.           Hilaria Kaur RN

## 2019-06-05 NOTE — PLAN OF CARE
Problem: Pain, Chronic (Adult)  Goal: Acceptable Pain/Comfort Level and Functional Ability  Outcome: Ongoing (interventions implemented as appropriate)

## 2019-06-05 NOTE — H&P
"    [facility] Medicine Services  HISTORY AND PHYSICAL    Patient Name: Florentin Mcallister  : 1950  MRN: [id number]  Primary Care Physician: Provider, No Known  Date of admission: 2019      Subjective   Subjective     Chief Complaint:  Shortness of breath     HPI:  Florentin Mcallister is a 69 y.o. male with a history of HTN, HLD and black lung disease  who presented to the ED w/ c/o shortness of breath and generalized weakness. Pt was seen at [facility] ED two weeks ago for shortness of breath and weakness. Pt was diagnosed w/ PNA and hyponatremia (Na 118) and admitted to the hospital for 5 days. His wife reports that he was treated w/ antibiotics and IVF. Prior to discharge his sodium was stable at 130. Since discharge he had been doing ok at home until about 3-4 days ago when his weakness and shortness of breath returned. Pt reports progressively worsening shortness of breath at rest and w/ exertion. He c/o chest congestion but is unable to produce sputum; does have mild cough. His wife reports that he has had generalized weakness and is very \"unsteady\" when walking and \"can't keep his balance\". Pt was seen by his PCP yesterday and had labs drawn. Labs revealed hyponatremia and pt was instructed to come to the ED for further evaluation. He went back to [facility] ED and admission was recommended but pt left AMA. Pt denies fever/chills, chest pain, N/V/D, abdominal pain, dysuria, edema, syncope, confusion, appetite changes, weight loss, visual changes, focal deficits. Pt evaluated in the ED; CTA chest concerning for lung mass and PNA. Sodium level 115. Pt admitted to the hospital medicine service for further evaluation.   *Patient does have a history of alcohol abuse but reports that he quit one month ago after he tapered his alcohol intake for the past year.       Review of Systems   Constitutional: Positive for fatigue. Negative for activity change, appetite change, chills, " diaphoresis, fever and unexpected weight change.        Generalized weakness and fatigue x 3-4 days.    HENT: Negative.    Eyes: Negative.    Respiratory: Positive for cough and shortness of breath. Negative for apnea, choking, chest tightness, wheezing and stridor.         Progressively worsening shortness of breath x 3-4 days. Non-productive cough. Lung congestion but unable to produce sputum. Dyspnea at rest and w/ exertion.    Cardiovascular: Negative.    Gastrointestinal: Negative.    Endocrine: Negative.    Genitourinary: Negative.    Musculoskeletal: Negative.    Skin: Negative.    Allergic/Immunologic: Negative.    Neurological: Negative for dizziness, tremors, seizures, syncope, facial asymmetry, speech difficulty, weakness, light-headedness, numbness and headaches.        Unsteady gait w/ balance instability x 3-4 days.    Hematological: Negative.    Psychiatric/Behavioral: Negative.    All other systems reviewed and are negative.         Otherwise complete ROS reviewed and is negative except as mentioned in the HPI.    Personal History     Past Medical History:   Diagnosis Date   • Arthritis    • Black lung disease (CMS/HCC)    • History of alcohol abuse    • Hyperlipidemia    • Hypertension        Past Surgical History:   Procedure Laterality Date   • NO PAST SURGERIES         Family History: family history includes COPD in his mother; Heart disease in his father. Otherwise pertinent FHx was reviewed and unremarkable.     Social History:  reports that he has quit smoking. He does not have any smokeless tobacco history on file. He reports that he does not use drugs.  Social History     Social History Narrative   • Not on file       Medications:    Available home medication information reviewed.    (Not in a hospital admission)    No Known Allergies    Objective   Objective     Vital Signs:   Temp:  [98 °F (36.7 °C)] 98 °F (36.7 °C)  Heart Rate:  [67-76] 67  Resp:  [18-20] 20  BP: (151-185)/(67-87) 155/87         Physical Exam   Constitutional: He is oriented to person, place, and time. He appears well-developed and well-nourished. No distress.   HENT:   Head: Normocephalic and atraumatic.   Eyes: EOM are normal. Pupils are equal, round, and reactive to light.   Neck: Normal range of motion. Neck supple.   Cardiovascular: Normal rate, regular rhythm, normal heart sounds and intact distal pulses. Exam reveals no gallop and no friction rub.   No murmur heard.  Pulmonary/Chest: Effort normal. No stridor. No respiratory distress. He has no wheezes.   Rhonchi throughout R>L. Significant congestion noted. Cough non-productive during exam.    Abdominal: Soft. Bowel sounds are normal. He exhibits no distension. There is no tenderness. There is no guarding.   Musculoskeletal: Normal range of motion. He exhibits no edema, tenderness or deformity.   Neurological: He is alert and oriented to person, place, and time. No cranial nerve deficit.   Skin: Skin is warm and dry. Capillary refill takes 2 to 3 seconds. No rash noted. He is not diaphoretic. No erythema. No pallor.   Psychiatric: He has a normal mood and affect. His behavior is normal. Judgment and thought content normal.   Nursing note and vitals reviewed.         Results Reviewed:  I have personally reviewed current lab, radiology, and data and agree.    Results from last 7 days   Lab Units 06/04/19  2141   WBC 10*3/mm3 8.74   HEMOGLOBIN g/dL 11.5*   HEMATOCRIT % 31.9*   PLATELETS 10*3/mm3 184     Results from last 7 days   Lab Units 06/04/19  2141   SODIUM mmol/L 115*   POTASSIUM mmol/L 4.0   CHLORIDE mmol/L 82*   CO2 mmol/L 21.0*   BUN mg/dL 17   CREATININE mg/dL 0.88   GLUCOSE mg/dL 137*   CALCIUM mg/dL 9.2   ALT (SGPT) U/L 23   AST (SGOT) U/L 17   TROPONIN T ng/mL <0.010     Estimated Creatinine Clearance: 90.3 mL/min (by C-G formula based on SCr of 0.88 mg/dL).  Brief Urine Lab Results  (Last result in the past 365 days)      Color   Clarity   Blood   Leuk Est    Nitrite   Protein   CREAT   Urine HCG        06/04/19 2330 Yellow Clear Negative Negative Negative Negative             Imaging Results (last 24 hours)     Procedure Component Value Units Date/Time    CT Angiogram Chest With Contrast [[id number]] Collected:  06/04/19 2255     Updated:  06/04/19 2257    Narrative:       CTA Chest    INDICATION:   Weakness and cough. Recently diagnosed with pneumonia 2 weeks ago.    TECHNIQUE:   CT angiogram of the chest with contrast. 3-D postprocessing was performed and reviewed.   Radiation dose reduction techniques included automated exposure control or exposure modulation based on body size. Count of known CT and cardiac nuc med studies  performed in previous 12 months: 0.     COMPARISON:   None available.    FINDINGS:   Right lower lobe parenchymal opacity partially encasing the right lower lobe bronchi. Mass measures roughly 9.1 x 5.4 x 7.2 cm and though could represent airspace disease and pneumonia given its masslike appearance is highly concerning for primary lung  malignancy. Background diffuse lung disease with underlying fibrosis. Minimal right effusion.    4.2 x 2.2 cm subcarinal lymphadenopathy. Small amount of bilateral hilar lymphadenopathy with a 2.3 x 1.4 cm right hilar lymph node. No evidence of pulmonary embolus. Heart and aorta are unremarkable. The visualized upper abdomen unremarkable. Osseous  structures and thoracic inlet appear normal.      Impression:       Extensive right hilar and right lower lobe parenchymal opacity which could represent airspace disease and pneumonia but appearance and history favors primary lung malignancy. Partial encasement of the right lower lobe bronchi.    No evidence of pulmonary embolus.    Diffuse interstitial lung disease.    Signer Name: KEN Munguia MD   Signed: 6/4/2019 10:55 PM   Workstation Name: [email or web address]       CT Head Without Contrast [[id number]] Collected:  06/04/19 2247     Updated:  06/04/19 2249    Narrative:        CT Head WO    HISTORY:   Decreased level of alertness. Hypertension and chest pain.    TECHNIQUE:   Axial unenhanced head CT. Radiation dose reduction techniques included automated exposure control or exposure modulation based on body size. Count of known CT and cardiac nuc med studies performed in previous 12 months: 0.     Time of scan: 2231 hours    COMPARISON:   None.    FINDINGS:   No intracranial hemorrhage, mass, or infarct. No hydrocephalus or extra-axial fluid collection. Brain parenchymal density is normal. The skull base, calvarium, and extracranial soft tissues are normal.      Impression:       Normal, negative unenhanced head CT.          Signer Name: KEN Munguia MD   Signed: 6/4/2019 10:47 PM   Workstation Name: [email or web address]       XR Chest 1 View [[id number]] Collected:  06/04/19 2210     Updated:  06/04/19 2212    Narrative:       CR Chest 1 Vw    INDICATION:   Weakness and cough. Patient diagnosed with pneumonia 2 weeks ago.     COMPARISON:    None available.    FINDINGS:  Single portable AP view of the chest.  Right perihilar and mid lung infiltrate and/or mass. No effusions. Heart and mediastinum unremarkable. Osseous structures appear normal.      Impression:       Right perihilar parenchymal opacity with some density extending into the right midlung. May represent a combination of infiltrate, adenopathy or mass. Patient reportedly with recently diagnosed pneumonia. Correlation with outside studies may be of  benefit to assess for resolution/progression of disease. Further evaluation with chest CT with contrast may be warranted to exclude underlying mass lesion.    Signer Name: KEN Munguia MD   Signed: 6/4/2019 10:10 PM   Workstation Name: [email or web address]                Assessment/Plan   Assessment / Plan     Active Hospital Problems    Diagnosis POA   • **PNA (pneumonia) [J18.9] Yes   • Shortness of breath [R06.02] Yes   • HTN (hypertension) [I10] Yes   • HLD (hyperlipidemia)  "[E78.5] Yes   • Anemia [D64.9] Yes   • 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 Pt reports drinking about 2-3 beers and 2-3 mixed drinks about 3-4 times a week on average. Tapered his alcohol intake over the past year in order to quit.      • Hypomagnesemia [E83.42] Unknown       Assessment & Plan    **Pneumonia  -CXR, CTA chest obtained; reviewed  -pt treated for PNA 2 weeks ago at [facility] where he was admitted for 5 days- records requested   -blood and sputum cx ordered  -pt given IV Rocephin and Azithro in ED; will broaden coverage given recent PNA diagnoses and hospitalization; starting IV Vanc and Zosyn  -duo nebs scheduled and PRN  -pt on prednisone 20mg/daily (from recent hospitalization??); continue for now  -symptom mgt  -currently maintaining saturations on room air; PRN oxygen     **R/O Lung mass  -CTA chest: \"Extensive right hilar and right lower lobe parenchymal opacity which could represent airspace disease and pneumonia but appearance and history favors primary lung malignancy. Partial encasement of the right lower lobe bronchi.\"  -hx of black lung disease, remote hx of smoking  -Pulmonary consult in am  -NPO for now until seen by Pulm  -obtain baseline labs and coags w/ am labs in anticipation of bronch  -hold routine ASA   -case mgt consulted for D/C planning     **Hyponatremia  -etiology unclear; 2/2 lung mass \"concerning for primary malignancy\"??  -previous episode of hyponatremia 2 weeks ago and hospitalized; per wife Na corrected w/ fluids (sodium reported at 118, corrected to 130 at discharge)- records requested  -initial Na here is 115  -sodium levels every 4 hours  -*not to correct over 8 millimoles/L in 24 hours*  -start on NS @ 75ml/hour and adjust as needed  -s/p 1 liter NS bolus in ED  -per med rec pt on sodium chloride 1 gram TID; " "held for now   -(+-) Nephrology consult w/ no improvement  -CT head obtained  -fall and seizure precautions ordered    **Shortness of breath   -plan per above  -telemetry and continuous pulse oximetry     **Hx of Black Lung Disease  -per patient he takes albuterol treatments Prn at home and Symbicort (although Symbicort not listed on med rec); Symbicort ordered  -PRN and scheduled duo nebs  -on room air at baseline  -pulm to see this morning    **Anemia  -baseline H/H unclear  -anemia workup/labs this am    **HTN  -stable  -routine Lisinopril held for now    **HLD  -continue routine Zocor    **Hx of alcohol abuse  -pt quit 1 month ago  -tapered his intake over the past year in order to quit; quit because he \"felt so bad\"  -previously drank 2-3 beers and 2-3 mixed drinks about 3 to 4 days a week   -no hx of withdrawal seizures   -start daily thiamine, MVI and folic acid     **Hypomagnesemia  -electrolyte replacement protocol       DVT prophylaxis:  scds     CODE STATUS:    Code Status and Medical Interventions:   Ordered at: 06/05/19 0150     Level Of Support Discussed With:    Patient     Code Status:    CPR     Medical Interventions (Level of Support Prior to Arrest):    Full       Admission Status:  I believe this patient meets INPATIENT status due to the need for care which can only be reasonably provided in an hospital setting of pneumonia, concerns for underlying lung malignancy.  In such, I feel patient’s risk for adverse outcomes and need for care warrant INPATIENT evaluation and predict the patient’s care encounter to likely last beyond 2 midnights.      Electronically signed by EARNEST Bass, 06/05/19, 12:31 AM.      Brief Attending Admission Attestation     I have seen and examined the patient, performing an independent face-to-face diagnostic evaluation with plan of care reviewed and developed with the advanced practice clinician (APC).      Brief Summary Statement:   Florentin Mcallister is a 69 " y.o. male patient with past medical history significant for black lung, remote tobacco abuse who presents to the ED due to difficulty breathing.  Patient reports 2 weeks ago he presented to outside ED with complaints of shortness of breath he was to pneumonia and treated inpatient.  He was done well since his discharge home until 3 days ago when he began to experience progressively worsening shortness of breath, weakness and difficulty walking.  Patient weight loss, fever.  He complains of a nonproductive cough.  Remainder of detailed HPI is as noted above and has been reviewed and/or edited by me for completeness.      Attending Physical Exam:  Constitutional: Awake, alert  Eyes: PERRLA, sclerae anicteric, no conjunctival injection  HENT: NCAT, mucous membranes moist  Neck: Supple, no thyromegaly, no lymphadenopathy, trachea midline  Respiratory: Coarse rhonchus breath sounds bilaterally  Cardiovascular: RRR, no murmurs, rubs, or gallops, palpable pedal pulses bilaterally  Gastrointestinal: Positive bowel sounds, soft, nontender, nondistended  Musculoskeletal: No bilateral ankle edema, no clubbing or cyanosis to extremities  Psychiatric: Appropriate affect, cooperative  Neurologic: Oriented x 3, strength symmetric in all extremities, Cranial Nerves grossly intact to confrontation, speech clear  Skin: No rashes        Brief Assessment/Plan :  See above for further detailed assessment and plan developed with APC which I have reviewed and/or edited for completeness.      Electronically signed by Shannon Curry DO, 06/05/19, 2:43 AM.

## 2019-06-06 NOTE — CONSULTS
Referring Provider: Maria Del Rosario Nunez  Reason for Consultation: Hyponatremia     Subjective     Chief complaint shortness of breath     History of present illness:  This is 69 year old man with past medical hx of Htn, HLD and black lung disease who presented to ED with complaints of Shortness of breath and generalized weakness. At presentation, Sodium was 115. He had similar presentation at OSH at that time he was also hyponatremic at 118 and per notes was diagnosed with PNA. He was treated with Antibx and IV NSALINE. This time on further evaluation, there is suspicious of pulmonary malignancy and is being worked out. He was started on Nsaline with sodium dropping to 112 today. Nephrology service has been consulted for further evaluation. Pt denies fever/chills, chest pain, N/V/D, abdominal pain, dysuria, edema, syncope, confusion, appetite changes, weight loss, visual changes, focal deficits.      History  Past Medical History:   Diagnosis Date   • Arthritis    • Black lung disease (CMS/HCC)    • History of alcohol abuse    • Hyperlipidemia    • Hypertension    ,   Past Surgical History:   Procedure Laterality Date   • NO PAST SURGERIES     ,   Family History   Problem Relation Age of Onset   • COPD Mother    • Heart disease Father    ,   Social History     Tobacco Use   • Smoking status: Former Smoker   • Smokeless tobacco: Never Used   • Tobacco comment: smoked 1ppd x 15 years; quit in 2009   Substance Use Topics   • Alcohol use: Not on file     Comment: hx of abuse; drank 2-3 beers and 2-3 mixed drinks about 3/4 x/week; stopped 1 month ago (had tapered drinking over past year)    • Drug use: No   ,   Medications Prior to Admission   Medication Sig Dispense Refill Last Dose   • albuterol (ACCUNEB) 1.25 MG/3ML nebulizer solution Take 1 ampule by nebulization Every 6 (Six) Hours As Needed for Wheezing.      • aspirin 81 MG chewable tablet Chew 81 mg Daily.      • levoFLOXacin (LEVAQUIN) 500 MG tablet Take 500 mg  by mouth Daily.      • lisinopril (PRINIVIL,ZESTRIL) 20 MG tablet Take 20 mg by mouth Daily.      • meloxicam (MOBIC) 15 MG tablet Take 15 mg by mouth Daily.      • predniSONE (DELTASONE) 20 MG tablet Take 20 mg by mouth Daily.      • simvastatin (ZOCOR) 20 MG tablet Take 20 mg by mouth Every Night.      • sodium chloride 1 g tablet Take 1 g by mouth 3 (Three) Times a Day.      , Scheduled Meds:    [START ON 6/6/2019] Pharmacy Consult  Does not apply Once   [START ON 6/6/2019] atorvastatin 10 mg Oral Nightly   budesonide-formoterol 2 puff Inhalation BID - RT   folic acid 1 mg Oral Daily   ipratropium-albuterol 3 mL Nebulization Q4H - RT   lisinopril 20 mg Oral Q24H   multivitamin 1 tablet Oral Daily   piperacillin-tazobactam 3.375 g Intravenous Q8H   polyethylene glycol 17 g Oral Daily   predniSONE 20 mg Oral Daily   sodium chloride 3 mL Intravenous Q12H   sodium chloride 2 g Oral TID With Meals   thiamine 100 mg Oral Daily   vancomycin 1,500 mg Intravenous Q12H   , Continuous Infusions:    Pharmacy to dose vancomycin    , PRN Meds:  acetaminophen  •  ipratropium-albuterol  •  magnesium sulfate **OR** magnesium sulfate **OR** magnesium sulfate  •  ondansetron **OR** ondansetron  •  Pharmacy to dose vancomycin  •  potassium chloride **OR** potassium chloride **OR** potassium chloride  •  sodium chloride  •  sodium chloride and Allergies:  Patient has no known allergies.    Review of Systems  Pertinent items are noted in HPI    Objective     Vital Signs  Temp:  [98 °F (36.7 °C)-99.1 °F (37.3 °C)] 98.8 °F (37.1 °C)  Heart Rate:  [64-80] 78  Resp:  [16-20] 16  BP: (140-185)/() 161/114    No intake/output data recorded.  I/O last 3 completed shifts:  In: 2525 [I.V.:75; IV Piggyback:2450]  Out: 800 [Urine:800]    Physical Exam:     General Appearance:    Alert, cooperative, in no acute distress   Head:    Normocephalic, without obvious abnormality, atraumatic   Eyes:            Lids and lashes normal, conjunctivae  and sclerae normal, no   icterus, no pallor, corneas clear, PERRLA   Ears:    Ears appear intact with no abnormalities noted   Throat:   No oral lesions, no thrush, oral mucosa moist   Neck:   No adenopathy, supple, trachea midline, no thyromegaly, no   carotid bruit, no JVD   Back:     No kyphosis present, no scoliosis present, no skin lesions,      erythema or scars, no tenderness to percussion or                   palpation,   range of motion normal   Lungs:     Clear to auscultation,respirations regular, even and                  unlabored    Heart:    Regular rhythm and normal rate, normal S1 and S2, no            murmur, no gallop, no rub, no click   Chest Wall:    No abnormalities observed   Abdomen:     Normal bowel sounds, no masses, no organomegaly, soft        non-tender, non-distended, no guarding, no rebound                tenderness   Rectal:     Deferred   Extremities:   Moves all extremities well, no edema, no cyanosis, no             redness   Pulses:   Pulses palpable and equal bilaterally   Skin:   No bleeding, bruising or rash   Lymph nodes:   No palpable adenopathy   Neurologic:   Cranial nerves 2 - 12 grossly intact, sensation intact, DTR       present and equal bilaterally       Results Review:   I reviewed the patient's new clinical results.    WBC WBC   Date Value Ref Range Status   06/05/2019 6.82 3.40 - 10.80 10*3/mm3 Final   06/04/2019 8.74 3.40 - 10.80 10*3/mm3 Final      HGB Hemoglobin   Date Value Ref Range Status   06/05/2019 11.4 (L) 13.0 - 17.7 g/dL Final   06/04/2019 11.5 (L) 13.0 - 17.7 g/dL Final      HCT Hematocrit   Date Value Ref Range Status   06/05/2019 31.5 (L) 37.5 - 51.0 % Final   06/04/2019 31.9 (L) 37.5 - 51.0 % Final      Platlets No results found for: LABPLAT   MCV MCV   Date Value Ref Range Status   06/05/2019 83.1 79.0 - 97.0 fL Final   06/04/2019 82.4 79.0 - 97.0 fL Final          Sodium Sodium   Date Value Ref Range Status   06/05/2019 112 (C) 136 - 145 mmol/L  Final   06/05/2019 115 (C) 136 - 145 mmol/L Final   06/05/2019 116 (C) 136 - 145 mmol/L Final   06/04/2019 115 (C) 136 - 145 mmol/L Final      Potassium Potassium   Date Value Ref Range Status   06/05/2019 4.5 3.5 - 5.2 mmol/L Final   06/05/2019 4.2 3.5 - 5.2 mmol/L Final   06/05/2019 3.9 3.5 - 5.2 mmol/L Final   06/04/2019 4.0 3.5 - 5.2 mmol/L Final      Chloride Chloride   Date Value Ref Range Status   06/05/2019 81 (L) 98 - 107 mmol/L Final   06/05/2019 82 (L) 98 - 107 mmol/L Final   06/05/2019 82 (L) 98 - 107 mmol/L Final   06/04/2019 82 (L) 98 - 107 mmol/L Final      CO2 CO2   Date Value Ref Range Status   06/05/2019 18.0 (L) 22.0 - 29.0 mmol/L Final   06/05/2019 21.0 (L) 22.0 - 29.0 mmol/L Final   06/05/2019 20.0 (L) 22.0 - 29.0 mmol/L Final   06/04/2019 21.0 (L) 22.0 - 29.0 mmol/L Final      BUN BUN   Date Value Ref Range Status   06/05/2019 13 8 - 23 mg/dL Final   06/05/2019 13 8 - 23 mg/dL Final   06/05/2019 13 8 - 23 mg/dL Final   06/04/2019 17 8 - 23 mg/dL Final      Creatinine Creatinine   Date Value Ref Range Status   06/05/2019 0.79 0.76 - 1.27 mg/dL Final   06/05/2019 0.88 0.76 - 1.27 mg/dL Final   06/05/2019 0.94 0.76 - 1.27 mg/dL Final   06/04/2019 0.88 0.76 - 1.27 mg/dL Final      Calcium Calcium   Date Value Ref Range Status   06/05/2019 8.9 8.6 - 10.5 mg/dL Final   06/05/2019 8.8 8.6 - 10.5 mg/dL Final   06/05/2019 8.5 (L) 8.6 - 10.5 mg/dL Final   06/04/2019 9.2 8.6 - 10.5 mg/dL Final      PO4 No results found for: CAPO4   Albumin Albumin   Date Value Ref Range Status   06/04/2019 4.10 3.50 - 5.20 g/dL Final      Magnesium Magnesium   Date Value Ref Range Status   06/05/2019 1.7 1.6 - 2.4 mg/dL Final   06/04/2019 1.7 1.6 - 2.4 mg/dL Final      Uric Acid No results found for: URICACID           [START ON 6/6/2019] Pharmacy Consult  Does not apply Once   [START ON 6/6/2019] atorvastatin 10 mg Oral Nightly   budesonide-formoterol 2 puff Inhalation BID - RT   folic acid 1 mg Oral Daily    ipratropium-albuterol 3 mL Nebulization Q4H - RT   lisinopril 20 mg Oral Q24H   multivitamin 1 tablet Oral Daily   piperacillin-tazobactam 3.375 g Intravenous Q8H   polyethylene glycol 17 g Oral Daily   predniSONE 20 mg Oral Daily   sodium chloride 3 mL Intravenous Q12H   sodium chloride 2 g Oral TID With Meals   thiamine 100 mg Oral Daily   vancomycin 1,500 mg Intravenous Q12H       Pharmacy to dose vancomycin        Assessment/Plan       PNA (pneumonia)    Shortness of breath    HTN (hypertension)    HLD (hyperlipidemia)    Anemia    Black lung disease (CMS/HCC)    Hyponatremia    Lung mass    History of alcohol abuse    Hypomagnesemia      1- Hyponatremia- asymptomatic - 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   2- HTN - not controlled on prednisone     Plan:  - Repeat BMP stat   - Uric acid level   - Urine and serum osmolality   - Urine sodium   - Fluid restriction 800 ml/day  - Sodium chloride tablet given.   - If sodium is still not stable or improving, will need 3% saline infusion or bolus. Discussed with hospitalist. If sodium level is still 113 or below- recommended to give 3% SALINE bolus of 100 ml over 20 min. This 100 ml bolus can be repeated in one more time in 1 to 2 hrs until sodium level has increased by 4-6 mEq if that cannot be done over floor   he may need to be shifted to ICU for that or alternative can be started on 3% saline infusion at 10-15 ml/hr until sodium has increased by 4-6 mEq.  - Will start on amlodipine       I discussed the patients findings and my recommendations with patient, nursing staff and primary care team    Yodit Navarro MD  06/05/19  @[facility]

## 2019-06-06 NOTE — PROGRESS NOTES
"   LOS: 2 days    Patient Care Team:  Provider, No Known as PCP - General        Subjective     Interval History:     No acute events overnight. No new complaints.     Review of Systems:   No CP or SOA    Objective     Vital Sign Min/Max for last 24 hours  Temp  Min: 98.4 °F (36.9 °C)  Max: 99.1 °F (37.3 °C)   BP  Min: 140/66  Max: 166/79   Pulse  Min: 65  Max: 80   Resp  Min: 16  Max: 18   SpO2  Min: 94 %  Max: 100 %   No Data Recorded   No Data Recorded     Flowsheet Rows      First Filed Value   Admission Height  172.7 cm (68\") Documented at 06/04/2019 2128   Admission Weight  96.2 kg (212 lb) Documented at 06/04/2019 2128          I/O this shift:  In: 50 [IV Piggyback:50]  Out: -   I/O last 3 completed shifts:  In: 4007.7 [P.O.:120; I.V.:1437.7; IV Piggyback:2450]  Out: 1300 [Urine:1300]    Physical Exam:     General Appearance:    Alert, cooperative, in no acute distress   Head:    Normocephalic, without obvious abnormality, atraumatic               Neck:   No adenopathy, supple, trachea midline, no thyromegaly, no   carotid bruit, no JVD       Lungs:     Clear to auscultation,respirations regular, even and                  unlabored    Heart:    Regular rhythm and normal rate, normal S1 and S2, no            murmur, no gallop, no rub, no click       Abdomen:     Normal bowel sounds, no masses, no organomegaly, soft        non-tender, non-distended, no guarding, no rebound                tenderness       Extremities:   Moves all extremities well, no edema, no cyanosis, no             redness               Neurologic:   No focal deficit noted       WBC WBC   Date Value Ref Range Status   06/06/2019 7.84 3.40 - 10.80 10*3/mm3 Final   06/05/2019 6.82 3.40 - 10.80 10*3/mm3 Final   06/04/2019 8.74 3.40 - 10.80 10*3/mm3 Final      HGB Hemoglobin   Date Value Ref Range Status   06/06/2019 10.7 (L) 13.0 - 17.7 g/dL Final   06/05/2019 11.4 (L) 13.0 - 17.7 g/dL Final   06/04/2019 11.5 (L) 13.0 - 17.7 g/dL Final      HCT " Hematocrit   Date Value Ref Range Status   06/06/2019 30.0 (L) 37.5 - 51.0 % Final   06/05/2019 31.5 (L) 37.5 - 51.0 % Final   06/04/2019 31.9 (L) 37.5 - 51.0 % Final      Platlets No results found for: LABPLAT   MCV MCV   Date Value Ref Range Status   06/06/2019 84.0 79.0 - 97.0 fL Final   06/05/2019 83.1 79.0 - 97.0 fL Final   06/04/2019 82.4 79.0 - 97.0 fL Final          Sodium Sodium   Date Value Ref Range Status   06/06/2019 117 (C) 136 - 145 mmol/L Final   06/06/2019 115 (C) 136 - 145 mmol/L Final   06/06/2019 115 (C) 136 - 145 mmol/L Final   06/06/2019 114 (C) 136 - 145 mmol/L Final   06/06/2019 114 (C) 136 - 145 mmol/L Final   06/05/2019 116 (C) 136 - 145 mmol/L Final   06/05/2019 114 (C) 136 - 145 mmol/L Final   06/05/2019 114 (C) 136 - 145 mmol/L Final   06/05/2019 112 (C) 136 - 145 mmol/L Final   06/05/2019 115 (C) 136 - 145 mmol/L Final   06/05/2019 116 (C) 136 - 145 mmol/L Final   06/04/2019 115 (C) 136 - 145 mmol/L Final      Potassium Potassium   Date Value Ref Range Status   06/06/2019 4.1 3.5 - 5.2 mmol/L Final   06/06/2019 4.0 3.5 - 5.2 mmol/L Final   06/06/2019 4.0 3.5 - 5.2 mmol/L Final   06/06/2019 4.3 3.5 - 5.2 mmol/L Final   06/06/2019 4.1 3.5 - 5.2 mmol/L Final   06/05/2019 4.2 3.5 - 5.2 mmol/L Final   06/05/2019 4.4 3.5 - 5.2 mmol/L Final   06/05/2019 4.6 3.5 - 5.2 mmol/L Final   06/05/2019 4.5 3.5 - 5.2 mmol/L Final   06/05/2019 4.2 3.5 - 5.2 mmol/L Final   06/05/2019 3.9 3.5 - 5.2 mmol/L Final   06/04/2019 4.0 3.5 - 5.2 mmol/L Final      Chloride Chloride   Date Value Ref Range Status   06/06/2019 83 (L) 98 - 107 mmol/L Final   06/06/2019 82 (L) 98 - 107 mmol/L Final   06/06/2019 83 (L) 98 - 107 mmol/L Final   06/06/2019 81 (L) 98 - 107 mmol/L Final   06/06/2019 81 (L) 98 - 107 mmol/L Final   06/05/2019 83 (L) 98 - 107 mmol/L Final   06/05/2019 82 (L) 98 - 107 mmol/L Final   06/05/2019 82 (L) 98 - 107 mmol/L Final   06/05/2019 81 (L) 98 - 107 mmol/L Final   06/05/2019 82 (L) 98 - 107  mmol/L Final   06/05/2019 82 (L) 98 - 107 mmol/L Final   06/04/2019 82 (L) 98 - 107 mmol/L Final      CO2 CO2   Date Value Ref Range Status   06/06/2019 22.0 22.0 - 29.0 mmol/L Final   06/06/2019 21.0 (L) 22.0 - 29.0 mmol/L Final   06/06/2019 20.0 (L) 22.0 - 29.0 mmol/L Final   06/06/2019 21.0 (L) 22.0 - 29.0 mmol/L Final   06/06/2019 20.0 (L) 22.0 - 29.0 mmol/L Final   06/05/2019 21.0 (L) 22.0 - 29.0 mmol/L Final   06/05/2019 20.0 (L) 22.0 - 29.0 mmol/L Final   06/05/2019 20.0 (L) 22.0 - 29.0 mmol/L Final   06/05/2019 18.0 (L) 22.0 - 29.0 mmol/L Final   06/05/2019 21.0 (L) 22.0 - 29.0 mmol/L Final   06/05/2019 20.0 (L) 22.0 - 29.0 mmol/L Final   06/04/2019 21.0 (L) 22.0 - 29.0 mmol/L Final      BUN BUN   Date Value Ref Range Status   06/06/2019 12 8 - 23 mg/dL Final   06/06/2019 12 8 - 23 mg/dL Final   06/06/2019 12 8 - 23 mg/dL Final   06/06/2019 12 8 - 23 mg/dL Final   06/06/2019 12 8 - 23 mg/dL Final   06/05/2019 13 8 - 23 mg/dL Final   06/05/2019 12 8 - 23 mg/dL Final   06/05/2019 12 8 - 23 mg/dL Final   06/05/2019 13 8 - 23 mg/dL Final   06/05/2019 13 8 - 23 mg/dL Final   06/05/2019 13 8 - 23 mg/dL Final   06/04/2019 17 8 - 23 mg/dL Final      Creatinine Creatinine   Date Value Ref Range Status   06/06/2019 0.74 (L) 0.76 - 1.27 mg/dL Final   06/06/2019 0.90 0.76 - 1.27 mg/dL Final   06/06/2019 0.73 (L) 0.76 - 1.27 mg/dL Final   06/06/2019 0.68 (L) 0.76 - 1.27 mg/dL Final   06/06/2019 0.72 (L) 0.76 - 1.27 mg/dL Final   06/05/2019 0.79 0.76 - 1.27 mg/dL Final   06/05/2019 0.78 0.76 - 1.27 mg/dL Final   06/05/2019 0.79 0.76 - 1.27 mg/dL Final   06/05/2019 0.79 0.76 - 1.27 mg/dL Final   06/05/2019 0.88 0.76 - 1.27 mg/dL Final   06/05/2019 0.94 0.76 - 1.27 mg/dL Final   06/04/2019 0.88 0.76 - 1.27 mg/dL Final      Calcium Calcium   Date Value Ref Range Status   06/06/2019 8.4 (L) 8.6 - 10.5 mg/dL Final   06/06/2019 8.6 8.6 - 10.5 mg/dL Final   06/06/2019 8.3 (L) 8.6 - 10.5 mg/dL Final   06/06/2019 8.7 8.6 - 10.5  mg/dL Final   06/06/2019 8.3 (L) 8.6 - 10.5 mg/dL Final   06/05/2019 8.3 (L) 8.6 - 10.5 mg/dL Final   06/05/2019 8.2 (L) 8.6 - 10.5 mg/dL Final   06/05/2019 8.3 (L) 8.6 - 10.5 mg/dL Final   06/05/2019 8.9 8.6 - 10.5 mg/dL Final   06/05/2019 8.8 8.6 - 10.5 mg/dL Final   06/05/2019 8.5 (L) 8.6 - 10.5 mg/dL Final   06/04/2019 9.2 8.6 - 10.5 mg/dL Final      PO4 No results found for: CAPO4   Albumin Albumin   Date Value Ref Range Status   06/04/2019 4.10 3.50 - 5.20 g/dL Final      Magnesium Magnesium   Date Value Ref Range Status   06/05/2019 1.7 1.6 - 2.4 mg/dL Final   06/04/2019 1.7 1.6 - 2.4 mg/dL Final      Uric Acid Uric Acid   Date Value Ref Range Status   06/06/2019 2.0 (L) 3.4 - 7.0 mg/dL Final     Comment:     Falsely depressed results may occur on samples drawn from patients receiving N-Acetylcysteine (NAC) or Metamizole.           Results Review:     I reviewed the patient's new clinical results.      amLODIPine 5 mg Oral Q24H   atorvastatin 10 mg Oral Nightly   budesonide-formoterol 2 puff Inhalation BID - RT   folic acid 1 mg Oral Daily   ipratropium-albuterol 3 mL Nebulization Q4H - RT   lisinopril 20 mg Oral Q24H   multivitamin 1 tablet Oral Daily   piperacillin-tazobactam 3.375 g Intravenous Q8H   polyethylene glycol 17 g Oral Daily   predniSONE 20 mg Oral Daily   sodium chloride 3 mL Intravenous Q12H   sodium chloride 2 g Oral TID With Meals   thiamine 100 mg Oral Daily          Medication Review:     Assessment/Plan       PNA (pneumonia)    Shortness of breath    HTN (hypertension)    HLD (hyperlipidemia)    Anemia    Black lung disease (CMS/HCC)    Hyponatremia    Lung mass    History of alcohol abuse    Hypomagnesemia      1- Hyponatremia- asymptomatic - 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 Improving.   2- HTN - not controlled on prednisone      Plan:  - Fluid restriction 800 ml/day  - Sodium chloride tablet   - Continue with amlodipine           Yodit Navarro,  MD  06/06/19  12:42 PM

## 2019-06-06 NOTE — PROGRESS NOTES
PULMONARY NOTE     Hospital Day: 2    Mr. Florentin Mcallister, 69 y.o. male is followed for:   Right lower lobe density and hyponatremia        SUBJECTIVE      69-year-old male admitted through the emergency room on 6/4.  He presented with a dry cough, mild shortness of breath, and generalized weakness.     He was recently admitted to [facility] about 2 weeks ago for 5 days with pneumonia and hyponatremia.  He was treated with antibiotics and his sodium was eventually increased to 130 on discharge.  He never had any fever or purulent secretions.  His main complaint was an unsteady gait.  This improved by the time of discharge and then about 3-4 days prior to admission he developed increasing weakness and unsteadiness.  He saw his primary care physician the day prior to admission and labs revealed recurrence of his hyponatremia and he was instructed to come to the emergency department.  He initially went back to [facility] but then came here for further evaluation.  He denies any chest pain, nausea, vomiting, or abdominal pain.  He is not producing any sputum and he denies any hemoptysis.     His sodium level here was 115 on admission.  A chest CT revealed extensive right lower lobe density which could be a very dense infiltrate versus a more solid, space-occupying lesion.  There is also right hilar and subcarinal adenopathy.     He was admitted for further treatment and evaluation    Interval history:    Quite comfortable.  On room air.  Serum sodium 115-117 today.  Remains on antimicrobial therapy.    The patient's relevant past medical, surgical and social history were reviewed and updated in Epic as appropriate.        OBJECTIVE     Vital Sign Min/Max for last 24 hours  Temp  Min: 98.4 °F (36.9 °C)  Max: 99.1 °F (37.3 °C)   BP  Min: 140/66  Max: 166/79   Pulse  Min: 65  Max: 80   Resp  Min: 16  Max: 18   SpO2  Min: 94 %  Max: 100 %   No Data Recorded   No Data Recorded      Intake/Output Summary  "(Last 24 hours) at 6/6/2019 1440  Last data filed at 6/6/2019 0858  Gross per 24 hour   Intake 2082.71 ml   Output 1000 ml   Net 1082.71 ml      Flowsheet Rows      First Filed Value   Admission Height  172.7 cm (68\") Documented at 06/04/2019 2128   Admission Weight  96.2 kg (212 lb) Documented at 06/04/2019 2128        Body mass index is 32.23 kg/m².     06/04/19 2128   Weight: 96.2 kg (212 lb)             Objective:  General Appearance:  In no acute distress.    Vital signs: (most recent): Blood pressure 148/77, pulse 69, temperature 98.7 °F (37.1 °C), temperature source Oral, resp. rate 16, height 172.7 cm (68\"), weight 96.2 kg (212 lb), SpO2 94 %.    HEENT: Normal HEENT exam.    Lungs:  Normal effort and normal respiratory rate.  He is not in respiratory distress.  There are decreased breath sounds and rhonchi.  No rales or wheezes.  (Decreased breath sounds and rhonchi right lower lobe)  Heart: Normal rate.  Regular rhythm.  S1 normal and S2 normal.  No murmur, gallop or friction rub.   Chest: Symmetric chest wall expansion.   Abdomen: Abdomen is soft and non-distended.  Bowel sounds are normal.   There is no abdominal tenderness.   There is no mass. There is no splenomegaly. There is no hepatomegaly.   Extremities: There is no deformity or dependent edema.    Neurological: Patient is alert and oriented to person, place and time.    Pupils:  Pupils are equal, round, and reactive to light.    Skin:  Warm and dry.                      I reviewed the patient's results and images, including reviewing images and reports.    Medications reviewed.      Scheduled Meds:  amLODIPine 5 mg Oral Q24H   atorvastatin 10 mg Oral Nightly   budesonide-formoterol 2 puff Inhalation BID - RT   folic acid 1 mg Oral Daily   ipratropium-albuterol 3 mL Nebulization Q4H - RT   lisinopril 20 mg Oral Q24H   multivitamin 1 tablet Oral Daily   piperacillin-tazobactam 3.375 g Intravenous Q8H   polyethylene glycol 17 g Oral Daily "   predniSONE 20 mg Oral Daily   sodium chloride 3 mL Intravenous Q12H   sodium chloride 2 g Oral TID With Meals   thiamine 100 mg Oral Daily         Assessment/Plan   ASSESSMENT      Active Hospital Problems    Diagnosis   • **PNA (pneumonia)   • Lung mass   • Hyponatremia     Initial Sodium 115 on 6/4/19      • Shortness of breath   • Black lung disease (CMS/HCC)   • HTN (hypertension)   • HLD (hyperlipidemia)   • Anemia   • History of alcohol abuse     Quit drinking 1 month ago. Pt reports drinking about 2-3 beers and 2-3 mixed drinks about 3-4 times a week on average. Tapered his alcohol intake over the past year in order to quit.      • Hypomagnesemia         69-year-old male with dense infiltrate versus mass in the right lower lobe with right hilar and subcarinal adenopathy.  Given his lack of fever and prominent hyponatremia we may be seeing a primary bronchogenic carcinoma with a paraneoplastic syndrome causing hyponatremia.          RECOMMENDATIONS     1. Still plan on diagnostic bronchoscopy but will be forced to delay another 24 hours due to continued severe hyponatremia.  Would prefer this be above 120 in preparation for a general anesthetic for an EBUS bronchoscopy.  2. Continue current empiric antibiotics  3. Discussed with patient and family in detail         I discussed the patient's findings and my recommendations with patient and family     High level of risk due to:  illness with threat to life or bodily function.    Bar Gage MD  Pulmonary and Critical Care Medicine

## 2019-06-06 NOTE — PROGRESS NOTES
IM Cross cover:    Asked by Dr Navarro to F/U on hyponatremia. He is giving pt Na tab and rechecking BMP. Per Dr Navarro, if Na is still 112 or lower, give 100ml hypertonic saline. Na level was 114 at 2014, so no need for hypertonic saline. Attending MD and Dr Navarro to f/u in am.

## 2019-06-06 NOTE — PROGRESS NOTES
[facility] Medicine Services  PROGRESS NOTE    Patient Name: Florentin Mcallister  : 1950  MRN: [id number]    Date of Admission: 2019  Length of Stay: 2  Primary Care Physician: Provider, No Known    Subjective   Subjective     CC:  SOA    HPI:  Pt doing well this am, denies any issues overnight. Wife at bedside      Review of Systems  Gen- No fevers, chills  CV- No chest pain, palpitations  Resp- No cough, dyspnea  GI- No N/V/D, abd pain  Otherwise ROS is negative except as mentioned in the HPI.    Objective   Objective     Vital Signs:   Temp:  [98.4 °F (36.9 °C)-99.1 °F (37.3 °C)] 98.4 °F (36.9 °C)  Heart Rate:  [65-80] 67  Resp:  [16-18] 16  BP: (140-166)/() 145/65        Physical Exam:  Constitutional: No acute distress, awake, alert  HENT: NCAT, mucous membranes moist  Respiratory: rhonchi right lower lobe otherwise clear  Cardiovascular: RRR, no murmurs, rubs, or gallops, palpable pedal pulses bilaterally  Gastrointestinal: Positive bowel sounds, soft, nontender, nondistended  Musculoskeletal: No bilateral ankle edema  Psychiatric: Appropriate affect, cooperative  Neurologic: Oriented x 3, strength symmetric in all extremities, Cranial Nerves grossly intact to confrontation, speech clear  Skin: No rashes    Results Reviewed:  I have personally reviewed current lab, radiology, and data and agree.    Results from last 7 days   Lab Units 19  0539 19  0945 19  2141   WBC 10*3/mm3 7.84 6.82 8.74   HEMOGLOBIN g/dL 10.7* 11.4* 11.5*   HEMATOCRIT % 30.0* 31.5* 31.9*   PLATELETS 10*3/mm3 164 175 184   INR   --  1.13  --      Results from last 7 days   Lab Units 19  0949 19  0740 19  0539  19  2141   SODIUM mmol/L 117* 115* 115*   < > 115*   POTASSIUM mmol/L 4.1 4.0 4.0   < > 4.0   CHLORIDE mmol/L 83* 82* 83*   < > 82*   CO2 mmol/L 22.0 21.0* 20.0*   < > 21.0*   BUN mg/dL 12 12 12   < > 17   CREATININE mg/dL 0.74* 0.90 0.73*   < > 0.88    GLUCOSE mg/dL 97 96 97   < > 137*   CALCIUM mg/dL 8.4* 8.6 8.3*   < > 9.2   ALT (SGPT) U/L  --   --   --   --  23   AST (SGOT) U/L  --   --   --   --  17   TROPONIN T ng/mL  --   --   --   --  <0.010    < > = values in this interval not displayed.     Estimated Creatinine Clearance: 99.4 mL/min (A) (by C-G formula based on SCr of 0.74 mg/dL (L)).    Microbiology Results Abnormal     Procedure Component Value - Date/Time    Blood Culture - Blood, Arm, Left [[id number]] Collected:  06/04/19 2358    Lab Status:  Preliminary result Specimen:  Blood from Arm, Left Updated:  06/06/19 0015     Blood Culture No growth at 24 hours    Blood Culture - Blood, Arm, Right [[id number]] Collected:  06/04/19 2350    Lab Status:  Preliminary result Specimen:  Blood from Arm, Right Updated:  06/06/19 0015     Blood Culture No growth at 24 hours    MRSA Screen, PCR - Swab, Nares [[id number]]  (Normal) Collected:  06/05/19 0916    Lab Status:  Final result Specimen:  Swab from Nares Updated:  06/05/19 1131     MRSA, PCR Negative    Narrative:       MRSA Negative          Imaging Results (last 24 hours)     ** No results found for the last 24 hours. **               I have reviewed the medications:    Current Facility-Administered Medications:   •  acetaminophen (TYLENOL) tablet 650 mg, 650 mg, Oral, Q4H PRN, Saskia Flores APRN  •  amLODIPine (NORVASC) tablet 5 mg, 5 mg, Oral, Q24H, Ramon, Yodit Lundberg MD, 5 mg at 06/05/19 2112  •  atorvastatin (LIPITOR) tablet 10 mg, 10 mg, Oral, Nightly, Saskia Flores APRN  •  budesonide-formoterol (SYMBICORT) 160-4.5 MCG/ACT inhaler 2 puff, 2 puff, Inhalation, BID - RT, Saskia Flores APRN, 2 puff at 06/06/19 0710  •  folic acid (FOLVITE) tablet 1 mg, 1 mg, Oral, Daily, Saskia Flores APRN, 1 mg at 06/06/19 0847  •  ipratropium-albuterol (DUO-NEB) nebulizer solution 3 mL, 3 mL, Nebulization, Q4H PRN, Saskia Flores APRN  •  ipratropium-albuterol (DUO-NEB) nebulizer solution 3 mL, 3 mL,  Nebulization, Q4H - RT, Saskia Flores APRN, 3 mL at 06/06/19 0710  •  lisinopril (PRINIVIL,ZESTRIL) tablet 20 mg, 20 mg, Oral, Q24H, Maria Del Rosario Nunez MD, 20 mg at 06/06/19 0847  •  Magnesium Sulfate 2 gram Bolus, followed by 8 gram infusion (total Mg dose 10 grams)- Mg less than or equal to 1mg/dL, 2 g, Intravenous, PRN **OR** Magnesium Sulfate 2 gram / 50mL Infusion (GIVE X 3 BAGS TO EQUAL 6GM TOTAL DOSE) - Mg 1.1 - 1.5 mg/dl, 2 g, Intravenous, PRN **OR** Magnesium Sulfate 4 gram infusion- Mg 1.6-1.9 mg/dL, 4 g, Intravenous, PRN, Patricio, Shannon G, DO, Last Rate: 25 mL/hr at 06/05/19 1400, 4 g at 06/05/19 1400  •  multivitamin (THERAGRAN) tablet 1 tablet, 1 tablet, Oral, Daily, Saskia Flores APRN, 1 tablet at 06/06/19 0846  •  ondansetron (ZOFRAN) tablet 4 mg, 4 mg, Oral, Q6H PRN **OR** ondansetron (ZOFRAN) injection 4 mg, 4 mg, Intravenous, Q6H PRN, Saskia Flores APRN, 4 mg at 06/06/19 0909  •  piperacillin-tazobactam (ZOSYN) 3.375 g in iso-osmotic dextrose 50 ml (premix), 3.375 g, Intravenous, Q8H, Saskia Flores APRN, 3.375 g at 06/06/19 0858  •  polyethylene glycol 3350 powder (packet), 17 g, Oral, Daily, Maria Del Rosario Nunez MD, Stopped at 06/06/19 0901  •  potassium chloride (MICRO-K) CR capsule 40 mEq, 40 mEq, Oral, PRN **OR** potassium chloride (KLOR-CON) packet 40 mEq, 40 mEq, Oral, PRN **OR** potassium chloride 10 mEq in 100 mL IVPB, 10 mEq, Intravenous, Q1H PRN, Patricio, Shannon G, DO  •  predniSONE (DELTASONE) tablet 20 mg, 20 mg, Oral, Daily, Saskia Flores APRN, 20 mg at 06/06/19 0907  •  sodium chloride 0.9 % flush 10 mL, 10 mL, Intravenous, PRN, Clarke Kwok MD  •  sodium chloride 0.9 % flush 3 mL, 3 mL, Intravenous, Q12H, Saskia Flores APRN, 3 mL at 06/05/19 2000  •  sodium chloride 0.9 % flush 3-10 mL, 3-10 mL, Intravenous, PRN, Saskia Flores APRN  •  sodium chloride tablet 2 g, 2 g, Oral, TID With Meals, RamonYodit MD, 2 g at 06/06/19 0847  •  thiamine (VITAMIN  B-1) tablet 100 mg, 100 mg, Oral, Daily, Saskia Flores, APRN, 100 mg at 06/06/19 0846      Assessment/Plan   Assessment / Plan     Active Hospital Problems    Diagnosis POA   • **PNA (pneumonia) [J18.9] Yes     Priority: High   • Hyponatremia [E87.1] Yes     Priority: High     Initial Sodium 115 on 6/4/19      • Lung mass [R91.8] Yes     Priority: High   • Shortness of breath [R06.02] Yes     Priority: Medium   • Anemia [D64.9] Yes     Priority: Medium   • Hypomagnesemia [E83.42] Yes     Priority: Medium   • HTN (hypertension) [I10] Yes     Priority: Low   • HLD (hyperlipidemia) [E78.5] Yes     Priority: Low   • Black lung disease (CMS/HCC) [J60] Yes     Priority: Low   • History of alcohol abuse [Z87.898] Yes     Priority: Low     Quit drinking 1 month ago. Pt reports drinking about 2-3 beers and 2-3 mixed drinks about 3-4 times a week on average. Tapered his alcohol intake over the past year in order to quit.             Brief Hospital Course to date:  Florentin Mcallister is a 69 y.o. male with past medical history significant for black lung, remote tobacco abuse who presents to the ED due to difficulty breathing. He was recently treated (two weeks prior to this admission) for PNA and hyponatremia (Na+ 115 at OSH) at an OSH.  He had been doing well until three days prior to this admission when his SOA increased and he became weak. On presentation to our ED, he was significantly hyponatremic and imaging showed RLL mass vs PNA.  He was admitted to our service for further evaluation.      Pneumonia vs lung malignancy  -CXR, CTA chest obtained; reviewed  -pt treated for PNA 2 weeks ago at [facility] ([place] where he was admitted for 5 days- records requested   -blood and sputum cx ordered  -pt given IV Rocephin and Azithro in ED; will broaden coverage given recent PNA diagnoses and hospitalization; stop Vanc as MRSA probe NEGATIVE, continue Zosyn for now  --Pulm consulted for possible bronch for diagnosis, appreciate their input.  "Plan for EBUS once Na+ better.   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 NAL consulted, pt on fluid restriction and has received PO Na+ per NAL with minimal improvement in Na+. ? Need for Tolvaptan. Will defer to Dr. Navarro. Appreciate his input.         Hx of Black Lung Disease  -per patient he takes albuterol treatments Prn at home and Symbicort (although Symbicort not listed on med rec); Symbicort ordered  -PRN and scheduled duo nebs  -on room air at baseline  -pulm to see this morning     Anemia  -baseline H/H unclear  -anemia workup/labs this am     HTN  -stable  -resumed ACEI    HLD  -continue routine Zocor     Hx of alcohol abuse  -pt quit 1 month ago  -tapered his intake over the past year in order to quit; quit because he \"felt so bad\"  -previously drank 2-3 beers and 2-3 mixed drinks about 3 to 4 days a week   -no hx of withdrawal seizures   -continue daily thiamine, MVI and folic acid      Hypomagnesemia  -electrolyte replacement protocol             DVT Prophylaxis:  mechanical    Disposition: I expect the patient to be discharged TBD    CODE STATUS:   Code Status and Medical Interventions:   Ordered at: 06/05/19 0150     Level Of Support Discussed With:    Patient     Code Status:    CPR     Medical Interventions (Level of Support Prior to Arrest):    Full         Electronically signed by Maria Del Rosario Nunez MD, 06/06/19, 11:11 AM.    "

## 2019-06-07 PROBLEM — J18.9 PNEUMONIA OF RIGHT LUNG DUE TO INFECTIOUS ORGANISM: Status: ACTIVE | Noted: 2019-01-01

## 2019-06-07 NOTE — PLAN OF CARE
Problem: Patient Care Overview  Goal: Discharge Needs Assessment   06/05/19 0758   Discharge Needs Assessment   Patient/Family Anticipates Transition to home with family

## 2019-06-07 NOTE — PROGRESS NOTES
[facility] Medicine Services  PROGRESS NOTE    Patient Name: Florentin Mcallister  : 1950  MRN: [id number]    Date of Admission: 2019  Length of Stay: 3  Primary Care Physician: Provider, No Known    Subjective   Subjective     CC:  SOA    HPI:  Pt didn't sleep overnight, otherwise has no complaints. Wife at bedside this am      Review of Systems  Gen- No fevers, chills  CV- No chest pain, palpitations  Resp- No cough, dyspnea  GI- No N/V/D, abd pain  Otherwise ROS is negative except as mentioned in the HPI.    Objective   Objective     Vital Signs:   Temp:  [98.2 °F (36.8 °C)-98.8 °F (37.1 °C)] 98.8 °F (37.1 °C)  Heart Rate:  [64-74] 73  Resp:  [15-18] 18  BP: (133-148)/(56-77) 147/61        Physical Exam:  Constitutional: No acute distress, awake, alert  HENT: NCAT, mucous membranes moist  Respiratory: rhonchi right lower lobe otherwise clear  Cardiovascular: RRR, no murmurs, rubs, or gallops, palpable pedal pulses bilaterally  Gastrointestinal: Positive bowel sounds, soft, nontender, nondistended  Musculoskeletal: No bilateral ankle edema  Psychiatric: Appropriate affect, cooperative  Neurologic: Oriented x 3, strength symmetric in all extremities, Cranial Nerves grossly intact to confrontation, speech clear  Skin: No rashes    Results Reviewed:  I have personally reviewed current lab, radiology, and data and agree.    Results from last 7 days   Lab Units 19  0409 19  0539 19  0953 19  0945   WBC 10*3/mm3 7.00 7.84  --  6.82   HEMOGLOBIN g/dL 10.1* 10.7*  --  11.4*   HEMATOCRIT % 29.9* 30.0* 31.9* 31.5*   PLATELETS 10*3/mm3 161 164  --  175   INR   --   --   --  1.13     Results from last 7 days   Lab Units 19  0435 19  0409 19  0218  19  2141   SODIUM mmol/L 119* 119* 119*   < > 115*   POTASSIUM mmol/L 4.0 4.1 4.4   < > 4.0   CHLORIDE mmol/L 85* 85* 87*   < > 82*   CO2 mmol/L 21.0* 19.0* 21.0*   < > 21.0*   BUN mg/dL 13 13 14    < > 17   CREATININE mg/dL 0.76 0.73* 0.78   < > 0.88   GLUCOSE mg/dL 109* 108* 114*   < > 137*   CALCIUM mg/dL 8.6 8.5* 8.6   < > 9.2   ALT (SGPT) U/L  --   --   --   --  23   AST (SGOT) U/L  --   --   --   --  17   TROPONIN T ng/mL  --   --   --   --  <0.010    < > = values in this interval not displayed.     Estimated Creatinine Clearance: 99.4 mL/min (by C-G formula based on SCr of 0.76 mg/dL).    Microbiology Results Abnormal     Procedure Component Value - Date/Time    Blood Culture - Blood, Arm, Left [[id number]] Collected:  06/04/19 2358    Lab Status:  Preliminary result Specimen:  Blood from Arm, Left Updated:  06/07/19 0015     Blood Culture No growth at 2 days    Blood Culture - Blood, Arm, Right [[id number]] Collected:  06/04/19 2350    Lab Status:  Preliminary result Specimen:  Blood from Arm, Right Updated:  06/07/19 0015     Blood Culture No growth at 2 days    MRSA Screen, PCR - Swab, Nares [[id number]]  (Normal) Collected:  06/05/19 0916    Lab Status:  Final result Specimen:  Swab from Nares Updated:  06/05/19 1131     MRSA, PCR Negative    Narrative:       MRSA Negative          Imaging Results (last 24 hours)     ** No results found for the last 24 hours. **               I have reviewed the medications:    Current Facility-Administered Medications:   •  acetaminophen (TYLENOL) tablet 650 mg, 650 mg, Oral, Q4H PRN, Saskia Flores APRN  •  amLODIPine (NORVASC) tablet 5 mg, 5 mg, Oral, Q24H, RamonYodit MD, 5 mg at 06/06/19 2043  •  atorvastatin (LIPITOR) tablet 10 mg, 10 mg, Oral, Nightly, Saskia Flores APRN, 10 mg at 06/06/19 2043  •  budesonide-formoterol (SYMBICORT) 160-4.5 MCG/ACT inhaler 2 puff, 2 puff, Inhalation, BID - RT, Saskia Flores APRN, 2 puff at 06/07/19 0728  •  folic acid (FOLVITE) tablet 1 mg, 1 mg, Oral, Daily, Saskia Flores APRN, 1 mg at 06/06/19 0847  •  ipratropium-albuterol (DUO-NEB) nebulizer solution 3 mL, 3 mL, Nebulization, Q4H PRN, Saskia Flores APRN  •   ipratropium-albuterol (DUO-NEB) nebulizer solution 3 mL, 3 mL, Nebulization, Q4H - RT, Saskia Flores APRN, 3 mL at 06/07/19 0728  •  lisinopril (PRINIVIL,ZESTRIL) tablet 20 mg, 20 mg, Oral, Q24H, Maria Del Rosario Nunez MD, 20 mg at 06/07/19 0832  •  Magnesium Sulfate 2 gram Bolus, followed by 8 gram infusion (total Mg dose 10 grams)- Mg less than or equal to 1mg/dL, 2 g, Intravenous, PRN **OR** Magnesium Sulfate 2 gram / 50mL Infusion (GIVE X 3 BAGS TO EQUAL 6GM TOTAL DOSE) - Mg 1.1 - 1.5 mg/dl, 2 g, Intravenous, PRN **OR** Magnesium Sulfate 4 gram infusion- Mg 1.6-1.9 mg/dL, 4 g, Intravenous, PRN, Patricio, Shannon G, DO, Last Rate: 25 mL/hr at 06/05/19 1400, 4 g at 06/05/19 1400  •  multivitamin (THERAGRAN) tablet 1 tablet, 1 tablet, Oral, Daily, Saskia Flores APRN, 1 tablet at 06/06/19 0846  •  ondansetron (ZOFRAN) tablet 4 mg, 4 mg, Oral, Q6H PRN **OR** ondansetron (ZOFRAN) injection 4 mg, 4 mg, Intravenous, Q6H PRN, Saskia Flores APRN, 4 mg at 06/07/19 0833  •  piperacillin-tazobactam (ZOSYN) 3.375 g in iso-osmotic dextrose 50 ml (premix), 3.375 g, Intravenous, Q8H, Saskia Flores APRN, 3.375 g at 06/07/19 0303  •  polyethylene glycol 3350 powder (packet), 17 g, Oral, Daily, Maria Del Rosario Nunez MD, 17 g at 06/06/19 1730  •  potassium chloride (MICRO-K) CR capsule 40 mEq, 40 mEq, Oral, PRN **OR** potassium chloride (KLOR-CON) packet 40 mEq, 40 mEq, Oral, PRN **OR** potassium chloride 10 mEq in 100 mL IVPB, 10 mEq, Intravenous, Q1H PRN, Shannon Curry DO  •  predniSONE (DELTASONE) tablet 20 mg, 20 mg, Oral, Daily, Saskia Flores APRN, 20 mg at 06/07/19 0832  •  sodium chloride 0.9 % flush 10 mL, 10 mL, Intravenous, PRN, Clarke Kwok MD  •  sodium chloride 0.9 % flush 3 mL, 3 mL, Intravenous, Q12H, Saskia Flores APRN, 3 mL at 06/06/19 2043  •  sodium chloride 0.9 % flush 3-10 mL, 3-10 mL, Intravenous, PRN, Saskia Flores APRN  •  sodium chloride tablet 2 g, 2 g, Oral, TID With Meals, Ramon,  Yodit Lundberg MD, 2 g at 06/07/19 0832  •  thiamine (VITAMIN B-1) tablet 100 mg, 100 mg, Oral, Daily, Saskia Flores APRN, 100 mg at 06/06/19 0846      Assessment/Plan   Assessment / Plan     Active Hospital Problems    Diagnosis POA   • **PNA (pneumonia) [J18.9] Yes     Priority: High   • Hyponatremia [E87.1] Yes     Priority: High     Initial Sodium 115 on 6/4/19      • Lung mass [R91.8] Yes     Priority: High   • Shortness of breath [R06.02] Yes     Priority: Medium   • Anemia [D64.9] Yes     Priority: Medium   • Hypomagnesemia [E83.42] Yes     Priority: Medium   • HTN (hypertension) [I10] Yes     Priority: Low   • HLD (hyperlipidemia) [E78.5] Yes     Priority: Low   • Black lung disease (CMS/HCC) [J60] Yes     Priority: Low   • History of alcohol abuse [Z87.898] Yes     Priority: Low     Quit drinking 1 month ago. Pt reports drinking about 2-3 beers and 2-3 mixed drinks about 3-4 times a week on average. Tapered his alcohol intake over the past year in order to quit.             Brief Hospital Course to date:  Florentin Mcallister is a 69 y.o. male with past medical history significant for black lung, remote tobacco abuse who presents to the ED due to difficulty breathing. He was recently treated (two weeks prior to this admission) for PNA and hyponatremia (Na+ 115 at OSH) at an OSH.  He had been doing well until three days prior to this admission when his SOA increased and he became weak. On presentation to our ED, he was significantly hyponatremic and imaging showed RLL mass vs PNA.  He was admitted to our service for further evaluation.      Pneumonia vs lung malignancy  -CXR, CTA chest obtained; reviewed  -pt treated for PNA 2 weeks ago at [facility] where he was admitted for 5 days- records requested   -blood and sputum cx ordered  --stopped Vanc as MRSA probe NEGATIVE, continue Zosyn for now  --Pulm consulted for possible bronch for diagnosis, appreciate their input. 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 NAL consulted, pt on fluid restriction and has received PO Na+ per NAL with minimal improvement in Na+. ? Need for Tolvaptan. Will defer to Dr. Navarro. Appreciate his input.         Hx of Black Lung Disease  -per patient he takes albuterol treatments Prn at home and Symbicort (although Symbicort not listed on med rec); Symbicort ordered  -PRN and scheduled duo nebs  -on room air at baseline     Anemia  -baseline H/H unclear     HTN  -stable  -resumed ACEI    HLD  -continue routine Zocor     Hx of alcohol abuse  -pt quit 1 month ago  -tapered his intake over the past year in order to quit; quit because he \"felt so bad\"  -previously drank 2-3 beers and 2-3 mixed drinks about 3 to 4 days a week   -no hx of withdrawal seizures   -continue daily thiamine, MVI and folic acid      Hypomagnesemia  -electrolyte replacement protocol             DVT Prophylaxis:  mechanical    Disposition: I expect the patient to be discharged TBD    CODE STATUS:   Code Status and Medical Interventions:   Ordered at: 06/05/19 0150     Level Of Support Discussed With:    Patient     Code Status:    CPR     Medical Interventions (Level of Support Prior to Arrest):    Full         Electronically signed by Maria Del Rosario Nunez MD, 06/07/19, 9:54 AM.    "

## 2019-06-07 NOTE — PLAN OF CARE
Problem: Fall Risk (Adult)  Goal: Identify Related Risk Factors and Signs and Symptoms  Outcome: Outcome(s) achieved Date Met: 06/07/19 06/05/19 0343   Fall Risk (Adult)   Related Risk Factors (Fall Risk) fatigue/slow reaction;gait/mobility problems;sensory deficits;slippery/uneven surfaces;environment unfamiliar   Signs and Symptoms (Fall Risk) presence of risk factors     Goal: Absence of Fall  Outcome: Ongoing (interventions implemented as appropriate)   06/07/19 1624   Fall Risk (Adult)   Absence of Fall making progress toward outcome       Problem: Patient Care Overview  Goal: Plan of Care Review  Outcome: Ongoing (interventions implemented as appropriate)   06/07/19 1421 06/07/19 1624   Plan of Care Review   Progress --  no change   OTHER   Outcome Summary --  pt has not complained of any pain during shift, pt Na levels have not increased and SAMSCA started   Coping/Psychosocial   Plan of Care Reviewed With patient;spouse --      Goal: Discharge Needs Assessment  Outcome: Ongoing (interventions implemented as appropriate)   06/05/19 0751 06/05/19 0925   Discharge Needs Assessment   Patient/Family Anticipates Transition to home with family --    Patient/Family Anticipated Services at Transition  --    Transportation Anticipated family or friend will provide --    Equipment Needed After Discharge --  none   Disability   Equipment Currently Used at Home --  cane, straight       Problem: Pain, Chronic (Adult)  Goal: Identify Related Risk Factors and Signs and Symptoms  Outcome: Outcome(s) achieved Date Met: 06/07/19 06/05/19 0939   Pain, Chronic (Adult)   Related Risk Factors (Chronic Pain) disease process     Goal: Acceptable Pain/Comfort Level and Functional Ability  Outcome: Outcome(s) achieved Date Met: 06/07/19 06/07/19 1624   Pain, Chronic (Adult)   Acceptable Pain/Comfort Level and Functional Ability achieves outcome

## 2019-06-07 NOTE — PROGRESS NOTES
PULMONARY NOTE     Hospital Day: 3    Mr. Florentin Mcallister, 69 y.o. male is followed for:   Right lower lobe density and hyponatremia        SUBJECTIVE      69-year-old male admitted through the emergency room on 6/4.  He presented with a dry cough, mild shortness of breath, and generalized weakness.     He was recently admitted to [facility] about 2 weeks ago for 5 days with pneumonia and hyponatremia.  He was treated with antibiotics and his sodium was eventually increased to 130 on discharge.  He never had any fever or purulent secretions.  His main complaint was an unsteady gait.  This improved by the time of discharge and then about 3-4 days prior to admission he developed increasing weakness and unsteadiness.  He saw his primary care physician the day prior to admission and labs revealed recurrence of his hyponatremia and he was instructed to come to the emergency department.  He initially went back to [facility] but then came here for further evaluation.  He denies any chest pain, nausea, vomiting, or abdominal pain.  He is not producing any sputum and he denies any hemoptysis.     His sodium level here was 115 on admission.  A chest CT revealed extensive right lower lobe density which could be a very dense infiltrate versus a more solid, space-occupying lesion.  There is also right hilar and subcarinal adenopathy.     He was admitted for further treatment and evaluation    Interval history:    No new symptoms.  Latest sodium 117.    The patient's relevant past medical, surgical and social history were reviewed and updated in Epic as appropriate.        OBJECTIVE     Vital Sign Min/Max for last 24 hours  Temp  Min: 98.2 °F (36.8 °C)  Max: 98.8 °F (37.1 °C)   BP  Min: 133/66  Max: 147/61   Pulse  Min: 64  Max: 74   Resp  Min: 15  Max: 18   SpO2  Min: 91 %  Max: 96 %   No Data Recorded   No Data Recorded      Intake/Output Summary (Last 24 hours) at 6/7/2019 5402  Last data filed at  "6/7/2019 1026  Gross per 24 hour   Intake 150 ml   Output 700 ml   Net -550 ml      Flowsheet Rows      First Filed Value   Admission Height  172.7 cm (68\") Documented at 06/04/2019 2128   Admission Weight  96.2 kg (212 lb) Documented at 06/04/2019 2128        Body mass index is 32.23 kg/m².     06/04/19 2128   Weight: 96.2 kg (212 lb)             Objective:  General Appearance:  In no acute distress.    Vital signs: (most recent): Blood pressure 147/61, pulse 72, temperature 98.8 °F (37.1 °C), temperature source Oral, resp. rate 18, height 172.7 cm (68\"), weight 96.2 kg (212 lb), SpO2 91 %.    HEENT: Normal HEENT exam.    Lungs:  Normal effort and normal respiratory rate.  He is not in respiratory distress.  There are decreased breath sounds and rhonchi.  No rales or wheezes.  (Decreased breath sounds and rhonchi right lower lobe)  Heart: Normal rate.  Regular rhythm.  S1 normal and S2 normal.  No murmur, gallop or friction rub.   Chest: Symmetric chest wall expansion.   Abdomen: Abdomen is soft and non-distended.  Bowel sounds are normal.   There is no abdominal tenderness.   There is no mass. There is no splenomegaly. There is no hepatomegaly.   Extremities: There is no deformity or dependent edema.    Neurological: Patient is alert and oriented to person, place and time.    Pupils:  Pupils are equal, round, and reactive to light.    Skin:  Warm and dry.                      I reviewed the patient's results and images, including reviewing images and reports.    Medications reviewed.      Scheduled Meds:    amLODIPine 5 mg Oral Q24H   atorvastatin 10 mg Oral Nightly   budesonide-formoterol 2 puff Inhalation BID - RT   enoxaparin 40 mg Subcutaneous Q24H   folic acid 1 mg Oral Daily   ipratropium-albuterol 3 mL Nebulization Q6H While Awake - RT   lisinopril 20 mg Oral Q24H   multivitamin 1 tablet Oral Daily   piperacillin-tazobactam 3.375 g Intravenous Q8H   polyethylene glycol 17 g Oral Daily   [START ON 6/8/2019] " predniSONE 10 mg Oral Daily With Breakfast   sodium chloride 3 mL Intravenous Q12H   thiamine 100 mg Oral Daily   tolvaptan 15 mg Oral Once         Assessment/Plan   ASSESSMENT      Active Hospital Problems    Diagnosis   • **PNA (pneumonia)   • Lung mass   • Hyponatremia     Initial Sodium 115 on 6/4/19      • Shortness of breath   • Black lung disease (CMS/HCC)   • HTN (hypertension)   • HLD (hyperlipidemia)   • Anemia   • History of alcohol abuse     Quit drinking 1 month ago. Pt reports drinking about 2-3 beers and 2-3 mixed drinks about 3-4 times a week on average. Tapered his alcohol intake over the past year in order to quit.      • Hypomagnesemia   • Pneumonia of right lung due to infectious organism     Added automatically from request for surgery [id number]           69-year-old male with dense infiltrate versus mass in the right lower lobe with right hilar and subcarinal adenopathy.  Given his lack of fever and prominent hyponatremia we may be seeing a primary bronchogenic carcinoma with a paraneoplastic syndrome causing hyponatremia.          RECOMMENDATIONS     1. Discussed with anesthesia today.  They feel risk of general anesthesia too high until sodium 123-125.  Therefore, my current plan is to schedule him for a bronchoscopy with EBUS under general anesthesia on Monday afternoon, which is the soonest availability after today.  2. Continue current empiric antibiotics  3. Treatment of hyponatremia per hospitalists and nephrology  4. Taper steroids off  5. DVT prophylaxis since no procedure planned for next several days  6. Discussed with patient and family in detail         I discussed the patient's findings and my recommendations with patient and family     .    Bar Gage MD  Pulmonary and Critical Care Medicine

## 2019-06-07 NOTE — PROGRESS NOTES
"   LOS: 3 days    Patient Care Team:  Provider, No Known as PCP - General        Subjective     Interval History:     No acute events overnight. No new complaints.     Review of Systems:   No CP or SOA    Objective     Vital Sign Min/Max for last 24 hours  Temp  Min: 98.2 °F (36.8 °C)  Max: 98.8 °F (37.1 °C)   BP  Min: 133/66  Max: 148/77   Pulse  Min: 64  Max: 74   Resp  Min: 15  Max: 18   SpO2  Min: 94 %  Max: 96 %   No Data Recorded   No Data Recorded     Flowsheet Rows      First Filed Value   Admission Height  172.7 cm (68\") Documented at 06/04/2019 2128   Admission Weight  96.2 kg (212 lb) Documented at 06/04/2019 2128          I/O this shift:  In: -   Out: 450 [Urine:450]  I/O last 3 completed shifts:  In: 1682.7 [P.O.:220; I.V.:1362.7; IV Piggyback:100]  Out: 1250 [Urine:1250]    Physical Exam:     General Appearance:    Alert, cooperative, in no acute distress   Head:    Normocephalic, without obvious abnormality, atraumatic               Neck:   No adenopathy, supple, trachea midline, no thyromegaly, no   carotid bruit, no JVD       Lungs:     Clear to auscultation,respirations regular, even and                  unlabored    Heart:    Regular rhythm and normal rate, normal S1 and S2, no            murmur, no gallop, no rub, no click       Abdomen:     Normal bowel sounds, no masses, no organomegaly, soft        non-tender, non-distended, no guarding, no rebound                tenderness       Extremities:   Moves all extremities well, no edema, no cyanosis, no             redness               Neurologic:   No focal deficit noted       WBC WBC   Date Value Ref Range Status   06/07/2019 7.00 3.40 - 10.80 10*3/mm3 Final   06/06/2019 7.84 3.40 - 10.80 10*3/mm3 Final   06/05/2019 6.82 3.40 - 10.80 10*3/mm3 Final   06/04/2019 8.74 3.40 - 10.80 10*3/mm3 Final      HGB Hemoglobin   Date Value Ref Range Status   06/07/2019 10.1 (L) 13.0 - 17.7 g/dL Final   06/06/2019 10.7 (L) 13.0 - 17.7 g/dL Final   06/05/2019 " 11.4 (L) 13.0 - 17.7 g/dL Final   06/04/2019 11.5 (L) 13.0 - 17.7 g/dL Final      HCT Hematocrit   Date Value Ref Range Status   06/07/2019 29.9 (L) 37.5 - 51.0 % Final   06/06/2019 30.0 (L) 37.5 - 51.0 % Final   06/05/2019 31.9 (L) 37.5 - 51.0 % Final   06/05/2019 31.5 (L) 37.5 - 51.0 % Final   06/04/2019 31.9 (L) 37.5 - 51.0 % Final      Platlets No results found for: LABPLAT   MCV MCV   Date Value Ref Range Status   06/07/2019 84.7 79.0 - 97.0 fL Final   06/06/2019 84.0 79.0 - 97.0 fL Final   06/05/2019 83.1 79.0 - 97.0 fL Final   06/04/2019 82.4 79.0 - 97.0 fL Final          Sodium Sodium   Date Value Ref Range Status   06/07/2019 118 (C) 136 - 145 mmol/L Final   06/07/2019 119 (C) 136 - 145 mmol/L Final   06/07/2019 119 (C) 136 - 145 mmol/L Final   06/07/2019 119 (C) 136 - 145 mmol/L Final   06/06/2019 118 (C) 136 - 145 mmol/L Final   06/06/2019 119 (C) 136 - 145 mmol/L Final   06/06/2019 116 (C) 136 - 145 mmol/L Final   06/06/2019 117 (C) 136 - 145 mmol/L Final   06/06/2019 117 (C) 136 - 145 mmol/L Final   06/06/2019 118 (C) 136 - 145 mmol/L Final   06/06/2019 117 (C) 136 - 145 mmol/L Final   06/06/2019 117 (C) 136 - 145 mmol/L Final   06/06/2019 115 (C) 136 - 145 mmol/L Final   06/06/2019 115 (C) 136 - 145 mmol/L Final   06/06/2019 114 (C) 136 - 145 mmol/L Final   06/06/2019 114 (C) 136 - 145 mmol/L Final   06/05/2019 116 (C) 136 - 145 mmol/L Final   06/05/2019 114 (C) 136 - 145 mmol/L Final   06/05/2019 114 (C) 136 - 145 mmol/L Final   06/05/2019 112 (C) 136 - 145 mmol/L Final   06/05/2019 115 (C) 136 - 145 mmol/L Final   06/05/2019 116 (C) 136 - 145 mmol/L Final   06/04/2019 115 (C) 136 - 145 mmol/L Final      Potassium Potassium   Date Value Ref Range Status   06/07/2019 4.4 3.5 - 5.2 mmol/L Final   06/07/2019 4.0 3.5 - 5.2 mmol/L Final   06/07/2019 4.1 3.5 - 5.2 mmol/L Final   06/07/2019 4.4 3.5 - 5.2 mmol/L Final   06/06/2019 4.6 3.5 - 5.2 mmol/L Final   06/06/2019 4.8 3.5 - 5.2 mmol/L Final   06/06/2019  4.7 3.5 - 5.2 mmol/L Final   06/06/2019 4.8 3.5 - 5.2 mmol/L Final   06/06/2019 4.5 3.5 - 5.2 mmol/L Final   06/06/2019 4.0 3.5 - 5.2 mmol/L Final   06/06/2019 4.3 3.5 - 5.2 mmol/L Final   06/06/2019 4.1 3.5 - 5.2 mmol/L Final   06/06/2019 4.0 3.5 - 5.2 mmol/L Final   06/06/2019 4.0 3.5 - 5.2 mmol/L Final   06/06/2019 4.3 3.5 - 5.2 mmol/L Final   06/06/2019 4.1 3.5 - 5.2 mmol/L Final   06/05/2019 4.2 3.5 - 5.2 mmol/L Final   06/05/2019 4.4 3.5 - 5.2 mmol/L Final   06/05/2019 4.6 3.5 - 5.2 mmol/L Final   06/05/2019 4.5 3.5 - 5.2 mmol/L Final   06/05/2019 4.2 3.5 - 5.2 mmol/L Final   06/05/2019 3.9 3.5 - 5.2 mmol/L Final   06/04/2019 4.0 3.5 - 5.2 mmol/L Final      Chloride Chloride   Date Value Ref Range Status   06/07/2019 85 (L) 98 - 107 mmol/L Final   06/07/2019 85 (L) 98 - 107 mmol/L Final   06/07/2019 85 (L) 98 - 107 mmol/L Final   06/07/2019 87 (L) 98 - 107 mmol/L Final   06/06/2019 85 (L) 98 - 107 mmol/L Final   06/06/2019 85 (L) 98 - 107 mmol/L Final   06/06/2019 85 (L) 98 - 107 mmol/L Final   06/06/2019 85 (L) 98 - 107 mmol/L Final   06/06/2019 84 (L) 98 - 107 mmol/L Final   06/06/2019 83 (L) 98 - 107 mmol/L Final   06/06/2019 84 (L) 98 - 107 mmol/L Final   06/06/2019 83 (L) 98 - 107 mmol/L Final   06/06/2019 82 (L) 98 - 107 mmol/L Final   06/06/2019 83 (L) 98 - 107 mmol/L Final   06/06/2019 81 (L) 98 - 107 mmol/L Final   06/06/2019 81 (L) 98 - 107 mmol/L Final   06/05/2019 83 (L) 98 - 107 mmol/L Final   06/05/2019 82 (L) 98 - 107 mmol/L Final   06/05/2019 82 (L) 98 - 107 mmol/L Final   06/05/2019 81 (L) 98 - 107 mmol/L Final   06/05/2019 82 (L) 98 - 107 mmol/L Final   06/05/2019 82 (L) 98 - 107 mmol/L Final   06/04/2019 82 (L) 98 - 107 mmol/L Final      CO2 CO2   Date Value Ref Range Status   06/07/2019 21.0 (L) 22.0 - 29.0 mmol/L Final   06/07/2019 21.0 (L) 22.0 - 29.0 mmol/L Final   06/07/2019 19.0 (L) 22.0 - 29.0 mmol/L Final   06/07/2019 21.0 (L) 22.0 - 29.0 mmol/L Final   06/06/2019 21.0 (L) 22.0 -  29.0 mmol/L Final   06/06/2019 21.0 (L) 22.0 - 29.0 mmol/L Final   06/06/2019 19.0 (L) 22.0 - 29.0 mmol/L Final   06/06/2019 21.0 (L) 22.0 - 29.0 mmol/L Final   06/06/2019 22.0 22.0 - 29.0 mmol/L Final   06/06/2019 21.0 (L) 22.0 - 29.0 mmol/L Final   06/06/2019 21.0 (L) 22.0 - 29.0 mmol/L Final   06/06/2019 22.0 22.0 - 29.0 mmol/L Final   06/06/2019 21.0 (L) 22.0 - 29.0 mmol/L Final   06/06/2019 20.0 (L) 22.0 - 29.0 mmol/L Final   06/06/2019 21.0 (L) 22.0 - 29.0 mmol/L Final   06/06/2019 20.0 (L) 22.0 - 29.0 mmol/L Final   06/05/2019 21.0 (L) 22.0 - 29.0 mmol/L Final   06/05/2019 20.0 (L) 22.0 - 29.0 mmol/L Final   06/05/2019 20.0 (L) 22.0 - 29.0 mmol/L Final   06/05/2019 18.0 (L) 22.0 - 29.0 mmol/L Final   06/05/2019 21.0 (L) 22.0 - 29.0 mmol/L Final   06/05/2019 20.0 (L) 22.0 - 29.0 mmol/L Final   06/04/2019 21.0 (L) 22.0 - 29.0 mmol/L Final      BUN BUN   Date Value Ref Range Status   06/07/2019 12 8 - 23 mg/dL Final   06/07/2019 13 8 - 23 mg/dL Final   06/07/2019 13 8 - 23 mg/dL Final   06/07/2019 14 8 - 23 mg/dL Final   06/06/2019 14 8 - 23 mg/dL Final   06/06/2019 15 8 - 23 mg/dL Final   06/06/2019 14 8 - 23 mg/dL Final   06/06/2019 14 8 - 23 mg/dL Final   06/06/2019 13 8 - 23 mg/dL Final   06/06/2019 12 8 - 23 mg/dL Final   06/06/2019 12 8 - 23 mg/dL Final   06/06/2019 12 8 - 23 mg/dL Final   06/06/2019 12 8 - 23 mg/dL Final   06/06/2019 12 8 - 23 mg/dL Final   06/06/2019 12 8 - 23 mg/dL Final   06/06/2019 12 8 - 23 mg/dL Final   06/05/2019 13 8 - 23 mg/dL Final   06/05/2019 12 8 - 23 mg/dL Final   06/05/2019 12 8 - 23 mg/dL Final   06/05/2019 13 8 - 23 mg/dL Final   06/05/2019 13 8 - 23 mg/dL Final   06/05/2019 13 8 - 23 mg/dL Final   06/04/2019 17 8 - 23 mg/dL Final      Creatinine Creatinine   Date Value Ref Range Status   06/07/2019 0.84 0.76 - 1.27 mg/dL Final   06/07/2019 0.76 0.76 - 1.27 mg/dL Final   06/07/2019 0.73 (L) 0.76 - 1.27 mg/dL Final   06/07/2019 0.78 0.76 - 1.27 mg/dL Final   06/06/2019  0.82 0.76 - 1.27 mg/dL Final   06/06/2019 0.88 0.76 - 1.27 mg/dL Final   06/06/2019 0.93 0.76 - 1.27 mg/dL Final   06/06/2019 0.84 0.76 - 1.27 mg/dL Final   06/06/2019 0.83 0.76 - 1.27 mg/dL Final   06/06/2019 0.86 0.76 - 1.27 mg/dL Final   06/06/2019 0.76 0.76 - 1.27 mg/dL Final   06/06/2019 0.74 (L) 0.76 - 1.27 mg/dL Final   06/06/2019 0.90 0.76 - 1.27 mg/dL Final   06/06/2019 0.73 (L) 0.76 - 1.27 mg/dL Final   06/06/2019 0.68 (L) 0.76 - 1.27 mg/dL Final   06/06/2019 0.72 (L) 0.76 - 1.27 mg/dL Final   06/05/2019 0.79 0.76 - 1.27 mg/dL Final   06/05/2019 0.78 0.76 - 1.27 mg/dL Final   06/05/2019 0.79 0.76 - 1.27 mg/dL Final   06/05/2019 0.79 0.76 - 1.27 mg/dL Final   06/05/2019 0.88 0.76 - 1.27 mg/dL Final   06/05/2019 0.94 0.76 - 1.27 mg/dL Final   06/04/2019 0.88 0.76 - 1.27 mg/dL Final      Calcium Calcium   Date Value Ref Range Status   06/07/2019 9.0 8.6 - 10.5 mg/dL Final   06/07/2019 8.6 8.6 - 10.5 mg/dL Final   06/07/2019 8.5 (L) 8.6 - 10.5 mg/dL Final   06/07/2019 8.6 8.6 - 10.5 mg/dL Final   06/06/2019 8.8 8.6 - 10.5 mg/dL Final   06/06/2019 8.7 8.6 - 10.5 mg/dL Final   06/06/2019 8.7 8.6 - 10.5 mg/dL Final   06/06/2019 8.5 (L) 8.6 - 10.5 mg/dL Final   06/06/2019 8.6 8.6 - 10.5 mg/dL Final   06/06/2019 8.3 (L) 8.6 - 10.5 mg/dL Final   06/06/2019 8.5 (L) 8.6 - 10.5 mg/dL Final   06/06/2019 8.4 (L) 8.6 - 10.5 mg/dL Final   06/06/2019 8.6 8.6 - 10.5 mg/dL Final   06/06/2019 8.3 (L) 8.6 - 10.5 mg/dL Final   06/06/2019 8.7 8.6 - 10.5 mg/dL Final   06/06/2019 8.3 (L) 8.6 - 10.5 mg/dL Final   06/05/2019 8.3 (L) 8.6 - 10.5 mg/dL Final   06/05/2019 8.2 (L) 8.6 - 10.5 mg/dL Final   06/05/2019 8.3 (L) 8.6 - 10.5 mg/dL Final   06/05/2019 8.9 8.6 - 10.5 mg/dL Final   06/05/2019 8.8 8.6 - 10.5 mg/dL Final   06/05/2019 8.5 (L) 8.6 - 10.5 mg/dL Final   06/04/2019 9.2 8.6 - 10.5 mg/dL Final      PO4 No results found for: CAPO4   Albumin Albumin   Date Value Ref Range Status   06/04/2019 4.10 3.50 - 5.20 g/dL Final       Magnesium Magnesium   Date Value Ref Range Status   06/05/2019 1.7 1.6 - 2.4 mg/dL Final   06/04/2019 1.7 1.6 - 2.4 mg/dL Final      Uric Acid Uric Acid   Date Value Ref Range Status   06/06/2019 2.0 (L) 3.4 - 7.0 mg/dL Final     Comment:     Falsely depressed results may occur on samples drawn from patients receiving N-Acetylcysteine (NAC) or Metamizole.           Results Review:     I reviewed the patient's new clinical results.      amLODIPine 5 mg Oral Q24H   atorvastatin 10 mg Oral Nightly   budesonide-formoterol 2 puff Inhalation BID - RT   folic acid 1 mg Oral Daily   ipratropium-albuterol 3 mL Nebulization Q4H - RT   lisinopril 20 mg Oral Q24H   multivitamin 1 tablet Oral Daily   piperacillin-tazobactam 3.375 g Intravenous Q8H   polyethylene glycol 17 g Oral Daily   predniSONE 20 mg Oral Daily   sodium chloride 3 mL Intravenous Q12H   thiamine 100 mg Oral Daily   tolvaptan 15 mg Oral Once          Medication Review:     Assessment/Plan       PNA (pneumonia)    Shortness of breath    HTN (hypertension)    HLD (hyperlipidemia)    Anemia    Black lung disease (CMS/HCC)    Hyponatremia    Lung mass    History of alcohol abuse    Hypomagnesemia      1- Hyponatremia- asymptomatic - 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 Improving.   2- HTN - not controlled on prednisone      Plan:  - Not much improvement with fluid restriction and salt tablet.  - Will start him on tolvaptan. No fluid restriction on tolvaptan . Discussed with PRIYA Navarro MD  06/07/19  11:54 AM

## 2019-06-07 NOTE — PROGRESS NOTES
Continued Stay Note  [facility]     Patient Name: Florentin Mcallister  MRN: [id number]  Today's Date: 6/7/2019    Admit Date: 6/4/2019    Discharge Plan     Row Name 06/07/19 1413       Plan    Plan  Update    Patient/Family in Agreement with Plan  other (see comments)    Plan Comments  I did not disturb the pt. I will f/u on Monday        Discharge Codes    No documentation.       Expected Discharge Date and Time     Expected Discharge Date Expected Discharge Time    Jun 9, 2019             Hilaria Kaur RN

## 2019-06-08 NOTE — PLAN OF CARE
Problem: Fall Risk (Adult)  Goal: Absence of Fall  Outcome: Ongoing (interventions implemented as appropriate)   06/07/19 3066   Fall Risk (Adult)   Absence of Fall making progress toward outcome

## 2019-06-08 NOTE — PROGRESS NOTES
"   LOS: 4 days    Patient Care Team:  Provider, No Known as PCP - General        Subjective     Interval History:     No acute events overnight. No new complaints.     Review of Systems:   No CP or SOA    Objective     Vital Sign Min/Max for last 24 hours  Temp  Min: 98 °F (36.7 °C)  Max: 99 °F (37.2 °C)   BP  Min: 115/53  Max: 156/66   Pulse  Min: 57  Max: 75   Resp  Min: 16  Max: 18   SpO2  Min: 91 %  Max: 100 %   No Data Recorded   No Data Recorded     Flowsheet Rows      First Filed Value   Admission Height  172.7 cm (68\") Documented at 06/04/2019 2128   Admission Weight  96.2 kg (212 lb) Documented at 06/04/2019 2128          No intake/output data recorded.  I/O last 3 completed shifts:  In: 240 [P.O.:240]  Out: 4725 [Urine:4725]    Physical Exam:     General Appearance:    Alert, cooperative, in no acute distress   Head:    Normocephalic, without obvious abnormality, atraumatic               Neck:   No adenopathy, supple, trachea midline, no thyromegaly, no   carotid bruit, no JVD       Lungs:     Clear to auscultation,respirations regular, even and                  unlabored    Heart:    Regular rhythm and normal rate, normal S1 and S2, no            murmur, no gallop, no rub, no click       Abdomen:     Normal bowel sounds, no masses, no organomegaly, soft        non-tender, non-distended, no guarding, no rebound                tenderness       Extremities:   Moves all extremities well, no edema, no cyanosis, no             redness               Neurologic:   No focal deficit noted       WBC WBC   Date Value Ref Range Status   06/08/2019 7.24 3.40 - 10.80 10*3/mm3 Final   06/07/2019 7.00 3.40 - 10.80 10*3/mm3 Final   06/06/2019 7.84 3.40 - 10.80 10*3/mm3 Final      HGB Hemoglobin   Date Value Ref Range Status   06/08/2019 11.4 (L) 13.0 - 17.7 g/dL Final   06/07/2019 10.1 (L) 13.0 - 17.7 g/dL Final   06/06/2019 10.7 (L) 13.0 - 17.7 g/dL Final      HCT Hematocrit   Date Value Ref Range Status   06/08/2019 33.7 " (L) 37.5 - 51.0 % Final   06/07/2019 29.9 (L) 37.5 - 51.0 % Final   06/06/2019 30.0 (L) 37.5 - 51.0 % Final      Platlets No results found for: LABPLAT   MCV MCV   Date Value Ref Range Status   06/08/2019 85.5 79.0 - 97.0 fL Final   06/07/2019 84.7 79.0 - 97.0 fL Final   06/06/2019 84.0 79.0 - 97.0 fL Final          Sodium Sodium   Date Value Ref Range Status   06/08/2019 133 (L) 136 - 145 mmol/L Final   06/08/2019 131 (L) 136 - 145 mmol/L Final   06/07/2019 130 (L) 136 - 145 mmol/L Final   06/07/2019 126 (L) 136 - 145 mmol/L Final   06/07/2019 123 (L) 136 - 145 mmol/L Final   06/07/2019 117 (C) 136 - 145 mmol/L Final   06/07/2019 118 (C) 136 - 145 mmol/L Final   06/07/2019 117 (C) 136 - 145 mmol/L Final   06/07/2019 118 (C) 136 - 145 mmol/L Final   06/07/2019 119 (C) 136 - 145 mmol/L Final   06/07/2019 119 (C) 136 - 145 mmol/L Final   06/07/2019 119 (C) 136 - 145 mmol/L Final   06/06/2019 118 (C) 136 - 145 mmol/L Final   06/06/2019 119 (C) 136 - 145 mmol/L Final   06/06/2019 116 (C) 136 - 145 mmol/L Final   06/06/2019 117 (C) 136 - 145 mmol/L Final   06/06/2019 117 (C) 136 - 145 mmol/L Final   06/06/2019 118 (C) 136 - 145 mmol/L Final   06/06/2019 117 (C) 136 - 145 mmol/L Final   06/06/2019 117 (C) 136 - 145 mmol/L Final   06/06/2019 115 (C) 136 - 145 mmol/L Final   06/06/2019 115 (C) 136 - 145 mmol/L Final   06/06/2019 114 (C) 136 - 145 mmol/L Final   06/06/2019 114 (C) 136 - 145 mmol/L Final   06/05/2019 116 (C) 136 - 145 mmol/L Final   06/05/2019 114 (C) 136 - 145 mmol/L Final   06/05/2019 114 (C) 136 - 145 mmol/L Final   06/05/2019 112 (C) 136 - 145 mmol/L Final   06/05/2019 115 (C) 136 - 145 mmol/L Final      Potassium Potassium   Date Value Ref Range Status   06/08/2019 4.5 3.5 - 5.2 mmol/L Final   06/08/2019 4.2 3.5 - 5.2 mmol/L Final   06/07/2019 4.4 3.5 - 5.2 mmol/L Final   06/07/2019 4.8 3.5 - 5.2 mmol/L Final   06/07/2019 4.8 3.5 - 5.2 mmol/L Final   06/07/2019 4.6 3.5 - 5.2 mmol/L Final   06/07/2019 4.6  3.5 - 5.2 mmol/L Final   06/07/2019 4.3 3.5 - 5.2 mmol/L Final   06/07/2019 4.4 3.5 - 5.2 mmol/L Final   06/07/2019 4.0 3.5 - 5.2 mmol/L Final   06/07/2019 4.1 3.5 - 5.2 mmol/L Final   06/07/2019 4.4 3.5 - 5.2 mmol/L Final   06/06/2019 4.6 3.5 - 5.2 mmol/L Final   06/06/2019 4.8 3.5 - 5.2 mmol/L Final   06/06/2019 4.7 3.5 - 5.2 mmol/L Final   06/06/2019 4.8 3.5 - 5.2 mmol/L Final   06/06/2019 4.5 3.5 - 5.2 mmol/L Final   06/06/2019 4.0 3.5 - 5.2 mmol/L Final   06/06/2019 4.3 3.5 - 5.2 mmol/L Final   06/06/2019 4.1 3.5 - 5.2 mmol/L Final   06/06/2019 4.0 3.5 - 5.2 mmol/L Final   06/06/2019 4.0 3.5 - 5.2 mmol/L Final   06/06/2019 4.3 3.5 - 5.2 mmol/L Final   06/06/2019 4.1 3.5 - 5.2 mmol/L Final   06/05/2019 4.2 3.5 - 5.2 mmol/L Final   06/05/2019 4.4 3.5 - 5.2 mmol/L Final   06/05/2019 4.6 3.5 - 5.2 mmol/L Final   06/05/2019 4.5 3.5 - 5.2 mmol/L Final   06/05/2019 4.2 3.5 - 5.2 mmol/L Final      Chloride Chloride   Date Value Ref Range Status   06/08/2019 97 (L) 98 - 107 mmol/L Final   06/08/2019 97 (L) 98 - 107 mmol/L Final   06/07/2019 95 (L) 98 - 107 mmol/L Final   06/07/2019 90 (L) 98 - 107 mmol/L Final   06/07/2019 88 (L) 98 - 107 mmol/L Final   06/07/2019 84 (L) 98 - 107 mmol/L Final   06/07/2019 86 (L) 98 - 107 mmol/L Final   06/07/2019 85 (L) 98 - 107 mmol/L Final   06/07/2019 85 (L) 98 - 107 mmol/L Final   06/07/2019 85 (L) 98 - 107 mmol/L Final   06/07/2019 85 (L) 98 - 107 mmol/L Final   06/07/2019 87 (L) 98 - 107 mmol/L Final   06/06/2019 85 (L) 98 - 107 mmol/L Final   06/06/2019 85 (L) 98 - 107 mmol/L Final   06/06/2019 85 (L) 98 - 107 mmol/L Final   06/06/2019 85 (L) 98 - 107 mmol/L Final   06/06/2019 84 (L) 98 - 107 mmol/L Final   06/06/2019 83 (L) 98 - 107 mmol/L Final   06/06/2019 84 (L) 98 - 107 mmol/L Final   06/06/2019 83 (L) 98 - 107 mmol/L Final   06/06/2019 82 (L) 98 - 107 mmol/L Final   06/06/2019 83 (L) 98 - 107 mmol/L Final   06/06/2019 81 (L) 98 - 107 mmol/L Final   06/06/2019 81 (L) 98 -  107 mmol/L Final   06/05/2019 83 (L) 98 - 107 mmol/L Final   06/05/2019 82 (L) 98 - 107 mmol/L Final   06/05/2019 82 (L) 98 - 107 mmol/L Final   06/05/2019 81 (L) 98 - 107 mmol/L Final   06/05/2019 82 (L) 98 - 107 mmol/L Final      CO2 CO2   Date Value Ref Range Status   06/08/2019 23.0 22.0 - 29.0 mmol/L Final   06/08/2019 22.0 22.0 - 29.0 mmol/L Final   06/07/2019 23.0 22.0 - 29.0 mmol/L Final   06/07/2019 22.0 22.0 - 29.0 mmol/L Final   06/07/2019 22.0 22.0 - 29.0 mmol/L Final   06/07/2019 19.0 (L) 22.0 - 29.0 mmol/L Final   06/07/2019 20.0 (L) 22.0 - 29.0 mmol/L Final   06/07/2019 20.0 (L) 22.0 - 29.0 mmol/L Final   06/07/2019 21.0 (L) 22.0 - 29.0 mmol/L Final   06/07/2019 21.0 (L) 22.0 - 29.0 mmol/L Final   06/07/2019 19.0 (L) 22.0 - 29.0 mmol/L Final   06/07/2019 21.0 (L) 22.0 - 29.0 mmol/L Final   06/06/2019 21.0 (L) 22.0 - 29.0 mmol/L Final   06/06/2019 21.0 (L) 22.0 - 29.0 mmol/L Final   06/06/2019 19.0 (L) 22.0 - 29.0 mmol/L Final   06/06/2019 21.0 (L) 22.0 - 29.0 mmol/L Final   06/06/2019 22.0 22.0 - 29.0 mmol/L Final   06/06/2019 21.0 (L) 22.0 - 29.0 mmol/L Final   06/06/2019 21.0 (L) 22.0 - 29.0 mmol/L Final   06/06/2019 22.0 22.0 - 29.0 mmol/L Final   06/06/2019 21.0 (L) 22.0 - 29.0 mmol/L Final   06/06/2019 20.0 (L) 22.0 - 29.0 mmol/L Final   06/06/2019 21.0 (L) 22.0 - 29.0 mmol/L Final   06/06/2019 20.0 (L) 22.0 - 29.0 mmol/L Final   06/05/2019 21.0 (L) 22.0 - 29.0 mmol/L Final   06/05/2019 20.0 (L) 22.0 - 29.0 mmol/L Final   06/05/2019 20.0 (L) 22.0 - 29.0 mmol/L Final   06/05/2019 18.0 (L) 22.0 - 29.0 mmol/L Final   06/05/2019 21.0 (L) 22.0 - 29.0 mmol/L Final      BUN BUN   Date Value Ref Range Status   06/08/2019 11 8 - 23 mg/dL Final   06/08/2019 12 8 - 23 mg/dL Final   06/07/2019 13 8 - 23 mg/dL Final   06/07/2019 13 8 - 23 mg/dL Final   06/07/2019 13 8 - 23 mg/dL Final   06/07/2019 13 8 - 23 mg/dL Final   06/07/2019 12 8 - 23 mg/dL Final   06/07/2019 12 8 - 23 mg/dL Final   06/07/2019 12 8 -  23 mg/dL Final   06/07/2019 13 8 - 23 mg/dL Final   06/07/2019 13 8 - 23 mg/dL Final   06/07/2019 14 8 - 23 mg/dL Final   06/06/2019 14 8 - 23 mg/dL Final   06/06/2019 15 8 - 23 mg/dL Final   06/06/2019 14 8 - 23 mg/dL Final   06/06/2019 14 8 - 23 mg/dL Final   06/06/2019 13 8 - 23 mg/dL Final   06/06/2019 12 8 - 23 mg/dL Final   06/06/2019 12 8 - 23 mg/dL Final   06/06/2019 12 8 - 23 mg/dL Final   06/06/2019 12 8 - 23 mg/dL Final   06/06/2019 12 8 - 23 mg/dL Final   06/06/2019 12 8 - 23 mg/dL Final   06/06/2019 12 8 - 23 mg/dL Final   06/05/2019 13 8 - 23 mg/dL Final   06/05/2019 12 8 - 23 mg/dL Final   06/05/2019 12 8 - 23 mg/dL Final   06/05/2019 13 8 - 23 mg/dL Final   06/05/2019 13 8 - 23 mg/dL Final      Creatinine Creatinine   Date Value Ref Range Status   06/08/2019 1.13 0.76 - 1.27 mg/dL Final   06/08/2019 1.07 0.76 - 1.27 mg/dL Final   06/07/2019 1.02 0.76 - 1.27 mg/dL Final   06/07/2019 1.07 0.76 - 1.27 mg/dL Final   06/07/2019 1.00 0.76 - 1.27 mg/dL Final   06/07/2019 0.85 0.76 - 1.27 mg/dL Final   06/07/2019 0.84 0.76 - 1.27 mg/dL Final   06/07/2019 0.78 0.76 - 1.27 mg/dL Final   06/07/2019 0.84 0.76 - 1.27 mg/dL Final   06/07/2019 0.76 0.76 - 1.27 mg/dL Final   06/07/2019 0.73 (L) 0.76 - 1.27 mg/dL Final   06/07/2019 0.78 0.76 - 1.27 mg/dL Final   06/06/2019 0.82 0.76 - 1.27 mg/dL Final   06/06/2019 0.88 0.76 - 1.27 mg/dL Final   06/06/2019 0.93 0.76 - 1.27 mg/dL Final   06/06/2019 0.84 0.76 - 1.27 mg/dL Final   06/06/2019 0.83 0.76 - 1.27 mg/dL Final   06/06/2019 0.86 0.76 - 1.27 mg/dL Final   06/06/2019 0.76 0.76 - 1.27 mg/dL Final   06/06/2019 0.74 (L) 0.76 - 1.27 mg/dL Final   06/06/2019 0.90 0.76 - 1.27 mg/dL Final   06/06/2019 0.73 (L) 0.76 - 1.27 mg/dL Final   06/06/2019 0.68 (L) 0.76 - 1.27 mg/dL Final   06/06/2019 0.72 (L) 0.76 - 1.27 mg/dL Final   06/05/2019 0.79 0.76 - 1.27 mg/dL Final   06/05/2019 0.78 0.76 - 1.27 mg/dL Final   06/05/2019 0.79 0.76 - 1.27 mg/dL Final   06/05/2019 0.79 0.76  - 1.27 mg/dL Final   06/05/2019 0.88 0.76 - 1.27 mg/dL Final      Calcium Calcium   Date Value Ref Range Status   06/08/2019 10.0 8.6 - 10.5 mg/dL Final   06/08/2019 9.6 8.6 - 10.5 mg/dL Final   06/07/2019 9.8 8.6 - 10.5 mg/dL Final   06/07/2019 9.5 8.6 - 10.5 mg/dL Final   06/07/2019 9.1 8.6 - 10.5 mg/dL Final   06/07/2019 8.8 8.6 - 10.5 mg/dL Final   06/07/2019 8.8 8.6 - 10.5 mg/dL Final   06/07/2019 8.8 8.6 - 10.5 mg/dL Final   06/07/2019 9.0 8.6 - 10.5 mg/dL Final   06/07/2019 8.6 8.6 - 10.5 mg/dL Final   06/07/2019 8.5 (L) 8.6 - 10.5 mg/dL Final   06/07/2019 8.6 8.6 - 10.5 mg/dL Final   06/06/2019 8.8 8.6 - 10.5 mg/dL Final   06/06/2019 8.7 8.6 - 10.5 mg/dL Final   06/06/2019 8.7 8.6 - 10.5 mg/dL Final   06/06/2019 8.5 (L) 8.6 - 10.5 mg/dL Final   06/06/2019 8.6 8.6 - 10.5 mg/dL Final   06/06/2019 8.3 (L) 8.6 - 10.5 mg/dL Final   06/06/2019 8.5 (L) 8.6 - 10.5 mg/dL Final   06/06/2019 8.4 (L) 8.6 - 10.5 mg/dL Final   06/06/2019 8.6 8.6 - 10.5 mg/dL Final   06/06/2019 8.3 (L) 8.6 - 10.5 mg/dL Final   06/06/2019 8.7 8.6 - 10.5 mg/dL Final   06/06/2019 8.3 (L) 8.6 - 10.5 mg/dL Final   06/05/2019 8.3 (L) 8.6 - 10.5 mg/dL Final   06/05/2019 8.2 (L) 8.6 - 10.5 mg/dL Final   06/05/2019 8.3 (L) 8.6 - 10.5 mg/dL Final   06/05/2019 8.9 8.6 - 10.5 mg/dL Final   06/05/2019 8.8 8.6 - 10.5 mg/dL Final      PO4 No results found for: CAPO4   Albumin No results found for: ALBUMIN   Magnesium No results found for: MG   Uric Acid Uric Acid   Date Value Ref Range Status   06/06/2019 2.0 (L) 3.4 - 7.0 mg/dL Final     Comment:     Falsely depressed results may occur on samples drawn from patients receiving N-Acetylcysteine (NAC) or Metamizole.           Results Review:     I reviewed the patient's new clinical results.      amLODIPine 5 mg Oral Q24H   atorvastatin 10 mg Oral Nightly   budesonide-formoterol 2 puff Inhalation BID - RT   enoxaparin 40 mg Subcutaneous Q24H   ipratropium-albuterol 3 mL Nebulization Q6H While Awake - RT    lisinopril 20 mg Oral Q24H   multivitamin 1 tablet Oral Daily   polyethylene glycol 17 g Oral Daily   predniSONE 10 mg Oral Daily With Breakfast   sodium chloride 3 mL Intravenous Q12H       dextrose 50 mL/hr Last Rate: 50 mL/hr (06/08/19 0004)       Medication Review:     Assessment/Plan       PNA (pneumonia)    Shortness of breath    HTN (hypertension)    HLD (hyperlipidemia)    Anemia    Black lung disease (CMS/HCC)    Hyponatremia    Lung mass    History of alcohol abuse    Hypomagnesemia    Pneumonia of right lung due to infectious organism      1- Hyponatremia- asymptomatic - 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 Improving.   2- HTN - not controlled on prednisone      Plan:  - Continue with D5W until sodium is below 130.   - Will hold tovaptan today.       Yodit Navarro MD  06/08/19  11:08 AM

## 2019-06-08 NOTE — PROGRESS NOTES
[facility] Medicine Services  PROGRESS NOTE    Patient Name: Florentin Mcallister  : 1950  MRN: [id number]    Date of Admission: 2019  Length of Stay: 4  Primary Care Physician: Provider, No Known    Subjective   Subjective     CC:  SOA    HPI:  Pt c/o stiff knees, otherwise doing okay today.       Review of Systems  Gen- No fevers, chills  CV- No chest pain, palpitations  Resp- No cough, dyspnea  GI- No N/V/D, abd pain  Otherwise ROS is negative except as mentioned in the HPI.    Objective   Objective     Vital Signs:   Temp:  [98 °F (36.7 °C)-99 °F (37.2 °C)] 99 °F (37.2 °C)  Heart Rate:  [57-75] 74  Resp:  [16-18] 16  BP: (115-156)/(53-66) 135/59        Physical Exam:  Constitutional: No acute distress, awake, alert  HENT: NCAT, mucous membranes moist  Respiratory: rhonchi right lower lobe otherwise clear  Cardiovascular: RRR, no murmurs, rubs, or gallops, palpable pedal pulses bilaterally  Gastrointestinal: Positive bowel sounds, soft, nontender, nondistended  Musculoskeletal: No bilateral ankle edema  Psychiatric: Appropriate affect, cooperative  Neurologic: Oriented x 3, strength symmetric in all extremities, Cranial Nerves grossly intact to confrontation, speech clear  Skin: No rashes    Results Reviewed:  I have personally reviewed current lab, radiology, and data and agree.    Results from last 7 days   Lab Units 19  0405 19  0409 19  0539  19  0945   WBC 10*3/mm3 7.24 7.00 7.84  --  6.82   HEMOGLOBIN g/dL 11.4* 10.1* 10.7*  --  11.4*   HEMATOCRIT % 33.7* 29.9* 30.0*   < > 31.5*   PLATELETS 10*3/mm3 181 161 164  --  175   INR   --   --   --   --  1.13    < > = values in this interval not displayed.     Results from last 7 days   Lab Units 19  0741 19  0405 19  2349  19  2141   SODIUM mmol/L 133* 131* 130*   < > 115*   POTASSIUM mmol/L 4.5 4.2 4.4   < > 4.0   CHLORIDE mmol/L 97* 97* 95*   < > 82*   CO2 mmol/L 23.0 22.0 23.0    < > 21.0*   BUN mg/dL 11 12 13   < > 17   CREATININE mg/dL 1.13 1.07 1.02   < > 0.88   GLUCOSE mg/dL 98 131* 117*   < > 137*   CALCIUM mg/dL 10.0 9.6 9.8   < > 9.2   ALT (SGPT) U/L  --   --   --   --  23   AST (SGOT) U/L  --   --   --   --  17   TROPONIN T ng/mL  --   --   --   --  <0.010    < > = values in this interval not displayed.     Estimated Creatinine Clearance: 70.4 mL/min (by C-G formula based on SCr of 1.13 mg/dL).    Microbiology Results Abnormal     Procedure Component Value - Date/Time    Blood Culture - Blood, Arm, Left [[id number]] Collected:  06/04/19 2358    Lab Status:  Preliminary result Specimen:  Blood from Arm, Left Updated:  06/08/19 0016     Blood Culture No growth at 3 days    Blood Culture - Blood, Arm, Right [[id number]] Collected:  06/04/19 2350    Lab Status:  Preliminary result Specimen:  Blood from Arm, Right Updated:  06/08/19 0016     Blood Culture No growth at 3 days    MRSA Screen, PCR - Swab, Nares [[id number]]  (Normal) Collected:  06/05/19 0916    Lab Status:  Final result Specimen:  Swab from Nares Updated:  06/05/19 1131     MRSA, PCR Negative    Narrative:       MRSA Negative          Imaging Results (last 24 hours)     ** No results found for the last 24 hours. **               I have reviewed the medications:    Current Facility-Administered Medications:   •  acetaminophen (TYLENOL) tablet 650 mg, 650 mg, Oral, Q4H PRN, Saskia Flores APRN  •  amLODIPine (NORVASC) tablet 5 mg, 5 mg, Oral, Q24H, RamonYodit MD, 5 mg at 06/07/19 2013  •  atorvastatin (LIPITOR) tablet 10 mg, 10 mg, Oral, Nightly, Saskia Flores APRN, 10 mg at 06/07/19 2013  •  budesonide-formoterol (SYMBICORT) 160-4.5 MCG/ACT inhaler 2 puff, 2 puff, Inhalation, BID - RT, Saskia Flores APRN, 2 puff at 06/08/19 0718  •  dextrose (D5W) 5 % infusion, 50 mL/hr, Intravenous, Continuous, Ramon, Yodit Lundbegr MD, Last Rate: 50 mL/hr at 06/08/19 0004, 50 mL/hr at 06/08/19 0004  •  enoxaparin (LOVENOX)  syringe 40 mg, 40 mg, Subcutaneous, Q24H, Bar Gage MD, 40 mg at 06/08/19 0822  •  ipratropium-albuterol (DUO-NEB) nebulizer solution 3 mL, 3 mL, Nebulization, Q4H PRN, Saskia Flores APRN  •  ipratropium-albuterol (DUO-NEB) nebulizer solution 3 mL, 3 mL, Nebulization, Q6H While Awake - RT, Bar Gage MD, 3 mL at 06/08/19 0718  •  lisinopril (PRINIVIL,ZESTRIL) tablet 20 mg, 20 mg, Oral, Q24H, Maria Del Rosario Nunez MD, 20 mg at 06/08/19 0822  •  Magnesium Sulfate 2 gram Bolus, followed by 8 gram infusion (total Mg dose 10 grams)- Mg less than or equal to 1mg/dL, 2 g, Intravenous, PRN **OR** Magnesium Sulfate 2 gram / 50mL Infusion (GIVE X 3 BAGS TO EQUAL 6GM TOTAL DOSE) - Mg 1.1 - 1.5 mg/dl, 2 g, Intravenous, PRN **OR** Magnesium Sulfate 4 gram infusion- Mg 1.6-1.9 mg/dL, 4 g, Intravenous, PRN, Shannon Curry DO, Last Rate: 25 mL/hr at 06/05/19 1400, 4 g at 06/05/19 1400  •  multivitamin (THERAGRAN) tablet 1 tablet, 1 tablet, Oral, Daily, Saskia Flores APRN, 1 tablet at 06/08/19 0821  •  ondansetron (ZOFRAN) tablet 4 mg, 4 mg, Oral, Q6H PRN **OR** ondansetron (ZOFRAN) injection 4 mg, 4 mg, Intravenous, Q6H PRN, Saskia Flores APRN, 4 mg at 06/07/19 0833  •  polyethylene glycol 3350 powder (packet), 17 g, Oral, Daily, Maria Del Rosario Nunez MD, 17 g at 06/06/19 1730  •  potassium chloride (MICRO-K) CR capsule 40 mEq, 40 mEq, Oral, PRN **OR** potassium chloride (KLOR-CON) packet 40 mEq, 40 mEq, Oral, PRN **OR** potassium chloride 10 mEq in 100 mL IVPB, 10 mEq, Intravenous, Q1H PRN, Shannon Curry DO  •  predniSONE (DELTASONE) tablet 10 mg, 10 mg, Oral, Daily With Breakfast, Bar Gage MD, 10 mg at 06/08/19 0821  •  sodium chloride 0.9 % flush 10 mL, 10 mL, Intravenous, PRN, Clarke Kwok MD  •  sodium chloride 0.9 % flush 3 mL, 3 mL, Intravenous, Q12H, Saskia Flores APRN, 3 mL at 06/08/19 0822  •  sodium chloride 0.9 % flush 3-10 mL, 3-10 mL, Intravenous,  BRITTNEY, Saskia Flores, EARNEST      Assessment/Plan   Assessment / Plan     Active Hospital Problems    Diagnosis POA   • **PNA (pneumonia) [J18.9] Yes     Priority: High   • Hyponatremia [E87.1] Yes     Priority: High     Initial Sodium 115 on 6/4/19      • Lung mass [R91.8] Yes     Priority: High   • Shortness of breath [R06.02] Yes     Priority: Medium   • Anemia [D64.9] Yes     Priority: Medium   • Hypomagnesemia [E83.42] Yes     Priority: Medium   • HTN (hypertension) [I10] Yes     Priority: Low   • HLD (hyperlipidemia) [E78.5] Yes     Priority: Low   • Black lung disease (CMS/HCC) [J60] Yes     Priority: Low   • History of alcohol abuse [Z87.898] Yes     Priority: Low     Quit drinking 1 month ago. Pt reports drinking about 2-3 beers and 2-3 mixed drinks about 3-4 times a week on average. Tapered his alcohol intake over the past year in order to quit.      • Pneumonia of right lung due to infectious organism [J18.9] Unknown     Added automatically from request for surgery [id number]            Brief Hospital Course to date:  Florentin Mcallister is a 69 y.o. male with past medical history significant for black lung, remote tobacco abuse who presents to the ED due to difficulty breathing. He was recently treated (two weeks prior to this admission) for PNA and hyponatremia (Na+ 115 at OSH) at an OSH.  He had been doing well until three days prior to this admission when his SOA increased and he became weak. On presentation to our ED, he was significantly hyponatremic and imaging showed RLL mass vs PNA.  He was admitted to our service for further evaluation.      Pneumonia vs lung malignancy  -CXR, CTA chest obtained; reviewed  -pt treated for PNA 2 weeks ago at [facility] where he was admitted for 5 days  -blood and sputum cx ordered  --stopped Vanc as MRSA probe NEGATIVE, continue Zosyn for now  --Pulm consulted for possible bronch for diagnosis, appreciate their input. Plan for EBUS once Na+ better/ >120. Now planning for  "Monday, June 10th  -duo nebs scheduled and PRN  -pt on prednisone 20mg/daily (from recent hospitalization??), tapering as per Pulm  -symptom mgt  -currently maintaining saturations on room air; PRN oxygen         Hyponatremia  -appears to be SIADH. NAL consulted, pt on fluid restriction and has received PO Na+ per NAL with minimal improvement in Na+. Started Tolvaptan. Dr. Navarro follows. Appreciate his input.         Hx of Black Lung Disease  -per patient he takes albuterol treatments Prn at home and Symbicort (although Symbicort not listed on med rec); Symbicort ordered  -PRN and scheduled duo nebs  -on room air at baseline     Anemia  -baseline H/H unclear     HTN  -stable  -resumed ACEI    HLD  -continue routine Zocor     Hx of alcohol abuse  -pt quit 1 month ago  -tapered his intake over the past year in order to quit; quit because he \"felt so bad\"  -previously drank 2-3 beers and 2-3 mixed drinks about 3 to 4 days a week   -no hx of withdrawal seizures   -continue daily thiamine, MVI and folic acid      Hypomagnesemia  -electrolyte replacement protocol             DVT Prophylaxis:  mechanical    Disposition: I expect the patient to be discharged TBD    CODE STATUS:   Code Status and Medical Interventions:   Ordered at: 06/05/19 0150     Level Of Support Discussed With:    Patient     Code Status:    CPR     Medical Interventions (Level of Support Prior to Arrest):    Full         Electronically signed by Maria Del Rosario Nunez MD, 06/08/19, 10:51 AM.    "

## 2019-06-09 PROBLEM — M10.9 ACUTE GOUT OF FOOT: Status: ACTIVE | Noted: 2019-01-01

## 2019-06-09 NOTE — PLAN OF CARE
Problem: Fall Risk (Adult)  Goal: Absence of Fall  Outcome: Ongoing (interventions implemented as appropriate)      Problem: Patient Care Overview  Goal: Plan of Care Review  Outcome: Ongoing (interventions implemented as appropriate)   06/09/19 0448 06/09/19 1404   Plan of Care Review   Progress no change --    Coping/Psychosocial   Plan of Care Reviewed With --  patient;family     Goal: Discharge Needs Assessment  Outcome: Ongoing (interventions implemented as appropriate)   06/05/19 0751 06/05/19 0925 06/09/19 1641   Discharge Needs Assessment   Readmission Within the Last 30 Days --  --  no previous admission in last 30 days   Concerns to be Addressed --  --  no discharge needs identified   Patient/Family Anticipates Transition to home with family --  --    Patient/Family Anticipated Services at Transition  --  --    Transportation Concerns --  --  car, none   Transportation Anticipated family or friend will provide --  --    Anticipated Changes Related to Illness --  --  none   Equipment Needed After Discharge --  none --    Disability   Equipment Currently Used at Home --  cane, straight --

## 2019-06-09 NOTE — PLAN OF CARE
Problem: Fall Risk (Adult)  Goal: Absence of Fall  Outcome: Ongoing (interventions implemented as appropriate)   06/09/19 0448   Fall Risk (Adult)   Absence of Fall making progress toward outcome       Problem: Patient Care Overview  Goal: Plan of Care Review  Outcome: Ongoing (interventions implemented as appropriate)   06/09/19 0448   Plan of Care Review   Progress no change   OTHER   Outcome Summary VSS. No c/o pain. Na levels increasing. Potassium replaced per protocol. Will continue to monitor.   Coping/Psychosocial   Plan of Care Reviewed With patient;spouse

## 2019-06-09 NOTE — PROGRESS NOTES
"   LOS: 5 days    Patient Care Team:  Provider, No Known as PCP - General        Subjective     Interval History:     No acute events overnight. No new complaints.     Review of Systems:   No CP or SOA    Objective     Vital Sign Min/Max for last 24 hours  Temp  Min: 98.4 °F (36.9 °C)  Max: 98.4 °F (36.9 °C)   BP  Min: 128/82  Max: 144/66   Pulse  Min: 79  Max: 85   Resp  Min: 16  Max: 18   No Data Recorded   No Data Recorded   No Data Recorded     Flowsheet Rows      First Filed Value   Admission Height  172.7 cm (68\") Documented at 06/04/2019 2128   Admission Weight  96.2 kg (212 lb) Documented at 06/04/2019 2128          I/O this shift:  In: 120 [P.O.:120]  Out: -   I/O last 3 completed shifts:  In: 2231 [P.O.:240; I.V.:1891; IV Piggyback:100]  Out: 4950 [Urine:4950]    Physical Exam:     General Appearance:    Alert, cooperative, in no acute distress   Head:    Normocephalic, without obvious abnormality, atraumatic               Neck:   No adenopathy, supple, trachea midline, no thyromegaly, no   carotid bruit, no JVD       Lungs:     Clear to auscultation,respirations regular, even and                  unlabored    Heart:    Regular rhythm and normal rate, normal S1 and S2, no            murmur, no gallop, no rub, no click       Abdomen:     Normal bowel sounds, no masses, no organomegaly, soft        non-tender, non-distended, no guarding, no rebound                tenderness       Extremities:   Moves all extremities well, no edema, no cyanosis, no             redness               Neurologic:   No focal deficit noted       WBC WBC   Date Value Ref Range Status   06/09/2019 11.94 (H) 3.40 - 10.80 10*3/mm3 Final   06/08/2019 7.24 3.40 - 10.80 10*3/mm3 Final   06/07/2019 7.00 3.40 - 10.80 10*3/mm3 Final      HGB Hemoglobin   Date Value Ref Range Status   06/09/2019 11.8 (L) 13.0 - 17.7 g/dL Final   06/08/2019 11.4 (L) 13.0 - 17.7 g/dL Final   06/07/2019 10.1 (L) 13.0 - 17.7 g/dL Final      HCT Hematocrit   Date " Value Ref Range Status   06/09/2019 35.5 (L) 37.5 - 51.0 % Final   06/08/2019 33.7 (L) 37.5 - 51.0 % Final   06/07/2019 29.9 (L) 37.5 - 51.0 % Final      Platlets No results found for: LABPLAT   MCV MCV   Date Value Ref Range Status   06/09/2019 88.3 79.0 - 97.0 fL Final   06/08/2019 85.5 79.0 - 97.0 fL Final   06/07/2019 84.7 79.0 - 97.0 fL Final          Sodium Sodium   Date Value Ref Range Status   06/09/2019 132 (L) 136 - 145 mmol/L Final   06/09/2019 133 (L) 136 - 145 mmol/L Final   06/09/2019 133 (L) 136 - 145 mmol/L Final   06/09/2019 134 (L) 136 - 145 mmol/L Final   06/08/2019 132 (L) 136 - 145 mmol/L Final   06/08/2019 133 (L) 136 - 145 mmol/L Final   06/08/2019 135 (L) 136 - 145 mmol/L Final   06/08/2019 133 (L) 136 - 145 mmol/L Final   06/08/2019 131 (L) 136 - 145 mmol/L Final   06/07/2019 130 (L) 136 - 145 mmol/L Final   06/07/2019 126 (L) 136 - 145 mmol/L Final   06/07/2019 123 (L) 136 - 145 mmol/L Final   06/07/2019 117 (C) 136 - 145 mmol/L Final   06/07/2019 118 (C) 136 - 145 mmol/L Final   06/07/2019 117 (C) 136 - 145 mmol/L Final   06/07/2019 118 (C) 136 - 145 mmol/L Final   06/07/2019 119 (C) 136 - 145 mmol/L Final   06/07/2019 119 (C) 136 - 145 mmol/L Final   06/07/2019 119 (C) 136 - 145 mmol/L Final   06/06/2019 118 (C) 136 - 145 mmol/L Final   06/06/2019 119 (C) 136 - 145 mmol/L Final   06/06/2019 116 (C) 136 - 145 mmol/L Final   06/06/2019 117 (C) 136 - 145 mmol/L Final   06/06/2019 117 (C) 136 - 145 mmol/L Final      Potassium Potassium   Date Value Ref Range Status   06/09/2019 4.1 3.5 - 5.2 mmol/L Final   06/09/2019 4.2 3.5 - 5.2 mmol/L Final   06/09/2019 4.1 3.5 - 5.2 mmol/L Final   06/09/2019 3.6 3.5 - 5.2 mmol/L Final   06/08/2019 3.9 3.5 - 5.2 mmol/L Final   06/08/2019 4.0 3.5 - 5.2 mmol/L Final   06/08/2019 4.0 3.5 - 5.2 mmol/L Final   06/08/2019 4.5 3.5 - 5.2 mmol/L Final   06/08/2019 4.2 3.5 - 5.2 mmol/L Final   06/07/2019 4.4 3.5 - 5.2 mmol/L Final   06/07/2019 4.8 3.5 - 5.2 mmol/L  Final   06/07/2019 4.8 3.5 - 5.2 mmol/L Final   06/07/2019 4.6 3.5 - 5.2 mmol/L Final   06/07/2019 4.6 3.5 - 5.2 mmol/L Final   06/07/2019 4.3 3.5 - 5.2 mmol/L Final   06/07/2019 4.4 3.5 - 5.2 mmol/L Final   06/07/2019 4.0 3.5 - 5.2 mmol/L Final   06/07/2019 4.1 3.5 - 5.2 mmol/L Final   06/07/2019 4.4 3.5 - 5.2 mmol/L Final   06/06/2019 4.6 3.5 - 5.2 mmol/L Final   06/06/2019 4.8 3.5 - 5.2 mmol/L Final   06/06/2019 4.7 3.5 - 5.2 mmol/L Final   06/06/2019 4.8 3.5 - 5.2 mmol/L Final   06/06/2019 4.5 3.5 - 5.2 mmol/L Final      Chloride Chloride   Date Value Ref Range Status   06/09/2019 96 (L) 98 - 107 mmol/L Final   06/09/2019 97 (L) 98 - 107 mmol/L Final   06/09/2019 97 (L) 98 - 107 mmol/L Final   06/09/2019 97 (L) 98 - 107 mmol/L Final   06/08/2019 96 (L) 98 - 107 mmol/L Final   06/08/2019 97 (L) 98 - 107 mmol/L Final   06/08/2019 98 98 - 107 mmol/L Final   06/08/2019 97 (L) 98 - 107 mmol/L Final   06/08/2019 97 (L) 98 - 107 mmol/L Final   06/07/2019 95 (L) 98 - 107 mmol/L Final   06/07/2019 90 (L) 98 - 107 mmol/L Final   06/07/2019 88 (L) 98 - 107 mmol/L Final   06/07/2019 84 (L) 98 - 107 mmol/L Final   06/07/2019 86 (L) 98 - 107 mmol/L Final   06/07/2019 85 (L) 98 - 107 mmol/L Final   06/07/2019 85 (L) 98 - 107 mmol/L Final   06/07/2019 85 (L) 98 - 107 mmol/L Final   06/07/2019 85 (L) 98 - 107 mmol/L Final   06/07/2019 87 (L) 98 - 107 mmol/L Final   06/06/2019 85 (L) 98 - 107 mmol/L Final   06/06/2019 85 (L) 98 - 107 mmol/L Final   06/06/2019 85 (L) 98 - 107 mmol/L Final   06/06/2019 85 (L) 98 - 107 mmol/L Final   06/06/2019 84 (L) 98 - 107 mmol/L Final      CO2 CO2   Date Value Ref Range Status   06/09/2019 23.0 22.0 - 29.0 mmol/L Final   06/09/2019 23.0 22.0 - 29.0 mmol/L Final   06/09/2019 22.0 22.0 - 29.0 mmol/L Final   06/09/2019 22.0 22.0 - 29.0 mmol/L Final   06/08/2019 22.0 22.0 - 29.0 mmol/L Final   06/08/2019 23.0 22.0 - 29.0 mmol/L Final   06/08/2019 24.0 22.0 - 29.0 mmol/L Final   06/08/2019 23.0 22.0  - 29.0 mmol/L Final   06/08/2019 22.0 22.0 - 29.0 mmol/L Final   06/07/2019 23.0 22.0 - 29.0 mmol/L Final   06/07/2019 22.0 22.0 - 29.0 mmol/L Final   06/07/2019 22.0 22.0 - 29.0 mmol/L Final   06/07/2019 19.0 (L) 22.0 - 29.0 mmol/L Final   06/07/2019 20.0 (L) 22.0 - 29.0 mmol/L Final   06/07/2019 20.0 (L) 22.0 - 29.0 mmol/L Final   06/07/2019 21.0 (L) 22.0 - 29.0 mmol/L Final   06/07/2019 21.0 (L) 22.0 - 29.0 mmol/L Final   06/07/2019 19.0 (L) 22.0 - 29.0 mmol/L Final   06/07/2019 21.0 (L) 22.0 - 29.0 mmol/L Final   06/06/2019 21.0 (L) 22.0 - 29.0 mmol/L Final   06/06/2019 21.0 (L) 22.0 - 29.0 mmol/L Final   06/06/2019 19.0 (L) 22.0 - 29.0 mmol/L Final   06/06/2019 21.0 (L) 22.0 - 29.0 mmol/L Final   06/06/2019 22.0 22.0 - 29.0 mmol/L Final      BUN BUN   Date Value Ref Range Status   06/09/2019 14 8 - 23 mg/dL Final   06/09/2019 14 8 - 23 mg/dL Final   06/09/2019 14 8 - 23 mg/dL Final   06/09/2019 14 8 - 23 mg/dL Final   06/08/2019 14 8 - 23 mg/dL Final   06/08/2019 15 8 - 23 mg/dL Final   06/08/2019 12 8 - 23 mg/dL Final   06/08/2019 11 8 - 23 mg/dL Final   06/08/2019 12 8 - 23 mg/dL Final   06/07/2019 13 8 - 23 mg/dL Final   06/07/2019 13 8 - 23 mg/dL Final   06/07/2019 13 8 - 23 mg/dL Final   06/07/2019 13 8 - 23 mg/dL Final   06/07/2019 12 8 - 23 mg/dL Final   06/07/2019 12 8 - 23 mg/dL Final   06/07/2019 12 8 - 23 mg/dL Final   06/07/2019 13 8 - 23 mg/dL Final   06/07/2019 13 8 - 23 mg/dL Final   06/07/2019 14 8 - 23 mg/dL Final   06/06/2019 14 8 - 23 mg/dL Final   06/06/2019 15 8 - 23 mg/dL Final   06/06/2019 14 8 - 23 mg/dL Final   06/06/2019 14 8 - 23 mg/dL Final   06/06/2019 13 8 - 23 mg/dL Final      Creatinine Creatinine   Date Value Ref Range Status   06/09/2019 1.13 0.76 - 1.27 mg/dL Final   06/09/2019 1.00 0.76 - 1.27 mg/dL Final   06/09/2019 0.95 0.76 - 1.27 mg/dL Final   06/09/2019 1.00 0.76 - 1.27 mg/dL Final   06/08/2019 0.98 0.76 - 1.27 mg/dL Final   06/08/2019 1.05 0.76 - 1.27 mg/dL Final    06/08/2019 1.20 0.76 - 1.27 mg/dL Final   06/08/2019 1.13 0.76 - 1.27 mg/dL Final   06/08/2019 1.07 0.76 - 1.27 mg/dL Final   06/07/2019 1.02 0.76 - 1.27 mg/dL Final   06/07/2019 1.07 0.76 - 1.27 mg/dL Final   06/07/2019 1.00 0.76 - 1.27 mg/dL Final   06/07/2019 0.85 0.76 - 1.27 mg/dL Final   06/07/2019 0.84 0.76 - 1.27 mg/dL Final   06/07/2019 0.78 0.76 - 1.27 mg/dL Final   06/07/2019 0.84 0.76 - 1.27 mg/dL Final   06/07/2019 0.76 0.76 - 1.27 mg/dL Final   06/07/2019 0.73 (L) 0.76 - 1.27 mg/dL Final   06/07/2019 0.78 0.76 - 1.27 mg/dL Final   06/06/2019 0.82 0.76 - 1.27 mg/dL Final   06/06/2019 0.88 0.76 - 1.27 mg/dL Final   06/06/2019 0.93 0.76 - 1.27 mg/dL Final   06/06/2019 0.84 0.76 - 1.27 mg/dL Final   06/06/2019 0.83 0.76 - 1.27 mg/dL Final      Calcium Calcium   Date Value Ref Range Status   06/09/2019 9.0 8.6 - 10.5 mg/dL Final   06/09/2019 9.3 8.6 - 10.5 mg/dL Final   06/09/2019 9.6 8.6 - 10.5 mg/dL Final   06/09/2019 9.7 8.6 - 10.5 mg/dL Final   06/08/2019 10.0 8.6 - 10.5 mg/dL Final   06/08/2019 10.0 8.6 - 10.5 mg/dL Final   06/08/2019 9.8 8.6 - 10.5 mg/dL Final   06/08/2019 10.0 8.6 - 10.5 mg/dL Final   06/08/2019 9.6 8.6 - 10.5 mg/dL Final   06/07/2019 9.8 8.6 - 10.5 mg/dL Final   06/07/2019 9.5 8.6 - 10.5 mg/dL Final   06/07/2019 9.1 8.6 - 10.5 mg/dL Final   06/07/2019 8.8 8.6 - 10.5 mg/dL Final   06/07/2019 8.8 8.6 - 10.5 mg/dL Final   06/07/2019 8.8 8.6 - 10.5 mg/dL Final   06/07/2019 9.0 8.6 - 10.5 mg/dL Final   06/07/2019 8.6 8.6 - 10.5 mg/dL Final   06/07/2019 8.5 (L) 8.6 - 10.5 mg/dL Final   06/07/2019 8.6 8.6 - 10.5 mg/dL Final   06/06/2019 8.8 8.6 - 10.5 mg/dL Final   06/06/2019 8.7 8.6 - 10.5 mg/dL Final   06/06/2019 8.7 8.6 - 10.5 mg/dL Final   06/06/2019 8.5 (L) 8.6 - 10.5 mg/dL Final   06/06/2019 8.6 8.6 - 10.5 mg/dL Final      PO4 No results found for: CAPO4   Albumin No results found for: ALBUMIN   Magnesium No results found for: MG   Uric Acid No results found for: URICACID         Results Review:     I reviewed the patient's new clinical results.      amLODIPine 5 mg Oral Q24H   atorvastatin 10 mg Oral Nightly   budesonide-formoterol 2 puff Inhalation BID - RT   colchicine 0.6 mg Oral Daily   ipratropium-albuterol 3 mL Nebulization Q6H While Awake - RT   lisinopril 20 mg Oral Q24H   multivitamin 1 tablet Oral Daily   piperacillin-tazobactam 3.375 g Intravenous Q8H   polyethylene glycol 17 g Oral Daily   predniSONE 10 mg Oral Daily With Breakfast   sodium chloride 3 mL Intravenous Q12H       dextrose 75 mL/hr Last Rate: 75 mL/hr (06/09/19 0532)       Medication Review:     Assessment/Plan       PNA (pneumonia)    Shortness of breath    HTN (hypertension)    HLD (hyperlipidemia)    Anemia    Black lung disease (CMS/HCC)    Hyponatremia    Lung mass    History of alcohol abuse    Hypomagnesemia    Pneumonia of right lung due to infectious organism    Acute gout of foot      1- Hyponatremia- asymptomatic - 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 Improving.   2- HTN - not controlled on prednisone      Plan:  - Will stop D5W. Monitor Sodium q 6 hrs now.       Yodit Navarro MD  06/09/19  2:09 PM

## 2019-06-09 NOTE — PROGRESS NOTES
[facility]  PROGRESS NOTE    Patient Name: Florentin Mcallister  : 1950  MRN: [id number]    Date of Admission: 2019  Length of Stay: 5  Primary Care Physician: Provider, No Known    Subjective   Subjective     CC:  SOA    HPI:  Pt still c/o knees hurting and now states that his feet hurt- feels like he is about to have a gout flare.  Notes that his right great toe is more painful than left.       Review of Systems  Gen- No fevers, chills  CV- No chest pain, palpitations  Resp- No cough, dyspnea  GI- No N/V/D, abd pain  Otherwise ROS is negative except as mentioned in the HPI.    Objective   Objective     Vital Signs:   Temp:  [98.4 °F (36.9 °C)] 98.4 °F (36.9 °C)  Heart Rate:  [79-85] 79  Resp:  [16-18] 18  BP: (128-144)/(66-82) 144/66        Physical Exam:  Constitutional: No acute distress, awake, alert  HENT: NCAT, mucous membranes moist  Respiratory: rhonchi right lower lobe otherwise clear  Cardiovascular: RRR, no murmurs, rubs, or gallops, palpable pedal pulses bilaterally  Gastrointestinal: Positive bowel sounds, soft, nontender, nondistended  Musculoskeletal: No bilateral ankle edema. Right great toe with minimal erythema and minimally tender to palpation  Psychiatric: Appropriate affect, cooperative  Neurologic: Oriented x 3, strength symmetric in all extremities, Cranial Nerves grossly intact to confrontation, speech clear  Skin: No rashes    Results Reviewed:  I have personally reviewed current lab, radiology, and data and agree.    Results from last 7 days   Lab Units 19  0426 19  0405 19  0409  19  0945   WBC 10*3/mm3 11.94* 7.24 7.00   < > 6.82   HEMOGLOBIN g/dL 11.8* 11.4* 10.1*   < > 11.4*   HEMATOCRIT % 35.5* 33.7* 29.9*   < > 31.5*   PLATELETS 10*3/mm3 186 181 161   < > 175   INR   --   --   --   --  1.13    < > = values in this interval not displayed.     Results from last 7 days   Lab Units 19  0862  06/09/19  0426 06/09/19  0023  06/04/19  2141   SODIUM mmol/L 133* 133* 134*   < > 115*   POTASSIUM mmol/L 4.2 4.1 3.6   < > 4.0   CHLORIDE mmol/L 97* 97* 97*   < > 82*   CO2 mmol/L 23.0 22.0 22.0   < > 21.0*   BUN mg/dL 14 14 14   < > 17   CREATININE mg/dL 1.00 0.95 1.00   < > 0.88   GLUCOSE mg/dL 90 83 141*   < > 137*   CALCIUM mg/dL 9.3 9.6 9.7   < > 9.2   ALT (SGPT) U/L  --   --   --   --  23   AST (SGOT) U/L  --   --   --   --  17   TROPONIN T ng/mL  --   --   --   --  <0.010    < > = values in this interval not displayed.     Estimated Creatinine Clearance: 79.5 mL/min (by C-G formula based on SCr of 1 mg/dL).    Microbiology Results Abnormal     Procedure Component Value - Date/Time    Blood Culture - Blood, Arm, Left [[id number]] Collected:  06/04/19 2358    Lab Status:  Preliminary result Specimen:  Blood from Arm, Left Updated:  06/09/19 0016     Blood Culture No growth at 4 days    Blood Culture - Blood, Arm, Right [[id number]] Collected:  06/04/19 2350    Lab Status:  Preliminary result Specimen:  Blood from Arm, Right Updated:  06/09/19 0016     Blood Culture No growth at 4 days    MRSA Screen, PCR - Swab, Nares [[id number]]  (Normal) Collected:  06/05/19 0916    Lab Status:  Final result Specimen:  Swab from Nares Updated:  06/05/19 1131     MRSA, PCR Negative    Narrative:       MRSA Negative          Imaging Results (last 24 hours)     ** No results found for the last 24 hours. **               I have reviewed the medications:    Current Facility-Administered Medications:   •  acetaminophen (TYLENOL) tablet 650 mg, 650 mg, Oral, Q4H PRN, Saskia Flores APRN  •  amLODIPine (NORVASC) tablet 5 mg, 5 mg, Oral, Q24H, RamonYodit tejada MD, 5 mg at 06/08/19 2007  •  atorvastatin (LIPITOR) tablet 10 mg, 10 mg, Oral, Nightly, Saskia Flores APRN, 10 mg at 06/08/19 2007  •  budesonide-formoterol (SYMBICORT) 160-4.5 MCG/ACT inhaler 2 puff, 2 puff, Inhalation, BID - RT, Saskia Flores APRN, 2 puff at  06/09/19 0751  •  colchicine tablet 0.6 mg, 0.6 mg, Oral, Daily, Marai Del Rosario Nunez MD  •  dextrose (D5W) 5 % infusion, 75 mL/hr, Intravenous, Continuous, Ramon, Yodit Lundberg MD, Last Rate: 75 mL/hr at 06/09/19 0532, 75 mL/hr at 06/09/19 0532  •  ipratropium-albuterol (DUO-NEB) nebulizer solution 3 mL, 3 mL, Nebulization, Q4H PRN, Saskia Flores APRN  •  ipratropium-albuterol (DUO-NEB) nebulizer solution 3 mL, 3 mL, Nebulization, Q6H While Awake - RT, Bar Gage MD, 3 mL at 06/09/19 0751  •  lisinopril (PRINIVIL,ZESTRIL) tablet 20 mg, 20 mg, Oral, Q24H, Maria Del Rosario Nunez MD, 20 mg at 06/09/19 0841  •  Magnesium Sulfate 2 gram Bolus, followed by 8 gram infusion (total Mg dose 10 grams)- Mg less than or equal to 1mg/dL, 2 g, Intravenous, PRN **OR** Magnesium Sulfate 2 gram / 50mL Infusion (GIVE X 3 BAGS TO EQUAL 6GM TOTAL DOSE) - Mg 1.1 - 1.5 mg/dl, 2 g, Intravenous, PRN **OR** Magnesium Sulfate 4 gram infusion- Mg 1.6-1.9 mg/dL, 4 g, Intravenous, PRN, Shannon Curry DO, Last Rate: 25 mL/hr at 06/05/19 1400, 4 g at 06/05/19 1400  •  multivitamin (THERAGRAN) tablet 1 tablet, 1 tablet, Oral, Daily, Saskia Flores APRN, 1 tablet at 06/09/19 0841  •  ondansetron (ZOFRAN) tablet 4 mg, 4 mg, Oral, Q6H PRN **OR** ondansetron (ZOFRAN) injection 4 mg, 4 mg, Intravenous, Q6H PRN, Saskia Flores APRN, 4 mg at 06/07/19 0833  •  piperacillin-tazobactam (ZOSYN) 3.375 g in iso-osmotic dextrose 50 ml (premix), 3.375 g, Intravenous, Q8H, Alab Nur, [facility], 3.375 g at 06/09/19 0408  •  polyethylene glycol 3350 powder (packet), 17 g, Oral, Daily, Maria Del Rosario Nunez MD, 17 g at 06/09/19 0841  •  potassium chloride (MICRO-K) CR capsule 40 mEq, 40 mEq, Oral, PRN, 40 mEq at 06/09/19 0531 **OR** potassium chloride (KLOR-CON) packet 40 mEq, 40 mEq, Oral, PRN **OR** potassium chloride 10 mEq in 100 mL IVPB, 10 mEq, Intravenous, Q1H PRN, Shannon Curry DO  •  predniSONE (DELTASONE) tablet 10 mg, 10 mg,  Oral, Daily With Breakfast, Bar Gage MD, 10 mg at 06/09/19 0841  •  sodium chloride 0.9 % flush 10 mL, 10 mL, Intravenous, PRN, Clarke Kwok MD  •  sodium chloride 0.9 % flush 3 mL, 3 mL, Intravenous, Q12H, Saskia Flores APRN, 3 mL at 06/08/19 2007  •  sodium chloride 0.9 % flush 3-10 mL, 3-10 mL, Intravenous, PRN, Saskia Flores APRN      Assessment/Plan   Assessment / Plan     Active Hospital Problems    Diagnosis POA   • **PNA (pneumonia) [J18.9] Yes     Priority: High   • Hyponatremia [E87.1] Yes     Priority: High     Initial Sodium 115 on 6/4/19      • Lung mass [R91.8] Yes     Priority: High   • Acute gout of foot [M10.9] Unknown     Priority: Medium   • Shortness of breath [R06.02] Yes     Priority: Medium   • Anemia [D64.9] Yes     Priority: Medium   • Hypomagnesemia [E83.42] Yes     Priority: Medium   • HTN (hypertension) [I10] Yes     Priority: Low   • HLD (hyperlipidemia) [E78.5] Yes     Priority: Low   • Black lung disease (CMS/HCC) [J60] Yes     Priority: Low   • History of alcohol abuse [Z87.898] Yes     Priority: Low     Quit drinking 1 month ago. Pt reports drinking about 2-3 beers and 2-3 mixed drinks about 3-4 times a week on average. Tapered his alcohol intake over the past year in order to quit.      • Pneumonia of right lung due to infectious organism [J18.9] Unknown     Added automatically from request for surgery [id number]            Brief Hospital Course to date:  Florentin Mcallister is a 69 y.o. male with past medical history significant for black lung, remote tobacco abuse who presents to the ED due to difficulty breathing. He was recently treated (two weeks prior to this admission) for PNA and hyponatremia (Na+ 115 at OSH) at an OSH.  He had been doing well until three days prior to this admission when his SOA increased and he became weak. On presentation to our ED, he was significantly hyponatremic and imaging showed RLL mass vs PNA.  He was admitted to our  "service for further evaluation.      Pneumonia vs lung malignancy  -CXR, CTA chest obtained; reviewed  -pt treated for PNA 2 weeks ago at [facility] where he was admitted for 5 days  -blood and sputum cx ordered  --stopped Vanc as MRSA probe NEGATIVE, continue Zosyn for now  --Pulm consulted for possible bronch for diagnosis, appreciate their input. Plan for EBUS once Na+ better/ >120. Now planning for Monday, June 10th  -duo nebs scheduled and PRN  -pt on prednisone 20mg/daily (from recent hospitalization??), tapering as per Pulm  -symptom mgt  -currently maintaining saturations on room air; PRN oxygen         Hyponatremia  -appears to be SIADH. NAL consulted, pt on fluid restriction and has received PO Na+ per NAL with minimal improvement in Na+. Started Tolvaptan. Dr. Navarro follows. Appreciate his input.     Acute gout  --will give colchicine today.  Appears pretty mild flare currently.     Leuckocytosis  --new today, could be due to above (acute gout), however, if worsens and/or pt becomes febrile, would expand ABX coverage after repeating cultures.        Hx of Black Lung Disease  -per patient he takes albuterol treatments Prn at home and Symbicort (although Symbicort not listed on med rec); Symbicort ordered  -PRN and scheduled duo nebs  -on room air at baseline     Anemia  -baseline H/H unclear     HTN  -stable  -resumed ACEI    HLD  -continue routine Zocor     Hx of alcohol abuse  -pt quit 1 month ago  -tapered his intake over the past year in order to quit; quit because he \"felt so bad\"  -previously drank 2-3 beers and 2-3 mixed drinks about 3 to 4 days a week   -no hx of withdrawal seizures   -continue daily thiamine, MVI and folic acid      Hypomagnesemia  -electrolyte replacement protocol       DVT Prophylaxis:  mechanical    Disposition: I expect the patient to be discharged TBD    CODE STATUS:   Code Status and Medical Interventions:   Ordered at: 06/05/19 0150     Level Of Support Discussed With:    Patient "     Code Status:    CPR     Medical Interventions (Level of Support Prior to Arrest):    Full         Electronically signed by Maria Del Rosario Nunez MD, 06/09/19, 9:51 AM.

## 2019-06-10 NOTE — OP NOTE
BRONCHOSCOPY WITH ENDOBRONCHIAL ULTRASOUND  Progress Note    Florentin Prince Mcallister  6/4/2019 - 6/10/2019    Pre-op Diagnosis:   Lung mass [R91.8]  Pneumonia of right lung due to infectious organism, unspecified part of lung [J18.9]       Post-Op Diagnosis Codes:     * Lung mass [R91.8]     * Pneumonia of right lung due to infectious organism, unspecified part of lung [J18.9]    Procedure/CPT® Codes:      Procedure(s):  BRONCHOSCOPY WITH ENDOBRONCHIAL ULTRASOUND    Surgeon(s):  Bar Gage MD    Anesthesia: General    Staff:   Circulator: Melissa Herbert RN  Endo Technician: Kelle Wesley    Estimated Blood Loss: 25 ml    Urine Voided: * No values recorded between 6/10/2019  1:50 PM and 6/10/2019  2:49 PM *    Specimens:                Specimens     ID Source Type Tests Collected By Collected At Frozen?      1 Lung, Right Lower Lobe Wash · FUNGAL CULTURE  · GRAM STAIN  · AFB STAIN  · AFB CULTURE  · FUNGUS SMEAR  · RESPIRATORY CULTURE   Bar Gage MD 6/10/19 1438      Description: RLL WASH FOR MICRO    A Lymph Node Tissue · TISSUE PATHOLOGY EXAM   Bar Gage MD 6/10/19 1421      Description: [facility] ST 7    B Lymph Node Tissue · TISSUE PATHOLOGY EXAM   Bar Gage MD 6/10/19 1426      Description: ST 11 R [facility]    C Lung, Right Lower Lobe Wash · NON-GYNECOLOGIC CYTOLOGY   Bar Gage MD 6/10/19 1434      Description: RLL WASH FOR CYTO    D Lung, Right Lower Lobe Brushing · NON-GYNECOLOGIC CYTOLOGY   Bar Gage MD 6/10/19 1439      Description: RLL BRUSH    E Lung, Right Lower Lobe Tissue · TISSUE PATHOLOGY EXAM   Bar Gage MD 6/10/19 1441      Description: RLL BX                Drains:      Findings:     Case was done under general anesthesia.  Once anesthesia was induced and the patient intubated with a 9 ET tube the standard bronchoscope was passed via the existing ET tube.    The tip of the ET tube was in the mid  trachea.  The lower trachea and the main miranda appeared normal.    A complete inspection of the left endobronchial tree revealed normal anatomy and no endobronchial lesions or abnormal secretions.    Inspection of the right endobronchial tree was then performed.  There is widening of the right upper lobe miranda.  There was a visible endobronchial lesion obstructing several basilarsegments of the right lower lobe.  This appeared to be neoplastic with abnormal vascularity and irregular mucosa.  Photodocumentation obtained    The standard bronchoscope was then removed and the EBUS bronchoscope inserted.  A survey of the lower mediastinal lymph nodes was performed.  There was significant enlargement of lymph nodes with adequate targets at stations 7 and 11 R.  There were some small lymph nodes present at 4R and 10 R but vascularity was prominent so that these stations were not biopsied.    Multiple transbronchial needle aspirates were performed at station 7 and 11R in that order and specimen sent for full pathologic analysis.    The standard bronchoscope was then reinserted and bronchial washings and brushings were taken in the right lower lobe as well as multiple endobronchial biopsies of the abnormal lesion.  There was mild self-limited bleeding that was controlled at the end of the procedure.    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

## 2019-06-10 NOTE — PROGRESS NOTES
[facility] Medicine Services  PROGRESS NOTE    Patient Name: Florentin Mcallister  : 1950  MRN: [id number]    Date of Admission: 2019  Length of Stay: 6  Primary Care Physician: Provider, No Known    Subjective   Subjective     CC:  SOA    HPI:    Now with gouty right knee pain in addition to toe pain. Cough with minimal sputum. No pleurisy. Hungry, waiting on bronchoscopy.      Review of Systems  Gen- No fevers, chills  CV- No chest pain, palpitations  Resp- No cough, dyspnea  GI- No N/V/D, abd pain  Otherwise ROS is negative except as mentioned in the HPI.    Objective   Objective     Vital Signs:   Temp:  [98 °F (36.7 °C)-98.3 °F (36.8 °C)] 98 °F (36.7 °C)  Heart Rate:  [72-90] 90  Resp:  [18-19] 18  BP: (128-136)/(55-67) 136/67        Physical Exam:    NAD, alert and oriented  OP clear, MMM  Neck supple  No LAD  RRR  Decreased at bases, otherwise clear  +BS, ND, NT  VALENCIA, but R knee TTP, mild ant/r effusion  No rashes  Normal affect    Results Reviewed:  I have personally reviewed current lab, radiology, and data and agree.    Results from last 7 days   Lab Units 06/10/19  0458 19  0426 19  0405  19  0945   WBC 10*3/mm3 11.06* 11.94* 7.24   < > 6.82   HEMOGLOBIN g/dL 11.2* 11.8* 11.4*   < > 11.4*   HEMATOCRIT % 34.2* 35.5* 33.7*   < > 31.5*   PLATELETS 10*3/mm3 159 186 181   < > 175   INR   --   --   --   --  1.13    < > = values in this interval not displayed.     Results from last 7 days   Lab Units 06/10/19  0458 06/10/19  0028 19  1823  19  2141   SODIUM mmol/L 131* 130* 130*   < > 115*   POTASSIUM mmol/L 4.3 4.1 4.3   < > 4.0   CHLORIDE mmol/L 96* 96* 95*   < > 82*   CO2 mmol/L 23.0 22.0 24.0   < > 21.0*   BUN mg/dL 15 15 15   < > 17   CREATININE mg/dL 1.04 1.07 1.08   < > 0.88   GLUCOSE mg/dL 98 97 132*   < > 137*   CALCIUM mg/dL 9.5 9.5 9.7   < > 9.2   ALT (SGPT) U/L  --   --   --   --  23   AST (SGOT) U/L  --   --   --   --  17   TROPONIN  T ng/mL  --   --   --   --  <0.010    < > = values in this interval not displayed.     Estimated Creatinine Clearance: 76.4 mL/min (by C-G formula based on SCr of 1.04 mg/dL).    Microbiology Results Abnormal     Procedure Component Value - Date/Time    Blood Culture - Blood, Arm, Left [[id number]] Collected:  06/04/19 2358    Lab Status:  Final result Specimen:  Blood from Arm, Left Updated:  06/10/19 0016     Blood Culture No growth at 5 days    Blood Culture - Blood, Arm, Right [[id number]] Collected:  06/04/19 2350    Lab Status:  Final result Specimen:  Blood from Arm, Right Updated:  06/10/19 0016     Blood Culture No growth at 5 days    MRSA Screen, PCR - Swab, Nares [[id number]]  (Normal) Collected:  06/05/19 0916    Lab Status:  Final result Specimen:  Swab from Nares Updated:  06/05/19 1131     MRSA, PCR Negative    Narrative:       MRSA Negative          Imaging Results (last 24 hours)     ** No results found for the last 24 hours. **               I have reviewed the medications:    Current Facility-Administered Medications:   •  acetaminophen (TYLENOL) tablet 650 mg, 650 mg, Oral, Q4H PRN, Saskia Flores APRN  •  amLODIPine (NORVASC) tablet 5 mg, 5 mg, Oral, Q24H, RamonYodit MD, 5 mg at 06/09/19 2018  •  atorvastatin (LIPITOR) tablet 10 mg, 10 mg, Oral, Nightly, Saskia Flores APRN, 10 mg at 06/09/19 2018  •  budesonide-formoterol (SYMBICORT) 160-4.5 MCG/ACT inhaler 2 puff, 2 puff, Inhalation, BID - RT, Saskia Flores APRN, 2 puff at 06/10/19 0753  •  colchicine tablet 0.6 mg, 0.6 mg, Oral, Daily, Maria Del Rosario Nunez MD, 0.6 mg at 06/10/19 0837  •  ipratropium-albuterol (DUO-NEB) nebulizer solution 3 mL, 3 mL, Nebulization, Q4H PRN, Saskia Flores APRN  •  ipratropium-albuterol (DUO-NEB) nebulizer solution 3 mL, 3 mL, Nebulization, Q6H While Awake - RT, Bar Gage MD, 3 mL at 06/10/19 0753  •  lisinopril (PRINIVIL,ZESTRIL) tablet 20 mg, 20 mg, Oral, Q24H, Maria Del Rosario Nunez  MD Carlitos, 20 mg at 06/10/19 0837  •  Magnesium Sulfate 2 gram Bolus, followed by 8 gram infusion (total Mg dose 10 grams)- Mg less than or equal to 1mg/dL, 2 g, Intravenous, PRN **OR** Magnesium Sulfate 2 gram / 50mL Infusion (GIVE X 3 BAGS TO EQUAL 6GM TOTAL DOSE) - Mg 1.1 - 1.5 mg/dl, 2 g, Intravenous, PRN **OR** Magnesium Sulfate 4 gram infusion- Mg 1.6-1.9 mg/dL, 4 g, Intravenous, PRN, Patricio, Shannon G, DO, Last Rate: 25 mL/hr at 06/05/19 1400, 4 g at 06/05/19 1400  •  methylPREDNISolone sodium succinate (SOLU-Medrol) injection 40 mg, 40 mg, Intravenous, Once, Zane Renteria MD  •  multivitamin (THERAGRAN) tablet 1 tablet, 1 tablet, Oral, Daily, Saskia Flores APRN, 1 tablet at 06/09/19 0841  •  ondansetron (ZOFRAN) tablet 4 mg, 4 mg, Oral, Q6H PRN **OR** ondansetron (ZOFRAN) injection 4 mg, 4 mg, Intravenous, Q6H PRN, Saskia Flores APRN, 4 mg at 06/07/19 0833  •  piperacillin-tazobactam (ZOSYN) 3.375 g in iso-osmotic dextrose 50 ml (premix), 3.375 g, Intravenous, Q8H, Zane Renteria MD  •  polyethylene glycol 3350 powder (packet), 17 g, Oral, Daily, Maria Del Rosario Nunez MD, 17 g at 06/09/19 0841  •  potassium chloride (MICRO-K) CR capsule 40 mEq, 40 mEq, Oral, PRN, 40 mEq at 06/09/19 0531 **OR** potassium chloride (KLOR-CON) packet 40 mEq, 40 mEq, Oral, PRN **OR** potassium chloride 10 mEq in 100 mL IVPB, 10 mEq, Intravenous, Q1H PRN, Patricio, Shannon G, DO  •  sodium chloride 0.9 % flush 10 mL, 10 mL, Intravenous, PRN, Clarke Kwok MD  •  sodium chloride 0.9 % flush 3 mL, 3 mL, Intravenous, Q12H, Saskia Flores APRN, 3 mL at 06/08/19 2007  •  sodium chloride 0.9 % flush 3-10 mL, 3-10 mL, Intravenous, PRN, Saskia Flores APRN      Assessment/Plan   Assessment / Plan     Active Hospital Problems    Diagnosis POA   • **PNA (pneumonia) [J18.9] Yes   • Acute gout of foot [M10.9] Unknown   • Shortness of breath [R06.02] Yes   • HTN (hypertension) [I10] Yes   • HLD (hyperlipidemia) [E78.5] Yes   •  Anemia [D64.9] Yes   • 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 Pt reports drinking about 2-3 beers and 2-3 mixed drinks about 3-4 times a week on average. Tapered his alcohol intake over the past year in order to quit.      • Hypomagnesemia [E83.42] Yes   • Pneumonia of right lung due to infectious organism [J18.9] Unknown     Added automatically from request for surgery [id number]            Brief Hospital Course to date:  Florentin Mcallister is a 69 y.o. male with past medical history significant for black lung, remote tobacco abuse who presents to the ED due to difficulty breathing. He was recently treated (two weeks prior to this admission) for PNA and hyponatremia (Na+ 115 at OSH) at an OSH.  He had been doing well until three days prior to this admission when his SOA increased and he became weak. On presentation to our ED, he was significantly hyponatremic and imaging showed RLL mass vs PNA.  He was admitted to our service for further evaluation.      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 NAL consulted and has been given Tolvaptan, further management per nephrology. Low but improved and stable.    Acute gout  --continue colchicine, steroids x 1 IV    Leuckocytosis  --new today, could be due to above (acute gout), however, if worsens and/or pt becomes febrile, would expand ABX coverage after repeating cultures.        Hx of Black Lung  "Disease  -per patient he takes albuterol treatments Prn at home and Symbicort (although Symbicort not listed on med rec); Symbicort ordered  -PRN and scheduled duo nebs  -on room air at baseline     Anemia  -baseline H/H unclear     HTN  -stable  -resumed ACEI    HLD  -continue routine Zocor     Hx of alcohol abuse  -pt quit 1 month ago  -tapered his intake over the past year in order to quit; quit because he \"felt so bad\"  -previously drank 2-3 beers and 2-3 mixed drinks about 3 to 4 days a week   -no hx of withdrawal seizures   -continue daily thiamine, MVI and folic acid      Hypomagnesemia  -electrolyte replacement protocol       DVT Prophylaxis:  mechanical    Disposition: I expect the patient to be discharged TBD    CODE STATUS:   Code Status and Medical Interventions:   Ordered at: 06/05/19 0150     Level Of Support Discussed With:    Patient     Code Status:    CPR     Medical Interventions (Level of Support Prior to Arrest):    Full         Electronically signed by Zane Renteria MD, 06/10/19, 11:17 AM.    "

## 2019-06-10 NOTE — PROGRESS NOTES
Continued Stay Note  [facility]     Patient Name: Florentin Mcallister  MRN: [id number]  Today's Date: 6/10/2019    Admit Date: 6/4/2019    Discharge Plan     Row Name 06/10/19 1202       Plan    Plan  Update    Patient/Family in Agreement with Plan  yes    Plan Comments  I spoke with the pt and family. Going for a bronch today. No new Dc needs at this time. I will follow.        Discharge Codes    No documentation.       Expected Discharge Date and Time     Expected Discharge Date Expected Discharge Time    Jun 14, 2019             Hilaria Kaur RN

## 2019-06-10 NOTE — PROGRESS NOTES
"   LOS: 6 days    Patient Care Team:  Provider, No Known as PCP - General        Subjective     Interval History:     No acute events overnight. No new complaints.     Review of Systems:   No CP or SOA    Objective     Vital Sign Min/Max for last 24 hours  Temp  Min: 98 °F (36.7 °C)  Max: 98.3 °F (36.8 °C)   BP  Min: 128/55  Max: 136/67   Pulse  Min: 72  Max: 90   Resp  Min: 18  Max: 19   No Data Recorded   No Data Recorded   No Data Recorded     Flowsheet Rows      First Filed Value   Admission Height  172.7 cm (68\") Documented at 06/04/2019 2128   Admission Weight  96.2 kg (212 lb) Documented at 06/04/2019 2128          No intake/output data recorded.  I/O last 3 completed shifts:  In: 1476 [P.O.:480; I.V.:896; IV Piggyback:100]  Out: 1220 [Urine:1220]    Physical Exam:     General Appearance:    Alert, cooperative, in no acute distress   Head:    Normocephalic, without obvious abnormality, atraumatic               Neck:   No adenopathy, supple, trachea midline, no thyromegaly, no   carotid bruit, no JVD       Lungs:     Clear to auscultation,respirations regular, even and                  unlabored    Heart:    Regular rhythm and normal rate, normal S1 and S2, no            murmur, no gallop, no rub, no click       Abdomen:     Normal bowel sounds, no masses, no organomegaly, soft        non-tender, non-distended, no guarding, no rebound                tenderness       Extremities:   Moves all extremities well, no edema, no cyanosis, no             redness               Neurologic:   No focal deficit noted       WBC WBC   Date Value Ref Range Status   06/10/2019 11.06 (H) 3.40 - 10.80 10*3/mm3 Final   06/09/2019 11.94 (H) 3.40 - 10.80 10*3/mm3 Final   06/08/2019 7.24 3.40 - 10.80 10*3/mm3 Final      HGB Hemoglobin   Date Value Ref Range Status   06/10/2019 11.2 (L) 13.0 - 17.7 g/dL Final   06/09/2019 11.8 (L) 13.0 - 17.7 g/dL Final   06/08/2019 11.4 (L) 13.0 - 17.7 g/dL Final      HCT Hematocrit   Date Value Ref " Range Status   06/10/2019 34.2 (L) 37.5 - 51.0 % Final   06/09/2019 35.5 (L) 37.5 - 51.0 % Final   06/08/2019 33.7 (L) 37.5 - 51.0 % Final      Platlets No results found for: LABPLAT   MCV MCV   Date Value Ref Range Status   06/10/2019 88.8 79.0 - 97.0 fL Final   06/09/2019 88.3 79.0 - 97.0 fL Final   06/08/2019 85.5 79.0 - 97.0 fL Final          Sodium Sodium   Date Value Ref Range Status   06/10/2019 131 (L) 136 - 145 mmol/L Final   06/10/2019 130 (L) 136 - 145 mmol/L Final   06/09/2019 130 (L) 136 - 145 mmol/L Final   06/09/2019 132 (L) 136 - 145 mmol/L Final   06/09/2019 133 (L) 136 - 145 mmol/L Final   06/09/2019 133 (L) 136 - 145 mmol/L Final   06/09/2019 134 (L) 136 - 145 mmol/L Final   06/08/2019 132 (L) 136 - 145 mmol/L Final   06/08/2019 133 (L) 136 - 145 mmol/L Final   06/08/2019 135 (L) 136 - 145 mmol/L Final   06/08/2019 133 (L) 136 - 145 mmol/L Final   06/08/2019 131 (L) 136 - 145 mmol/L Final   06/07/2019 130 (L) 136 - 145 mmol/L Final   06/07/2019 126 (L) 136 - 145 mmol/L Final   06/07/2019 123 (L) 136 - 145 mmol/L Final   06/07/2019 117 (C) 136 - 145 mmol/L Final   06/07/2019 118 (C) 136 - 145 mmol/L Final   06/07/2019 117 (C) 136 - 145 mmol/L Final      Potassium Potassium   Date Value Ref Range Status   06/10/2019 4.3 3.5 - 5.2 mmol/L Final   06/10/2019 4.1 3.5 - 5.2 mmol/L Final   06/09/2019 4.3 3.5 - 5.2 mmol/L Final   06/09/2019 4.1 3.5 - 5.2 mmol/L Final   06/09/2019 4.2 3.5 - 5.2 mmol/L Final   06/09/2019 4.1 3.5 - 5.2 mmol/L Final   06/09/2019 3.6 3.5 - 5.2 mmol/L Final   06/08/2019 3.9 3.5 - 5.2 mmol/L Final   06/08/2019 4.0 3.5 - 5.2 mmol/L Final   06/08/2019 4.0 3.5 - 5.2 mmol/L Final   06/08/2019 4.5 3.5 - 5.2 mmol/L Final   06/08/2019 4.2 3.5 - 5.2 mmol/L Final   06/07/2019 4.4 3.5 - 5.2 mmol/L Final   06/07/2019 4.8 3.5 - 5.2 mmol/L Final   06/07/2019 4.8 3.5 - 5.2 mmol/L Final   06/07/2019 4.6 3.5 - 5.2 mmol/L Final   06/07/2019 4.6 3.5 - 5.2 mmol/L Final   06/07/2019 4.3 3.5 - 5.2  mmol/L Final      Chloride Chloride   Date Value Ref Range Status   06/10/2019 96 (L) 98 - 107 mmol/L Final   06/10/2019 96 (L) 98 - 107 mmol/L Final   06/09/2019 95 (L) 98 - 107 mmol/L Final   06/09/2019 96 (L) 98 - 107 mmol/L Final   06/09/2019 97 (L) 98 - 107 mmol/L Final   06/09/2019 97 (L) 98 - 107 mmol/L Final   06/09/2019 97 (L) 98 - 107 mmol/L Final   06/08/2019 96 (L) 98 - 107 mmol/L Final   06/08/2019 97 (L) 98 - 107 mmol/L Final   06/08/2019 98 98 - 107 mmol/L Final   06/08/2019 97 (L) 98 - 107 mmol/L Final   06/08/2019 97 (L) 98 - 107 mmol/L Final   06/07/2019 95 (L) 98 - 107 mmol/L Final   06/07/2019 90 (L) 98 - 107 mmol/L Final   06/07/2019 88 (L) 98 - 107 mmol/L Final   06/07/2019 84 (L) 98 - 107 mmol/L Final   06/07/2019 86 (L) 98 - 107 mmol/L Final   06/07/2019 85 (L) 98 - 107 mmol/L Final      CO2 CO2   Date Value Ref Range Status   06/10/2019 23.0 22.0 - 29.0 mmol/L Final   06/10/2019 22.0 22.0 - 29.0 mmol/L Final   06/09/2019 24.0 22.0 - 29.0 mmol/L Final   06/09/2019 23.0 22.0 - 29.0 mmol/L Final   06/09/2019 23.0 22.0 - 29.0 mmol/L Final   06/09/2019 22.0 22.0 - 29.0 mmol/L Final   06/09/2019 22.0 22.0 - 29.0 mmol/L Final   06/08/2019 22.0 22.0 - 29.0 mmol/L Final   06/08/2019 23.0 22.0 - 29.0 mmol/L Final   06/08/2019 24.0 22.0 - 29.0 mmol/L Final   06/08/2019 23.0 22.0 - 29.0 mmol/L Final   06/08/2019 22.0 22.0 - 29.0 mmol/L Final   06/07/2019 23.0 22.0 - 29.0 mmol/L Final   06/07/2019 22.0 22.0 - 29.0 mmol/L Final   06/07/2019 22.0 22.0 - 29.0 mmol/L Final   06/07/2019 19.0 (L) 22.0 - 29.0 mmol/L Final   06/07/2019 20.0 (L) 22.0 - 29.0 mmol/L Final   06/07/2019 20.0 (L) 22.0 - 29.0 mmol/L Final      BUN BUN   Date Value Ref Range Status   06/10/2019 15 8 - 23 mg/dL Final   06/10/2019 15 8 - 23 mg/dL Final   06/09/2019 15 8 - 23 mg/dL Final   06/09/2019 14 8 - 23 mg/dL Final   06/09/2019 14 8 - 23 mg/dL Final   06/09/2019 14 8 - 23 mg/dL Final   06/09/2019 14 8 - 23 mg/dL Final   06/08/2019  14 8 - 23 mg/dL Final   06/08/2019 15 8 - 23 mg/dL Final   06/08/2019 12 8 - 23 mg/dL Final   06/08/2019 11 8 - 23 mg/dL Final   06/08/2019 12 8 - 23 mg/dL Final   06/07/2019 13 8 - 23 mg/dL Final   06/07/2019 13 8 - 23 mg/dL Final   06/07/2019 13 8 - 23 mg/dL Final   06/07/2019 13 8 - 23 mg/dL Final   06/07/2019 12 8 - 23 mg/dL Final   06/07/2019 12 8 - 23 mg/dL Final      Creatinine Creatinine   Date Value Ref Range Status   06/10/2019 1.04 0.76 - 1.27 mg/dL Final   06/10/2019 1.07 0.76 - 1.27 mg/dL Final   06/09/2019 1.08 0.76 - 1.27 mg/dL Final   06/09/2019 1.13 0.76 - 1.27 mg/dL Final   06/09/2019 1.00 0.76 - 1.27 mg/dL Final   06/09/2019 0.95 0.76 - 1.27 mg/dL Final   06/09/2019 1.00 0.76 - 1.27 mg/dL Final   06/08/2019 0.98 0.76 - 1.27 mg/dL Final   06/08/2019 1.05 0.76 - 1.27 mg/dL Final   06/08/2019 1.20 0.76 - 1.27 mg/dL Final   06/08/2019 1.13 0.76 - 1.27 mg/dL Final   06/08/2019 1.07 0.76 - 1.27 mg/dL Final   06/07/2019 1.02 0.76 - 1.27 mg/dL Final   06/07/2019 1.07 0.76 - 1.27 mg/dL Final   06/07/2019 1.00 0.76 - 1.27 mg/dL Final   06/07/2019 0.85 0.76 - 1.27 mg/dL Final   06/07/2019 0.84 0.76 - 1.27 mg/dL Final   06/07/2019 0.78 0.76 - 1.27 mg/dL Final      Calcium Calcium   Date Value Ref Range Status   06/10/2019 9.5 8.6 - 10.5 mg/dL Final   06/10/2019 9.5 8.6 - 10.5 mg/dL Final   06/09/2019 9.7 8.6 - 10.5 mg/dL Final   06/09/2019 9.0 8.6 - 10.5 mg/dL Final   06/09/2019 9.3 8.6 - 10.5 mg/dL Final   06/09/2019 9.6 8.6 - 10.5 mg/dL Final   06/09/2019 9.7 8.6 - 10.5 mg/dL Final   06/08/2019 10.0 8.6 - 10.5 mg/dL Final   06/08/2019 10.0 8.6 - 10.5 mg/dL Final   06/08/2019 9.8 8.6 - 10.5 mg/dL Final   06/08/2019 10.0 8.6 - 10.5 mg/dL Final   06/08/2019 9.6 8.6 - 10.5 mg/dL Final   06/07/2019 9.8 8.6 - 10.5 mg/dL Final   06/07/2019 9.5 8.6 - 10.5 mg/dL Final   06/07/2019 9.1 8.6 - 10.5 mg/dL Final   06/07/2019 8.8 8.6 - 10.5 mg/dL Final   06/07/2019 8.8 8.6 - 10.5 mg/dL Final   06/07/2019 8.8 8.6 - 10.5  mg/dL Final      PO4 No results found for: CAPO4   Albumin No results found for: ALBUMIN   Magnesium No results found for: MG   Uric Acid No results found for: URICACID        Results Review:     I reviewed the patient's new clinical results.      amLODIPine 5 mg Oral Q24H   atorvastatin 10 mg Oral Nightly   budesonide-formoterol 2 puff Inhalation BID - RT   colchicine 0.6 mg Oral Daily   ipratropium-albuterol 3 mL Nebulization Q6H While Awake - RT   lisinopril 20 mg Oral Q24H   multivitamin 1 tablet Oral Daily   polyethylene glycol 17 g Oral Daily   sodium chloride 3 mL Intravenous Q12H          Medication Review:     Assessment/Plan       PNA (pneumonia)    Shortness of breath    HTN (hypertension)    HLD (hyperlipidemia)    Anemia    Black lung disease (CMS/HCC)    Hyponatremia    Lung mass    History of alcohol abuse    Hypomagnesemia    Pneumonia of right lung due to infectious organism    Acute gout of foot      1- Hyponatremia- asymptomatic - 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 Improving.   2- HTN - controlled      Plan:  - Fluid restriction 1 lit/day once eating.       Yodit Navarro MD  06/10/19  10:11 AM

## 2019-06-10 NOTE — PLAN OF CARE
Problem: Fall Risk (Adult)  Goal: Absence of Fall  Outcome: Ongoing (interventions implemented as appropriate)   06/10/19 1722   Fall Risk (Adult)   Absence of Fall making progress toward outcome       Problem: Patient Care Overview  Goal: Plan of Care Review  Outcome: Ongoing (interventions implemented as appropriate)   06/10/19 0246 06/10/19 1302 06/10/19 1722   Plan of Care Review   Progress no change --  --    OTHER   Outcome Summary --  --  pt to bronchoscopy, tolerated well, still complains of pain in knees from gout flare up   Coping/Psychosocial   Plan of Care Reviewed With --  patient;spouse --      Goal: Discharge Needs Assessment  Outcome: Ongoing (interventions implemented as appropriate)   06/05/19 0751 06/05/19 0925 06/09/19 1641   Discharge Needs Assessment   Readmission Within the Last 30 Days --  --  no previous admission in last 30 days   Concerns to be Addressed --  --  no discharge needs identified   Patient/Family Anticipates Transition to home with family --  --    Patient/Family Anticipated Services at Transition  --  --    Transportation Concerns --  --  car, none   Transportation Anticipated family or friend will provide --  --    Anticipated Changes Related to Illness --  --  none   Equipment Needed After Discharge --  none --    Disability   Equipment Currently Used at Home --  cane, straight --

## 2019-06-10 NOTE — ANESTHESIA PROCEDURE NOTES
Airway  Urgency: elective    Airway not difficult    General Information and Staff    Patient location during procedure: OR  CRNA: Zion Dueñas CRNA    Indications and Patient Condition  Indications for airway management: airway protection    Preoxygenated: yes  MILS not maintained throughout  Mask difficulty assessment: 1 - vent by mask    Final Airway Details  Final airway type: endotracheal airway      Successful airway: ETT  Cuffed: yes   Successful intubation technique: direct laryngoscopy  Facilitating devices/methods: intubating stylet and anterior pressure/BURP  Endotracheal tube insertion site: oral  Blade: Tracey  Blade size: 3  ETT size (mm): 9.0  Cormack-Lehane Classification: grade I - full view of glottis  Placement verified by: chest auscultation and capnometry   Measured from: lips  ETT to lips (cm): 20  Number of attempts at approach: 1    Additional Comments  Negative epigastric sounds, Breath sound equal bilaterally with symmetric chest rise and fall

## 2019-06-10 NOTE — PLAN OF CARE
Problem: Patient Care Overview  Goal: Plan of Care Review  Outcome: Ongoing (interventions implemented as appropriate)   06/10/19 0246   Plan of Care Review   Progress no change   OTHER   Outcome Summary VSS, denies any complaints throughout the night. NPO since midnight.    Coping/Psychosocial   Plan of Care Reviewed With patient

## 2019-06-10 NOTE — ANESTHESIA PREPROCEDURE EVALUATION
Anesthesia Evaluation     Patient summary reviewed and Nursing notes reviewed   NPO Solid Status: > 8 hours  NPO Liquid Status: > 8 hours           Airway   Mallampati: II  TM distance: >3 FB  Neck ROM: full  No difficulty expected  Dental    (+) edentulous    Pulmonary    (+) pneumonia (R Lung ) improving , a smoker Former, COPD moderate, shortness of breath (stable ),   Cardiovascular     ECG reviewed    (+) hypertension, hyperlipidemia,   (-) past MI, dysrhythmias, angina, cardiac stents    ROS comment: Sinus rhythm with premature supraventricular complexes    Neuro/Psych  (-) seizures, CVA  GI/Hepatic/Renal/Endo    (-) liver disease, no renal disease, diabetes, hypothyroidism    Musculoskeletal     Abdominal    Substance History      OB/GYN          Other   (+) arthritis     ROS/Med Hx Other: Prednisone   Na 129 Hct34                Anesthesia Plan    ASA 3     general     intravenous induction   Anesthetic plan, all risks, benefits, and alternatives have been provided, discussed and informed consent has been obtained with: patient.    Plan discussed with CRNA.

## 2019-06-10 NOTE — ANESTHESIA POSTPROCEDURE EVALUATION
Patient: Florentin Mcallister    Procedure Summary     Date:  06/10/19 Room / Location:   [facility]    Anesthesia Start:  1352 Anesthesia Stop:      Procedure:  BRONCHOSCOPY WITH ENDOBRONCHIAL ULTRASOUND (N/A Bronchus) Diagnosis:       Lung mass      Pneumonia of right lung due to infectious organism, unspecified part of lung      (Lung mass [R91.8])      (Pneumonia of right lung due to infectious organism, unspecified part of lung [J18.9])    Surgeon:  Bar Gage MD Provider:  Cameron Capps MD    Anesthesia Type:  general ASA Status:  3          Anesthesia Type: general  Last vitals  BP   152/62 (06/10/19 1451)   Temp   100.1 °F (37.8 °C) (06/10/19 1451)   Pulse   95 (06/10/19 1451)   Resp   18 (06/10/19 1451)     SpO2   98 % (06/10/19 1451)     Post Anesthesia Care and Evaluation    Patient location during evaluation: PACU  Patient participation: complete - patient participated  Level of consciousness: awake and alert  Pain score: 0  Pain management: adequate  Airway patency: patent  Anesthetic complications: No anesthetic complications  PONV Status: none  Cardiovascular status: hemodynamically stable and acceptable  Respiratory status: nonlabored ventilation, acceptable and nasal cannula  Hydration status: acceptable

## 2019-06-11 NOTE — DISCHARGE SUMMARY
[facility] Medicine Services  DISCHARGE SUMMARY    Patient Name: Florentin Mcallister  : 1950  MRN: [id number]    Date of Admission: 2019  Date of Discharge:  2019  Primary Care Physician: Provider, No Known    Consults     Date and Time Order Name Status Description    2019 1413 Inpatient Nephrology Consult Completed     2019 0334 Inpatient Pulmonology Consult Completed     2019 0246 Inpatient Pulmonology Consult Completed           Hospital Course     Presenting Problem:   PNA (pneumonia) [J18.9]    Active Hospital Problems    Diagnosis  POA   • **PNA (pneumonia) [J18.9]  Yes   • Acute gout of foot [M10.9]  Unknown   • Shortness of breath [R06.02]  Yes   • HTN (hypertension) [I10]  Yes   • HLD (hyperlipidemia) [E78.5]  Yes   • Anemia [D64.9]  Yes   • Black lung disease (CMS/HCC) [J60]  Yes   • Hyponatremia [E87.1]  Yes   • Lung mass [R91.8]  Yes   • History of alcohol abuse [Z87.898]  Yes   • Hypomagnesemia [E83.42]  Yes   • Pneumonia of right lung due to infectious organism [J18.9]  Unknown      Resolved Hospital Problems   No resolved problems to display.          Hospital Course:  Florentin Mcallister is a 69 y.o. male with past medical history significant for black lung, remote tobacco abuse who presented to the ED due to difficulty breathing. He was recently treated (two weeks prior to this admission) for PNA and hyponatremia (Na+ 115 at OSH) at an OSH.  He had been doing well until three days prior to this admission when his SOA increased and he became weak. On presentation to our ED, he was significantly hyponatremic and imaging showed RLL mass vs PNA.  He was admitted to our service for further evaluation.    Regarding his right lung mass, he underwent bronchoscopy with results as below. Pathology is pending. He will need follow up with oncology and we have made arrangements. Hopefully his biopsy results will return by his appointment date and time.      Regarding his pneumonia, he completed a course of antibiotics and bronchoscopy revealed no purulence. Pulmonary did not recommend any further antibiotics. He also received steroids for wheezing and bronchospasm that have largely resolved.    The patient had a gout flare in his R foot and knee that improved with colchicine and steroids and he will continue a course as written below.    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 He was given tolvaptan and improved and has now remained stable although it is still mildly decreased. He needs close follow up and needs to remain on a fluid restricted diet.        Discharge Follow Up Recommendations for labs/diagnostics:  As written below    Day of Discharge     HPI:   Mild cough and congestion, no SOA. No n/v. No diarrhea. No f/c/sweats. No other issues. Wants to go home.    Review of Systems    Otherwise ROS is negative except as mentioned in the HPI.    Vital Signs:   Temp:  [97.5 °F (36.4 °C)-100.1 °F (37.8 °C)] 98.3 °F (36.8 °C)  Heart Rate:  [60-96] 74  Resp:  [16-20] 16  BP: (121-174)/(53-84) 130/53     Physical Exam:  NAD, alert and oriented  OP clear, MMM  Neck supple  No LAD  RRR  Decreased at bases, otherwise clear  +BS, ND, NT  VALENCIA, but R knee TTP, and effusion decreased, improved ROM  No rashes  Normal affect        Bar Gage MD   Physician   Pulmonology   Op Note   Signed   Date of Service:  6/10/2019  2:01 PM   Creation Time:  6/10/2019  3:03 PM         Procedure: BRONCHOSCOPY WITH ENDOBRONCHIAL ULTRASOUND Case Time: 6/10/2019  2:01 PM Surgeon: Bar Gage MD            Signed                 Show:Clear all  [x]Manual[]Template[x]Copied    Added by:  [x]Bar Gage MD      []Olvin for details    BRONCHOSCOPY WITH ENDOBRONCHIAL ULTRASOUND  Progress Note     Florentin Prince Mcallister  6/4/2019 - 6/10/2019     Pre-op Diagnosis:   Lung mass [R91.8]  Pneumonia of right lung due to  infectious organism, unspecified part of lung [J18.9]       Post-Op Diagnosis Codes:     * Lung mass [R91.8]     * Pneumonia of right lung due to infectious organism, unspecified part of lung [J18.9]     Procedure/CPT® Codes:        Procedure(s):  BRONCHOSCOPY WITH ENDOBRONCHIAL ULTRASOUND     Surgeon(s):  Bar Gage MD     Anesthesia: General     Staff:   Circulator: Melissa Herbert RN  Endo Technician: Kelle Wesley     Estimated Blood Loss: 25 ml     Urine Voided: * No values recorded between 6/10/2019  1:50 PM and 6/10/2019  2:49 PM *     Specimens:                            Specimens      ID Source Type Tests Collected By Collected At Frozen?       1 Lung, Right Lower Lobe Wash · FUNGAL CULTURE  · GRAM STAIN  · AFB STAIN  · AFB CULTURE  · FUNGUS SMEAR  · RESPIRATORY CULTURE    Bar Gage MD 6/10/19 1438         Description: RLL WASH FOR MICRO     A Lymph Node Tissue · TISSUE PATHOLOGY EXAM    Bar Gage MD 6/10/19 1421         Description: [facility] ST 7     B Lymph Node Tissue · TISSUE PATHOLOGY EXAM    Bar Gage MD 6/10/19 1426         Description: ST 11 R [facility]     C Lung, Right Lower Lobe Wash · NON-GYNECOLOGIC CYTOLOGY    Bar Gage MD 6/10/19 1434         Description: RLL WASH FOR CYTO     D Lung, Right Lower Lobe Brushing · NON-GYNECOLOGIC CYTOLOGY    Bar Gage MD 6/10/19 1439         Description: RLL BRUSH     E Lung, Right Lower Lobe Tissue · TISSUE PATHOLOGY EXAM    Bar Gage MD 6/10/19 1441         Description: RLL BX                    Drains:       Findings:      Case was done under general anesthesia.  Once anesthesia was induced and the patient intubated with a 9 ET tube the standard bronchoscope was passed via the existing ET tube.     The tip of the ET tube was in the mid trachea.  The lower trachea and the main miranda appeared normal.     A complete inspection of the left endobronchial tree  revealed normal anatomy and no endobronchial lesions or abnormal secretions.     Inspection of the right endobronchial tree was then performed.  There is widening of the right upper lobe miranda.  There was a visible endobronchial lesion obstructing several basilarsegments of the right lower lobe.  This appeared to be neoplastic with abnormal vascularity and irregular mucosa.  Photodocumentation obtained     The standard bronchoscope was then removed and the EBUS bronchoscope inserted.  A survey of the lower mediastinal lymph nodes was performed.  There was significant enlargement of lymph nodes with adequate targets at stations 7 and 11 R.  There were some small lymph nodes present at 4R and 10 R but vascularity was prominent so that these stations were not biopsied.     Multiple transbronchial needle aspirates were performed at station 7 and 11R in that order and specimen sent for full pathologic analysis.     The standard bronchoscope was then reinserted and bronchial washings and brushings were taken in the right lower lobe as well as multiple endobronchial biopsies of the abnormal lesion.  There was mild self-limited bleeding that was controlled at the end of the procedure.     Impression: Likely malignant disease of the right lower lobe and associated adenopathy     Plan: Await biopsy results                            Pertinent  and/or Most Recent Results     Results from last 7 days   Lab Units 06/11/19  0541 06/10/19  2352 06/10/19  1723 06/10/19  1236 06/10/19  0458 06/10/19  0028 06/09/19  1823  06/09/19  0426  06/08/19  0405  06/07/19  0409  06/06/19  0539  06/05/19  0953 06/05/19  0945   WBC 10*3/mm3 8.52  --   --   --  11.06*  --   --   --  11.94*  --  7.24  --  7.00  --  7.84  --   --  6.82   HEMOGLOBIN g/dL 11.2*  --   --   --  11.2*  --   --   --  11.8*  --  11.4*  --  10.1*  --  10.7*  --   --  11.4*   HEMATOCRIT % 33.7*  --   --   --  34.2*  --   --   --  35.5*  --  33.7*  --  29.9*  --  30.0*  --   31.9* 31.5*   PLATELETS 10*3/mm3 167  --   --   --  159  --   --   --  186  --  181  --  161  --  164  --   --  175   SODIUM mmol/L 129* 129* 129* 129* 131* 130* 130*   < > 133*   < > 131*   < > 119*   < > 115*   < >  --  116*   POTASSIUM mmol/L 4.6 4.8 4.8 4.6 4.3 4.1 4.3   < > 4.1   < > 4.2   < > 4.1   < > 4.0   < >  --  3.9   CHLORIDE mmol/L 94* 95* 96* 95* 96* 96* 95*   < > 97*   < > 97*   < > 85*   < > 83*   < >  --  82*   CO2 mmol/L 23.0 21.0* 20.0* 22.0 23.0 22.0 24.0   < > 22.0   < > 22.0   < > 19.0*   < > 20.0*   < >  --  20.0*   BUN mg/dL 23 22 16 16 15 15 15   < > 14   < > 12   < > 13   < > 12   < >  --  13   CREATININE mg/dL 1.04 0.92 0.85 1.00 1.04 1.07 1.08   < > 0.95   < > 1.07   < > 0.73*   < > 0.73*   < >  --  0.94   GLUCOSE mg/dL 107* 130* 144* 113* 98 97 132*   < > 83   < > 131*   < > 108*   < > 97   < >  --  101*   CALCIUM mg/dL 9.2 9.1 9.1 9.5 9.5 9.5 9.7   < > 9.6   < > 9.6   < > 8.5*   < > 8.3*   < >  --  8.5*    < > = values in this interval not displayed.     Results from last 7 days   Lab Units 06/05/19  0945 06/04/19  2141   BILIRUBIN mg/dL  --  0.6   ALK PHOS U/L  --  74   ALT (SGPT) U/L  --  23   AST (SGOT) U/L  --  17   PROTIME Seconds 13.9  --    INR  1.13  --    APTT seconds 27.7  --            Invalid input(s): TG, LDLCALC, LDLREALC  Results from last 7 days   Lab Units 06/05/19  0945 06/04/19  2141   TSH mIU/mL 0.796  --    HEMOGLOBIN A1C % 5.50  --    TROPONIN T ng/mL  --  <0.010       Brief Urine Lab Results  (Last result in the past 365 days)      Color   Clarity   Blood   Leuk Est   Nitrite   Protein   CREAT   Urine HCG        06/04/19 2330 Yellow Clear Negative Negative Negative Negative               Microbiology Results Abnormal     Procedure Component Value - Date/Time    Respiratory Culture - Wash, Lung, Right Lower Lobe [[id number]] Collected:  06/10/19 1438    Lab Status:  Preliminary result Specimen:  Wash from Lung, Right Lower Lobe Updated:  06/10/19 2022     Gram Stain  Few (2+) WBCs per low power field      Occasional Epithelial cells per low power field      No organisms seen    Blood Culture - Blood, Arm, Left [[id number]] Collected:  06/04/19 2358    Lab Status:  Final result Specimen:  Blood from Arm, Left Updated:  06/10/19 0016     Blood Culture No growth at 5 days    Blood Culture - Blood, Arm, Right [[id number]] Collected:  06/04/19 2350    Lab Status:  Final result Specimen:  Blood from Arm, Right Updated:  06/10/19 0016     Blood Culture No growth at 5 days    MRSA Screen, PCR - Swab, Nares [[id number]]  (Normal) Collected:  06/05/19 0916    Lab Status:  Final result Specimen:  Swab from Nares Updated:  06/05/19 1131     MRSA, PCR Negative    Narrative:       MRSA Negative          Imaging Results (all)     Procedure Component Value Units Date/Time    XR Chest 1 View [[id number]] Collected:  06/11/19 0841     Updated:  06/11/19 0842    Narrative:          EXAMINATION: XR CHEST 1 VW-      INDICATION: DYSPNEA, ON EXERTION      COMPARISON: 06/04/2019     FINDINGS: Right parahilar mass is again noted. Chronic appearing  interstitial changes elsewhere are stable. No effusion or pneumothorax  is seen. The heart is borderline enlarged. The vasculature is normal.             Impression:       Persistent right infrahilar mass, recently evaluated with  chest CT scan          CT Angiogram Chest With Contrast [[id number]] Collected:  06/04/19 2255     Updated:  06/04/19 2257    Narrative:       CTA Chest    INDICATION:   Weakness and cough. Recently diagnosed with pneumonia 2 weeks ago.    TECHNIQUE:   CT angiogram of the chest with contrast. 3-D postprocessing was performed and reviewed.   Radiation dose reduction techniques included automated exposure control or exposure modulation based on body size. Count of known CT and cardiac nuc med studies  performed in previous 12 months: 0.     COMPARISON:   None available.    FINDINGS:   Right lower lobe parenchymal opacity partially encasing  the right lower lobe bronchi. Mass measures roughly 9.1 x 5.4 x 7.2 cm and though could represent airspace disease and pneumonia given its masslike appearance is highly concerning for primary lung  malignancy. Background diffuse lung disease with underlying fibrosis. Minimal right effusion.    4.2 x 2.2 cm subcarinal lymphadenopathy. Small amount of bilateral hilar lymphadenopathy with a 2.3 x 1.4 cm right hilar lymph node. No evidence of pulmonary embolus. Heart and aorta are unremarkable. The visualized upper abdomen unremarkable. Osseous  structures and thoracic inlet appear normal.      Impression:       Extensive right hilar and right lower lobe parenchymal opacity which could represent airspace disease and pneumonia but appearance and history favors primary lung malignancy. Partial encasement of the right lower lobe bronchi.    No evidence of pulmonary embolus.    Diffuse interstitial lung disease.    Signer Name: KEN Munguia MD   Signed: 6/4/2019 10:55 PM   Workstation Name: [id number]       CT Head Without Contrast [[id number]] Collected:  06/04/19 2247     Updated:  06/04/19 2249    Narrative:       CT Head WO    HISTORY:   Decreased level of alertness. Hypertension and chest pain.    TECHNIQUE:   Axial unenhanced head CT. Radiation dose reduction techniques included automated exposure control or exposure modulation based on body size. Count of known CT and cardiac nuc med studies performed in previous 12 months: 0.     Time of scan: 2231 hours    COMPARISON:   None.    FINDINGS:   No intracranial hemorrhage, mass, or infarct. No hydrocephalus or extra-axial fluid collection. Brain parenchymal density is normal. The skull base, calvarium, and extracranial soft tissues are normal.      Impression:       Normal, negative unenhanced head CT.          Signer Name: KEN Munguia MD   Signed: 6/4/2019 10:47 PM   Workstation Name: [id number]       XR Chest 1 View [[id number]] Collected:  06/04/19 2210      Updated:  06/04/19 2212    Narrative:       CR Chest 1 Vw    INDICATION:   Weakness and cough. Patient diagnosed with pneumonia 2 weeks ago.     COMPARISON:    None available.    FINDINGS:  Single portable AP view of the chest.  Right perihilar and mid lung infiltrate and/or mass. No effusions. Heart and mediastinum unremarkable. Osseous structures appear normal.      Impression:       Right perihilar parenchymal opacity with some density extending into the right midlung. May represent a combination of infiltrate, adenopathy or mass. Patient reportedly with recently diagnosed pneumonia. Correlation with outside studies may be of  benefit to assess for resolution/progression of disease. Further evaluation with chest CT with contrast may be warranted to exclude underlying mass lesion.    Signer Name: KEN Munguia MD   Signed: 6/4/2019 10:10 PM   Workstation Name: [id number]                             Order Current Status    AFB Culture - Wash, Lung, Right Lower Lobe In process    Fungus Culture - Wash, Lung, Right Lower Lobe In process    Fungus Smear - Wash, Lung, Right Lower Lobe In process    Non-gynecologic Cytology In process    Tissue Pathology Exam In process    Respiratory Culture - Wash, Lung, Right Lower Lobe Preliminary result        Discharge Details        Discharge Medications      New Medications      Instructions Start Date   budesonide-formoterol 160-4.5 MCG/ACT inhaler  Commonly known as:  SYMBICORT   2 puffs, Inhalation, 2 Times Daily - RT      colchicine 0.6 MG tablet   0.6 mg, Oral, Daily   Start Date:  6/12/2019     PHARMACY MEDS TO BED CONSULT   Does not apply, Daily         Changes to Medications      Instructions Start Date   predniSONE 10 MG tablet  Commonly known as:  DELTASONE  What changed:  medication strength   20 mg, Oral, Daily      sodium chloride 1 g tablet  What changed:  Another medication with the same name was added. Make sure you understand how and when to take  each.   1 g, Oral, 3 Times Daily      sodium chloride 1 g tablet  What changed:  You were already taking a medication with the same name, and this prescription was added. Make sure you understand how and when to take each.   2 g, Oral, 3 Times Daily With Meals         Continue These Medications      Instructions Start Date   albuterol 1.25 MG/3ML nebulizer solution  Commonly known as:  ACCUNEB   1 ampule, Nebulization, Every 6 Hours PRN      aspirin 81 MG chewable tablet   81 mg, Oral, Daily      lisinopril 20 MG tablet  Commonly known as:  PRINIVIL,ZESTRIL   20 mg, Oral, Daily      ZOCOR 20 MG tablet  Generic drug:  simvastatin   20 mg, Oral, Nightly         Stop These Medications    levoFLOXacin 500 MG tablet  Commonly known as:  LEVAQUIN     meloxicam 15 MG tablet  Commonly known as:  MOBIC            No Known Allergies      Discharge Disposition:  Home or Self Care    Discharge Diet:  Diet Order   Procedures   • Diet Regular         Discharge Activity:   Activity Instructions     Activity as Tolerated              CODE STATUS:    Code Status and Medical Interventions:   Ordered at: 06/05/19 0150     Level Of Support Discussed With:    Patient     Code Status:    CPR     Medical Interventions (Level of Support Prior to Arrest):    Full         Future Appointments   Date Time Provider Department Center   6/25/2019  1:15 PM Ulysses Argueta MD [facility]       Additional Instructions for the Follow-ups that You Need to Schedule     Discharge Follow-up with PCP   As directed       Currently Documented PCP:    Provider, No Known    PCP Phone Number:    None     Follow Up Details:  1 week         Discharge Follow-up with Specified Provider: louie Argueta in 1-2 weeks   As directed      To:  louie Argueta in 1-2 weeks               Time Spent on Discharge:  38 minutes    Electronically signed by Zane Renteria MD, 06/11/19, 8:46 AM.

## 2019-06-11 NOTE — PLAN OF CARE
Problem: Patient Care Overview  Goal: Plan of Care Review  Outcome: Ongoing (interventions implemented as appropriate)   06/11/19 1081   Plan of Care Review   Progress no change   OTHER   Outcome Summary VSS, no complaints noted through the night, rested well with wife at BS.    Coping/Psychosocial   Plan of Care Reviewed With patient

## 2019-06-11 NOTE — PROGRESS NOTES
"   LOS: 7 days    Patient Care Team:  Provider, No Known as PCP - General        Subjective     Interval History:     No acute events overnight. No new complaints.     Review of Systems:   No CP or SOA    Objective     Vital Sign Min/Max for last 24 hours  Temp  Min: 97.5 °F (36.4 °C)  Max: 100.1 °F (37.8 °C)   BP  Min: 121/62  Max: 174/70   Pulse  Min: 60  Max: 96   Resp  Min: 16  Max: 20   SpO2  Min: 92 %  Max: 100 %   No Data Recorded   Weight  Min: 96.2 kg (212 lb)  Max: 96.2 kg (212 lb)     Flowsheet Rows      First Filed Value   Admission Height  172.7 cm (68\") Documented at 06/04/2019 2128   Admission Weight  96.2 kg (212 lb) Documented at 06/04/2019 2128          I/O this shift:  In: 120 [P.O.:120]  Out: 400 [Urine:400]  I/O last 3 completed shifts:  In: 620 [P.O.:120; I.V.:500]  Out: 225 [Urine:225]    Physical Exam:     General Appearance:    Alert, cooperative, in no acute distress   Head:    Normocephalic, without obvious abnormality, atraumatic               Neck:   No adenopathy, supple, trachea midline, no thyromegaly, no   carotid bruit, no JVD       Lungs:     Clear to auscultation,respirations regular, even and                  unlabored    Heart:    Regular rhythm and normal rate, normal S1 and S2, no            murmur, no gallop, no rub, no click       Abdomen:     Normal bowel sounds, no masses, no organomegaly, soft        non-tender, non-distended, no guarding, no rebound                tenderness       Extremities:   Moves all extremities well, no edema, no cyanosis, no             redness               Neurologic:   No focal deficit noted       WBC WBC   Date Value Ref Range Status   06/11/2019 8.52 3.40 - 10.80 10*3/mm3 Final   06/10/2019 11.06 (H) 3.40 - 10.80 10*3/mm3 Final   06/09/2019 11.94 (H) 3.40 - 10.80 10*3/mm3 Final      HGB Hemoglobin   Date Value Ref Range Status   06/11/2019 11.2 (L) 13.0 - 17.7 g/dL Final   06/10/2019 11.2 (L) 13.0 - 17.7 g/dL Final   06/09/2019 11.8 (L) 13.0 " - 17.7 g/dL Final      HCT Hematocrit   Date Value Ref Range Status   06/11/2019 33.7 (L) 37.5 - 51.0 % Final   06/10/2019 34.2 (L) 37.5 - 51.0 % Final   06/09/2019 35.5 (L) 37.5 - 51.0 % Final      Platlets No results found for: LABPLAT   MCV MCV   Date Value Ref Range Status   06/11/2019 88.7 79.0 - 97.0 fL Final   06/10/2019 88.8 79.0 - 97.0 fL Final   06/09/2019 88.3 79.0 - 97.0 fL Final          Sodium Sodium   Date Value Ref Range Status   06/11/2019 129 (L) 136 - 145 mmol/L Final   06/10/2019 129 (L) 136 - 145 mmol/L Final   06/10/2019 129 (L) 136 - 145 mmol/L Final   06/10/2019 129 (L) 136 - 145 mmol/L Final   06/10/2019 131 (L) 136 - 145 mmol/L Final   06/10/2019 130 (L) 136 - 145 mmol/L Final   06/09/2019 130 (L) 136 - 145 mmol/L Final   06/09/2019 132 (L) 136 - 145 mmol/L Final   06/09/2019 133 (L) 136 - 145 mmol/L Final   06/09/2019 133 (L) 136 - 145 mmol/L Final   06/09/2019 134 (L) 136 - 145 mmol/L Final   06/08/2019 132 (L) 136 - 145 mmol/L Final   06/08/2019 133 (L) 136 - 145 mmol/L Final   06/08/2019 135 (L) 136 - 145 mmol/L Final      Potassium Potassium   Date Value Ref Range Status   06/11/2019 4.6 3.5 - 5.2 mmol/L Final   06/10/2019 4.8 3.5 - 5.2 mmol/L Final   06/10/2019 4.8 3.5 - 5.2 mmol/L Final   06/10/2019 4.6 3.5 - 5.2 mmol/L Final   06/10/2019 4.3 3.5 - 5.2 mmol/L Final   06/10/2019 4.1 3.5 - 5.2 mmol/L Final   06/09/2019 4.3 3.5 - 5.2 mmol/L Final   06/09/2019 4.1 3.5 - 5.2 mmol/L Final   06/09/2019 4.2 3.5 - 5.2 mmol/L Final   06/09/2019 4.1 3.5 - 5.2 mmol/L Final   06/09/2019 3.6 3.5 - 5.2 mmol/L Final   06/08/2019 3.9 3.5 - 5.2 mmol/L Final   06/08/2019 4.0 3.5 - 5.2 mmol/L Final   06/08/2019 4.0 3.5 - 5.2 mmol/L Final      Chloride Chloride   Date Value Ref Range Status   06/11/2019 94 (L) 98 - 107 mmol/L Final   06/10/2019 95 (L) 98 - 107 mmol/L Final   06/10/2019 96 (L) 98 - 107 mmol/L Final   06/10/2019 95 (L) 98 - 107 mmol/L Final   06/10/2019 96 (L) 98 - 107 mmol/L Final    06/10/2019 96 (L) 98 - 107 mmol/L Final   06/09/2019 95 (L) 98 - 107 mmol/L Final   06/09/2019 96 (L) 98 - 107 mmol/L Final   06/09/2019 97 (L) 98 - 107 mmol/L Final   06/09/2019 97 (L) 98 - 107 mmol/L Final   06/09/2019 97 (L) 98 - 107 mmol/L Final   06/08/2019 96 (L) 98 - 107 mmol/L Final   06/08/2019 97 (L) 98 - 107 mmol/L Final   06/08/2019 98 98 - 107 mmol/L Final      CO2 CO2   Date Value Ref Range Status   06/11/2019 23.0 22.0 - 29.0 mmol/L Final   06/10/2019 21.0 (L) 22.0 - 29.0 mmol/L Final   06/10/2019 20.0 (L) 22.0 - 29.0 mmol/L Final   06/10/2019 22.0 22.0 - 29.0 mmol/L Final   06/10/2019 23.0 22.0 - 29.0 mmol/L Final   06/10/2019 22.0 22.0 - 29.0 mmol/L Final   06/09/2019 24.0 22.0 - 29.0 mmol/L Final   06/09/2019 23.0 22.0 - 29.0 mmol/L Final   06/09/2019 23.0 22.0 - 29.0 mmol/L Final   06/09/2019 22.0 22.0 - 29.0 mmol/L Final   06/09/2019 22.0 22.0 - 29.0 mmol/L Final   06/08/2019 22.0 22.0 - 29.0 mmol/L Final   06/08/2019 23.0 22.0 - 29.0 mmol/L Final   06/08/2019 24.0 22.0 - 29.0 mmol/L Final      BUN BUN   Date Value Ref Range Status   06/11/2019 23 8 - 23 mg/dL Final   06/10/2019 22 8 - 23 mg/dL Final   06/10/2019 16 8 - 23 mg/dL Final   06/10/2019 16 8 - 23 mg/dL Final   06/10/2019 15 8 - 23 mg/dL Final   06/10/2019 15 8 - 23 mg/dL Final   06/09/2019 15 8 - 23 mg/dL Final   06/09/2019 14 8 - 23 mg/dL Final   06/09/2019 14 8 - 23 mg/dL Final   06/09/2019 14 8 - 23 mg/dL Final   06/09/2019 14 8 - 23 mg/dL Final   06/08/2019 14 8 - 23 mg/dL Final   06/08/2019 15 8 - 23 mg/dL Final   06/08/2019 12 8 - 23 mg/dL Final      Creatinine Creatinine   Date Value Ref Range Status   06/11/2019 1.04 0.76 - 1.27 mg/dL Final   06/10/2019 0.92 0.76 - 1.27 mg/dL Final   06/10/2019 0.85 0.76 - 1.27 mg/dL Final   06/10/2019 1.00 0.76 - 1.27 mg/dL Final   06/10/2019 1.04 0.76 - 1.27 mg/dL Final   06/10/2019 1.07 0.76 - 1.27 mg/dL Final   06/09/2019 1.08 0.76 - 1.27 mg/dL Final   06/09/2019 1.13 0.76 - 1.27 mg/dL  Final   06/09/2019 1.00 0.76 - 1.27 mg/dL Final   06/09/2019 0.95 0.76 - 1.27 mg/dL Final   06/09/2019 1.00 0.76 - 1.27 mg/dL Final   06/08/2019 0.98 0.76 - 1.27 mg/dL Final   06/08/2019 1.05 0.76 - 1.27 mg/dL Final   06/08/2019 1.20 0.76 - 1.27 mg/dL Final      Calcium Calcium   Date Value Ref Range Status   06/11/2019 9.2 8.6 - 10.5 mg/dL Final   06/10/2019 9.1 8.6 - 10.5 mg/dL Final   06/10/2019 9.1 8.6 - 10.5 mg/dL Final   06/10/2019 9.5 8.6 - 10.5 mg/dL Final   06/10/2019 9.5 8.6 - 10.5 mg/dL Final   06/10/2019 9.5 8.6 - 10.5 mg/dL Final   06/09/2019 9.7 8.6 - 10.5 mg/dL Final   06/09/2019 9.0 8.6 - 10.5 mg/dL Final   06/09/2019 9.3 8.6 - 10.5 mg/dL Final   06/09/2019 9.6 8.6 - 10.5 mg/dL Final   06/09/2019 9.7 8.6 - 10.5 mg/dL Final   06/08/2019 10.0 8.6 - 10.5 mg/dL Final   06/08/2019 10.0 8.6 - 10.5 mg/dL Final   06/08/2019 9.8 8.6 - 10.5 mg/dL Final      PO4 No results found for: CAPO4   Albumin No results found for: ALBUMIN   Magnesium No results found for: MG   Uric Acid No results found for: URICACID        Results Review:     I reviewed the patient's new clinical results.      amLODIPine 5 mg Oral Q24H   atorvastatin 10 mg Oral Nightly   budesonide-formoterol 2 puff Inhalation BID - RT   colchicine 0.6 mg Oral Daily   ipratropium-albuterol 3 mL Nebulization Q6H While Awake - RT   Lidocaine HCl (PF) 4 mL Nebulization Once   lisinopril 20 mg Oral Q24H   multivitamin 1 tablet Oral Daily   piperacillin-tazobactam 3.375 g Intravenous Q8H   polyethylene glycol 17 g Oral Daily   sodium chloride 3 mL Intravenous Q12H       lactated ringers 9 mL/hr    sodium chloride 10 mL/hr Last Rate: 10 mL/hr (06/10/19 1305)       Medication Review:     Assessment/Plan       PNA (pneumonia)    Shortness of breath    HTN (hypertension)    HLD (hyperlipidemia)    Anemia    Black lung disease (CMS/HCC)    Hyponatremia    Lung mass    History of alcohol abuse    Hypomagnesemia    Pneumonia of right lung due to infectious  organism    Acute gout of foot      1- Hyponatremia- asymptomatic - 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 Improving.   2- HTN - controlled      Plan:  - Place him on sodium chloride tablets 2 gm tid with meals.     Follow up with [facility] in 2 weeks.       Yodit Navarro MD  06/11/19  9:43 AM

## 2019-06-11 NOTE — PROGRESS NOTES
Received phone call from pathology today.  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  Patient was just discharged from the hospital earlier today.  I did call and discussed the diagnosis with the patient and his wife.  I did inform them yesterday that I would be doing this and they were not surprised of the diagnosis of lung cancer.  I told them that it was small cell carcinoma and that the treatment would most likely be chemotherapy.  I note that he has in appointment with oncology in 2 weeks and I will try to have that moved up to a sooner visit.    Bar Gage MD

## 2019-06-13 NOTE — OUTREACH NOTE
"COPD/PN Week 1 Survey      Responses   Facility patient discharged from?  [facility]   Does the patient have one of the following disease processes/diagnoses(primary or secondary)?  COPD/Pneumonia   Is there a successful TCM telephone encounter documented?  No   Was the primary reason for admission:  Pneumonia   Week 1 attempt successful?  Yes   Call start time  1651   Call end time  1658   Discharge diagnosis  PNA (pneumonia)   Is patient permission given to speak with other caregiver?  Yes   List who call center can speak with  Wife-Flores   Meds reviewed with patient/caregiver?  Yes   Is the patient having any side effects they believe may be caused by any medication additions or changes?  No   Does the patient have all medications ordered at discharge?  Yes   Prescription comments  Pt reports the sodium chloride upsets his stomach a little and he feels \"nervous\"   Is the patient taking all medications as directed (includes completed medication regime)?  Yes   Comments regarding appointments  Pt reports he will see his PCP Monday or Tuesday. He is aware of upcoming appointment on 6/14/19.   Does the patient have a primary care provider?   Yes   Does the patient have an appointment with their PCP or pulmonologist within 7 days of discharge?  Yes   Has the patient kept scheduled appointments due by today?  N/A   Has home health visited the patient within 72 hours of discharge?  N/A   Psychosocial issues?  No   Did the patient receive a copy of their discharge instructions?  Yes   Nursing interventions  Reviewed instructions with patient   What is the patient's perception of their health status since discharge?  Improving   Nursing Interventions  Nurse provided patient education   Are the patient's immunizations up to date?   Yes   Nursing interventions  Educated on importance of maintaining up to date immunizations as advised by provider   If the patient is a current smoker, are they able to teach back resources for " cessation?  -- [Not a smoker]   Is the patient/caregiver able to teach back the hierarchy of who to call/visit for symptoms/problems? PCP, Specialist, Home health nurse, Urgent Care, ED, 911  Yes   Is the patient able to teach back COPD zones?  Yes   Nursing interventions  Education provided on various zones   Patient reports what zone on this call?  Yellow Zone   Green Zone  Appetite is good, Sleeping well   Yellow Zone  Increased shortness of air, Unable to complete daily activities   Yellow interventions  Continue to use daily medications, Use quick relief inhaler as ordered, Do not smoke, Get plenty of rest, Call provider immediatly if symptoms do not improve   Is the patient/caregiver able to teach back signs and symptoms of worsening condition:  Fever/chills, Shortness of breath, Chest pain   Is the patient/caregiver able to teach back importance of completing antibiotic course of treatment?  Yes   Week 1 call completed?  Yes          Iraida Foster RN

## 2019-06-14 PROBLEM — C34.90 SMALL CELL LUNG CANCER (HCC): Status: ACTIVE | Noted: 2019-01-01

## 2019-06-14 NOTE — PROGRESS NOTES
DATE OF CONSULTATION: 6/14/2019    REFERRING PHYSICIAN: Bouchra, No Known    Dear  Provider, No Known  Thank you for asking for my medical advice on this patient. I saw him in the  [place] office on 6/14/2019    REASON FOR CONSULTATION: Right lower lobe small cell lung cancer    HISTORY OF PRESENT ILLNESS: The patient is a very pleasant 69 y.o.  male   who was in his usual state of health until May 2019.  Patient presented with generalized fatigue and weakness, this is been getting gradually worse, get worse with activity and improves with rest, associated with shortness of breath.  He was able to outside hospital found to be hyponatremic but he was admitted for 5 days 3 with IV fluid discharged home to follow-up with primary care provider.  Repeated blood work revealed recurrent hyponatremia.  Patient went again to local emergency room, he refused admission decided to come to [facility] for further evaluation.  Patient was admitted to our hospital June 5, 2019.  He had CT chest PE protocol given his shortness of breath and that revealed large right lower lobe lung mass with bulky mediastinal adenopathy.  Patient had bronchoscopy with biopsy done on Jackelin 10, 2019 pathology came back consistent with small cell lung cancer.  Patient was referred to me for further recommendations.    SUBJECTIVE: When I saw the patient today he is doing fairly well.  He is complaining of shortness of breath with cough.    Review of Systems   Constitutional: Negative for activity change, appetite change, chills, fatigue, fever and unexpected weight change.   HENT: Negative for hearing loss, mouth sores, nosebleeds, sore throat and trouble swallowing.    Eyes: Negative for visual disturbance.   Respiratory: Negative for cough, chest tightness, shortness of breath and wheezing.    Cardiovascular: Negative for chest pain, palpitations and leg swelling.   Gastrointestinal: Negative for abdominal distention, abdominal  pain, blood in stool, constipation, diarrhea, nausea, rectal pain and vomiting.   Endocrine: Negative for cold intolerance and heat intolerance.   Genitourinary: Negative for difficulty urinating, dysuria, frequency and urgency.   Musculoskeletal: Negative for arthralgias, back pain, gait problem, joint swelling and myalgias.   Skin: Negative for rash.   Neurological: Negative for dizziness, tremors, syncope, weakness, light-headedness, numbness and headaches.   Hematological: Negative for adenopathy. Does not bruise/bleed easily.   Psychiatric/Behavioral: Negative for confusion, sleep disturbance and suicidal ideas. The patient is not nervous/anxious.        Past Medical History:   Diagnosis Date   • Arthritis    • Black lung disease (CMS/HCC)    • History of alcohol abuse    • Hyperlipidemia    • Hypertension        Social History     Socioeconomic History   • Marital status:      Spouse name: Not on file   • Number of children: Not on file   • Years of education: Not on file   • Highest education level: Not on file   Tobacco Use   • Smoking status: Former Smoker     Packs/day: 1.00     Types: Cigarettes     Last attempt to quit: 2009     Years since quitting: 10.4   • Smokeless tobacco: Never Used   • Tobacco comment: smoked 1ppd x 15 years; quit in 2009   Substance and Sexual Activity   • Alcohol use: No     Frequency: Never     Comment: hx of abuse; drank 2-3 beers and 2-3 mixed drinks about 3/4 x/week; stopped 1 month ago (had tapered drinking over past year)    • Drug use: No   • Sexual activity: Defer       Family History   Problem Relation Age of Onset   • COPD Mother    • Heart disease Father        Past Surgical History:   Procedure Laterality Date   • BRONCHOSCOPY N/A 6/10/2019    Procedure: BRONCHOSCOPY WITH ENDOBRONCHIAL ULTRASOUND;  Surgeon: Bar Gage MD;  Location: [facility] ENDOSCOPY;  Service: Pulmonary   • BRONCHOSCOPY     • CATARACT EXTRACTION, BILATERAL     • LUNG BIOPSY     •  "NO PAST SURGERIES     • TEETH EXTRACTION     • WISDOM TOOTH EXTRACTION         No Known Allergies       Current Outpatient Medications:   •  aspirin 81 MG chewable tablet, Chew 81 mg Daily., Disp: , Rfl:   •  budesonide-formoterol (SYMBICORT) 160-4.5 MCG/ACT inhaler, Inhale 2 puffs 2 (Two) Times a Day., Disp: 10.2 g, Rfl: 12  •  colchicine 0.6 MG tablet, Take 1 tablet by mouth Daily., Disp: 2 tablet, Rfl: 0  •  lisinopril (PRINIVIL,ZESTRIL) 20 MG tablet, Take 20 mg by mouth Daily., Disp: , Rfl:   •  PHARMACY MEDS TO BED CONSULT, Daily., Disp: , Rfl:   •  predniSONE (DELTASONE) 10 MG tablet, Take 3 tabs daily for 2 days, then 2 tabs daily for 2 days, then 1 tab daily for 2 days, Disp: 12 tablet, Rfl: 0  •  simvastatin (ZOCOR) 20 MG tablet, Take 20 mg by mouth Every Night., Disp: , Rfl:   •  sodium chloride 1 g tablet, Take 2 tablets by mouth 3 (Three) Times a Day With Meals., Disp: 180 tablet, Rfl: 1  •  albuterol (ACCUNEB) 1.25 MG/3ML nebulizer solution, Take 1 ampule by nebulization Every 6 (Six) Hours As Needed for Wheezing., Disp: , Rfl:   •  sodium chloride 1 g tablet, Take 1 g by mouth 3 (Three) Times a Day., Disp: , Rfl:     PHYSICAL EXAMINATION:   /52   Pulse 93   Resp 16   Ht 172.7 cm (68\")   Wt 89.4 kg (197 lb)   SpO2 95%   BMI 29.95 kg/m²    ECOG Performance Status: 1 - Symptomatic but completely ambulatory  General Appearance:  alert, cooperative, no apparent distress and appears stated age   Neurologic/Psychiatric: A&O x 3, gait steady, appropriate affect, strength 5/5 in all muscle groups   HEENT:  Normocephalic, without obvious abnormality, mucous membranes moist   Neck: Supple, symmetrical, trachea midline, no adenopathy;  No thyromegaly, masses, or tenderness   Lungs:   Clear to auscultation bilaterally; respirations regular, even, and unlabored bilaterally   Heart:  Regular rate and rhythm, no murmurs appreciated   Abdomen:   Soft, non-tender, non-distended and no organomegaly   Lymph " nodes: No cervical, supraclavicular, inguinal or axillary adenopathy noted   Extremities: Normal, atraumatic; no clubbing, cyanosis, or edema    Skin: No rashes, ulcers, or suspicious lesions noted       Admission on 06/04/2019, Discharged on 06/11/2019   No results displayed because visit has over 200 results.           Ct Head Without Contrast    Result Date: 6/4/2019  Narrative: CT Head WO HISTORY: Decreased level of alertness. Hypertension and chest pain. TECHNIQUE: Axial unenhanced head CT. Radiation dose reduction techniques included automated exposure control or exposure modulation based on body size. Count of known CT and cardiac nuc med studies performed in previous 12 months: 0. Time of scan: 2231 hours COMPARISON: None. FINDINGS: No intracranial hemorrhage, mass, or infarct. No hydrocephalus or extra-axial fluid collection. Brain parenchymal density is normal. The skull base, calvarium, and extracranial soft tissues are normal.     Impression: Normal, negative unenhanced head CT. Signer Name: KEN Munguia MD  Signed: 6/4/2019 10:47 PM  Workstation Name: [email or web address]     Xr Chest 1 View    Result Date: 6/11/2019  Narrative: EXAMINATION: XR CHEST 1 VW-  INDICATION: Dyspnea, on exertion.  COMPARISON: 06/04/2019.  FINDINGS: Right parahilar mass is again noted. Chronic appearing interstitial changes elsewhere are stable. No effusion or pneumothorax is seen. The heart is borderline enlarged. The vasculature appears normal.         Impression: Persistent right parahilar mass, recently evaluated with chest CT scan.  D:  06/11/2019 E:  06/11/2019  This report was finalized on 6/11/2019 10:03 PM by DR. Zane Rangel MD.      Xr Chest 1 View    Result Date: 6/4/2019  Narrative: CR Chest 1 Vw INDICATION: Weakness and cough. Patient diagnosed with pneumonia 2 weeks ago. COMPARISON:  None available. FINDINGS: Single portable AP view of the chest.  Right perihilar and mid lung infiltrate and/or mass. No effusions.  Heart and mediastinum unremarkable. Osseous structures appear normal.     Impression: Right perihilar parenchymal opacity with some density extending into the right midlung. May represent a combination of infiltrate, adenopathy or mass. Patient reportedly with recently diagnosed pneumonia. Correlation with outside studies may be of benefit to assess for resolution/progression of disease. Further evaluation with chest CT with contrast may be warranted to exclude underlying mass lesion. Signer Name: KEN Munguia MD  Signed: 6/4/2019 10:10 PM  Workstation Name: [email or web address]     Ct Angiogram Chest With Contrast    Result Date: 6/4/2019  Narrative: CTA Chest INDICATION: Weakness and cough. Recently diagnosed with pneumonia 2 weeks ago. TECHNIQUE: CT angiogram of the chest with contrast. 3-D postprocessing was performed and reviewed.   Radiation dose reduction techniques included automated exposure control or exposure modulation based on body size. Count of known CT and cardiac nuc med studies performed in previous 12 months: 0. COMPARISON: None available. FINDINGS: Right lower lobe parenchymal opacity partially encasing the right lower lobe bronchi. Mass measures roughly 9.1 x 5.4 x 7.2 cm and though could represent airspace disease and pneumonia given its masslike appearance is highly concerning for primary lung malignancy. Background diffuse lung disease with underlying fibrosis. Minimal right effusion. 4.2 x 2.2 cm subcarinal lymphadenopathy. Small amount of bilateral hilar lymphadenopathy with a 2.3 x 1.4 cm right hilar lymph node. No evidence of pulmonary embolus. Heart and aorta are unremarkable. The visualized upper abdomen unremarkable. Osseous structures and thoracic inlet appear normal.     Impression: Extensive right hilar and right lower lobe parenchymal opacity which could represent airspace disease and pneumonia but appearance and history favors primary lung malignancy. Partial encasement of the right  lower lobe bronchi. No evidence of pulmonary embolus. Diffuse interstitial lung disease. Signer Name: KEN Munguia MD  Signed: 6/4/2019 10:55 PM  Workstation Name: [email or web address]         DIAGNOSTIC DATA:   1. Radiology: As above  2. Dr. Renteria's note from June 11, 2019 reviewed by me and documented in the  chart.   3. Pathology report:   Final Diagnosis   1. 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   2. STATION 11R LYMPH NODE, TRANSBRONCHIAL NEEDLE ASPIRATE WITH CELLBLOCK:  Compatible with metastatic small cell undifferentiated carcinoma.  3. RIGHT LOWER LOBE BIOPSIES:  Small cell undifferentiated carcinoma. (See comment)  SHIVAM/walter        4. Laboratory data:    Results for THELMA SKY (MRN [id number]) as of 6/14/2019 15:25   Ref. Range 6/11/2019 05:41   WBC Latest Ref Range: 3.40 - 10.80 10*3/mm3 8.52   RBC Latest Ref Range: 4.14 - 5.80 10*6/mm3 3.80 (L)   Hemoglobin Latest Ref Range: 13.0 - 17.7 g/dL 11.2 (L)   Hematocrit Latest Ref Range: 37.5 - 51.0 % 33.7 (L)   RDW Latest Ref Range: 12.3 - 15.4 % 11.7 (L)   MCV Latest Ref Range: 79.0 - 97.0 fL 88.7   MCH Latest Ref Range: 26.6 - 33.0 pg 29.5   MCHC Latest Ref Range: 31.5 - 35.7 g/dL 33.2   MPV Latest Ref Range: 6.0 - 12.0 fL 8.7   Platelets Latest Ref Range: 140 - 450 10*3/mm3 167       ASSESSMENT: The patient is a very pleasant 69 y.o.  male  with limited stage small cell lung cancer    PROBLEM LIST:   1.  Right lower lobe small cell lung cancer T4 N2 M0 stage IIIa:  A.  Presented with shortness of breath and cough  B.  CT chest done June 4019 revealed 9 cm mass right lower lobe with bulky hilar and mediastinal lymphadenopathy  C.  Status post bronchoscopy with biopsy done by Dr. Nash Jackelin 10, 2019  D.  Pathology revealed small cell lung cancer from right lower lobe, level 7 and level 10 mediastinal nodes.  2.  Hyponatremia  3.  Hypertension  4.   COPD    PLAN:   1. I had a long discussion today with the patient and his wife and daughter about his  new diagnosis of small cell lung cancer. I did go over the final pathology report in  detail with both of them. I reviewed the patient's documents including refereing provider's notes, lab results, imaging, and path report.   2.  I will order MRI brain with contrast as well as CAT scan abdomen pelvis and bone scan to complete his staging work-up.  3.  We discussed the rule of treatment.  The patient is interested in active cancer treatment.  If he has indeed stage III disease he will be treated with concurrent chemotherapy with radiation using cisplatin and etoposide.  Other hand if he has evidence of extensive disease he will be treated with carboplatin and etoposide with no radiation.  4.  Given his current symptoms as well as aggressive disease nature I recommend to start his treatment as soon as possible.  5. We reviewed the potential side effects of this regimen including fatigue, vomiting and nausea, hair loss, nephropathy, neuropathy, hearing loss, myelosuppression, and risk of infusion reaction.  6.  I will monitor the patient blood work including blood counts kidney function liver function and electrolytes.  7.  I will set him up to meet with my nurse practitioner for chemotherapy education session.  8.  The patient will follow-up with me for cycle number 1 day 3.  To evaluate for treatment tolerance as well as to go over the imaging results.  9.  I will monitor the patient blood pressure while he is on chemotherapy we may have to stop his lisinopril.  10.  We will continue sodium chloride tablets for now we will be monitoring his sodium levels.  His hyponatremia should resolve with chemotherapy.  Ulysses Argueta MD  6/14/2019

## 2019-06-17 NOTE — PROGRESS NOTES
CHEMOTHERAPY PREPARATION    Florentin Mcallister  [id number]  1950    Chief Complaint: chemo education     History of present illness:  Florentin Mcallister is a 69 y.o. year old male who is here today for chemotherapy preparation and needs assessment. The patient has been diagnosed with extensive stage small cell lung cancer and is scheduled to begin treatment with carboplatin/etoposide.     Oncology History:     No history exists.       Past Medical History:   Diagnosis Date   • Arthritis    • Black lung disease (CMS/HCC)    • History of alcohol abuse    • Hyperlipidemia    • Hypertension        Past Surgical History:   Procedure Laterality Date   • BRONCHOSCOPY N/A 6/10/2019    Procedure: BRONCHOSCOPY WITH ENDOBRONCHIAL ULTRASOUND;  Surgeon: Bar Gage MD;  Location: [facility] ENDOSCOPY;  Service: Pulmonary   • BRONCHOSCOPY     • CATARACT EXTRACTION, BILATERAL     • LUNG BIOPSY     • NO PAST SURGERIES     • TEETH EXTRACTION     • WISDOM TOOTH EXTRACTION         MEDICATIONS: The current medication list was reviewed and reconciled.     Allergies:  has No Known Allergies.    Family History   Problem Relation Age of Onset   • COPD Mother    • Heart disease Father          Review of Systems   Constitutional: Positive for fatigue and unexpected weight change. Negative for fever.   HENT: Negative for congestion, hearing loss, sore throat and trouble swallowing.    Eyes: Negative for visual disturbance.   Respiratory: Positive for cough and shortness of breath. Negative for wheezing.    Cardiovascular: Negative for chest pain and leg swelling.   Gastrointestinal: Positive for nausea. Negative for abdominal distention, abdominal pain, constipation, diarrhea and vomiting.   Endocrine: Negative.    Genitourinary: Negative.    Musculoskeletal: Positive for gait problem. Negative for arthralgias and back pain.        Ambulates with walking stick   Skin: Negative.    Allergic/Immunologic: Negative.    Neurological:  Negative for dizziness, weakness, numbness and headaches.   Hematological: Negative for adenopathy. Does not bruise/bleed easily.   Psychiatric/Behavioral: Positive for sleep disturbance.   All other systems reviewed and are negative.      Physical Exam  Vital Signs: /70   Pulse 83   Temp 97.1 °F (36.2 °C) (Temporal)   Resp 20   Wt 88.9 kg (196 lb)   SpO2 97%   BMI 29.80 kg/m²    General Appearance:  alert, cooperative, no apparent distress and appears stated age   Neurologic/Psychiatric: A&O x 3, gait steady, appropriate affect   HEENT:  Normocephalic, without obvious abnormality, mucous membranes moist   Lungs:   Clear to auscultation bilaterally; respirations regular, even, and unlabored bilaterally   Heart:  Regular rate and rhythm, no murmurs appreciated   Extremities: Normal, atraumatic; no clubbing, cyanosis, or edema    Skin: No rashes, lesions, or abnormal coloration noted     ECOG Performance Status: (1) Restricted in physically strenuous activity, ambulatory and able to do work of light nature          NEEDS ASSESSMENTS    Genetics  The patient's new diagnosis and family history have been reviewed for genetic counseling needs. A genetic referral is not recommended.     Psychosocial  The patient has completed a PHQ-9 Depression Screening and the Distress Thermometer (DT) today.   PHQ-9 results show 1-4 (Minimal Depression). The patient scored their distress today as 1 on a scale of 0-10 with 0 being no distress and 10 being extreme distress.   Problems marked by the patient as being an issue for them within the last week include emotional problems and physical problems.   Results were reviewed along with psychosocial resources offered by our cancer center. Our oncology social worker will be flagged for a DT score of 4 or above, and a same day call will be made for a score of 9 or 10. A mental health referral is not recommended at this time. The patient is not accepting of a referral to [facility].   Copies of patient's questionnaires will be scanned into EMR for details and further reference.    Barriers to care  A barriers form was also completed by the patient today. We discussed services offered by our facility to help him have adequate access to care. The patient was given the name and card for our Oncology Social Worker, Blessing George. Based upon barriers assessment today, the patient will not require a follow-up call from the  to further discuss needs.   A copy of the barriers form will also be scanned into EMR for details and further reference.     VAD Assessment  The patient and I discussed planned intervenous chemotherapy as well as other IV treatments that are often needed throughout the course of treatment. These may include, but are not limited to blood transfusions, antibiotics, and IV hydration. The vasculature does appear to be adequate for multiple peripheral IVs throughout their treatment course. Discussed risks and benefits of VADs. The patient would not like to pursue Port-A-Cath insertion prior to initiation of treatment.     Advanced Care Planning  The patient and I discussed advanced care planning, \"Conversations that Matter\".   This service was offered, free of charge, for development of advance directives with a certified ACP facilitator.  The patient does not have an up-to-date advanced directive. This document is not on file with our office. The patient may be interested in an appointment with one of our facilitators to create or update their advanced directives, however he is going to think more about this and notify us if he desires referral.      Palliative Care  The patient and I discussed palliative care services. Palliative care is not the same as Hospice care. This is specialized medical care for people living with serious illness with the goal of improving quality of life for the patient and their family. [facility] has partnered with [facility] " Navigators to offer our patients outpatient palliative care early along with their treatment to assist in coordination of care, symptom management, pain management, and medical decision making.  Oncology criteria for palliative care referral is not met at this time. The patient is not interested in a palliative care consultation.     Additional Referral needs  none      CHEMOTHERAPY EDUCATION    Booklets Given: Chemotherapy and You [x]  Eating Hints [x]    Sexuality/Fertility Books []      Chemotherapy/Biotherapy Education Sheets: (list all that apply)  nausea management, acid reflux management, diarrhea management, Cancer resourse contacts information and skin and mouth care                                                                                                                                                                 Chemotherapy Regimen:   Treatment Plans     Name Type Plan dates Plan Provider         Active    OP LUNG Etoposide / CARBOplatin AUC=5 ONCOLOGY TREATMENT  6/18/2019 - Present Ulysses Argueta MD                    TOPICS EDUCATION PROVIDED COMMENTS   ANEMIA:  role of RBC, cause, s/s, ways to manage, role of transfusion []    THROMBOCYTOPENIA:  role of platelet, cause, s/s, ways to prevent bleeding, things to avoid, when to seek help []    NEUTROPENIA:  role of WBC, cause, infection precautions, s/s of infection, when to call MD []    NUTRITION & APPETITE CHANGES:  importance of maintaining healthy diet & weight, ways to manage to improve intake, dietary consult, exercise regimen []    DIARRHEA:  causes, s/s of dehydration, ways to manage, dietary changes, when to call MD []    CONSTIPATION:  causes, ways to manage, dietary changes, when to call MD []    NAUSEA & VOMITING:  cause, use of antiemetics, dietary changes, when to call MD []    MOUTH SORES:  causes, oral care, ways to manage []    ALOPECIA:  cause, ways to manage, resources []    INFERTILITY & SEXUALITY:  causes, fertility  preservation options, sexuality changes, ways to manage, importance of birth control []    NERVOUS SYSTEM CHANGES:  causes, s/s, neuropathies, cognitive changes, ways to manage []    PAIN:  causes, ways to manage [] ????   SKIN & NAIL CHANGES:  cause, s/s, ways to manage []    ORGAN TOXICITIES:  cause, s/s, need for diagnostic tests, labs, when to notify MD []    SURVIVORSHIP:  distress, distress assessment, secondary malignancies, early/late effects, follow-up, social issues, social support []    HOME CARE:  use of spill kits, storing of PO chemo, how to manage bodily fluids []    MISCELLANEOUS:  drug interactions, administration, vesicant, et []        Assessment and Plan:    Florentin was seen today for lung cancer.    Diagnoses and all orders for this visit:    Small cell lung cancer (CMS/HCC)  -     ondansetron (ZOFRAN) 8 MG tablet; Take 1 tablet by mouth 3 (Three) Times a Day As Needed for Nausea or Vomiting.  -     CBC and Differential; Future  -     Comprehensive metabolic panel; Future  -     Lab Appointment Request; Future  -     Clinic Appointment Request; Future  -     Infusion Appointment Request; Future    Other orders  -     sodium chloride 0.9 % infusion 250 mL  -     palonosetron (ALOXI) injection 0.25 mg  -     fosaprepitant (EMEND) 150 mg in sodium chloride 0.9 % 100 mL IVPB  -     dexamethasone (DECADRON) 12 mg in sodium chloride 0.9 % IVPB  -     etoposide (TOPOSAR) 200 mg in sodium chloride 0.9 % 510 mL chemo IVPB  -     CARBOplatin (PARAPLATIN) 550 mg in sodium chloride 0.9 % 305 mL chemo IVPB  -     hydrocortisone sodium succinate (Solu-CORTEF) injection 100 mg  -     diphenhydrAMINE (BENADRYL) injection 50 mg  -     famotidine (PEPCID) injection 20 mg        This was a 45 minute face-to-face visit with 45 minutes spent in  counseling and coordination of care as documented above.   The patient and I have reviewed their new cancer diagnosis and scheduled treatment plan. Needs assessment was  completed including genetics, psychosocial needs, barriers to care, VAD evaluation, advanced care planning, and palliative care services. Referrals have been ordered as appropriate based upon our evaluation and patient desires.     Chemotherapy teaching was also completed today as documented above. Adequate time was given to answer all questions to his satisfaction. Patient and family are aware of their care team members and contact information if they have questions or problems throughout the treatment course. Needs assessments and education has been completed. The patient is adequately prepared to begin treatment as scheduled.     I reviewed the MRI brain with the patient and his family. I reassured him he has not evidence of brain metastasis. I also reviewed the CAT scan of abdomen and pelvis. The official radiology report is not back, however based upon his images it appears he has evidence of metastatic disease to the liver. The patient is having bone scan completed today as well. We will follow up on these results and notify him of findings.     I discussed with the patient that based on the CAT scan results this does represent extensive stage disease. His treatment plan will therefore not include adjuvant radiation, and we will use Carboplatin with etoposide instead of cisplatin. I told the patient his goal of treatment will be considered palliative in nature.     RX sent for Zofran 8 mg to be taken every 8 hours as needed for treatment induced nausea and vomiting.     The patient will have pre-treatment labs drawn today in preparation for treatment on Wednesday. We will follow up on his sodium level and the need for continued fluid restriction.     The patient is going to think more about advanced care planning. We reviewed the benefits of designating medical power or  as well as advanced directives. If he desires referral he will notify us for planning needs.     We will see the patient back as  scheduled for follow up visit with Dr. Argueta Friday, cycle 1 day 3 of treatment.   EARNEST Hutchinson

## 2019-06-19 NOTE — PLAN OF CARE
Outpatient Infusion • [address] [facility] • [address] • [place] [id number] • [phone]      CHEMOTHERAPY EDUCATION SHEET    NAME:  Florentin Mcallister      : 1950           DATE: 19    Booklets Given: Chemotherapy and You []  Eating Hints []    Sexuality/Fertility Books []     Chemotherapy/Biotherapy Education Sheets: (list all that apply)  Carboplatin and etoposide [employer] education sheets were provided to the patient along with a treatment calendar                                                                                                                                                                Chemotherapy Regimen:  Carboplatin IV (Target AUC = 5) on day 1 of a 21 day cycle + etoposide IV on days 1-3 of a 21 day cycle - 4 cycles planned    Intravenous anti-emetic regimen: palonosetron 0.25mg on day 1 of 21 day cycle                                                        fosaprepitant 150mg on day 1 of 21 day cycle                                                        dexamethasone 12mg on days 1-3 of 21 day cycle    TOPICS EDUCATION PROVIDED EDUCATION REINFORCED COMMENTS   ANEMIA:  role of RBC, cause, s/s, ways to manage, role of transfusion []  [x]  Spoke about the chemotherapy reducing RBC count and the tiredness that some experience as a result. Emphasized the importance of maintaining daily routines and staying as active as possible.    THROMBOCYTOPENIA:  role of platelet, cause, s/s, ways to prevent bleeding, things to avoid, when to seek help []  [x]  Spoke about increased risk of bleeding/bruising and that if a cut were to occur, applying pressure as necessary.    NEUTROPENIA:  role of WBC, cause, infection precautions, s/s of infection, when to call MD []  [x]  Spoke about increased risk of infection from the likely drop of WBC as the chemotherapy does not discriminate between cancerous and non-cancerous cells. Counseled patient to call MD if they were to experience a  fever of 100.4F.     NUTRITION & APPETITE CHANGES:  importance of maintaining healthy diet & weight, ways to manage to improve intake, dietary consult, exercise regimen []  [x]  Discussed maintaining normal diet. Counseled not to eat at buffets.    DIARRHEA:  causes, s/s of dehydration, ways to manage, dietary changes, when to call MD []  [x]  Discussed that the regimen may cause constipation or diarrhea. Counseled that over the counter loperamide (Immodium) is the recommended treatment.   CONSTIPATION:  causes, ways to manage, dietary changes, when to call MD []  [x]  Discussed that Miralax or docusate  are the preferred agents to treat constipation if it occurs. Counseled that the patient should NOT use enemas or suppositories.    NAUSEA & VOMITING:  cause, use of antiemetics, dietary changes, when to call MD []  [x]  As soon as patient were to feel nauseated he should take ondansetron 8mg. Patient counseled not to delay taking ondansetron at the onset of nausea. Ondansetron may be taken 2-3 times per day. Nausea is typically most prevalent on days 3-5 as the intravenous antiemetic.   MOUTH SORES:  causes, oral care, ways to manage []  [x]  Discussed utilizing teaspoon of salt and baking soda with a quart of water to prevent mouth sores. If sores are experienced patient counseled to call for magic mouthwash prescription.   ALOPECIA:  cause, ways to manage, resources []  [x]  Hair thinning mentioned.    INFERTILITY & SEXUALITY:  causes, fertility preservation options, sexuality changes, ways to manage, importance of birth control []  [x]  Discussed using a barrier method ie condom if engaging in intercourse within 48 hours of IV treatment.    NERVOUS SYSTEM CHANGES:  causes, s/s, neuropathies, cognitive changes, ways to manage []  [x]  Discussed using emollient cream if tingling and redness occurs.    PAIN:  causes, ways to manage []  []  ????   SKIN & NAIL CHANGES:  cause, s/s, ways to manage []  [x]   Discussed  the importance of sunscreen and protecting the skin from sun exposure through protective clothing/hat.    ORGAN TOXICITIES:  cause, s/s, need for diagnostic tests, labs, when to notify MD []  [x]  Discussed the importance of hydration. Patient currently is under a fluid restriction due to SIADH. Mentioned that labs will be drawn to monitor sodium levels, kidney function and magnesium levels.   SURVIVORSHIP:  distress, distress assessment, secondary malignancies, early/late effects, follow-up, social issues, social support []  [x]  Discussed the importance of utilizing resources available at the [facility] if/as needs arise.   HOME CARE:  use of spill kits, storing of PO chemo, how to manage bodily fluids []  [x]  Discussed any caregivers using gloves if cleaning up an accident. Mentioned that toilet should be flushed with the lid down after use. Counseled that typically clothes can be washed as normal unless soiled with bodily fluids. If soiled, wash twice without other laundry and if not able to be immediately washed store in a plastic bag.    MISCELLANEOUS:  drug interactions, administration, vesicant, et []  []  Mentioned that no drug interactions were noted with the chemotherapy regimen and current medication regimen.        Referrals:    None    Notes:   Patient and his wife were counseled as documented above about the potential side effects of chemotherapy. Patient and wife's had no further questions at the conclusion of counseling and were encouraged to reach out should questions arise. Discussed anti-emetic medications, addressing patient concern and counseled patient and wife to call if emesis were to occur/persist. Emphasized utilizing ondansetron at the onset of nausea if it were to occur.     Thanks,  Dewey Ramos, PharmD  Pharmacy Resident  6/19/2019  10:01 AM

## 2019-06-19 NOTE — PROGRESS NOTES
Oncology Nutrition Screening    Patient Name:  Florentin Mcallister  YOB: 1950  MRN: [id number]  Date:  06/19/19  Physician:  Dr. Ulysses Argueta    Type of Cancer Treatment:   Chemotherapy: Carboplatin D1 / Etoposide D1-3 - every 21 days x 4 cycles    Patient Active Problem List   Diagnosis   • PNA (pneumonia)   • Shortness of breath   • HTN (hypertension)   • HLD (hyperlipidemia)   • Anemia   • Black lung disease (CMS/HCC)   • Hyponatremia   • Lung mass   • History of alcohol abuse   • Hypomagnesemia   • Pneumonia of right lung due to infectious organism   • Acute gout of foot   • Small cell lung cancer (CMS/HCC)       Current Outpatient Medications   Medication Sig Dispense Refill   • albuterol (ACCUNEB) 1.25 MG/3ML nebulizer solution Take 1 ampule by nebulization Every 6 (Six) Hours As Needed for Wheezing.     • aspirin 81 MG chewable tablet Chew 81 mg Daily.     • budesonide-formoterol (SYMBICORT) 160-4.5 MCG/ACT inhaler Inhale 2 puffs 2 (Two) Times a Day. 10.2 g 12   • colchicine 0.6 MG tablet Take 1 tablet by mouth Daily. 2 tablet 0   • lisinopril (PRINIVIL,ZESTRIL) 20 MG tablet Take 20 mg by mouth Daily.     • ondansetron (ZOFRAN) 8 MG tablet Take 1 tablet by mouth 3 (Three) Times a Day As Needed for Nausea or Vomiting. 30 tablet 5   • PHARMACY MEDS TO BED CONSULT Daily.     • predniSONE (DELTASONE) 10 MG tablet Take 3 tabs daily for 2 days, then 2 tabs daily for 2 days, then 1 tab daily for 2 days 12 tablet 0   • simvastatin (ZOCOR) 20 MG tablet Take 20 mg by mouth Every Night.     • sodium chloride 1 g tablet Take 1 g by mouth 3 (Three) Times a Day.     • sodium chloride 1 g tablet Take 2 tablets by mouth 3 (Three) Times a Day With Meals. 180 tablet 1     No current facility-administered medications for this visit.        Glycemic Risk:   Reports he was previously borderline diabetic, most recent HgbA1c = 5.5%    Weight:   Height: 68 inches  Weight: 199 lbs.  Usual Body Weight: ~ 200 lbs.   BMI:  "30.3  Obese  Weight has been stable    Oral Food Intake:  Regular Diet - No Restrictions    Hydration Status:   How many 8 ounce glass of water of fluid do you drink per day?  3 - Patient states he keeps a water bottle (~24 oz) with him but it usually takes him a whole day to finish it. He is also on a 1200 mL fluid restriction per his MD for hyponatremia    Enteral Feeding:   n/a    Nutrition Symptoms:   Constipation, occasional    Activity:   Not my normal self, but able to be up and about with fairly normal activities    Reports that he quit smoking about 10 years ago. His smoking use included cigarettes. He smoked 1.00 pack per day. He has never used smokeless tobacco. He reports that he does not drink alcohol or use drugs.    Evaluation of Nutritional Risk:   Patient is not identified as a nutritional risk at time of screening. Met with patient and wife during initial chemotherapy infusion appointment. Patient reports his UBW is around 200 lbs. His wife states that he is a pretty picky eater, does not like many vegetables/fruits. Reviewed in length, patients typical diet. He does like nuts but concerned that they were giving him constipation. Explained to patient that nuts, along with fruits/vegetables, and grains all provide healthy fiber that helps to keep bowels moving. Provided patient with handout for \"Fiber for Bowel Management\" to assist with providing different food options that incorporate fiber. Encouraged patient to have a well-balanced diet, trying new fruits/vegetables in order to incorporate all food groups. Patient acknowledged understanding.    Discussed the importance of good nutrition during treatment course focusing on adequate calorie, protein, nutrient and fluid intake.  Advised patient that consuming smaller more frequent meals/snacks throughout the day can aid with potential nausea management.  Emphasized the importance of protein and its role in the diet; reviewed high protein foods; and " recommended having a protein source at each meal/snack.  Also emphasized the importance of hydration; reviewed good hydrating fluid options; and recommended to keep sipping on hydrating fluids throughout the day. Per MD, patient is to be following a 1200 mL fluid restriction to monitor sodium levels, advised patient to continue per MD recommendations in terms of fluid.    Answered patient/wifes questions, both voiced understanding of information discussed.  RD's contact information provided along with education materials and encouraged them to call with questions.  Will monitor as needed during treatment course.    Electronically signed by:  Elvira Jacinto RDN  11:16 AM

## 2019-06-20 NOTE — OUTREACH NOTE
COPD/PN Week 1 Survey      Responses   Facility patient discharged from?  [facility]   Does the patient have one of the following disease processes/diagnoses(primary or secondary)?  COPD/Pneumonia   Was the primary reason for admission:  Pneumonia          Tatyana Gan RN

## 2019-06-21 NOTE — OUTREACH NOTE
COPD/PN Week 2 Survey      Responses   Facility patient discharged from?  [facility]   Does the patient have one of the following disease processes/diagnoses(primary or secondary)?  COPD/Pneumonia   Was the primary reason for admission:  Pneumonia   Week 2 attempt successful?  No   Unsuccessful attempts  Attempt 2          Cameron Newell, RN

## 2019-06-21 NOTE — PROGRESS NOTES
DATE OF VISIT: 6/21/2019    REASON FOR VISIT: Followup for extensive stage small cell lung cancer     HISTORY OF PRESENT ILLNESS: The patient is a very pleasant 69 y.o. male  with past medical history significant for extensive stage small cell lung cancer diagnosed Jackelin 10, 2019.  Staging work-up revealed right lower lobe lung mass with bulky mediastinal adenopathy and liver metastases.  He was started on chemotherapy using carboplatin and etoposide June 19, 2019 . The patient is here today for scheduled follow-up visit with treatment cycle #1, day 3.    SUBJECTIVE: The patient has been doing fairly well. he was able to tolerate  his treatment without any serious side effects. he denied any fever or  chills, no night sweats, denied any headaches.  He is complaining of constipation.    PAST MEDICAL HISTORY/SOCIAL HISTORY/FAMILY HISTORY: Reviewed by me and unchanged from my documentation done on 06/21/19.    Review of Systems   Constitutional: Negative for activity change, appetite change, chills, fatigue, fever and unexpected weight change.   HENT: Negative for hearing loss, mouth sores, nosebleeds, sore throat and trouble swallowing.    Eyes: Negative for visual disturbance.   Respiratory: Negative for cough, chest tightness, shortness of breath and wheezing.    Cardiovascular: Negative for chest pain, palpitations and leg swelling.   Gastrointestinal: Negative for abdominal distention, abdominal pain, blood in stool, constipation, diarrhea, nausea, rectal pain and vomiting.   Endocrine: Negative for cold intolerance and heat intolerance.   Genitourinary: Negative for difficulty urinating, dysuria, frequency and urgency.   Musculoskeletal: Negative for arthralgias, back pain, gait problem, joint swelling and myalgias.   Skin: Negative for rash.   Neurological: Negative for dizziness, tremors, syncope, weakness, light-headedness, numbness and headaches.   Hematological: Negative for adenopathy. Does not bruise/bleed  "easily.   Psychiatric/Behavioral: Negative for confusion, sleep disturbance and suicidal ideas. The patient is not nervous/anxious.          Current Outpatient Medications:   •  albuterol (ACCUNEB) 1.25 MG/3ML nebulizer solution, Take 1 ampule by nebulization Every 6 (Six) Hours As Needed for Wheezing., Disp: , Rfl:   •  aspirin 81 MG chewable tablet, Chew 81 mg Daily., Disp: , Rfl:   •  budesonide-formoterol (SYMBICORT) 160-4.5 MCG/ACT inhaler, Inhale 2 puffs 2 (Two) Times a Day., Disp: 10.2 g, Rfl: 12  •  colchicine 0.6 MG tablet, Take 1 tablet by mouth Daily., Disp: 2 tablet, Rfl: 0  •  lisinopril (PRINIVIL,ZESTRIL) 20 MG tablet, Take 20 mg by mouth Daily., Disp: , Rfl:   •  ondansetron (ZOFRAN) 8 MG tablet, Take 1 tablet by mouth 3 (Three) Times a Day As Needed for Nausea or Vomiting., Disp: 30 tablet, Rfl: 5  •  PHARMACY MEDS TO BED CONSULT, Daily., Disp: , Rfl:   •  simvastatin (ZOCOR) 20 MG tablet, Take 20 mg by mouth Every Night., Disp: , Rfl:   •  sodium chloride 1 g tablet, Take 2 tablets by mouth 3 (Three) Times a Day With Meals., Disp: 180 tablet, Rfl: 1  No current facility-administered medications for this visit.     PHYSICAL EXAMINATION:   /63   Pulse 77   Temp 97.6 °F (36.4 °C) (Temporal)   Resp 16   Ht 172.7 cm (68\")   Wt 91.6 kg (202 lb)   SpO2 98%   BMI 30.71 kg/m²    ECOG Performance Status: 1 - Symptomatic but completely ambulatory  General Appearance:  alert, cooperative, no apparent distress and appears stated age   Neurologic/Psychiatric: A&O x 3, gait steady, appropriate affect, strength 5/5 in all muscle groups   HEENT:  Normocephalic, without obvious abnormality, mucous membranes moist   Neck: Supple, symmetrical, trachea midline, no adenopathy;  No thyromegaly, masses, or tenderness   Lungs:   Clear to auscultation bilaterally; respirations regular, even, and unlabored bilaterally   Heart:  Regular rate and rhythm, no murmurs appreciated   Abdomen:   Soft, non-tender, " non-distended and no organomegaly   Lymph nodes: No cervical, supraclavicular, inguinal or axillary adenopathy noted   Extremities: Normal, atraumatic; no clubbing, cyanosis, or edema    Skin: No rashes, ulcers, or suspicious lesions noted     Lab on 06/17/2019   Component Date Value Ref Range Status   • Glucose 06/17/2019 132* 65 - 99 mg/dL Final   • BUN 06/17/2019 25* 8 - 23 mg/dL Final   • Creatinine 06/17/2019 0.93  0.76 - 1.27 mg/dL Final   • Sodium 06/17/2019 139  136 - 145 mmol/L Final   • Potassium 06/17/2019 4.0  3.5 - 5.2 mmol/L Final   • Chloride 06/17/2019 100  98 - 107 mmol/L Final   • CO2 06/17/2019 25.0  22.0 - 29.0 mmol/L Final   • Calcium 06/17/2019 10.1  8.6 - 10.5 mg/dL Final   • Total Protein 06/17/2019 7.0  6.0 - 8.5 g/dL Final   • Albumin 06/17/2019 4.20  3.50 - 5.20 g/dL Final   • ALT (SGPT) 06/17/2019 79* 1 - 41 U/L Final   • AST (SGOT) 06/17/2019 32  1 - 40 U/L Final   • Alkaline Phosphatase 06/17/2019 68  39 - 117 U/L Final   • Total Bilirubin 06/17/2019 0.6  0.2 - 1.2 mg/dL Final   • eGFR Non African Amer 06/17/2019 81  >60 mL/min/1.73 Final   • Globulin 06/17/2019 2.8  gm/dL Final   • A/G Ratio 06/17/2019 1.5  g/dL Final   • BUN/Creatinine Ratio 06/17/2019 26.9* 7.0 - 25.0 Final   • Anion Gap 06/17/2019 14.0  mmol/L Final   • WBC 06/17/2019 9.90  3.40 - 10.80 10*3/mm3 Final   • RBC 06/17/2019 4.40  4.14 - 5.80 10*6/mm3 Final   • Hemoglobin 06/17/2019 12.8* 13.0 - 17.7 g/dL Final   • Hematocrit 06/17/2019 39.6  37.5 - 51.0 % Final   • RDW 06/17/2019 12.6  12.3 - 15.4 % Final   • MCV 06/17/2019 90.1  79.0 - 97.0 fL Final   • MCH 06/17/2019 29.1  26.6 - 33.0 pg Final   • MCHC 06/17/2019 32.4  31.5 - 35.7 g/dL Final   • MPV 06/17/2019 7.0  6.0 - 12.0 fL Final   • Platelets 06/17/2019 207  140 - 450 10*3/mm3 Final   • Neutrophil % 06/17/2019 72.0  42.7 - 76.0 % Final   • Lymphocyte % 06/17/2019 24.6  19.6 - 45.3 % Final   • Monocyte % 06/17/2019 3.4* 5.0 - 12.0 % Final   • Neutrophils,  Absolute 06/17/2019 7.10* 1.70 - 7.00 10*3/mm3 Final   • Lymphocytes, Absolute 06/17/2019 2.40  0.70 - 3.10 10*3/mm3 Final   • Monocytes, Absolute 06/17/2019 0.30  0.10 - 0.90 10*3/mm3 Final   Admission on 06/04/2019, Discharged on 06/11/2019   No results displayed because visit has over 200 results.           Ct Head Without Contrast    Result Date: 6/4/2019  Narrative: CT Head WO HISTORY: Decreased level of alertness. Hypertension and chest pain. TECHNIQUE: Axial unenhanced head CT. Radiation dose reduction techniques included automated exposure control or exposure modulation based on body size. Count of known CT and cardiac nuc med studies performed in previous 12 months: 0. Time of scan: 2231 hours COMPARISON: None. FINDINGS: No intracranial hemorrhage, mass, or infarct. No hydrocephalus or extra-axial fluid collection. Brain parenchymal density is normal. The skull base, calvarium, and extracranial soft tissues are normal.     Impression: Normal, negative unenhanced head CT. Signer Name: KEN Munguia MD  Signed: 6/4/2019 10:47 PM  Workstation Name: [id number]     Mri Brain With & Without Contrast    Result Date: 6/17/2019  Narrative: EXAMINATION: MRI BRAIN W WO CONTRAST-06/17/2019:  INDICATION: Complete small-cell lung cancer staging; C34.90-Malignant neoplasm of unspecified part of unspecified bronchus or lung.  TECHNIQUE: Multiplanar imaging of the brain was performed with images acquired prior to and following intravenous contrast.  COMPARISON: Normal CT scan of the head (06/04/2019).  FINDINGS: There are age-related periventricular white matter changes. There is no intracranial mass. There is no hemorrhage. There is no midline shift or extra-axial fluid collection. There is no abnormal contrast enhancement. There is no acute infarct.      Impression: Normal for age. There is no metastatic disease.  D:  06/17/2019 E:  06/17/2019     This report was finalized on 6/17/2019 2:12 PM by Dr. Austyn Lacy,  MD.      Nm Bone Scan Whole Body    Result Date: 6/18/2019  Narrative: EXAMINATION: NM BONE SCAN WHOLE BODY-  INDICATION: Complete small cell lung cancer staging; C34.90-Malignant neoplasm of unspecified part of unspecified bronchus or lung.  TECHNIQUE: Patient was injected with 25.6 mCi technetium MDP and scanned after a 4.5 hour delay.  COMPARISON: None.  FINDINGS: There is an arthritic distribution of tracer including the shoulders, eccentric lesions in the thoracic and lumbar spine, right knee and ankle. There is no focal or multifocal pattern to suggest metastatic disease. The eccentric changes in the thoracic spine correspond to large osteophytes. Lastly notation made of a ptotic right kidney.      Impression: Arthritic changes without a pattern suggestive of metastatic disease.  D:  06/17/2019 E:  06/18/2019    This report was finalized on 6/18/2019 4:58 PM by Dr. Austyn Lacy MD.      Ct Abdomen Pelvis With Contrast    Result Date: 6/18/2019  Narrative: EXAMINATION: CT ABDOMEN AND PELVIS WITH CONTRAST-06/17/2019:  INDICATION: Complete small-cell lung cancer staging; C34.90-Malignant neoplasm of unspecified part of unspecified bronchus or lung, lung cancer.  TECHNIQUE: Multiple axial CT imaging was obtained of the abdomen and pelvis following the administration of intravenous contrast.  The radiation dose reduction device was turned on for each scan per the ALARA (As Low as Reasonably Achievable) protocol.  COMPARISON: NONE.  FINDINGS:  ABDOMEN:  Masslike density identified within the right lower lobe suggesting the patient's known malignancy. There is a low-density lesion identified in the right lobe of the liver measuring 2.2 cm suggesting a metastatic focus as well as more inferiorly in the liver at 1 cm suggesting a second metastatic focus. A lesion identified in the periphery of the right lobe of the liver near the vertex at 1.4 cm. The spleen is homogeneous. The right kidney is seen in the pelvis.  Vascular calcification seen within the abdominal aorta and iliac vessels. No pelvic adenopathy. No free fluid or free air. Diverticulosis with no evidence of diverticulitis. No pelvic adenopathy. The pancreas is homogeneous.  PELVIS: The pelvic organs are unremarkable. Right pelvic kidney present. Vascular calcification seen within the pelvic vessels. No pelvic adenopathy. The appendix is normal. The bony structures reveal degenerative changes seen within the spine and pelvis.      Impression: Mass identified within the right lower lobe suggesting the patient's known malignancy with at least 3 lesions identified within the liver suggesting metastatic disease.  D:  06/17/2019 E:  06/18/2019  This report was finalized on 6/18/2019 9:13 AM by Dr. Avis Cassidy MD.      Xr Chest 1 View    Result Date: 6/11/2019  Narrative: EXAMINATION: XR CHEST 1 VW-  INDICATION: Dyspnea, on exertion.  COMPARISON: 06/04/2019.  FINDINGS: Right parahilar mass is again noted. Chronic appearing interstitial changes elsewhere are stable. No effusion or pneumothorax is seen. The heart is borderline enlarged. The vasculature appears normal.         Impression: Persistent right parahilar mass, recently evaluated with chest CT scan.  D:  06/11/2019 E:  06/11/2019  This report was finalized on 6/11/2019 10:03 PM by DR. Zane Rangel MD.      Xr Chest 1 View    Result Date: 6/4/2019  Narrative: CR Chest 1 Vw INDICATION: Weakness and cough. Patient diagnosed with pneumonia 2 weeks ago. COMPARISON:  None available. FINDINGS: Single portable AP view of the chest.  Right perihilar and mid lung infiltrate and/or mass. No effusions. Heart and mediastinum unremarkable. Osseous structures appear normal.     Impression: Right perihilar parenchymal opacity with some density extending into the right midlung. May represent a combination of infiltrate, adenopathy or mass. Patient reportedly with recently diagnosed pneumonia. Correlation with outside studies  may be of benefit to assess for resolution/progression of disease. Further evaluation with chest CT with contrast may be warranted to exclude underlying mass lesion. Signer Name: KEN Munguia MD  Signed: 6/4/2019 10:10 PM  Workstation Name: [id number]     Ct Angiogram Chest With Contrast    Result Date: 6/4/2019  Narrative: CTA Chest INDICATION: Weakness and cough. Recently diagnosed with pneumonia 2 weeks ago. TECHNIQUE: CT angiogram of the chest with contrast. 3-D postprocessing was performed and reviewed.   Radiation dose reduction techniques included automated exposure control or exposure modulation based on body size. Count of known CT and cardiac nuc med studies performed in previous 12 months: 0. COMPARISON: None available. FINDINGS: Right lower lobe parenchymal opacity partially encasing the right lower lobe bronchi. Mass measures roughly 9.1 x 5.4 x 7.2 cm and though could represent airspace disease and pneumonia given its masslike appearance is highly concerning for primary lung malignancy. Background diffuse lung disease with underlying fibrosis. Minimal right effusion. 4.2 x 2.2 cm subcarinal lymphadenopathy. Small amount of bilateral hilar lymphadenopathy with a 2.3 x 1.4 cm right hilar lymph node. No evidence of pulmonary embolus. Heart and aorta are unremarkable. The visualized upper abdomen unremarkable. Osseous structures and thoracic inlet appear normal.     Impression: Extensive right hilar and right lower lobe parenchymal opacity which could represent airspace disease and pneumonia but appearance and history favors primary lung malignancy. Partial encasement of the right lower lobe bronchi. No evidence of pulmonary embolus. Diffuse interstitial lung disease. Signer Name: KEN Munguia MD  Signed: 6/4/2019 10:55 PM  Workstation Name: [id number]       ASSESSMENT: The patient is a very pleasant 69 y.o. male  with extensive stage small cell lung cancer     PROBLEM LIST:   1.  Right lower  lobe small cell lung cancer T4 N3 M1 stage IVb:  A.  Presented with shortness of breath and cough  B.  CT chest done June 4019 revealed 9 cm mass right lower lobe with bulky hilar and mediastinal lymphadenopathy, and liver metastases.  C.  Status post bronchoscopy with biopsy done by Dr. Nash Jackelin 10, 2019  D.  Pathology revealed small cell lung cancer from right lower lobe, level 7 and level 10 mediastinal nodes.  E.  Started palliative chemotherapy with carboplatin and etoposide June 19, 2019, Tecentriq was added cycle #2.  Status post 1 cycle  2.  Hyponatremia  3.  Hypertension  4.  COPD  5.  Constipation      PLAN:  1.  I will proceed with chemotherapy as scheduled today with carboplatin and etoposide cycle 1 day 3.  2.  I will add Tecentriq with cycle #2.  This is in compliance with NCCN guidelines and this is FDA approved indication.  3.  The patient will follow-up with me in 3 weeks for cycle #2.  4.  I will monitor the patient blood work including blood counts kidney function liver function electrolytes and thyroid function.  5.  We will repeat the patient's scans after cycle #3.  6. We reviewed the potential side effects of this regimen including fatigue, vomiting and nausea, hair loss, nephropathy, neuropathy, hearing loss, myelosuppression, and risk of infusion reaction.  7. We discussed potential side effects of immunotherapy including but not limited to immune mediated reactions with thyroiditis, pneumonitis, hepatitis, colitis, rash, and electrolytes abnormalities, fatigue, multiorgan failure, and possibly death.  8.  I will start patient on Colace daily with lactulose as needed for chemotherapy as constipation.  9.  We will continue inhalers as needed for COPD.  10.  We will continue sodium chloride tablets for hyponatremia.  We may have to hold this if his sodium normalized with chemotherapy.    Ulysses Argueta MD  6/21/2019

## 2019-06-21 NOTE — PROGRESS NOTES
THORACIC CONFERENCE INITIAL TREATMENT PLAN    PRESENTER: Ulysses Argueta M.D.  DATE: 19  SITE:  Lung     Florentin Mcallister  :  1950  [address] [facility] [address]  Home phone: [phone]  Mobile phone: Mobile phone not available  Work phone: Work phone not available  MRN:  [id number]    CLINICAL HISTORY:       HPI:  69 YOWM to [facility] ED with dyspnea and generalized weakness. By history, the patient  seen recently at [facility] ED for sudden and progressively worsening dyspnea and weakness.  Per family, he was diagnosed with pneumonia and hyponatremia (118) and admitted for five days.  At that time, he was treated with antibiotics and saline solution.  He was discharged; however, he continued complaining of worsening dyspnea, weakness and unsteadiness.  On 19, he was seen by his PCP who lorie labs and mad several medication changes.  As his sodium was low again, he, he was instructed to go to the ED once more.  He was re-worked up at [facility] ED for re-evaluation and possible admission; however, he the patient left AMA.         REFERRING PROVIDER: In-Patient Pulmonology consultation (EARNEST Dodd).         PLACE OF RESIDENCE:  [place], [place].       SOCIAL HISTORY:  Former smoker 1 PPD x15 years (quit ).  He is .       FAMILY HISTORY:  Non-contributory.       PAST MEDICAL HISTORY: Black Lung, hypertension.       PAST SURGICAL HISTORY: Non-contributory.    IMAGIN/04/19 ([facility]):  Chest x-ray - Right perihilar parenchymal opacity with some density extending into the right mid-lung. May represent a combination of infiltrate, adenopathy or mass. Patient reportedly with recently diagnosed pneumonia. Correlation with outside studies may be of  benefit to assess for resolution/progression of disease.       19 ([facility]):  CT chest - RLL parenchymal opacity partially encasing the RLL bronchi. Mass measures roughly 9.1 x 5.4 x 7.2 cm, and though could represent  airspace disease and pneumonia, given its mass-like appearance. is highly concerning for primary lung malignancy. 4.2 x 2.2 cm subcarinal lymphadenopathy. Small amount of bilateral hilar lymphadenopathy with a 2.3 x 1.4 cm right hilar lymph node.        06/04/19:  CT head – Negative.    BIOPSY/STAGING RESULTS  Facility: [facility]   Date:  06/10/19  Histologic type: Undifferentiated carcinoma   Specimen site and character: RLL biopsy with Station 7 and 11R       CLINICAL STAGING  T4 N2 M1 Stage 4    RECOMMENDED TREATMENT  Palliative chemotherapy     GUIDELINE CONCORDANCE  Recommended treatment is in a concordance with National Comprehensive Cancer Network (NCCN):  yes      REFERRAL SUPPORT  Clinical Trial recommended: yes; Initiated: no; Notes: Consider Quilt clinical trial in future  Palliative Care Consult recommended: yes; Rationale: As needed in the future    Electronically signed by:  Ulysses Argueta MD  06/28/19  3:15 PM  Privileged and Confidential Patient Safety Work Product:  The information contained herein has been compiled as part of the [facility] Patient Safety Evaluation System and is deemed to be a Patient Safety Work Product and is privileged and confidential.  Disclaimer:  Multidisciplinary cancer conferences provide consultative services to formulate an effective treatment plan for patients and include physician representation from medical oncology, radiation oncology, radiology, surgery, and pathology.  AJCC or other appropriate staging is discussed, along with site-specific prognostic indicators and treatment planning used by evidence-based treatment guidelines.  Treatment plans which are discussed during conference may/may not necessarily be followed by the patient’s managing physician(s), as there may be other factors taken into consideration which impact the treatment plan.  The specific treatment plan is ultimately determined on an individual basis by the patient and  the physician(s) involved in his/her care.

## 2019-06-26 NOTE — OUTREACH NOTE
COPD/PN Week 3 Survey      Responses   Facility patient discharged from?  [facility]   Does the patient have one of the following disease processes/diagnoses(primary or secondary)?  COPD/Pneumonia   Was the primary reason for admission:  Pneumonia   Week 3 attempt successful?  Yes   Call start time  1312   Call end time  1315   General alerts for this patient  Dx of lung cancer    Discharge diagnosis  PNA (pneumonia)   Person spoke with today (if not patient) and relationship  Flores-wife   Meds reviewed with patient/caregiver?  Yes   Is the patient having any side effects they believe may be caused by any medication additions or changes?  No   Does the patient have all medications ordered at discharge?  Yes   Is the patient taking all medications as directed (includes completed medication regime)?  Yes   Does the patient have a primary care provider?   Yes   Has the patient kept scheduled appointments due by today?  Yes   Psychosocial issues?  No   What is the patient's perception of their health status since discharge?  Improving   Is the patient able to teach back COPD zones?  Yes   Is the patient/caregiver able to teach back signs and symptoms of worsening condition:  Fever/chills, Chest pain, Shortness of breath   Week 3 call completed?  Yes   Revoked  No further contact(revokes)-requires comment   Graduated/Revoked comments  Dx of lung cancer          Iraida Foster RN

## 2019-07-05 NOTE — TELEPHONE ENCOUNTER
Pt wife Flores Mcallister called regarding pt waking up with head cold and states he has phlegm. No other symptoms present, and no fever. Encouraged to take normal medications for head cold such as claritin. If cold persists and other symptoms arise or starts running fever encouraged to call back.

## 2019-07-09 NOTE — PROGRESS NOTES
DATE OF VISIT: 7/9/2019    REASON FOR VISIT: Followup for extensive stage small cell lung cancer     HISTORY OF PRESENT ILLNESS: The patient is a very pleasant 69 y.o. male  with past medical history significant for extensive stage small cell lung cancer diagnosed Jackelin 10, 2019.  Staging work-up revealed right lower lobe lung mass with bulky mediastinal adenopathy and liver metastases.  He was started on chemotherapy using carboplatin and etoposide June 19, 2019 . The patient is here today for scheduled follow-up visit with treatment cycle #2, day 1.    SUBJECTIVE: The patient has been doing fairly well. he had nausea but no vomiting.  He was fatigue for 1 week.  Requesting a refill on lisinopril.    PAST MEDICAL HISTORY/SOCIAL HISTORY/FAMILY HISTORY: Reviewed by me and unchanged from my documentation done on 07/09/19.    Review of Systems   Constitutional: Negative for activity change, appetite change, chills, fatigue, fever and unexpected weight change.   HENT: Negative for hearing loss, mouth sores, nosebleeds, sore throat and trouble swallowing.    Eyes: Negative for visual disturbance.   Respiratory: Negative for cough, chest tightness, shortness of breath and wheezing.    Cardiovascular: Negative for chest pain, palpitations and leg swelling.   Gastrointestinal: Negative for abdominal distention, abdominal pain, blood in stool, constipation, diarrhea, nausea, rectal pain and vomiting.   Endocrine: Negative for cold intolerance and heat intolerance.   Genitourinary: Negative for difficulty urinating, dysuria, frequency and urgency.   Musculoskeletal: Negative for arthralgias, back pain, gait problem, joint swelling and myalgias.   Skin: Negative for rash.   Neurological: Negative for dizziness, tremors, syncope, weakness, light-headedness, numbness and headaches.   Hematological: Negative for adenopathy. Does not bruise/bleed easily.   Psychiatric/Behavioral: Negative for confusion, sleep disturbance and  "suicidal ideas. The patient is not nervous/anxious.          Current Outpatient Medications:   •  albuterol (ACCUNEB) 1.25 MG/3ML nebulizer solution, Take 1 ampule by nebulization Every 6 (Six) Hours As Needed for Wheezing., Disp: , Rfl:   •  aspirin 81 MG chewable tablet, Chew 81 mg Daily., Disp: , Rfl:   •  budesonide-formoterol (SYMBICORT) 160-4.5 MCG/ACT inhaler, Inhale 2 puffs 2 (Two) Times a Day., Disp: 10.2 g, Rfl: 12  •  colchicine 0.6 MG tablet, Take 1 tablet by mouth Daily., Disp: 2 tablet, Rfl: 0  •  docusate sodium (COLACE) 100 MG capsule, Take 1 capsule by mouth Daily. Two capusles, Disp: 60 capsule, Rfl: 3  •  lactulose (CHRONULAC) 10 GM/15ML solution, Take 30 mL by mouth 3 (Three) Times a Day. PRN constipation, Disp: 240 mL, Rfl: 2  •  lisinopril (PRINIVIL,ZESTRIL) 20 MG tablet, Take 1 tablet by mouth Daily., Disp: 90 tablet, Rfl: 3  •  ondansetron (ZOFRAN) 8 MG tablet, Take 1 tablet by mouth 3 (Three) Times a Day As Needed for Nausea or Vomiting., Disp: 30 tablet, Rfl: 5  •  PHARMACY MEDS TO BED CONSULT, Daily., Disp: , Rfl:   •  simvastatin (ZOCOR) 20 MG tablet, Take 20 mg by mouth Every Night., Disp: , Rfl:   •  sodium chloride 1 g tablet, Take 2 tablets by mouth 3 (Three) Times a Day With Meals., Disp: 180 tablet, Rfl: 1    PHYSICAL EXAMINATION:   /67   Pulse 74   Temp 96.8 °F (36 °C) (Temporal)   Resp 12   Ht 172.7 cm (68\")   Wt 91.6 kg (202 lb)   SpO2 97%   BMI 30.71 kg/m²    ECOG Performance Status: 1 - Symptomatic but completely ambulatory  General Appearance:  alert, cooperative, no apparent distress and appears stated age   Neurologic/Psychiatric: A&O x 3, gait steady, appropriate affect, strength 5/5 in all muscle groups   HEENT:  Normocephalic, without obvious abnormality, mucous membranes moist   Neck: Supple, symmetrical, trachea midline, no adenopathy;  No thyromegaly, masses, or tenderness   Lungs:   Clear to auscultation bilaterally; respirations regular, even, and " unlabored bilaterally   Heart:  Regular rate and rhythm, no murmurs appreciated   Abdomen:   Soft, non-tender, non-distended and no organomegaly   Lymph nodes: No cervical, supraclavicular, inguinal or axillary adenopathy noted   Extremities: Normal, atraumatic; no clubbing, cyanosis, or edema    Skin: No rashes, ulcers, or suspicious lesions noted     No visits with results within 2 Week(s) from this visit.   Latest known visit with results is:   Lab on 06/17/2019   Component Date Value Ref Range Status   • Glucose 06/17/2019 132* 65 - 99 mg/dL Final   • BUN 06/17/2019 25* 8 - 23 mg/dL Final   • Creatinine 06/17/2019 0.93  0.76 - 1.27 mg/dL Final   • Sodium 06/17/2019 139  136 - 145 mmol/L Final   • Potassium 06/17/2019 4.0  3.5 - 5.2 mmol/L Final   • Chloride 06/17/2019 100  98 - 107 mmol/L Final   • CO2 06/17/2019 25.0  22.0 - 29.0 mmol/L Final   • Calcium 06/17/2019 10.1  8.6 - 10.5 mg/dL Final   • Total Protein 06/17/2019 7.0  6.0 - 8.5 g/dL Final   • Albumin 06/17/2019 4.20  3.50 - 5.20 g/dL Final   • ALT (SGPT) 06/17/2019 79* 1 - 41 U/L Final   • AST (SGOT) 06/17/2019 32  1 - 40 U/L Final   • Alkaline Phosphatase 06/17/2019 68  39 - 117 U/L Final   • Total Bilirubin 06/17/2019 0.6  0.2 - 1.2 mg/dL Final   • eGFR Non African Amer 06/17/2019 81  >60 mL/min/1.73 Final   • Globulin 06/17/2019 2.8  gm/dL Final   • A/G Ratio 06/17/2019 1.5  g/dL Final   • BUN/Creatinine Ratio 06/17/2019 26.9* 7.0 - 25.0 Final   • Anion Gap 06/17/2019 14.0  mmol/L Final   • WBC 06/17/2019 9.90  3.40 - 10.80 10*3/mm3 Final   • RBC 06/17/2019 4.40  4.14 - 5.80 10*6/mm3 Final   • Hemoglobin 06/17/2019 12.8* 13.0 - 17.7 g/dL Final   • Hematocrit 06/17/2019 39.6  37.5 - 51.0 % Final   • RDW 06/17/2019 12.6  12.3 - 15.4 % Final   • MCV 06/17/2019 90.1  79.0 - 97.0 fL Final   • MCH 06/17/2019 29.1  26.6 - 33.0 pg Final   • MCHC 06/17/2019 32.4  31.5 - 35.7 g/dL Final   • MPV 06/17/2019 7.0  6.0 - 12.0 fL Final   • Platelets 06/17/2019 207   140 - 450 10*3/mm3 Final   • Neutrophil % 06/17/2019 72.0  42.7 - 76.0 % Final   • Lymphocyte % 06/17/2019 24.6  19.6 - 45.3 % Final   • Monocyte % 06/17/2019 3.4* 5.0 - 12.0 % Final   • Neutrophils, Absolute 06/17/2019 7.10* 1.70 - 7.00 10*3/mm3 Final   • Lymphocytes, Absolute 06/17/2019 2.40  0.70 - 3.10 10*3/mm3 Final   • Monocytes, Absolute 06/17/2019 0.30  0.10 - 0.90 10*3/mm3 Final        Mri Brain With & Without Contrast    Result Date: 6/17/2019  Narrative: EXAMINATION: MRI BRAIN W WO CONTRAST-06/17/2019:  INDICATION: Complete small-cell lung cancer staging; C34.90-Malignant neoplasm of unspecified part of unspecified bronchus or lung.  TECHNIQUE: Multiplanar imaging of the brain was performed with images acquired prior to and following intravenous contrast.  COMPARISON: Normal CT scan of the head (06/04/2019).  FINDINGS: There are age-related periventricular white matter changes. There is no intracranial mass. There is no hemorrhage. There is no midline shift or extra-axial fluid collection. There is no abnormal contrast enhancement. There is no acute infarct.      Impression: Normal for age. There is no metastatic disease.  D:  06/17/2019 E:  06/17/2019     This report was finalized on 6/17/2019 2:12 PM by Dr. Austyn Lacy MD.      Nm Bone Scan Whole Body    Result Date: 6/18/2019  Narrative: EXAMINATION: NM BONE SCAN WHOLE BODY-  INDICATION: Complete small cell lung cancer staging; C34.90-Malignant neoplasm of unspecified part of unspecified bronchus or lung.  TECHNIQUE: Patient was injected with 25.6 mCi technetium MDP and scanned after a 4.5 hour delay.  COMPARISON: None.  FINDINGS: There is an arthritic distribution of tracer including the shoulders, eccentric lesions in the thoracic and lumbar spine, right knee and ankle. There is no focal or multifocal pattern to suggest metastatic disease. The eccentric changes in the thoracic spine correspond to large osteophytes. Lastly notation made of a ptotic  right kidney.      Impression: Arthritic changes without a pattern suggestive of metastatic disease.  D:  06/17/2019 E:  06/18/2019    This report was finalized on 6/18/2019 4:58 PM by Dr. Austyn Lacy MD.      Ct Abdomen Pelvis With Contrast    Result Date: 6/18/2019  Narrative: EXAMINATION: CT ABDOMEN AND PELVIS WITH CONTRAST-06/17/2019:  INDICATION: Complete small-cell lung cancer staging; C34.90-Malignant neoplasm of unspecified part of unspecified bronchus or lung, lung cancer.  TECHNIQUE: Multiple axial CT imaging was obtained of the abdomen and pelvis following the administration of intravenous contrast.  The radiation dose reduction device was turned on for each scan per the ALARA (As Low as Reasonably Achievable) protocol.  COMPARISON: NONE.  FINDINGS:  ABDOMEN:  Masslike density identified within the right lower lobe suggesting the patient's known malignancy. There is a low-density lesion identified in the right lobe of the liver measuring 2.2 cm suggesting a metastatic focus as well as more inferiorly in the liver at 1 cm suggesting a second metastatic focus. A lesion identified in the periphery of the right lobe of the liver near the vertex at 1.4 cm. The spleen is homogeneous. The right kidney is seen in the pelvis. Vascular calcification seen within the abdominal aorta and iliac vessels. No pelvic adenopathy. No free fluid or free air. Diverticulosis with no evidence of diverticulitis. No pelvic adenopathy. The pancreas is homogeneous.  PELVIS: The pelvic organs are unremarkable. Right pelvic kidney present. Vascular calcification seen within the pelvic vessels. No pelvic adenopathy. The appendix is normal. The bony structures reveal degenerative changes seen within the spine and pelvis.      Impression: Mass identified within the right lower lobe suggesting the patient's known malignancy with at least 3 lesions identified within the liver suggesting metastatic disease.  D:  06/17/2019 E:  06/18/2019   This report was finalized on 6/18/2019 9:13 AM by Dr. Avis Cassidy MD.      Xr Chest 1 View    Result Date: 6/11/2019  Narrative: EXAMINATION: XR CHEST 1 VW-  INDICATION: Dyspnea, on exertion.  COMPARISON: 06/04/2019.  FINDINGS: Right parahilar mass is again noted. Chronic appearing interstitial changes elsewhere are stable. No effusion or pneumothorax is seen. The heart is borderline enlarged. The vasculature appears normal.         Impression: Persistent right parahilar mass, recently evaluated with chest CT scan.  D:  06/11/2019 E:  06/11/2019  This report was finalized on 6/11/2019 10:03 PM by DR. Zane Rangel MD.        ASSESSMENT: The patient is a very pleasant 69 y.o. male  with extensive stage small cell lung cancer     PROBLEM LIST:   1.  Right lower lobe small cell lung cancer T4 N3 M1 stage IVb:  A.  Presented with shortness of breath and cough  B.  CT chest done June 4019 revealed 9 cm mass right lower lobe with bulky hilar and mediastinal lymphadenopathy, and liver metastases.  C.  Status post bronchoscopy with biopsy done by Dr. Nash Jackelin 10, 2019  D.  Pathology revealed small cell lung cancer from right lower lobe, level 7 and level 10 mediastinal nodes.  E.  Started palliative chemotherapy with carboplatin and etoposide June 19, 2019, Tecentriq was added cycle #2.  Status post 1 cycle  2.  Hyponatremia  3.  Hypertension  4.  COPD  5.  Constipation      PLAN:  1.  I will proceed with chemotherapy as scheduled today with carboplatin and etoposide cycle 2 day 1.  2.    The patient will follow-up with me in 3 weeks for cycle #3.  4.  I will monitor the patient blood work including blood counts kidney function liver function electrolytes and thyroid function.  4.  We will repeat the patient's scans after cycle #3.  5. We reviewed the potential side effects of this regimen including fatigue, vomiting and nausea, hair loss, nephropathy, neuropathy, hearing loss, myelosuppression, and risk of  infusion reaction.  6. We discussed potential side effects of immunotherapy including but not limited to immune mediated reactions with thyroiditis, pneumonitis, hepatitis, colitis, rash, and electrolytes abnormalities, fatigue, multiorgan failure, and possibly death.  7.  I will continue the patient on Colace daily with lactulose as needed for chemotherapy as constipation.  8.  We will continue inhalers as needed for COPD.  9.  We will continue sodium chloride tablets for hyponatremia.  We may have to hold this if his sodium normalized with chemotherapy.  I asked the patient to go down to 1 tablet daily.  10.  We will continue lisinopril for hypertension we will watch this since it 90 to be held.  I will refill it today.    Ulysses Argueta MD  7/9/2019

## 2019-07-12 NOTE — TELEPHONE ENCOUNTER
Wife called and reported that patient is having issues with gout in his knee.  Discussed with Isi Cuellar NP and was advised to have patient call PCP who prescribed previous medication for gout.  Wife verbalized and reported that she will call PCP.

## 2019-07-12 NOTE — TELEPHONE ENCOUNTER
----- Message from Elvira Miner RN sent at 7/12/2019  8:42 AM EDT -----  Regarding: Gout medication  Pt wife called regarding pt's gout flair after finishing chemotherapy yesterday. Wanting to know if pt can have a different medication for gout this time than what he is already on which is colchicine 0.6 mg daily. Wife's number is [phone]

## 2019-07-29 NOTE — PROGRESS NOTES
DATE OF VISIT: 7/29/2019    REASON FOR VISIT: Followup for extensive stage small cell lung cancer     HISTORY OF PRESENT ILLNESS: The patient is a very pleasant 69 y.o. male  with past medical history significant for extensive stage small cell lung cancer diagnosed Jackelin 10, 2019.  Staging work-up revealed right lower lobe lung mass with bulky mediastinal adenopathy and liver metastases.  He was started on chemotherapy using carboplatin and etoposide June 19, 2019 . The patient is here today for scheduled follow-up visit with treatment cycle #3, day 1.    SUBJECTIVE: The patient is doing fairly well. He tolerated her last treatment well. He did have more fatigue, but he is trying to stay active. He is eating and drinking well.  His breathing has been stable. He denies fever or chills. He has had no nausea or vomiting. He did have issues with his gout flaring after his last treatment. It has improved with use of Colchicine which he is still taking. He has bee monitoring his blood pressure and taking half of his lisinopril tablet. He stopped taking his sodium chloride tablets 2-3 days ago.     PAST MEDICAL HISTORY/SOCIAL HISTORY/FAMILY HISTORY: Reviewed by me and unchanged from my documentation done on 07/29/19.    Review of Systems   Constitutional: Positive for fatigue. Negative for activity change, appetite change, chills, fever and unexpected weight change.   HENT: Negative for hearing loss, mouth sores, nosebleeds, sore throat and trouble swallowing.    Eyes: Negative for visual disturbance.   Respiratory: Positive for shortness of breath. Negative for cough, chest tightness and wheezing.    Cardiovascular: Negative for chest pain, palpitations and leg swelling.   Gastrointestinal: Negative for abdominal distention, abdominal pain, blood in stool, constipation, diarrhea, nausea, rectal pain and vomiting.   Endocrine: Negative for cold intolerance and heat intolerance.   Genitourinary: Negative for difficulty  urinating, dysuria, frequency and urgency.   Musculoskeletal: Positive for arthralgias. Negative for back pain, gait problem, joint swelling and myalgias.   Skin: Negative for rash.   Neurological: Negative for dizziness, tremors, syncope, weakness, light-headedness, numbness and headaches.   Hematological: Negative for adenopathy. Does not bruise/bleed easily.   Psychiatric/Behavioral: Negative for confusion, sleep disturbance and suicidal ideas. The patient is not nervous/anxious.          Current Outpatient Medications:   •  albuterol (ACCUNEB) 1.25 MG/3ML nebulizer solution, Take 1 ampule by nebulization Every 6 (Six) Hours As Needed for Wheezing., Disp: , Rfl:   •  aspirin 81 MG chewable tablet, Chew 81 mg Daily., Disp: , Rfl:   •  budesonide-formoterol (SYMBICORT) 160-4.5 MCG/ACT inhaler, Inhale 2 puffs 2 (Two) Times a Day., Disp: 10.2 g, Rfl: 12  •  colchicine 0.6 MG tablet, Take 1 tablet by mouth Daily., Disp: 2 tablet, Rfl: 0  •  docusate sodium (COLACE) 100 MG capsule, Take 1 capsule by mouth Daily. Two capusles, Disp: 60 capsule, Rfl: 3  •  lactulose (CHRONULAC) 10 GM/15ML solution, Take 30 mL by mouth 3 (Three) Times a Day. PRN constipation, Disp: 240 mL, Rfl: 2  •  lisinopril (PRINIVIL,ZESTRIL) 20 MG tablet, Take 1 tablet by mouth Daily., Disp: 90 tablet, Rfl: 3  •  PHARMACY MEDS TO BED CONSULT, Daily., Disp: , Rfl:   •  simvastatin (ZOCOR) 20 MG tablet, Take 20 mg by mouth Every Night., Disp: , Rfl:   •  sodium chloride 1 g tablet, Take 2 tablets by mouth 3 (Three) Times a Day With Meals., Disp: 180 tablet, Rfl: 1    PHYSICAL EXAMINATION:   There were no vitals taken for this visit.   ECOG Performance Status: 1 - Symptomatic but completely ambulatory  General Appearance:  alert, cooperative, no apparent distress and appears stated age   Neurologic/Psychiatric: A&O x 3, gait steady, appropriate affect, strength 5/5 in all muscle groups   HEENT:  Normocephalic, without obvious abnormality, mucous  membranes moist   Neck: Supple, symmetrical, trachea midline, no adenopathy;  No thyromegaly, masses, or tenderness   Lungs:   Clear to auscultation bilaterally; respirations regular, even, and unlabored bilaterally   Heart:  Regular rate and rhythm, no murmurs appreciated   Abdomen:   Soft, non-tender, non-distended and no organomegaly   Lymph nodes: No cervical, supraclavicular, inguinal or axillary adenopathy noted   Extremities: Normal, atraumatic; no clubbing, cyanosis, or edema    Skin: No rashes, ulcers, or suspicious lesions noted     No visits with results within 2 Week(s) from this visit.   Latest known visit with results is:   Hospital Outpatient Visit on 07/09/2019   Component Date Value Ref Range Status   • Glucose 07/09/2019 118* 65 - 99 mg/dL Final   • BUN 07/09/2019 15  8 - 23 mg/dL Final   • Creatinine 07/09/2019 0.74* 0.76 - 1.27 mg/dL Final   • Sodium 07/09/2019 121* 136 - 145 mmol/L Final   • Potassium 07/09/2019 4.1  3.5 - 5.2 mmol/L Final   • Chloride 07/09/2019 86* 98 - 107 mmol/L Final   • CO2 07/09/2019 20.0* 22.0 - 29.0 mmol/L Final   • Calcium 07/09/2019 9.5  8.6 - 10.5 mg/dL Final   • Total Protein 07/09/2019 6.5  6.0 - 8.5 g/dL Final   • Albumin 07/09/2019 4.10  3.50 - 5.20 g/dL Final   • ALT (SGPT) 07/09/2019 26  1 - 41 U/L Final   • AST (SGOT) 07/09/2019 19  1 - 40 U/L Final   • Alkaline Phosphatase 07/09/2019 86  39 - 117 U/L Final   • Total Bilirubin 07/09/2019 0.4  0.2 - 1.2 mg/dL Final   • eGFR Non African Amer 07/09/2019 105  >60 mL/min/1.73 Final   • Globulin 07/09/2019 2.4  gm/dL Final   • A/G Ratio 07/09/2019 1.7  g/dL Final   • BUN/Creatinine Ratio 07/09/2019 20.3  7.0 - 25.0 Final   • Anion Gap 07/09/2019 15.0  5.0 - 15.0 mmol/L Final   • TSH 07/09/2019 1.340  0.270 - 4.200 mIU/mL Final   • T3, Free 07/09/2019 3.05  2.00 - 4.40 pg/mL Final   • Free T4 07/09/2019 1.19  0.93 - 1.70 ng/dL Final   • WBC 07/09/2019 2.90* 3.40 - 10.80 10*3/mm3 Final   • RBC 07/09/2019 3.81* 4.14 -  5.80 10*6/mm3 Final   • Hemoglobin 07/09/2019 11.4* 13.0 - 17.7 g/dL Final   • Hematocrit 07/09/2019 33.7* 37.5 - 51.0 % Final   • RDW 07/09/2019 13.8  12.3 - 15.4 % Final   • MCV 07/09/2019 88.2  79.0 - 97.0 fL Final   • MCH 07/09/2019 29.9  26.6 - 33.0 pg Final   • MCHC 07/09/2019 33.8  31.5 - 35.7 g/dL Final   • MPV 07/09/2019 6.9  6.0 - 12.0 fL Final   • Platelets 07/09/2019 177  140 - 450 10*3/mm3 Final   • Neutrophil % 07/09/2019 36.6* 42.7 - 76.0 % Final   • Lymphocyte % 07/09/2019 52.1* 19.6 - 45.3 % Final   • Monocyte % 07/09/2019 11.3  5.0 - 12.0 % Final   • Neutrophils, Absolute 07/09/2019 1.10* 1.70 - 7.00 10*3/mm3 Final   • Lymphocytes, Absolute 07/09/2019 1.50  0.70 - 3.10 10*3/mm3 Final   • Monocytes, Absolute 07/09/2019 0.30  0.10 - 0.90 10*3/mm3 Final   • Creatinine 07/09/2019 0.70  0.60 - 1.30 mg/dL Final    Serial Number: [id number]:  [id number]        No results found.    ASSESSMENT: The patient is a very pleasant 69 y.o. male  with extensive stage small cell lung cancer     PROBLEM LIST:   1.  Right lower lobe small cell lung cancer T4 N3 M1 stage IVb:  A.  Presented with shortness of breath and cough  B.  CT chest done June 4019 revealed 9 cm mass right lower lobe with bulky hilar and mediastinal lymphadenopathy, and liver metastases.  C.  Status post bronchoscopy with biopsy done by Dr. Nash Jackelin 10, 2019  D.  Pathology revealed small cell lung cancer from right lower lobe, level 7 and level 10 mediastinal nodes.  E.  Started palliative chemotherapy with carboplatin and etoposide June 19, 2019, Tecentriq was added cycle #2.  Status post 2 cycles  2.  Hyponatremia  3.  Hypertension  4.  COPD  5.  Constipation      PLAN:  1.  I will proceed with chemotherapy as scheduled today with carboplatin and etoposide cycle 3 day 1.  2.    The patient will follow-up with me in 3 weeks for cycle #4.  4.  I will monitor the patient's blood work including blood counts, kidney function, liver function,  electrolytes, and thyroid functions throughout treatment.  4.  We will repeat the patient's scans after cycle #3. These will be ordered for prior to return.   5. We reviewed the potential side effects of this regimen including fatigue, vomiting and nausea, hair loss, nephropathy, neuropathy, hearing loss, myelosuppression, and risk of infusion reaction.  6. We discussed potential side effects of immunotherapy including but not limited to immune mediated reactions with thyroiditis, pneumonitis, hepatitis, colitis, rash, and electrolytes abnormalities, fatigue, multiorgan failure, and possibly death.  7.  I will continue the patient on Colace daily with lactulose as needed for chemotherapy as constipation.  8.  We will continue inhalers as needed for COPD.  9.  The patient stopped his sodium chloride tablets 2-3 days ago. I reviewed with his that at his last treatment his sodium level was still low. We will check it again today and restart this is needed for continued hyponatremia.   10.  We will continue lisinopril 1/2 tablet daily for hypertension  I told him to continue to monitor his blood pressure at home and hold his dose for hypotension.     Isi Cuellar, APRN  7/29/2019

## 2019-07-29 NOTE — PROGRESS NOTES
Sena from lab called to question the CMP that was drawn this morning as it still has not resulted yet. Labs were drawn this morning and CMP was sent to main chemistry lab after istat resulted. I called Elaine in chemistry to ask about the results. After putting me on hold she was unable to find the tube of blood. Yari Winn RN notified as well as inbasket message sent to Dr. Argueta's office.

## 2019-07-29 NOTE — TELEPHONE ENCOUNTER
----- Message from Kelle Diallo RN sent at 7/29/2019  1:59 PM EDT -----  Regarding: Sena Snyder with lab called to report the CMP from this morning still has not resulted. When I called chemistry I spoke to Elaine who said they could not find the tube of blood for him.     I apologize, the blood was sent this morning when he was here. Hopefully they will find it sometime today. Mr. Mcallister is coming back on Monday for treatment as we did not treat him today.     Thanks,   Kelle

## 2019-08-26 NOTE — PROGRESS NOTES
DATE OF VISIT: 8/26/2019    REASON FOR VISIT: Followup for extensive stage small cell lung cancer     HISTORY OF PRESENT ILLNESS: The patient is a very pleasant 69 y.o. male  with past medical history significant for extensive stage small cell lung cancer diagnosed Jackelin 10, 2019.  Staging work-up revealed right lower lobe lung mass with bulky mediastinal adenopathy and liver metastases.  He was started on chemotherapy using carboplatin and etoposide June 19, 2019 . The patient is here today for scheduled follow-up visit with treatment cycle #4, day 1.    SUBJECTIVE: The patient is doing fairly well.     PAST MEDICAL HISTORY/SOCIAL HISTORY/FAMILY HISTORY: Reviewed by me and unchanged from my documentation done on 08/26/19.    Review of Systems   Constitutional: Positive for fatigue. Negative for activity change, appetite change, chills, fever and unexpected weight change.   HENT: Negative for hearing loss, mouth sores, nosebleeds, sore throat and trouble swallowing.    Eyes: Negative for visual disturbance.   Respiratory: Positive for shortness of breath. Negative for cough, chest tightness and wheezing.    Cardiovascular: Negative for chest pain, palpitations and leg swelling.   Gastrointestinal: Negative for abdominal distention, abdominal pain, blood in stool, constipation, diarrhea, nausea, rectal pain and vomiting.   Endocrine: Negative for cold intolerance and heat intolerance.   Genitourinary: Negative for difficulty urinating, dysuria, frequency and urgency.   Musculoskeletal: Positive for arthralgias. Negative for back pain, gait problem, joint swelling and myalgias.   Skin: Negative for rash.   Neurological: Negative for dizziness, tremors, syncope, weakness, light-headedness, numbness and headaches.   Hematological: Negative for adenopathy. Does not bruise/bleed easily.   Psychiatric/Behavioral: Negative for confusion, sleep disturbance and suicidal ideas. The patient is not nervous/anxious.   "        Current Outpatient Medications:   •  albuterol (ACCUNEB) 1.25 MG/3ML nebulizer solution, Take 1 ampule by nebulization Every 6 (Six) Hours As Needed for Wheezing., Disp: , Rfl:   •  aspirin 81 MG chewable tablet, Chew 81 mg Daily., Disp: , Rfl:   •  budesonide-formoterol (SYMBICORT) 160-4.5 MCG/ACT inhaler, Inhale 2 puffs 2 (Two) Times a Day., Disp: 10.2 g, Rfl: 12  •  colchicine 0.6 MG tablet, Take 1 tablet by mouth Daily., Disp: 2 tablet, Rfl: 0  •  docusate sodium (COLACE) 100 MG capsule, Take 1 capsule by mouth Daily. Two capusles, Disp: 60 capsule, Rfl: 3  •  lactulose (CHRONULAC) 10 GM/15ML solution, Take 30 mL by mouth 3 (Three) Times a Day. PRN constipation, Disp: 240 mL, Rfl: 2  •  lisinopril (PRINIVIL,ZESTRIL) 20 MG tablet, Take 1 tablet by mouth Daily. (Patient taking differently: Take 10 mg by mouth Daily.), Disp: 90 tablet, Rfl: 3  •  PHARMACY MEDS TO BED CONSULT, Daily., Disp: , Rfl:   •  simvastatin (ZOCOR) 20 MG tablet, Take 20 mg by mouth Every Night., Disp: , Rfl:     PHYSICAL EXAMINATION:   /62   Pulse 93   Temp 97.6 °F (36.4 °C) (Temporal)   Resp 16   Ht 172.5 cm (67.91\")   Wt 93.9 kg (207 lb)   SpO2 98%   BMI 31.56 kg/m²    ECOG Performance Status: 1 - Symptomatic but completely ambulatory  General Appearance:  alert, cooperative, no apparent distress and appears stated age   Neurologic/Psychiatric: A&O x 3, gait steady, appropriate affect, strength 5/5 in all muscle groups   HEENT:  Normocephalic, without obvious abnormality, mucous membranes moist   Neck: Supple, symmetrical, trachea midline, no adenopathy;  No thyromegaly, masses, or tenderness   Lungs:   Clear to auscultation bilaterally; respirations regular, even, and unlabored bilaterally   Heart:  Regular rate and rhythm, no murmurs appreciated   Abdomen:   Soft, non-tender, non-distended and no organomegaly   Lymph nodes: No cervical, supraclavicular, inguinal or axillary adenopathy noted   Extremities: Normal, " atraumatic; no clubbing, cyanosis, or edema    Skin: No rashes, ulcers, or suspicious lesions noted     No visits with results within 2 Week(s) from this visit.   Latest known visit with results is:   Hospital Outpatient Visit on 08/05/2019   Component Date Value Ref Range Status   • Glucose 08/05/2019 168* 65 - 99 mg/dL Final   • BUN 08/05/2019 14  8 - 23 mg/dL Final   • Creatinine 08/05/2019 0.91  0.76 - 1.27 mg/dL Final   • Sodium 08/05/2019 138  136 - 145 mmol/L Final   • Potassium 08/05/2019 4.0  3.5 - 5.2 mmol/L Final   • Chloride 08/05/2019 101  98 - 107 mmol/L Final   • CO2 08/05/2019 25.0  22.0 - 29.0 mmol/L Final   • Calcium 08/05/2019 9.5  8.6 - 10.5 mg/dL Final   • Total Protein 08/05/2019 6.8  6.0 - 8.5 g/dL Final   • Albumin 08/05/2019 4.20  3.50 - 5.20 g/dL Final   • ALT (SGPT) 08/05/2019 21  1 - 41 U/L Final   • AST (SGOT) 08/05/2019 19  1 - 40 U/L Final   • Alkaline Phosphatase 08/05/2019 83  39 - 117 U/L Final   • Total Bilirubin 08/05/2019 0.3  0.2 - 1.2 mg/dL Final   • eGFR Non  Amer 08/05/2019 83  >60 mL/min/1.73 Final   • Globulin 08/05/2019 2.6  gm/dL Final   • A/G Ratio 08/05/2019 1.6  g/dL Final   • BUN/Creatinine Ratio 08/05/2019 15.4  7.0 - 25.0 Final   • Anion Gap 08/05/2019 12.0  5.0 - 15.0 mmol/L Final   • WBC 08/05/2019 5.80  3.40 - 10.80 10*3/mm3 Final   • RBC 08/05/2019 3.61* 4.14 - 5.80 10*6/mm3 Final   • Hemoglobin 08/05/2019 11.0* 13.0 - 17.7 g/dL Final   • Hematocrit 08/05/2019 32.1* 37.5 - 51.0 % Final   • RDW 08/05/2019 18.2* 12.3 - 15.4 % Final   • MCV 08/05/2019 88.7  79.0 - 97.0 fL Final   • MCH 08/05/2019 30.3  26.6 - 33.0 pg Final   • MCHC 08/05/2019 34.2  31.5 - 35.7 g/dL Final   • MPV 08/05/2019 6.8  6.0 - 12.0 fL Final   • Platelets 08/05/2019 271  140 - 450 10*3/mm3 Final    Verified by repeat analysis.    • Neutrophil % 08/05/2019 60.6  42.7 - 76.0 % Final   • Lymphocyte % 08/05/2019 31.3  19.6 - 45.3 % Final   • Monocyte % 08/05/2019 8.1  5.0 - 12.0 % Final    • Neutrophils, Absolute 08/05/2019 3.50  1.70 - 7.00 10*3/mm3 Final   • Lymphocytes, Absolute 08/05/2019 1.80  0.70 - 3.10 10*3/mm3 Final   • Monocytes, Absolute 08/05/2019 0.50  0.10 - 0.90 10*3/mm3 Final   • Creatinine 08/05/2019 0.9  mg/dL Final   • Creatinine 08/05/2019 0.90  0.60 - 1.30 mg/dL Final    Serial Number: [id number]:  [id number]        Ct Chest With Contrast    Result Date: 8/21/2019  Narrative: EXAMINATION: CT CHEST W CONTRAST-, CT ABDOMEN/PELVIS W CONTRAST-08/21/2019:  INDICATION: Restaging small-cell lung cancer; C34.90-Malignant neoplasm of unspecified part of unspecified bronchus or lung.  TECHNIQUE: CT chest, abdomen and pelvis with intravenous contrast.  The radiation dose reduction device was turned on for each scan per the ALARA (As Low as Reasonably Achievable) protocol.  COMPARISON: CT dated 06/04/2019, nuclear medicine bone scan 06/17/2019 and CT abdomen and pelvis 06/17/2019.  FINDINGS:  CHEST: The thyroid is homogeneous in attenuation. No bulky midsternal adenopathy. Central airways are patent with esophagus in normal course and caliber. Atherosclerotic nonaneurysmal thoracic aorta with patent great vessel origins. Central pulmonary arteries are grossly patent. Cardiac size within normal limits and without pericardial effusion demonstrating coronary calcifications. Extended lung windows demonstrate significant interval response to treatment and decreased mass in the right lower lobe with only minimal residual involvement and soft tissue thickening of the right hilar and infrahilar region involving the bronchus intermedius without narrowing evident. There is an area of micronodular appearance groundglass attenuation at the right lower lobe superior portion posteriorly near the pleura of potential minimal residual nodularity otherwise, scattered atelectasis and scarring noted without new dominant nodule or mass lesion. Background chronic changes are again noted. No pleural effusion.  Degenerative changes of the spine without aggressive osseous lesion. No soft tissue body wall findings of concern specifically, no bulky axillary adenopathy.  ABDOMEN AND PELVIS: Liver demonstrates interval decrease in size of right inferior hepatic lobe hypoenhancing focus measuring 9 mm along with decreased appearance of the more superiorly oriented lesions near the dome. No new parenchymal lesion or evidence of progression of any of these areas. Gallbladder unremarkable. No biliary dilatation. Pancreas and spleen unremarkable. Adrenals without distinct nodule. Ectopic right kidney in the pelvis again noted without hydronephrosis or hydroureter of either kidney demonstrating bilateral nonobstructing nephrolithiasis. No bulky retroperitoneal adenopathy. Atherosclerotic nonaneurysmal abdominal aorta. GI tract evaluation without focal thickening or disproportionate dilatation of bowel. No free fluid or intra-abdominal free air. Pelvic viscera are grossly unremarkable without bulky pelvic adenopathy or free fluid. Degenerative changes of the spine without aggressive osseous lesion. No soft tissue body wall findings of concern.      Impression: 1.Significant interval treatment response with interval reduction in size of right lower lobe pulmonary mass lesion with only minimal residual soft tissue thickening surrounding the right hilar and infrahilar structures including bronchus intermedius without narrowing and residual right lower lobe superior portion micronodular appearance of groundglass nodules with attention on followup. No new abnormal enhancement or soft tissue nodularity within the lungs to suggest progression. 2. Interval reduction in size of hepatic lesions from prior comparison consistent with treatment response. No new lesion or evidence for new metastatic involvement of the abdomen/pelvis. 3.  No ascites.  D:  08/21/2019 E:  08/21/2019    This report was finalized on 8/21/2019 5:07 PM by Dr. Keane  Crawford.      Ct Abdomen Pelvis With Contrast    Result Date: 8/21/2019  Narrative: EXAMINATION: CT CHEST W CONTRAST-, CT ABDOMEN/PELVIS W CONTRAST-08/21/2019:  INDICATION: Restaging small-cell lung cancer; C34.90-Malignant neoplasm of unspecified part of unspecified bronchus or lung.  TECHNIQUE: CT chest, abdomen and pelvis with intravenous contrast.  The radiation dose reduction device was turned on for each scan per the ALARA (As Low as Reasonably Achievable) protocol.  COMPARISON: CT dated 06/04/2019, nuclear medicine bone scan 06/17/2019 and CT abdomen and pelvis 06/17/2019.  FINDINGS:  CHEST: The thyroid is homogeneous in attenuation. No bulky midsternal adenopathy. Central airways are patent with esophagus in normal course and caliber. Atherosclerotic nonaneurysmal thoracic aorta with patent great vessel origins. Central pulmonary arteries are grossly patent. Cardiac size within normal limits and without pericardial effusion demonstrating coronary calcifications. Extended lung windows demonstrate significant interval response to treatment and decreased mass in the right lower lobe with only minimal residual involvement and soft tissue thickening of the right hilar and infrahilar region involving the bronchus intermedius without narrowing evident. There is an area of micronodular appearance groundglass attenuation at the right lower lobe superior portion posteriorly near the pleura of potential minimal residual nodularity otherwise, scattered atelectasis and scarring noted without new dominant nodule or mass lesion. Background chronic changes are again noted. No pleural effusion. Degenerative changes of the spine without aggressive osseous lesion. No soft tissue body wall findings of concern specifically, no bulky axillary adenopathy.  ABDOMEN AND PELVIS: Liver demonstrates interval decrease in size of right inferior hepatic lobe hypoenhancing focus measuring 9 mm along with decreased appearance of the more  superiorly oriented lesions near the dome. No new parenchymal lesion or evidence of progression of any of these areas. Gallbladder unremarkable. No biliary dilatation. Pancreas and spleen unremarkable. Adrenals without distinct nodule. Ectopic right kidney in the pelvis again noted without hydronephrosis or hydroureter of either kidney demonstrating bilateral nonobstructing nephrolithiasis. No bulky retroperitoneal adenopathy. Atherosclerotic nonaneurysmal abdominal aorta. GI tract evaluation without focal thickening or disproportionate dilatation of bowel. No free fluid or intra-abdominal free air. Pelvic viscera are grossly unremarkable without bulky pelvic adenopathy or free fluid. Degenerative changes of the spine without aggressive osseous lesion. No soft tissue body wall findings of concern.      Impression: 1.Significant interval treatment response with interval reduction in size of right lower lobe pulmonary mass lesion with only minimal residual soft tissue thickening surrounding the right hilar and infrahilar structures including bronchus intermedius without narrowing and residual right lower lobe superior portion micronodular appearance of groundglass nodules with attention on followup. No new abnormal enhancement or soft tissue nodularity within the lungs to suggest progression. 2. Interval reduction in size of hepatic lesions from prior comparison consistent with treatment response. No new lesion or evidence for new metastatic involvement of the abdomen/pelvis. 3.  No ascites.  D:  08/21/2019 E:  08/21/2019    This report was finalized on 8/21/2019 5:07 PM by Dr. Lakhwinder Crawford.        ASSESSMENT: The patient is a very pleasant 69 y.o. male  with extensive stage small cell lung cancer     PROBLEM LIST:   1.  Right lower lobe small cell lung cancer T4 N3 M1 stage IVb:  A.  Presented with shortness of breath and cough  B.  CT chest done June 4019 revealed 9 cm mass right lower lobe with bulky hilar and  mediastinal lymphadenopathy, and liver metastases.  C.  Status post bronchoscopy with biopsy done by Dr. Nash Jackelin 10, 2019  D.  Pathology revealed small cell lung cancer from right lower lobe, level 7 and level 10 mediastinal nodes.  E.  Started palliative chemotherapy with carboplatin and etoposide June 19, 2019, Tecentriq was added cycle #2.  Status post 3 cycles  2.  Hyponatremia  3.  Hypertension  4.  COPD  5.  Constipation      PLAN:  1.  I will proceed with chemotherapy as scheduled today with carboplatin and etoposide cycle 4 day 1.  2.    The patient will follow-up with me in 3 weeks for cycle #5.  He will start Tecentriq single agent at that point.  4.  I will monitor the patient's blood work including blood counts, kidney function, liver function, electrolytes, and thyroid functions throughout treatment.  4.  I did go over the scan results with the patient, I reviewed the films myself, I compared the current films to previous study done June 4, 2019, I reassured the patient he is having good response to treatment.  We will repeat the patient's scans after cycle #6.   5. We reviewed the potential side effects of this regimen including fatigue, vomiting and nausea, hair loss, nephropathy, neuropathy, hearing loss, myelosuppression, and risk of infusion reaction.  6. We discussed potential side effects of immunotherapy including but not limited to immune mediated reactions with thyroiditis, pneumonitis, hepatitis, colitis, rash, and electrolytes abnormalities, fatigue, multiorgan failure, and possibly death.  7.  I will continue the patient on Colace daily with lactulose as needed for chemotherapy as constipation.  8.  We will continue inhalers as needed for COPD.  9.  The patient stopped his sodium chloride tablets 2-3 days ago. I reviewed with his that at his last treatment his sodium level was still low. We will check it again today and restart this is needed for continued hyponatremia.   10.  We  will continue lisinopril 1/2 tablet daily for hypertension  I told him to continue to monitor his blood pressure at home and hold his dose for hypotension.     Ulysses Argueta MD  8/26/2019

## 2019-09-16 NOTE — PROGRESS NOTES
DATE OF VISIT: 9/16/2019    REASON FOR VISIT: Followup for extensive stage small cell lung cancer     HISTORY OF PRESENT ILLNESS: The patient is a very pleasant 69 y.o. male  with past medical history significant for extensive stage small cell lung cancer diagnosed Jackelin 10, 2019.  Staging work-up revealed right lower lobe lung mass with bulky mediastinal adenopathy and liver metastases.  He was started on chemotherapy using carboplatin, etoposide, Tecentriq and etoposide June 19, 2019 . The patient is here today for scheduled follow-up visit with treatment cycle #5, day 1.    SUBJECTIVE: The patient is doing fairly well. He had more fatigue and weakness following his last cycle of chemotherapy, but has felt better the last 2-3 days. He has been coughing some and has been able to get up some sputum. He denies hemoptysis, fever, or chills. His breathing overall is better with shortness of breath only with activity.  He is eating and drinking well. He denies skin rash or diarrhea. He is asking whether or not he can use CBD oil.     PAST MEDICAL HISTORY/SOCIAL HISTORY/FAMILY HISTORY: Reviewed by me and unchanged from my documentation done on 09/16/19.    Review of Systems   Constitutional: Positive for fatigue. Negative for activity change, appetite change, chills, fever and unexpected weight change.   HENT: Negative for hearing loss, mouth sores, nosebleeds, sore throat and trouble swallowing.    Eyes: Negative for visual disturbance.   Respiratory: Positive for shortness of breath. Negative for cough, chest tightness and wheezing.    Cardiovascular: Negative for chest pain, palpitations and leg swelling.   Gastrointestinal: Negative for abdominal distention, abdominal pain, blood in stool, constipation, diarrhea, nausea, rectal pain and vomiting.   Endocrine: Negative for cold intolerance and heat intolerance.   Genitourinary: Negative for difficulty urinating, dysuria, frequency and urgency.   Musculoskeletal:  "Positive for arthralgias. Negative for back pain, gait problem, joint swelling and myalgias.   Skin: Negative for rash.   Neurological: Negative for dizziness, tremors, syncope, weakness, light-headedness, numbness and headaches.   Hematological: Negative for adenopathy. Does not bruise/bleed easily.   Psychiatric/Behavioral: Negative for confusion, sleep disturbance and suicidal ideas. The patient is not nervous/anxious.          Current Outpatient Medications:   •  albuterol (ACCUNEB) 1.25 MG/3ML nebulizer solution, Take 1 ampule by nebulization Every 6 (Six) Hours As Needed for Wheezing., Disp: , Rfl:   •  aspirin 81 MG chewable tablet, Chew 81 mg Daily., Disp: , Rfl:   •  budesonide-formoterol (SYMBICORT) 160-4.5 MCG/ACT inhaler, Inhale 2 puffs 2 (Two) Times a Day., Disp: 10.2 g, Rfl: 12  •  colchicine 0.6 MG tablet, Take 1 tablet by mouth Daily., Disp: 2 tablet, Rfl: 0  •  docusate sodium (COLACE) 100 MG capsule, Take 1 capsule by mouth Daily. Two capusles, Disp: 60 capsule, Rfl: 3  •  lactulose (CHRONULAC) 10 GM/15ML solution, Take 30 mL by mouth 3 (Three) Times a Day. PRN constipation, Disp: 240 mL, Rfl: 2  •  lisinopril (PRINIVIL,ZESTRIL) 20 MG tablet, Take 1 tablet by mouth Daily. (Patient taking differently: Take 10 mg by mouth Daily.), Disp: 90 tablet, Rfl: 3  •  simvastatin (ZOCOR) 20 MG tablet, Take 20 mg by mouth Every Night., Disp: , Rfl:     PHYSICAL EXAMINATION:   /63   Pulse 82   Temp 97.2 °F (36.2 °C) (Temporal)   Resp 15   Ht 172.7 cm (68\")   Wt 95.7 kg (211 lb)   BMI 32.08 kg/m²    ECOG Performance Status: 1 - Symptomatic but completely ambulatory  General Appearance:  alert, cooperative, no apparent distress and appears stated age   Neurologic/Psychiatric: A&O x 3, gait steady, appropriate affect, strength 5/5 in all muscle groups   HEENT:  Normocephalic, without obvious abnormality, mucous membranes moist   Neck: Supple, symmetrical, trachea midline, no adenopathy;  No thyromegaly, " masses, or tenderness   Lungs:   Clear to auscultation bilaterally; respirations regular, even, and unlabored bilaterally   Heart:  Regular rate and rhythm, no murmurs appreciated   Abdomen:   Soft, non-tender, non-distended and no organomegaly   Lymph nodes: No cervical, supraclavicular, inguinal or axillary adenopathy noted   Extremities: Normal, atraumatic; no clubbing, cyanosis, or edema    Skin: No rashes, ulcers, or suspicious lesions noted     No visits with results within 2 Week(s) from this visit.   Latest known visit with results is:   Hospital Outpatient Visit on 08/26/2019   Component Date Value Ref Range Status   • Glucose 08/26/2019 135* 65 - 99 mg/dL Final   • BUN 08/26/2019 15  8 - 23 mg/dL Final   • Creatinine 08/26/2019 0.99  0.76 - 1.27 mg/dL Final   • Sodium 08/26/2019 138  136 - 145 mmol/L Final   • Potassium 08/26/2019 4.2  3.5 - 5.2 mmol/L Final   • Chloride 08/26/2019 100  98 - 107 mmol/L Final   • CO2 08/26/2019 25.0  22.0 - 29.0 mmol/L Final   • Calcium 08/26/2019 9.5  8.6 - 10.5 mg/dL Final   • Total Protein 08/26/2019 7.0  6.0 - 8.5 g/dL Final   • Albumin 08/26/2019 4.40  3.50 - 5.20 g/dL Final   • ALT (SGPT) 08/26/2019 18  1 - 41 U/L Final   • AST (SGOT) 08/26/2019 19  1 - 40 U/L Final   • Alkaline Phosphatase 08/26/2019 79  39 - 117 U/L Final   • Total Bilirubin 08/26/2019 0.3  0.2 - 1.2 mg/dL Final   • eGFR Non African Amer 08/26/2019 75  >60 mL/min/1.73 Final   • Globulin 08/26/2019 2.6  gm/dL Final   • A/G Ratio 08/26/2019 1.7  g/dL Final   • BUN/Creatinine Ratio 08/26/2019 15.2  7.0 - 25.0 Final   • Anion Gap 08/26/2019 13.0  5.0 - 15.0 mmol/L Final   • WBC 08/26/2019 4.40  3.40 - 10.80 10*3/mm3 Final   • RBC 08/26/2019 3.07* 4.14 - 5.80 10*6/mm3 Final   • Hemoglobin 08/26/2019 9.6* 13.0 - 17.7 g/dL Final   • Hematocrit 08/26/2019 28.8* 37.5 - 51.0 % Final   • RDW 08/26/2019 22.5* 12.3 - 15.4 % Final   • MCV 08/26/2019 93.7  79.0 - 97.0 fL Final   • MCH 08/26/2019 31.2  26.6 - 33.0  pg Final   • MCHC 08/26/2019 33.3  31.5 - 35.7 g/dL Final   • MPV 08/26/2019 6.7  6.0 - 12.0 fL Final   • Platelets 08/26/2019 119* 140 - 450 10*3/mm3 Final   • Neutrophil % 08/26/2019 47.8  42.7 - 76.0 % Final   • Lymphocyte % 08/26/2019 41.9  19.6 - 45.3 % Final   • Monocyte % 08/26/2019 10.3  5.0 - 12.0 % Final   • Neutrophils, Absolute 08/26/2019 2.10  1.70 - 7.00 10*3/mm3 Final   • Lymphocytes, Absolute 08/26/2019 1.80  0.70 - 3.10 10*3/mm3 Final   • Monocytes, Absolute 08/26/2019 0.50  0.10 - 0.90 10*3/mm3 Final   • Creatinine 08/26/2019 0.90  mg/dL Final   • Creatinine 08/26/2019 0.90  0.60 - 1.30 mg/dL Final    Serial Number: [id number]:  [id number]        Ct Chest With Contrast    Result Date: 8/21/2019  Narrative: EXAMINATION: CT CHEST W CONTRAST-, CT ABDOMEN/PELVIS W CONTRAST-08/21/2019:  INDICATION: Restaging small-cell lung cancer; C34.90-Malignant neoplasm of unspecified part of unspecified bronchus or lung.  TECHNIQUE: CT chest, abdomen and pelvis with intravenous contrast.  The radiation dose reduction device was turned on for each scan per the ALARA (As Low as Reasonably Achievable) protocol.  COMPARISON: CT dated 06/04/2019, nuclear medicine bone scan 06/17/2019 and CT abdomen and pelvis 06/17/2019.  FINDINGS:  CHEST: The thyroid is homogeneous in attenuation. No bulky midsternal adenopathy. Central airways are patent with esophagus in normal course and caliber. Atherosclerotic nonaneurysmal thoracic aorta with patent great vessel origins. Central pulmonary arteries are grossly patent. Cardiac size within normal limits and without pericardial effusion demonstrating coronary calcifications. Extended lung windows demonstrate significant interval response to treatment and decreased mass in the right lower lobe with only minimal residual involvement and soft tissue thickening of the right hilar and infrahilar region involving the bronchus intermedius without narrowing evident. There is an area of  micronodular appearance groundglass attenuation at the right lower lobe superior portion posteriorly near the pleura of potential minimal residual nodularity otherwise, scattered atelectasis and scarring noted without new dominant nodule or mass lesion. Background chronic changes are again noted. No pleural effusion. Degenerative changes of the spine without aggressive osseous lesion. No soft tissue body wall findings of concern specifically, no bulky axillary adenopathy.  ABDOMEN AND PELVIS: Liver demonstrates interval decrease in size of right inferior hepatic lobe hypoenhancing focus measuring 9 mm along with decreased appearance of the more superiorly oriented lesions near the dome. No new parenchymal lesion or evidence of progression of any of these areas. Gallbladder unremarkable. No biliary dilatation. Pancreas and spleen unremarkable. Adrenals without distinct nodule. Ectopic right kidney in the pelvis again noted without hydronephrosis or hydroureter of either kidney demonstrating bilateral nonobstructing nephrolithiasis. No bulky retroperitoneal adenopathy. Atherosclerotic nonaneurysmal abdominal aorta. GI tract evaluation without focal thickening or disproportionate dilatation of bowel. No free fluid or intra-abdominal free air. Pelvic viscera are grossly unremarkable without bulky pelvic adenopathy or free fluid. Degenerative changes of the spine without aggressive osseous lesion. No soft tissue body wall findings of concern.      Impression: 1.Significant interval treatment response with interval reduction in size of right lower lobe pulmonary mass lesion with only minimal residual soft tissue thickening surrounding the right hilar and infrahilar structures including bronchus intermedius without narrowing and residual right lower lobe superior portion micronodular appearance of groundglass nodules with attention on followup. No new abnormal enhancement or soft tissue nodularity within the lungs to  suggest progression. 2. Interval reduction in size of hepatic lesions from prior comparison consistent with treatment response. No new lesion or evidence for new metastatic involvement of the abdomen/pelvis. 3.  No ascites.  D:  08/21/2019 E:  08/21/2019    This report was finalized on 8/21/2019 5:07 PM by Dr. Lakhwinder Crawford.      Ct Abdomen Pelvis With Contrast    Result Date: 8/21/2019  Narrative: EXAMINATION: CT CHEST W CONTRAST-, CT ABDOMEN/PELVIS W CONTRAST-08/21/2019:  INDICATION: Restaging small-cell lung cancer; C34.90-Malignant neoplasm of unspecified part of unspecified bronchus or lung.  TECHNIQUE: CT chest, abdomen and pelvis with intravenous contrast.  The radiation dose reduction device was turned on for each scan per the ALARA (As Low as Reasonably Achievable) protocol.  COMPARISON: CT dated 06/04/2019, nuclear medicine bone scan 06/17/2019 and CT abdomen and pelvis 06/17/2019.  FINDINGS:  CHEST: The thyroid is homogeneous in attenuation. No bulky midsternal adenopathy. Central airways are patent with esophagus in normal course and caliber. Atherosclerotic nonaneurysmal thoracic aorta with patent great vessel origins. Central pulmonary arteries are grossly patent. Cardiac size within normal limits and without pericardial effusion demonstrating coronary calcifications. Extended lung windows demonstrate significant interval response to treatment and decreased mass in the right lower lobe with only minimal residual involvement and soft tissue thickening of the right hilar and infrahilar region involving the bronchus intermedius without narrowing evident. There is an area of micronodular appearance groundglass attenuation at the right lower lobe superior portion posteriorly near the pleura of potential minimal residual nodularity otherwise, scattered atelectasis and scarring noted without new dominant nodule or mass lesion. Background chronic changes are again noted. No pleural effusion. Degenerative  changes of the spine without aggressive osseous lesion. No soft tissue body wall findings of concern specifically, no bulky axillary adenopathy.  ABDOMEN AND PELVIS: Liver demonstrates interval decrease in size of right inferior hepatic lobe hypoenhancing focus measuring 9 mm along with decreased appearance of the more superiorly oriented lesions near the dome. No new parenchymal lesion or evidence of progression of any of these areas. Gallbladder unremarkable. No biliary dilatation. Pancreas and spleen unremarkable. Adrenals without distinct nodule. Ectopic right kidney in the pelvis again noted without hydronephrosis or hydroureter of either kidney demonstrating bilateral nonobstructing nephrolithiasis. No bulky retroperitoneal adenopathy. Atherosclerotic nonaneurysmal abdominal aorta. GI tract evaluation without focal thickening or disproportionate dilatation of bowel. No free fluid or intra-abdominal free air. Pelvic viscera are grossly unremarkable without bulky pelvic adenopathy or free fluid. Degenerative changes of the spine without aggressive osseous lesion. No soft tissue body wall findings of concern.      Impression: 1.Significant interval treatment response with interval reduction in size of right lower lobe pulmonary mass lesion with only minimal residual soft tissue thickening surrounding the right hilar and infrahilar structures including bronchus intermedius without narrowing and residual right lower lobe superior portion micronodular appearance of groundglass nodules with attention on followup. No new abnormal enhancement or soft tissue nodularity within the lungs to suggest progression. 2. Interval reduction in size of hepatic lesions from prior comparison consistent with treatment response. No new lesion or evidence for new metastatic involvement of the abdomen/pelvis. 3.  No ascites.  D:  08/21/2019 E:  08/21/2019    This report was finalized on 8/21/2019 5:07 PM by Dr. Lakhwinder Crawford.         ASSESSMENT: The patient is a very pleasant 69 y.o. male  with extensive stage small cell lung cancer     PROBLEM LIST:   1.  Right lower lobe small cell lung cancer T4 N3 M1 stage IVb:  A.  Presented with shortness of breath and cough  B.  CT chest done June 4019 revealed 9 cm mass right lower lobe with bulky hilar and mediastinal lymphadenopathy, and liver metastases.  C.  Status post bronchoscopy with biopsy done by Dr. Nash Jackelin 10, 2019  D.  Pathology revealed small cell lung cancer from right lower lobe, level 7 and level 10 mediastinal nodes.  E.  Started palliative chemotherapy with carboplatin and etoposide June 19, 2019, Tecentriq was added cycle #2.  Status post 4 cycles  2.  Hyponatremia  3.  Hypertension  4.  COPD  5.  Constipation      PLAN:  1.  I will proceed with chemotherapy as scheduled today with Tecentriq alone cycle 5 day 1.  2.    The patient will follow-up with me in 3 weeks for cycle #6 Tecentriq alone.    3.  I will continue to monitor the patient's blood work including blood counts, kidney function, liver function, electrolytes, and thyroid functions throughout treatment.  4. The patient will need repeat CAT scans after cycle #6 of treatment.   5. We reviewed the potential side effects of this regimen including fatigue, vomiting and nausea, hair loss, nephropathy, neuropathy, hearing loss, myelosuppression, and risk of infusion reaction.  6. We discussed potential side effects of immunotherapy including but not limited to immune mediated reactions with thyroiditis, pneumonitis, hepatitis, colitis, rash, and electrolytes abnormalities, fatigue, multiorgan failure, and possibly death.  7.  I will continue the patient on Colace daily with lactulose as needed for chemotherapy as constipation.  8.  We will continue inhalers as needed for COPD.  9.  I told the patient he can try CBD oil if he desires for arthralgias and sleep disturbance.   10.  We will continue lisinopril 1/2 tablet  daily for hypertension  I told him to continue to monitor his blood pressure at home and hold his dose if needed for hypotension.     Isi Page, APRN  9/16/2019

## 2019-10-07 NOTE — PROGRESS NOTES
DATE OF VISIT: 10/7/2019    REASON FOR VISIT: Followup for extensive stage small cell lung cancer     HISTORY OF PRESENT ILLNESS: The patient is a very pleasant 69 y.o. male  with past medical history significant for extensive stage small cell lung cancer diagnosed Jackelin 10, 2019.  Staging work-up revealed right lower lobe lung mass with bulky mediastinal adenopathy and liver metastases.  He was started on chemotherapy using carboplatin and etoposide with Tecentriq June 19, 2019 .  He completed 4 cycles then started Tecentriq maintenance September 16, 2019 the patient is here today for scheduled follow-up visit with treatment cycle #6.    SUBJECTIVE: The patient is doing fairly well.  He denies any fever or chills.  He has felt better after the last infusion since we stopped chemotherapy.  He has mild fatigue however his energy is improving.    PAST MEDICAL HISTORY/SOCIAL HISTORY/FAMILY HISTORY: Reviewed by me and unchanged from my documentation done on 10/07/19.    Review of Systems   Constitutional: Positive for fatigue. Negative for activity change, appetite change, chills, fever and unexpected weight change.   HENT: Negative for hearing loss, mouth sores, nosebleeds, sore throat and trouble swallowing.    Eyes: Negative for visual disturbance.   Respiratory: Positive for shortness of breath. Negative for cough, chest tightness and wheezing.    Cardiovascular: Negative for chest pain, palpitations and leg swelling.   Gastrointestinal: Negative for abdominal distention, abdominal pain, blood in stool, constipation, diarrhea, nausea, rectal pain and vomiting.   Endocrine: Negative for cold intolerance and heat intolerance.   Genitourinary: Negative for difficulty urinating, dysuria, frequency and urgency.   Musculoskeletal: Positive for arthralgias. Negative for back pain, gait problem, joint swelling and myalgias.   Skin: Negative for rash.   Neurological: Negative for dizziness, tremors, syncope, weakness,  "light-headedness, numbness and headaches.   Hematological: Negative for adenopathy. Does not bruise/bleed easily.   Psychiatric/Behavioral: Negative for confusion, sleep disturbance and suicidal ideas. The patient is not nervous/anxious.          Current Outpatient Medications:   •  albuterol (ACCUNEB) 1.25 MG/3ML nebulizer solution, Take 1 ampule by nebulization Every 6 (Six) Hours As Needed for Wheezing., Disp: , Rfl:   •  aspirin 81 MG chewable tablet, Chew 81 mg Daily., Disp: , Rfl:   •  budesonide-formoterol (SYMBICORT) 160-4.5 MCG/ACT inhaler, Inhale 2 puffs 2 (Two) Times a Day., Disp: 10.2 g, Rfl: 12  •  colchicine 0.6 MG tablet, Take 1 tablet by mouth Daily., Disp: 2 tablet, Rfl: 0  •  docusate sodium (COLACE) 100 MG capsule, Take 1 capsule by mouth Daily. Two capusles, Disp: 60 capsule, Rfl: 3  •  lactulose (CHRONULAC) 10 GM/15ML solution, Take 30 mL by mouth 3 (Three) Times a Day. PRN constipation, Disp: 240 mL, Rfl: 2  •  lisinopril (PRINIVIL,ZESTRIL) 20 MG tablet, Take 1 tablet by mouth Daily. (Patient taking differently: Take 10 mg by mouth Daily.), Disp: 90 tablet, Rfl: 3  •  simvastatin (ZOCOR) 20 MG tablet, Take 20 mg by mouth Every Night., Disp: , Rfl:     PHYSICAL EXAMINATION:   /63   Pulse 72   Temp 97.4 °F (36.3 °C) (Temporal)   Resp 16   Ht 172.7 cm (68\")   Wt 96.2 kg (212 lb)   SpO2 99%   BMI 32.23 kg/m²    ECOG Performance Status: 1 - Symptomatic but completely ambulatory  General Appearance:  alert, cooperative, no apparent distress and appears stated age   Neurologic/Psychiatric: A&O x 3, gait steady, appropriate affect, strength 5/5 in all muscle groups   HEENT:  Normocephalic, without obvious abnormality, mucous membranes moist   Neck: Supple, symmetrical, trachea midline, no adenopathy;  No thyromegaly, masses, or tenderness   Lungs:   Clear to auscultation bilaterally; respirations regular, even, and unlabored bilaterally   Heart:  Regular rate and rhythm, no murmurs " appreciated   Abdomen:   Soft, non-tender, non-distended and no organomegaly   Lymph nodes: No cervical, supraclavicular, inguinal or axillary adenopathy noted   Extremities: Normal, atraumatic; no clubbing, cyanosis, or edema    Skin: No rashes, ulcers, or suspicious lesions noted     No visits with results within 2 Week(s) from this visit.   Latest known visit with results is:   Hospital Outpatient Visit on 09/16/2019   Component Date Value Ref Range Status   • Glucose 09/16/2019 129* 65 - 99 mg/dL Final   • BUN 09/16/2019 17  8 - 23 mg/dL Final   • Creatinine 09/16/2019 0.94  0.76 - 1.27 mg/dL Final   • Sodium 09/16/2019 138  136 - 145 mmol/L Final   • Potassium 09/16/2019 4.3  3.5 - 5.2 mmol/L Final   • Chloride 09/16/2019 101  98 - 107 mmol/L Final   • CO2 09/16/2019 26.0  22.0 - 29.0 mmol/L Final   • Calcium 09/16/2019 9.6  8.6 - 10.5 mg/dL Final   • Total Protein 09/16/2019 7.3  6.0 - 8.5 g/dL Final   • Albumin 09/16/2019 4.60  3.50 - 5.20 g/dL Final   • ALT (SGPT) 09/16/2019 16  1 - 41 U/L Final   • AST (SGOT) 09/16/2019 20  1 - 40 U/L Final   • Alkaline Phosphatase 09/16/2019 81  39 - 117 U/L Final   • Total Bilirubin 09/16/2019 0.4  0.2 - 1.2 mg/dL Final   • eGFR Non African Amer 09/16/2019 80  >60 mL/min/1.73 Final   • Globulin 09/16/2019 2.7  gm/dL Final   • A/G Ratio 09/16/2019 1.7  g/dL Final   • BUN/Creatinine Ratio 09/16/2019 18.1  7.0 - 25.0 Final   • Anion Gap 09/16/2019 11.0  5.0 - 15.0 mmol/L Final   • WBC 09/16/2019 3.60  3.40 - 10.80 10*3/mm3 Final   • RBC 09/16/2019 2.52* 4.14 - 5.80 10*6/mm3 Final   • Hemoglobin 09/16/2019 8.3* 13.0 - 17.7 g/dL Final   • Hematocrit 09/16/2019 24.6* 37.5 - 51.0 % Final   • RDW 09/16/2019 24.8* 12.3 - 15.4 % Final   • MCV 09/16/2019 97.7* 79.0 - 97.0 fL Final   • MCH 09/16/2019 32.8  26.6 - 33.0 pg Final   • MCHC 09/16/2019 33.5  31.5 - 35.7 g/dL Final   • MPV 09/16/2019 6.9  6.0 - 12.0 fL Final   • Platelets 09/16/2019 79* 140 - 450 10*3/mm3 Final   •  Neutrophil % 09/16/2019 37.7* 42.7 - 76.0 % Final   • Lymphocyte % 09/16/2019 51.6* 19.6 - 45.3 % Final   • Monocyte % 09/16/2019 10.7  5.0 - 12.0 % Final   • Neutrophils, Absolute 09/16/2019 1.40* 1.70 - 7.00 10*3/mm3 Final   • Lymphocytes, Absolute 09/16/2019 1.90  0.70 - 3.10 10*3/mm3 Final   • Monocytes, Absolute 09/16/2019 0.40  0.10 - 0.90 10*3/mm3 Final        No results found.    ASSESSMENT: The patient is a very pleasant 69 y.o. male  with extensive stage small cell lung cancer     PROBLEM LIST:   1.  Right lower lobe small cell lung cancer T4 N3 M1 stage IVb:  A.  Presented with shortness of breath and cough  B.  CT chest done June 4019 revealed 9 cm mass right lower lobe with bulky hilar and mediastinal lymphadenopathy, and liver metastases.  C.  Status post bronchoscopy with biopsy done by Dr. Nash Jackelin 10, 2019  D.  Pathology revealed small cell lung cancer from right lower lobe, level 7 and level 10 mediastinal nodes.  E.  Started palliative chemotherapy with carboplatin and etoposide June 19, 2019, Tecentriq was added cycle #2 and chemo stopped after cycle #4.  Status post 5 cycles  2.  Hyponatremia  3.  Hypertension  4.  COPD  5.  Constipation      PLAN:  1.  I will proceed with chemotherapy as scheduled today with Tecentriq cycle #1.  2.    The patient will follow-up with me in 3 weeks for cycle #7.    4.  I will monitor the patient's blood work including blood counts, kidney function, liver function, electrolytes, and thyroid functions throughout treatment.  4.  The patient will need 3 months follow-up scans which would be due November 21 2019.   5.  We discussed potential side effects of immunotherapy including but not limited to immune mediated reactions with thyroiditis, pneumonitis, hepatitis, colitis, rash, and electrolytes abnormalities, fatigue, multiorgan failure, and possibly death.  6.  I will continue the patient on Colace daily with lactulose as needed for chemotherapy as  constipation.  7.  We will continue inhalers as needed for COPD.  8.   We will continue lisinopril 1/2 tablet daily for hypertension  I told him to continue to monitor his blood pressure at home and hold his dose for hypotension.     Ulysses Argueta MD  10/7/2019

## 2019-10-28 NOTE — PROGRESS NOTES
DATE OF VISIT: 10/28/2019    REASON FOR VISIT: Followup for extensive stage small cell lung cancer     HISTORY OF PRESENT ILLNESS: The patient is a very pleasant 69 y.o. male  with past medical history significant for extensive stage small cell lung cancer diagnosed Jackelin 10, 2019.  Staging work-up revealed right lower lobe lung mass with bulky mediastinal adenopathy and liver metastases.  He was started on chemotherapy using carboplatin and etoposide with Tecentriq June 19, 2019 .  He completed 4 cycles then started Tecentriq maintenance September 16, 2019 the patient is here today for scheduled follow-up visit with treatment cycle #7.    SUBJECTIVE: The patient is here today with his wife. He has been doing fairly well. He has had sinus congestion and cough over the last week. He denies fever or chills. He has tried some over the counter cold medicines, but it has not improved. He also complains of sinus headaches. His breathing has otherwise been stable. He has gained some weight and has been eating well. He has had some dry skin, however no rash or itching. He has been trying to increase his activity.     PAST MEDICAL HISTORY/SOCIAL HISTORY/FAMILY HISTORY: Reviewed by me and unchanged from my documentation done on 10/28/19.    Review of Systems   Constitutional: Positive for fatigue. Negative for activity change, appetite change, chills, fever and unexpected weight change.   HENT: Positive for congestion, sinus pressure and sinus pain. Negative for hearing loss, mouth sores, nosebleeds, sore throat and trouble swallowing.    Eyes: Negative for visual disturbance.   Respiratory: Positive for cough, shortness of breath and wheezing. Negative for chest tightness.    Cardiovascular: Negative for chest pain, palpitations and leg swelling.   Gastrointestinal: Negative for abdominal distention, abdominal pain, blood in stool, constipation, diarrhea, nausea, rectal pain and vomiting.   Endocrine: Negative for cold  intolerance and heat intolerance.   Genitourinary: Negative for difficulty urinating, dysuria, frequency and urgency.   Musculoskeletal: Positive for arthralgias. Negative for back pain, gait problem, joint swelling and myalgias.   Skin: Negative for rash.   Neurological: Negative for dizziness, tremors, syncope, weakness, light-headedness, numbness and headaches.   Hematological: Negative for adenopathy. Does not bruise/bleed easily.   Psychiatric/Behavioral: Negative for confusion, sleep disturbance and suicidal ideas. The patient is not nervous/anxious.          Current Outpatient Medications:   •  albuterol (ACCUNEB) 1.25 MG/3ML nebulizer solution, Take 1 ampule by nebulization Every 6 (Six) Hours As Needed for Wheezing., Disp: , Rfl:   •  aspirin 81 MG chewable tablet, Chew 81 mg Daily., Disp: , Rfl:   •  budesonide-formoterol (SYMBICORT) 160-4.5 MCG/ACT inhaler, Inhale 2 puffs 2 (Two) Times a Day., Disp: 10.2 g, Rfl: 12  •  colchicine 0.6 MG tablet, Take 1 tablet by mouth Daily., Disp: 2 tablet, Rfl: 0  •  docusate sodium (COLACE) 100 MG capsule, Take 1 capsule by mouth Daily. Two capusles, Disp: 60 capsule, Rfl: 3  •  lactulose (CHRONULAC) 10 GM/15ML solution, Take 30 mL by mouth 3 (Three) Times a Day. PRN constipation, Disp: 240 mL, Rfl: 2  •  lisinopril (PRINIVIL,ZESTRIL) 20 MG tablet, Take 1 tablet by mouth Daily. (Patient taking differently: Take 10 mg by mouth Daily.), Disp: 90 tablet, Rfl: 3  •  simvastatin (ZOCOR) 20 MG tablet, Take 20 mg by mouth Every Night., Disp: , Rfl:     PHYSICAL EXAMINATION:   There were no vitals taken for this visit.   ECOG Performance Status: 1 - Symptomatic but completely ambulatory  General Appearance:  alert, cooperative, no apparent distress and appears stated age   Neurologic/Psychiatric: A&O x 3, gait steady, appropriate affect, strength 5/5 in all muscle groups   HEENT:  Normocephalic, without obvious abnormality, mucous membranes moist, R TM dull and cloudy with some  erythema surround, L TM clear with good light reflex.    Neck: Supple, symmetrical, trachea midline, no adenopathy;  No thyromegaly, masses, or tenderness   Lungs:   Clear to auscultation bilaterally; respirations regular, even, and unlabored bilaterally   Heart:  Regular rate and rhythm, no murmurs appreciated   Abdomen:   Soft, non-tender, non-distended and no organomegaly   Lymph nodes: No cervical, supraclavicular, inguinal or axillary adenopathy noted   Extremities: Normal, atraumatic; no clubbing, cyanosis, or edema    Skin: No rashes, ulcers, or suspicious lesions noted     No visits with results within 2 Week(s) from this visit.   Latest known visit with results is:   Hospital Outpatient Visit on 10/07/2019   Component Date Value Ref Range Status   • Glucose 10/07/2019 128* 65 - 99 mg/dL Final   • BUN 10/07/2019 22  8 - 23 mg/dL Final   • Creatinine 10/07/2019 0.99  0.76 - 1.27 mg/dL Final   • Sodium 10/07/2019 136  136 - 145 mmol/L Final   • Potassium 10/07/2019 4.0  3.5 - 5.2 mmol/L Final   • Chloride 10/07/2019 101  98 - 107 mmol/L Final   • CO2 10/07/2019 24.0  22.0 - 29.0 mmol/L Final   • Calcium 10/07/2019 9.8  8.6 - 10.5 mg/dL Final   • Total Protein 10/07/2019 7.3  6.0 - 8.5 g/dL Final   • Albumin 10/07/2019 4.50  3.50 - 5.20 g/dL Final   • ALT (SGPT) 10/07/2019 19  1 - 41 U/L Final   • AST (SGOT) 10/07/2019 20  1 - 40 U/L Final   • Alkaline Phosphatase 10/07/2019 69  39 - 117 U/L Final   • Total Bilirubin 10/07/2019 0.4  0.2 - 1.2 mg/dL Final   • eGFR Non African Amer 10/07/2019 75  >60 mL/min/1.73 Final   • Globulin 10/07/2019 2.8  gm/dL Final   • A/G Ratio 10/07/2019 1.6  g/dL Final   • BUN/Creatinine Ratio 10/07/2019 22.2  7.0 - 25.0 Final   • Anion Gap 10/07/2019 11.0  5.0 - 15.0 mmol/L Final   • TSH 10/07/2019 2.170  0.270 - 4.200 uIU/mL Final   • T3, Free 10/07/2019 2.73  2.00 - 4.40 pg/mL Final   • Free T4 10/07/2019 1.00  0.93 - 1.70 ng/dL Final   • WBC 10/07/2019 6.40  3.40 - 10.80 10*3/mm3  Final   • RBC 10/07/2019 3.14* 4.14 - 5.80 10*6/mm3 Final   • Hemoglobin 10/07/2019 10.5* 13.0 - 17.7 g/dL Final   • Hematocrit 10/07/2019 32.0* 37.5 - 51.0 % Final   • RDW 10/07/2019 18.3* 12.3 - 15.4 % Final   • MCV 10/07/2019 101.9* 79.0 - 97.0 fL Final   • MCH 10/07/2019 33.5* 26.6 - 33.0 pg Final   • MCHC 10/07/2019 32.9  31.5 - 35.7 g/dL Final   • MPV 10/07/2019 7.0  6.0 - 12.0 fL Final   • Platelets 10/07/2019 185  140 - 450 10*3/mm3 Final   • Neutrophil % 10/07/2019 57.4  42.7 - 76.0 % Final   • Lymphocyte % 10/07/2019 33.1  19.6 - 45.3 % Final   • Monocyte % 10/07/2019 9.5  5.0 - 12.0 % Final   • Neutrophils, Absolute 10/07/2019 3.70  1.70 - 7.00 10*3/mm3 Final   • Lymphocytes, Absolute 10/07/2019 2.10  0.70 - 3.10 10*3/mm3 Final   • Monocytes, Absolute 10/07/2019 0.60  0.10 - 0.90 10*3/mm3 Final        No results found.    ASSESSMENT: The patient is a very pleasant 69 y.o. male  with extensive stage small cell lung cancer     PROBLEM LIST:   1.  Right lower lobe small cell lung cancer T4 N3 M1 stage IVb:  A.  Presented with shortness of breath and cough  B.  CT chest done June 4019 revealed 9 cm mass right lower lobe with bulky hilar and mediastinal lymphadenopathy, and liver metastases.  C.  Status post bronchoscopy with biopsy done by Dr. Nash Jackelin 10, 2019  D.  Pathology revealed small cell lung cancer from right lower lobe, level 7 and level 10 mediastinal nodes.  E.  Started palliative chemotherapy with carboplatin and etoposide June 19, 2019, Tecentriq was added cycle #2 and chemo stopped after cycle #4.  Status post 2 cycles of maintenance Tecentriq  2.  Hyponatremia  3.  Hypertension  4.  COPD  5.  Constipation  6. Acute sinusitis  7. Dyslipidemia    PLAN:  1.  I will proceed with chemotherapy as scheduled today with maintenance Tecentriq cycle #3.  2.    The patient will follow-up with me in 3 weeks for cycle #4.    4.  I will monitor the patient's blood work including blood counts, kidney  function, liver function, electrolytes, and thyroid functions throughout treatment.  4.  The patient will need 3 months follow-up scans which would be due November 21 2019. These will be ordered for prior to return.    5.  We discussed potential side effects of immunotherapy including but not limited to immune mediated reactions with thyroiditis, pneumonitis, hepatitis, colitis, rash, and electrolytes abnormalities, fatigue, multiorgan failure, and possibly death.  6.  We will continue the patient on Colace daily with lactulose as needed for chemotherapy as constipation.  7.  We will continue inhalers as needed for COPD. I encouraged him to use his albuterol inhaler when needed for symptoms of wheezing.   8.   We will continue lisinopril 1/2 tablet daily for hypertension  I told him to continue to monitor his blood pressure at home and hold his dose for hypotension.   9. We will add Z-pack for treatment of acute sinusitis.   10. I will refill his simvastatin 20 mg daily for hypercholesterolemia. This is normally prescribed by his PCP, but he has not been back to them recently. I gave him 3 month supply and recommended he follow up with them in the near future for re-evaluation of cholesterol levels.     Isi Cuellar, APRN  10/28/2019

## 2019-11-04 NOTE — TELEPHONE ENCOUNTER
Received VM from pt's spouse through triage line. Pt was treated for acute sinusitis last week and has now completed antibiotic. She wanted to report that he is still sick, and starting to feel worse. C/o: no energy, congestion, wheezing, jaw pain. He is requesting another abx. Attempted to reach pt/ wife back. No answer. Left VM explaining that it is preferred that he be evaluated since no improvement first. We could see him in the urgent care clinic, or it is ok if they wanted to visit local PCP since they live in [place] if that is easier. Requested they call back to touch base.

## 2019-11-05 NOTE — TELEPHONE ENCOUNTER
Dr. Chelsea Wahl called triage line and reported that she was seeing patient at [facility] for upper respiratory symptoms and that the patient also was experiencing a headache and altered gait that for the last two days patient has needed a cane for ambulation.  Dr. Wahl wanted to know if patient should be sent to ER.  Discussed with NP and returned call to [facility] advised to send patient to ER.

## 2019-11-05 NOTE — PLAN OF CARE
Problem: Patient Care Overview  Goal: Plan of Care Review   11/05/19 1641   OTHER   Outcome Summary Patient has no complaints of pain. No cough present. Patient educated on 1500ml fluid restriction, verbilizes understanding. vitals stable. Will monitor

## 2019-11-05 NOTE — ED PROVIDER NOTES
"Subjective   Patient presents to the emergency department with complaint of \"my breathing is not right\".  Patient, together with his wife, reports that since his diagnosis of lung cancer in June, the patient has had wheezing and cough.  Nevertheless, patient's symptoms have become significantly worse in the last several days.  He has had no fever, stating he has checked it daily.  He has had rare sputum production and denies any severe shortness of breath.  Patient states he has been eating, drinking, and voiding well.  He denies any pain anywhere with exception of his right ear.  Patient was seen by his providers on October 28 and follow-up.  A Z-Christopher was prescribed at that time for his ear related pain but the patient states his pain in the ears only gotten worse.  Patient has been taking chemo followed by immunotherapy.  He has had no surgery.  Additionally, the wife states that his gait has been unsteady.  Similarly, the patient had hyponatremia-related unsteady gait in June.            History provided by:  Patient and spouse (spouse is best historian; patient is hard of hearing)   used: No    Shortness of Breath   Severity:  Moderate  Duration:  7 days  Timing:  Constant  Progression:  Worsening  Chronicity:  Chronic  Context comment:  Known lung cancer  Relieved by:  Rest  Worsened by:  Coughing, movement and exertion  Associated symptoms: cough, ear pain, headaches (some mild headache recently), sputum production (rare) and wheezing    Associated symptoms: no abdominal pain, no chest pain, no claudication, no diaphoresis, no fever, no hemoptysis, no neck pain and no vomiting    Risk factors: hx of cancer and tobacco use    Risk factors: no hx of PE/DVT  Oral contraceptive use: previously.        Review of Systems   Constitutional: Negative for appetite change, chills, diaphoresis and fever.   HENT: Positive for ear pain. Ear discharge: right side; completed a zpak without changes.  "   Respiratory: Positive for cough, sputum production (rare), shortness of breath and wheezing. Negative for hemoptysis.    Cardiovascular: Negative for chest pain and claudication.   Gastrointestinal: Negative for abdominal pain and vomiting.   Endocrine: Negative.    Genitourinary: Negative.    Musculoskeletal: Negative.  Negative for neck pain.   Skin: Negative.    Neurological: Positive for headaches (some mild headache recently).        Gait disturbance.     Hematological: Bruises/bleeds easily (on aspirin only).   Psychiatric/Behavioral: Negative.    All other systems reviewed and are negative.      Past Medical History:   Diagnosis Date   • Arthritis    • Black lung disease (CMS/HCC)    • History of alcohol abuse    • Hyperlipidemia    • Hypertension        No Known Allergies    Past Surgical History:   Procedure Laterality Date   • BRONCHOSCOPY N/A 6/10/2019    Procedure: BRONCHOSCOPY WITH ENDOBRONCHIAL ULTRASOUND;  Surgeon: Bar Gage MD;  Location: [facility] ENDOSCOPY;  Service: Pulmonary   • BRONCHOSCOPY     • CATARACT EXTRACTION, BILATERAL     • LUNG BIOPSY     • NO PAST SURGERIES     • TEETH EXTRACTION     • WISDOM TOOTH EXTRACTION         Family History   Problem Relation Age of Onset   • COPD Mother    • Heart disease Father        Social History     Socioeconomic History   • Marital status:      Spouse name: Not on file   • Number of children: Not on file   • Years of education: Not on file   • Highest education level: Not on file   Tobacco Use   • Smoking status: Former Smoker     Packs/day: 1.00     Types: Cigarettes     Last attempt to quit: 2009     Years since quitting: 10.8   • Smokeless tobacco: Never Used   • Tobacco comment: smoked 1ppd x 15 years; quit in 2009   Substance and Sexual Activity   • Alcohol use: No     Frequency: Never     Comment: hx of abuse; drank 2-3 beers and 2-3 mixed drinks about 3/4 x/week; stopped 1 month ago (had tapered drinking over past year)    •  Drug use: No   • Sexual activity: Defer           Objective   Physical Exam   Constitutional: He is oriented to person, place, and time. He appears well-developed and well-nourished.   Vital signs are stable.     HENT:   Head: Normocephalic and atraumatic.   Mouth/Throat: Oropharynx is clear and moist.   Eyes: Conjunctivae are normal. Pupils are equal, round, and reactive to light. No scleral icterus.   Neck: Normal range of motion. Neck supple. No JVD present.   Cardiovascular: Normal rate and regular rhythm.   Pulmonary/Chest: Effort normal. No respiratory distress. He has wheezes. He has no rales.   Patient has coarse rhonchi on the right in comparison to the left.  There is no respiratory distress or tachypnea.   Abdominal: Soft. Bowel sounds are normal. He exhibits no distension. There is no tenderness. There is no rebound and no guarding.   Musculoskeletal: Normal range of motion. He exhibits no edema.   Neurological: He is alert and oriented to person, place, and time. No sensory deficit. Coordination normal.   No neurosensory deficits or focal weakness.   Skin: Skin is warm and dry. Capillary refill takes less than 2 seconds. No rash noted. He is not diaphoretic.   Psychiatric: He has a normal mood and affect. His behavior is normal. Judgment and thought content normal.   Nursing note and vitals reviewed.      Procedures           ED Course  ED Course as of Nov 05 1503   Tue Nov 05, 2019   1217 Magnesium: (!) 1.5 [MS]   1217 Creatine Kinase: (!) 318 [MS]   1217 Sodium: (!!) 116 [MS]   1330 I have discussed case with Dr. Silva who concurs with hospitalization; CT head pending his disposition.  Patient and spouse understand and concur.   [MS]      ED Course User Index  [MS] Mishel Cope APRN      Recent Results (from the past 24 hour(s))   Light Blue Top    Collection Time: 11/05/19  9:53 AM   Result Value Ref Range    Extra Tube hold for add-on    Green Top (Gel)    Collection Time: 11/05/19  9:53  AM   Result Value Ref Range    Extra Tube Hold for add-ons.    Lavender Top    Collection Time: 11/05/19  9:53 AM   Result Value Ref Range    Extra Tube hold for add-on    Gold Top - SST    Collection Time: 11/05/19  9:53 AM   Result Value Ref Range    Extra Tube Hold for add-ons.    Troponin    Collection Time: 11/05/19  9:53 AM   Result Value Ref Range    Troponin T <0.010 0.000 - 0.030 ng/mL   Comprehensive Metabolic Panel    Collection Time: 11/05/19  9:53 AM   Result Value Ref Range    Glucose 119 (H) 65 - 99 mg/dL    BUN 15 8 - 23 mg/dL    Creatinine 0.94 0.76 - 1.27 mg/dL    Sodium 116 (C) 136 - 145 mmol/L    Potassium 4.7 3.5 - 5.2 mmol/L    Chloride 81 (L) 98 - 107 mmol/L    CO2 21.0 (L) 22.0 - 29.0 mmol/L    Calcium 9.8 8.6 - 10.5 mg/dL    Total Protein 7.9 6.0 - 8.5 g/dL    Albumin 4.50 3.50 - 5.20 g/dL    ALT (SGPT) 18 1 - 41 U/L    AST (SGOT) 24 1 - 40 U/L    Alkaline Phosphatase 79 39 - 117 U/L    Total Bilirubin 0.7 0.2 - 1.2 mg/dL    eGFR Non African Amer 80 >60 mL/min/1.73    Globulin 3.4 gm/dL    A/G Ratio 1.3 g/dL    BUN/Creatinine Ratio 16.0 7.0 - 25.0    Anion Gap 14.0 5.0 - 15.0 mmol/L   BNP    Collection Time: 11/05/19  9:53 AM   Result Value Ref Range    proBNP 98.3 5.0 - 900.0 pg/mL   CK    Collection Time: 11/05/19  9:53 AM   Result Value Ref Range    Creatine Kinase 318 (H) 20 - 200 U/L   Magnesium    Collection Time: 11/05/19  9:53 AM   Result Value Ref Range    Magnesium 1.5 (L) 1.6 - 2.4 mg/dL   CBC Auto Differential    Collection Time: 11/05/19  9:53 AM   Result Value Ref Range    WBC 9.12 3.40 - 10.80 10*3/mm3    RBC 4.19 4.14 - 5.80 10*6/mm3    Hemoglobin 12.9 (L) 13.0 - 17.7 g/dL    Hematocrit 37.1 (L) 37.5 - 51.0 %    MCV 88.5 79.0 - 97.0 fL    MCH 30.8 26.6 - 33.0 pg    MCHC 34.8 31.5 - 35.7 g/dL    RDW 12.9 12.3 - 15.4 %    RDW-SD 41.6 37.0 - 54.0 fl    MPV 9.7 6.0 - 12.0 fL    Platelets 174 140 - 450 10*3/mm3    Neutrophil % 71.9 42.7 - 76.0 %    Lymphocyte % 16.7 (L) 19.6 -  45.3 %    Monocyte % 10.7 5.0 - 12.0 %    Eosinophil % 0.2 (L) 0.3 - 6.2 %    Basophil % 0.2 0.0 - 1.5 %    Immature Grans % 0.3 0.0 - 0.5 %    Neutrophils, Absolute 6.55 1.70 - 7.00 10*3/mm3    Lymphocytes, Absolute 1.52 0.70 - 3.10 10*3/mm3    Monocytes, Absolute 0.98 (H) 0.10 - 0.90 10*3/mm3    Eosinophils, Absolute 0.02 0.00 - 0.40 10*3/mm3    Basophils, Absolute 0.02 0.00 - 0.20 10*3/mm3    Immature Grans, Absolute 0.03 0.00 - 0.05 10*3/mm3    nRBC 0.0 0.0 - 0.2 /100 WBC   Influenza Antigen, Rapid - Swab, Nasopharynx    Collection Time: 11/05/19  9:54 AM   Result Value Ref Range    Influenza A Ag, EIA Negative Negative    Influenza B Ag, EIA Negative Negative   Lactic Acid, Plasma    Collection Time: 11/05/19 11:23 AM   Result Value Ref Range    Lactate 0.9 0.5 - 2.0 mmol/L   Troponin    Collection Time: 11/05/19 12:17 PM   Result Value Ref Range    Troponin T <0.010 0.000 - 0.030 ng/mL     Note: In addition to lab results from this visit, the labs listed above may include labs taken at another facility or during a different encounter within the last 24 hours. Please correlate lab times with ED admission and discharge times for further clarification of the services performed during this visit.    CT Head Without Contrast   Preliminary Result   No acute intracranial process.              CT Angiogram Chest With & Without Contrast   Preliminary Result   There has been interval development of extensive   opacification of the right infrahilar and right lower lobe with   associated more peripheral groundglass and nodular airspace disease   which is concerning for recurrent mass within this region with   associated postobstructive pneumonitis. This mass demonstrates   circumferential involvement of the bronchus intermedius as well as   multiple right lower lobe bronchi and abutment of the right middle lobe   bronchus. This mass also abuts and significantly narrows multiple   segmental and subsegmental right lower  lobe pulmonary arteries. There is   likely partial occlusion of some of these right lower lobe pulmonary   arteries which may relate to direct tumor invasion versus external   compression. Multiple right lower lobe segmental and subsegmental   pulmonary arteries demonstrate incomplete opacification and is favored   to relate to tumor invasion/compression with altered flow mechanics   rather than pulmonary arterial emboli, although small emboli within the   right lower lobe cannot be entirely excluded. There is no evidence of   pulmonary arterial embolus elsewhere within the main pulmonary artery or   chest.       D:  11/05/2019   E:  11/05/2019                        Vitals:    11/05/19 0957 11/05/19 1129 11/05/19 1132 11/05/19 1300   BP: 158/74  143/73 149/83   Pulse: 68 61 61 66   Resp:   17 18   Temp:       TempSrc:       SpO2: 100% 100% 100% 95%   Weight:       Height:         Medications   sodium chloride 0.9 % flush 10 mL (not administered)   sodium chloride 0.9 % bolus 500 mL (0 mL Intravenous Stopped 11/5/19 1139)   ipratropium-albuterol (DUO-NEB) nebulizer solution 3 mL (3 mL Nebulization Given 11/5/19 1129)   methylPREDNISolone sodium succinate (SOLU-Medrol) injection 125 mg (125 mg Intravenous Given 11/5/19 1108)   sodium chloride 0.9 % bolus 1,000 mL (0 mL Intravenous Stopped 11/5/19 1203)   iopamidol (ISOVUE-370) 76 % injection 100 mL (100 mL Intravenous Given 11/5/19 1208)     ECG/EMG Results (last 24 hours)     Procedure Component Value Units Date/Time    ECG 12 Lead [[id number]] Collected:  11/05/19 0953     Updated:  11/05/19 1028    ECG 12 Lead [[id number]] Collected:  11/05/19 1226     Updated:  11/05/19 1248        ECG 12 Lead   Final Result   Test Reason : 2nd set   Blood Pressure : **/** mmHG   Vent. Rate : 069 BPM     Atrial Rate : 069 BPM      P-R Int : 198 ms          QRS Dur : 102 ms       QT Int : 396 ms       P-R-T Axes : 057 000 018 degrees      QTc Int : 424 ms      Normal sinus rhythm    Normal ECG   When compared with ECG of 05-NOV-2019 09:53, (Unconfirmed)   No significant change was found   Confirmed by GERMAN SIFUENTES MD ([id number]) on 11/5/2019 2:16:17 PM      Referred By:  TIM           Confirmed By:GERMAN SIFUENTES MD      ECG 12 Lead   Final Result   Test Reason : fatigue   Blood Pressure : **/** mmHG   Vent. Rate : 071 BPM     Atrial Rate : 071 BPM      P-R Int : 184 ms          QRS Dur : 100 ms       QT Int : 378 ms       P-R-T Axes : 050 002 033 degrees      QTc Int : 410 ms      Normal sinus rhythm   Normal ECG   When compared with ECG of 04-JUN-2019 21:38,   premature supraventricular complexes are no longer present   Confirmed by GERMAN SIFUENTES MD ([id number]) on 11/5/2019 1:35:44 PM      Referred By:  ABRIL SRIVASTAVA           Confirmed By:GERMAN SIFUENTES MD                    [facility]    Final diagnoses:   Hyponatremia   History of lung cancer   Essential hypertension   Lung mass              Prisca, Mishel Andersone, APRN  11/05/19 1508

## 2019-11-05 NOTE — CONSULTS
Subjective     CHIEF COMPLAINT: Shortness of breath    HISTORY OF PRESENT ILLNESS:  The patient is a 69 y.o. male, referred by Justice Silva MD for extensive stage small cell lung cancer.  The patient was in his usual state of health until a week ago he presented shortness of breath, this has been getting gradually worse, associated with cough, yellowish sputum, no fever chills, no recent travel.  He tried to use his inhaler at home with no improvement.  Patient presented to [facility] emergency room today, CT chest PE protocol revealed progressive disease with increase in size of right hilar mass.  The patient was admitted to the hospitalist service and I was consulted to further assist in his care.      REVIEW OF SYSTEMS:  A 14 point review of systems was performed and is negative except as noted above.    Past Medical History:   Diagnosis Date   • Arthritis    • Black lung disease (CMS/HCC)    • History of alcohol abuse    • Hyperlipidemia    • Hypertension        No current facility-administered medications on file prior to encounter.      Current Outpatient Medications on File Prior to Encounter   Medication Sig Dispense Refill   • colchicine 0.6 MG tablet Take 0.6 mg by mouth Daily As Needed for Muscle / Joint Pain.     • lisinopril (PRINIVIL,ZESTRIL) 20 MG tablet Take 20 mg by mouth Daily.     • Multiple Vitamins-Minerals (MULTIVITAMIN PO) Take 1 tablet by mouth Daily.     • albuterol (ACCUNEB) 1.25 MG/3ML nebulizer solution Take 1 ampule by nebulization Every 6 (Six) Hours As Needed for Wheezing.     • aspirin 81 MG chewable tablet Chew 81 mg Daily.     • budesonide-formoterol (SYMBICORT) 160-4.5 MCG/ACT inhaler Inhale 2 puffs 2 (Two) Times a Day. 10.2 g 12   • simvastatin (ZOCOR) 20 MG tablet Take 1 tablet by mouth Every Night. 30 tablet 2   • [DISCONTINUED] azithromycin (ZITHROMAX) 250 MG tablet Take 2 tablets the first day, then 1 tablet daily for 4 days. 6 tablet 0   • [DISCONTINUED]  colchicine 0.6 MG tablet Take 1 tablet by mouth Daily. 2 tablet 0   • [DISCONTINUED] docusate sodium (COLACE) 100 MG capsule Take 1 capsule by mouth Daily. Two capusles 60 capsule 3   • [DISCONTINUED] lactulose (CHRONULAC) 10 GM/15ML solution Take 30 mL by mouth 3 (Three) Times a Day. PRN constipation 240 mL 2   • [DISCONTINUED] lisinopril (PRINIVIL,ZESTRIL) 20 MG tablet Take 1 tablet by mouth Daily. (Patient taking differently: Take 10 mg by mouth Daily.) 90 tablet 3       No Known Allergies    Past Surgical History:   Procedure Laterality Date   • BRONCHOSCOPY N/A 6/10/2019    Procedure: BRONCHOSCOPY WITH ENDOBRONCHIAL ULTRASOUND;  Surgeon: Bar Gage MD;  Location: [facility] ENDOSCOPY;  Service: Pulmonary   • BRONCHOSCOPY     • CATARACT EXTRACTION, BILATERAL     • LUNG BIOPSY     • NO PAST SURGERIES     • TEETH EXTRACTION     • WISDOM TOOTH EXTRACTION         OB History   No data available       Social History     Socioeconomic History   • Marital status:      Spouse name: Not on file   • Number of children: Not on file   • Years of education: Not on file   • Highest education level: Not on file   Tobacco Use   • Smoking status: Former Smoker     Packs/day: 1.00     Types: Cigarettes     Last attempt to quit: 2009     Years since quitting: 10.8   • Smokeless tobacco: Never Used   • Tobacco comment: smoked 1ppd x 15 years; quit in 2009   Substance and Sexual Activity   • Alcohol use: No     Frequency: Never     Comment: hx of abuse; drank 2-3 beers and 2-3 mixed drinks about 3/4 x/week; stopped 1 month ago (had tapered drinking over past year)    • Drug use: No   • Sexual activity: Defer       Family History   Problem Relation Age of Onset   • COPD Mother    • Heart disease Father        Objective     Vitals:    11/05/19 0957 11/05/19 1129 11/05/19 1132 11/05/19 1300   BP: 158/74  143/73 149/83   Pulse: 68 61 61 66   Resp:   17 18   Temp:       TempSrc:       SpO2: 100% 100% 100% 95%   Weight:        Height:                ECOG Performance Status: 1 - Symptomatic but completely ambulatory  General: well appearing male in no acute distress  Neuro/Psych: A&O x 3, gait steady, appropriate affect, strength 5/5 in all muscle groups  HEENT: sclerae anicteric, oropharynx clear  Lymphatics: no cervical, supraclavicular, or axillary adenopathy  Cardiovascular: regular rate and rhythm, no murmurs  Lungs: clear to auscultation bilaterally  Abdomen: soft, nontender, nondistended.  No palpable organomegaly  Extremities: no lower extremity edema  Skin: no rashes, lesions, bruising, or petechiae      Admission on 11/05/2019   Component Date Value Ref Range Status   • Extra Tube 11/05/2019 hold for add-on   Final    Auto resulted   • Extra Tube 11/05/2019 Hold for add-ons.   Final    Auto resulted.   • Extra Tube 11/05/2019 hold for add-on   Final    Auto resulted   • Extra Tube 11/05/2019 Hold for add-ons.   Final    Auto resulted.   • Influenza A Ag, EIA 11/05/2019 Negative  Negative Final   • Influenza B Ag, EIA 11/05/2019 Negative  Negative Final   • Troponin T 11/05/2019 <0.010  0.000 - 0.030 ng/mL Final   • Glucose 11/05/2019 119* 65 - 99 mg/dL Final   • BUN 11/05/2019 15  8 - 23 mg/dL Final   • Creatinine 11/05/2019 0.94  0.76 - 1.27 mg/dL Final   • Sodium 11/05/2019 116* 136 - 145 mmol/L Final   • Potassium 11/05/2019 4.7  3.5 - 5.2 mmol/L Final   • Chloride 11/05/2019 81* 98 - 107 mmol/L Final   • CO2 11/05/2019 21.0* 22.0 - 29.0 mmol/L Final   • Calcium 11/05/2019 9.8  8.6 - 10.5 mg/dL Final   • Total Protein 11/05/2019 7.9  6.0 - 8.5 g/dL Final   • Albumin 11/05/2019 4.50  3.50 - 5.20 g/dL Final   • ALT (SGPT) 11/05/2019 18  1 - 41 U/L Final   • AST (SGOT) 11/05/2019 24  1 - 40 U/L Final   • Alkaline Phosphatase 11/05/2019 79  39 - 117 U/L Final   • Total Bilirubin 11/05/2019 0.7  0.2 - 1.2 mg/dL Final   • eGFR Non African Amer 11/05/2019 80  >60 mL/min/1.73 Final   • Globulin 11/05/2019 3.4  gm/dL Final   • A/G  Ratio 11/05/2019 1.3  g/dL Final   • BUN/Creatinine Ratio 11/05/2019 16.0  7.0 - 25.0 Final   • Anion Gap 11/05/2019 14.0  5.0 - 15.0 mmol/L Final   • proBNP 11/05/2019 98.3  5.0 - 900.0 pg/mL Final   • Creatine Kinase 11/05/2019 318* 20 - 200 U/L Final   • Magnesium 11/05/2019 1.5* 1.6 - 2.4 mg/dL Final   • Lactate 11/05/2019 0.9  0.5 - 2.0 mmol/L Final    Falsely depressed results may occur on samples drawn from patients receiving N-Acetylcysteine (NAC) or Metamizole.   • WBC 11/05/2019 9.12  3.40 - 10.80 10*3/mm3 Final   • RBC 11/05/2019 4.19  4.14 - 5.80 10*6/mm3 Final   • Hemoglobin 11/05/2019 12.9* 13.0 - 17.7 g/dL Final   • Hematocrit 11/05/2019 37.1* 37.5 - 51.0 % Final   • MCV 11/05/2019 88.5  79.0 - 97.0 fL Final   • MCH 11/05/2019 30.8  26.6 - 33.0 pg Final   • MCHC 11/05/2019 34.8  31.5 - 35.7 g/dL Final   • RDW 11/05/2019 12.9  12.3 - 15.4 % Final   • RDW-SD 11/05/2019 41.6  37.0 - 54.0 fl Final   • MPV 11/05/2019 9.7  6.0 - 12.0 fL Final   • Platelets 11/05/2019 174  140 - 450 10*3/mm3 Final   • Neutrophil % 11/05/2019 71.9  42.7 - 76.0 % Final   • Lymphocyte % 11/05/2019 16.7* 19.6 - 45.3 % Final   • Monocyte % 11/05/2019 10.7  5.0 - 12.0 % Final   • Eosinophil % 11/05/2019 0.2* 0.3 - 6.2 % Final   • Basophil % 11/05/2019 0.2  0.0 - 1.5 % Final   • Immature Grans % 11/05/2019 0.3  0.0 - 0.5 % Final   • Neutrophils, Absolute 11/05/2019 6.55  1.70 - 7.00 10*3/mm3 Final   • Lymphocytes, Absolute 11/05/2019 1.52  0.70 - 3.10 10*3/mm3 Final   • Monocytes, Absolute 11/05/2019 0.98* 0.10 - 0.90 10*3/mm3 Final   • Eosinophils, Absolute 11/05/2019 0.02  0.00 - 0.40 10*3/mm3 Final   • Basophils, Absolute 11/05/2019 0.02  0.00 - 0.20 10*3/mm3 Final   • Immature Grans, Absolute 11/05/2019 0.03  0.00 - 0.05 10*3/mm3 Final   • nRBC 11/05/2019 0.0  0.0 - 0.2 /100 WBC Final   • Troponin T 11/05/2019 <0.010  0.000 - 0.030 ng/mL Final        No results found.    ASSESSMENT 69 years old gentleman with extensive stage  small cell lung cancer and right postobstructive pneumonia    PROBLEM LIST   1.  Extensive stage small cell lung cancer: Progressed on carboplatin and etoposide with Tecentriq  2.  Right-sided postobstructive pneumonia  3.  Hyponatremia  4.  Normocytic anemia    PLAN  1.  I reviewed the patient's chart including admission note blood results and imaging report.  2.  I reviewed the patient's CT scan films myself I compared his current CT scan from today to previous study done August 21, 2019.  Patient had evidence of progression with significant increase in size of right hilar mass.  3.  Continue IV antibiotics for postobstructive pneumonia.  4.  I will add IV steroids with Solu-Medrol once a day.  5.  I discussed the case with Dr. Vaz from radiation oncology.  She agreed to see the patient for palliative radiation and hoping that might help shrink the right hilar mass open the airway clear the pneumonia and also might help his hyponatremia and improve immune therapy response.  6.  Monitor blood work.  His hyponatremia should improve with radiation.  We might need to add demeclocycline.      Ulysses Argueta MD    11/5/2019

## 2019-11-05 NOTE — H&P
[facility] Medicine Services  HISTORY AND PHYSICAL    Patient Name: Florentin Mcallister  : 1950  MRN: [id number]  Primary Care Physician: Bar Gage MD  Date of admission: 2019      Subjective   Subjective     Chief Complaint:  Cough, SOB, unstable gait.    HPI:  Florentin Mcallister is a 69 y.o. male with past medical history significant for hypertension, hyperlipidemia, black lung, gout, history of EtOH abuse, history of tobacco abuse, diagnosis of extensive stage small cell lung cancer with metastasis to the liver status post chemotherapy.  Patient also has history of hyponatremia and he was treated for that during the last admission by nephrology.  Patient tells me that he had his this chemotherapy done on Monday about a week ago and since then he has felt poorly.  Tells me that for the past 4 to 5 days he has had increasing cough with phlegm production but he denies any blood stains in the sputum.  He also complains of shortness of breath, weakness, malaise, unsteady gait.  Also has some mild headache off and on but nothing major and no visual changes.  Denies any fever or Reiger.  Denies any nausea, vomiting, diarrhea or abdominal pain.  Patient was brought to the emergency room today because symptoms were getting worse.  Here at the emergency room work-up shows hyponatremia and sodium is at 116.  CT angiogram of the chest also was done to rule out PE.  This is study is negative for PE however unfortunately shows evidence of disease progression.    Review of Systems   Denies any recent weight loss or weight gain, no lumps or bumps, no night sweats, no headaches except as mentioned above, no visual changes, has balance issues as mentioned in the history of present illness, no chest pain or shortness of breath on exertion, no difficulty swallowing, no nausea, vomiting, double diarrhea, abdominal pain.  Patient also denies any rashes or hives.    All other systems  reviewed and are negative.     Personal History     Past Medical History:   Diagnosis Date   • Arthritis    • Black lung disease (CMS/HCC)    • History of alcohol abuse    • Hyperlipidemia    • Hypertension        Past Surgical History:   Procedure Laterality Date   • BRONCHOSCOPY N/A 6/10/2019    Procedure: BRONCHOSCOPY WITH ENDOBRONCHIAL ULTRASOUND;  Surgeon: Bar Gage MD;  Location: [facility] ENDOSCOPY;  Service: Pulmonary   • BRONCHOSCOPY     • CATARACT EXTRACTION, BILATERAL     • LUNG BIOPSY     • NO PAST SURGERIES     • TEETH EXTRACTION     • WISDOM TOOTH EXTRACTION         Family History: family history includes COPD in his mother; Heart disease in his father. Otherwise pertinent FHx was reviewed and unremarkable.     Social History:  reports that he quit smoking about 10 years ago. His smoking use included cigarettes. He smoked 1.00 pack per day. He has never used smokeless tobacco. He reports that he does not drink alcohol or use drugs.  Social History     Social History Narrative   • Not on file       Medications:    Available home medication information reviewed.  Medications Prior to Admission   Medication Sig Dispense Refill Last Dose   • colchicine 0.6 MG tablet Take 0.6 mg by mouth Daily As Needed for Muscle / Joint Pain.      • lisinopril (PRINIVIL,ZESTRIL) 20 MG tablet Take 20 mg by mouth Daily.      • Multiple Vitamins-Minerals (MULTIVITAMIN PO) Take 1 tablet by mouth Daily.      • albuterol (ACCUNEB) 1.25 MG/3ML nebulizer solution Take 1 ampule by nebulization Every 6 (Six) Hours As Needed for Wheezing.   Taking   • aspirin 81 MG chewable tablet Chew 81 mg Daily.   Taking   • budesonide-formoterol (SYMBICORT) 160-4.5 MCG/ACT inhaler Inhale 2 puffs 2 (Two) Times a Day. 10.2 g 12 Taking   • simvastatin (ZOCOR) 20 MG tablet Take 1 tablet by mouth Every Night. 30 tablet 2 Taking       No Known Allergies    Objective   Objective     Vital Signs:   Temp:  [97.5 °F (36.4 °C)-98.1 °F (36.7  °C)] 98.1 °F (36.7 °C)  Heart Rate:  [61-73] 66  Resp:  [16-20] 18  BP: (143-158)/(67-96) 149/83        Physical Exam   Constitutional: Awake, alert  Eyes: PERRLA, sclerae anicteric, no conjunctival injection  HENT: NCAT, mucous membranes moist  Neck: Supple, no thyromegaly, no lymphadenopathy, trachea midline  Respiratory: Very harsh breath sounds bilaterally, scattered expiratory wheezing, nonlabored respirations   Cardiovascular: RRR, no murmurs, rubs, or gallops  Gastrointestinal: Positive bowel sounds, soft, nontender, slightly distended.  Musculoskeletal: No bilateral ankle edema, no clubbing or cyanosis to extremities  Psychiatric: Appropriate affect, cooperative  Neurologic: Oriented x 3, strength symmetric in all extremities, Cranial Nerves grossly intact to confrontation, speech clear  Skin: No rashes    Results Reviewed:  I have personally reviewed current lab and radiology data.    Results from last 7 days   Lab Units 11/05/19  0953   WBC 10*3/mm3 9.12   HEMOGLOBIN g/dL 12.9*   HEMATOCRIT % 37.1*   PLATELETS 10*3/mm3 174     Results from last 7 days   Lab Units 11/05/19  1217 11/05/19  1123 11/05/19  0953   SODIUM mmol/L  --   --  116*   POTASSIUM mmol/L  --   --  4.7   CHLORIDE mmol/L  --   --  81*   CO2 mmol/L  --   --  21.0*   BUN mg/dL  --   --  15   CREATININE mg/dL  --   --  0.94   GLUCOSE mg/dL  --   --  119*   CALCIUM mg/dL  --   --  9.8   ALT (SGPT) U/L  --   --  18   AST (SGOT) U/L  --   --  24   TROPONIN T ng/mL <0.010  --  <0.010   PROBNP pg/mL  --   --  98.3   LACTATE mmol/L  --  0.9  --      Estimated Creatinine Clearance: 85.1 mL/min (by C-G formula based on SCr of 0.94 mg/dL).  Brief Urine Lab Results  (Last result in the past 365 days)      Color   Clarity   Blood   Leuk Est   Nitrite   Protein   CREAT   Urine HCG        06/04/19 2330 Yellow Clear Negative Negative Negative Negative             Imaging Results (Last 24 Hours)     Procedure Component Value Units Date/Time    CT Head  Without Contrast [[id number]] Collected:  11/05/19 1456     Updated:  11/05/19 1458    Narrative:       EXAMINATION: CT HEAD WO CONTRAST-      INDICATION: unsteady gait; hx of lung cancer; E87.0-[id number] and  hyponatremia; Z85.118-Personal history of other malignant neoplasm of  bronchus and lung; I10-Essential (primary) hypertension; R91.8-Other  nonspecific abnormal finding of lung field     TECHNIQUE: Unenhanced CT imaging of the head was performed.     The radiation dose reduction device was turned on for each scan per the  ALARA (As Low as Reasonably Achievable) protocol.     COMPARISON: CT of the head 06/04/2019     FINDINGS: Unenhanced CT imaging of the head demonstrates no evidence of  midline shift or mass effect. No evidence of hydrocephalus. No  intracranial hemorrhage is identified. No extra-axial fluid collections  are identified. Gray-white matter junction appears maintained without  convincing CT evidence of transcortical infarct. The visualized  calvarium appears intact without evidence for acute osseous abnormality.  The paranasal sinuses are predominantly clear. No surrounding soft  tissue lesion identified. The globes and retro-orbital soft tissues are  unremarkable.             Impression:       No acute intracranial process.          CT Angiogram Chest With & Without Contrast [[id number]] Collected:  11/05/19 1213     Updated:  11/05/19 1448    Narrative:       EXAMINATION: CT ANGIOGRAM CHEST W WO CONTRAST-      INDICATION: Shortness of breath, increased cough; at risk for PE;  history of lung CA.     TECHNIQUE: CTA imaging of the chest utilizing pulmonary arterial  embolism protocol was performed. Additional coronal and sagittal MIP  reformatted imaging was provided for increased diagnostic accuracy     The radiation dose reduction device was turned on for each scan per the  ALARA (As Low as Reasonably Achievable) protocol.     COMPARISON: CT of the chest 08/21/2019, CT of the chest  06/04/2019.     FINDINGS: PULMONARY ARTERY: There is adequate contrast opacification in  the main pulmonary artery for evaluation. No main pulmonary arterial  filling defect is identified. There is no convincing evidence for  filling defect within the main pulmonary arterial vasculature to the  level of the proximal segmental pulmonary arteries to suggest pulmonary  arterial embolus. There has been, however, significant recurrent soft  tissue lesion within the right infrahilar and right lower lobe measuring  approximately 6.8 x 4.9 cm, although is irregular in shape making exact  measurement difficult. This mass does involve the right middle and right  lower lobe pulmonary arteries where there is significant narrowing and  likely occlusion of some of these arteries particularly involving the  distal segmental and subsegmental vessels where there is incomplete  opacification which may be secondary to underlying narrowing for mass  effect, although distal pulmonary emboli cannot be excluded within the  right lower lobe. There is a small right pleural effusion identified.  The visualized aorta is unremarkable without evidence for acute aortic  pathology as visualized. The heart is unremarkable for acute  abnormality. Coronary arteries demonstrate moderate calcification and  are incompletely evaluated secondary to lack of cardiac gating. Trace  nonspecific pericardial effusion identified. Scattered prominent and  enlarged mediastinal lymph nodes are identified particularly within the  right lower paratracheal region where there is a 9 mm prominent lymph  node and subcarinal lymph node complex measuring up to 1.7 cm.  Additional prominent right hilar lymph nodes are identified. Moderate to  severe centrilobular emphysema is identified throughout the lungs. No  pneumothorax identified. Thoracolumbar degenerative changes are  identified. No aggressive osseous features are appreciated.       Impression:       There has been  interval development of extensive  opacification of the right infrahilar and right lower lobe with  associated more peripheral groundglass and nodular airspace disease  which is concerning for recurrent mass within this region with  associated postobstructive pneumonitis. This mass demonstrates  circumferential involvement of the bronchus intermedius as well as  multiple right lower lobe bronchi and abutment of the right middle lobe  bronchus. This mass also abuts and significantly narrows multiple  segmental and subsegmental right lower lobe pulmonary arteries. There is  likely partial occlusion of some of these right lower lobe pulmonary  arteries which may relate to direct tumor invasion versus external  compression. Multiple right lower lobe segmental and subsegmental  pulmonary arteries demonstrate incomplete opacification and is favored  to relate to tumor invasion/compression with altered flow mechanics  rather than pulmonary arterial emboli, although small emboli within the  right lower lobe cannot be entirely excluded. There is no evidence of  pulmonary arterial embolus elsewhere within the main pulmonary artery or  chest.     D:  11/05/2019  E:  11/05/2019                         Assessment/Plan   Assessment / Plan     Active Hospital Problems    Diagnosis POA   • Hyponatremia [E87.1] Yes     Initial Sodium 115 on 6/4/19          Patient is a 69-year-old  male with past medical history of hypertension, hyperlipidemia, diagnosis of extensive stage small cell lung cancer with metastasis to the liver.  Chemotherapy was started on June 19, 2019.  Latest chemotherapy was Monday about a week ago and since then patient has been feeling poorly.  Patient is being admitted for increasing shortness of breath and cough, weakness, unsteady gait.  At the ER CT scan of the long shows progression of disease.  CT scan of the head is negative for any acute process.  Also lab is significant for hyponatremia with  sodium of 116.      PLAN:  -Admit the patient to telemetry.  -Start Zosyn empirically for possible postobstructive lower respiratory tract infection  -Oxygen and neb treatment  -Consult oncology  -Restrict oral fluid intake  -IV normal saline  -Espinoza labs and monitor sodium  -Heparin for DVT prophylaxis      DVT prophylaxis: Heparin    CODE STATUS:    Code Status and Medical Interventions:   Ordered at: 11/05/19 1523     Level Of Support Discussed With:    Patient     Code Status:    CPR     Medical Interventions (Level of Support Prior to Arrest):    Full     Comments:    patients wife is POA.       Admission Status: inpatient  Electronically signed by Justice Silva MD, 11/05/19, 3:30 PM.

## 2019-11-06 PROBLEM — R53.1 GENERAL WEAKNESS: Status: ACTIVE | Noted: 2019-01-01

## 2019-11-06 PROBLEM — J18.9 POSTOBSTRUCTIVE PNEUMONIA: Status: ACTIVE | Noted: 2019-01-01

## 2019-11-06 NOTE — PROGRESS NOTES
[facility] Medicine Services  PROGRESS NOTE    Patient Name: Florentin Mcallister  : 1950  MRN: [id number]    Date of Admission: 2019  Primary Care Physician: Bar Gage MD    Subjective   Subjective     CC:  Follow-up cough and shortness of breath     HPI:  Currently feels much better today.  Reports he is walking back to normal.  States he is not interested in inpatient physical therapy.  Breathing still short with some cough.  Awaiting radiation treatment when I evaluated him today.    Review of Systems  No current fevers or chills  No current nausea, vomiting, or diarrhea  No current chest pain or palpitations  Improving shortness of breath and cough      Objective   Objective     Vital Signs:   Temp:  [97.5 °F (36.4 °C)-98.1 °F (36.7 °C)] 97.8 °F (36.6 °C)  Heart Rate:  [60-84] 71  Resp:  [16-18] 16  BP: (148-170)/(67-83) 158/72        Physical Exam:  Constitutional:Awake, alert  HENT: NCAT, mucous membranes moist, neck supple  Respiratory: Some rhonchus and rales noted diffusely, moderate inspiration  Cardiovascular: Regular, normal radial pulses  Gastrointestinal: Positive bowel sounds, soft, nontender, nondistended  Musculoskeletal: Obesity normal musculature for age, no lower extremity edema, BMI 32  Psychiatric: Appropriate affect, cooperative, conversational  Neurologic: No slurred speech or facial droop, follows commands, not confused   Skin: No rashes or jaundice, warm      Results Reviewed:    Results from last 7 days   Lab Units 19  0811 19  0953   WBC 10*3/mm3 9.82 9.12   HEMOGLOBIN g/dL 11.6* 12.9*   HEMATOCRIT % 33.9* 37.1*   PLATELETS 10*3/mm3 159 174     Results from last 7 days   Lab Units 19  0811 19  1217 19  0953   SODIUM mmol/L 117*  --  116*   POTASSIUM mmol/L 5.0  --  4.7   CHLORIDE mmol/L 86*  --  81*   CO2 mmol/L 17.0*  --  21.0*   BUN mg/dL 19  --  15   CREATININE mg/dL 0.92  --  0.94   GLUCOSE mg/dL 152*   --  119*   CALCIUM mg/dL 8.8  --  9.8   ALT (SGPT) U/L  --   --  18   AST (SGOT) U/L  --   --  24   TROPONIN T ng/mL  --  <0.010 <0.010   PROBNP pg/mL  --   --  98.3     Estimated Creatinine Clearance: 87 mL/min (by C-G formula based on SCr of 0.92 mg/dL).    Microbiology Results Abnormal     Procedure Component Value - Date/Time    Blood Culture - Blood, Arm, Right [[id number]] Collected:  11/05/19 1115    Lab Status:  Preliminary result Specimen:  Blood from Arm, Right Updated:  11/06/19 1130     Blood Culture No growth at 24 hours    Blood Culture - Blood, Hand, Left [[id number]] Collected:  11/05/19 1120    Lab Status:  Preliminary result Specimen:  Blood from Hand, Left Updated:  11/06/19 1130     Blood Culture No growth at 24 hours    Influenza Antigen, Rapid - Swab, Nasopharynx [[id number]]  (Normal) Collected:  11/05/19 0954    Lab Status:  Final result Specimen:  Swab from Nasopharynx Updated:  11/05/19 1034     Influenza A Ag, EIA Negative     Influenza B Ag, EIA Negative          Imaging Results (Last 24 Hours)     Procedure Component Value Units Date/Time    CT Head Without Contrast [[id number]] Collected:  11/05/19 1456     Updated:  11/06/19 0956    Narrative:       EXAMINATION: CT HEAD WO CONTRAST-      INDICATION: Unsteady gait; history of lung cancer; E87.1-Chkk-uyxkpeyytl  and hyponatremia; Z85.118-Personal history of other malignant neoplasm  of bronchus and lung; I10-Essential (primary) hypertension; R91.8-Other  nonspecific abnormal finding of lung field.     TECHNIQUE: Unenhanced CT imaging of the head was performed.     The radiation dose reduction device was turned on for each scan per the  ALARA (As Low as Reasonably Achievable) protocol.     COMPARISON: CT of the head 06/04/2019.     FINDINGS: Unenhanced CT imaging of the head demonstrates no evidence of  midline shift or mass effect. No evidence of hydrocephalus. No  intracranial hemorrhage is identified. No extraaxial fluid collections  are  identified. Gray-white matter junction appears maintained without  convincing CT evidence of transcortical infarct. The visualized  calvarium appears intact without evidence for acute osseous abnormality.  The paranasal sinuses are predominantly clear. No surrounding soft  tissue lesion is  identified. The globes and retro-orbital soft tissues  are unremarkable.       Impression:       No acute intracranial process.     D:  11/05/2019  E:  11/06/2019     This report was finalized on 11/6/2019 9:53 AM by Dr. Erickson Astorga MD.       CT Angiogram Chest With & Without Contrast [[id number]] Collected:  11/05/19 1213     Updated:  11/06/19 0823    Narrative:       EXAMINATION: CT ANGIOGRAM CHEST W WO CONTRAST-      INDICATION: Shortness of breath, increased cough; at risk for PE;  history of lung CA.     TECHNIQUE: CTA imaging of the chest utilizing pulmonary arterial  embolism protocol was performed. Additional coronal and sagittal MIP  reformatted imaging was provided for increased diagnostic accuracy     The radiation dose reduction device was turned on for each scan per the  ALARA (As Low as Reasonably Achievable) protocol.     COMPARISON: CT of the chest 08/21/2019, CT of the chest 06/04/2019.     FINDINGS: PULMONARY ARTERY: There is adequate contrast opacification in  the main pulmonary artery for evaluation. No main pulmonary arterial  filling defect is identified. There is no convincing evidence for  filling defect within the main pulmonary arterial vasculature to the  level of the proximal segmental pulmonary arteries to suggest pulmonary  arterial embolus. There has been, however, significant recurrent soft  tissue lesion within the right infrahilar and right lower lobe measuring  approximately 6.8 x 4.9 cm, although is irregular in shape making exact  measurement difficult. This mass does involve the right middle and right  lower lobe pulmonary arteries where there is significant narrowing and  likely  occlusion of some of these arteries particularly involving the  distal segmental and subsegmental vessels where there is incomplete  opacification which may be secondary to underlying narrowing for mass  effect, although distal pulmonary emboli cannot be excluded within the  right lower lobe. There is a small right pleural effusion identified.  The visualized aorta is unremarkable without evidence for acute aortic  pathology as visualized. The heart is unremarkable for acute  abnormality. Coronary arteries demonstrate moderate calcification and  are incompletely evaluated secondary to lack of cardiac gating. Trace  nonspecific pericardial effusion identified. Scattered prominent and  enlarged mediastinal lymph nodes are identified particularly within the  right lower paratracheal region where there is a 9 mm prominent lymph  node and subcarinal lymph node complex measuring up to 1.7 cm.  Additional prominent right hilar lymph nodes are identified. Moderate to  severe centrilobular emphysema is identified throughout the lungs. No  pneumothorax identified. Thoracolumbar degenerative changes are  identified. No aggressive osseous features are appreciated.       Impression:       There has been interval development of extensive  opacification of the right infrahilar and right lower lobe with  associated more peripheral groundglass and nodular airspace disease  which is concerning for recurrent mass within this region with  associated postobstructive pneumonitis. This mass demonstrates  circumferential involvement of the bronchus intermedius as well as  multiple right lower lobe bronchi and abutment of the right middle lobe  bronchus. This mass also abuts and significantly narrows multiple  segmental and subsegmental right lower lobe pulmonary arteries. There is  likely partial occlusion of some of these right lower lobe pulmonary  arteries which may relate to direct tumor invasion versus external  compression. Multiple  right lower lobe segmental and subsegmental  pulmonary arteries demonstrate incomplete opacification and is favored  to relate to tumor invasion/compression with altered flow mechanics  rather than pulmonary arterial emboli, although small emboli within the  right lower lobe cannot be entirely excluded. There is no evidence of  pulmonary arterial embolus elsewhere within the main pulmonary artery or  chest.     D:  11/05/2019  E:  11/05/2019              This report was finalized on 11/6/2019 8:20 AM by Dr. Erickson Astorga MD.                  I have reviewed the medications:  Scheduled Meds:  aspirin 81 mg Oral Daily   atorvastatin 20 mg Oral Nightly   budesonide-formoterol 2 puff Inhalation BID - RT   heparin (porcine) 5,000 Units Subcutaneous Q8H   ipratropium-albuterol 3 mL Nebulization 4x Daily - RT   lisinopril 20 mg Oral Daily   methylPREDNISolone sodium succinate 125 mg Intravenous Daily   OCUVITE-LUTEIN 1 tablet Oral Daily   pantoprazole 40 mg Intravenous Q AM   piperacillin-tazobactam 3.375 g Intravenous Q8H   sodium chloride 10 mL Intravenous Q12H     Continuous Infusions:  sodium chloride 100 mL/hr Last Rate: 100 mL/hr (11/06/19 0813)     PRN Meds:.•  acetaminophen **OR** acetaminophen **OR** acetaminophen  •  colchicine  •  HYDROcodone-acetaminophen  •  magnesium sulfate **OR** magnesium sulfate **OR** magnesium sulfate  •  Morphine **AND** naloxone  •  ondansetron **OR** ondansetron  •  potassium chloride **OR** potassium chloride **OR** potassium chloride  •  potassium phosphate infusion greater than 15 mMoles **OR** potassium phosphate infusion greater than 15 mMoles **OR** potassium phosphate **OR** sodium phosphate IVPB **OR** sodium phosphate IVPB **OR** sodium phosphate IVPB  •  sodium chloride  •  sodium chloride      Assessment/Plan   Assessment / Plan     Active Hospital Problems    Diagnosis  POA   • Hyponatremia [E87.1]  Yes      Resolved Hospital Problems   No resolved problems to  display.        Brief Hospital Course to date:  Florentin Mcallister is a 69 y.o. male with past medical history of hypertension, hyperlipidemia, diagnosis of extensive stage small cell lung cancer with metastasis to the liver.  Chemotherapy was started on June 19, 2019.  Latest chemotherapy was Monday about a week ago and since then patient has been feeling poorly.  Patient is being admitted for increasing shortness of breath and cough, weakness, unsteady gait.  At the ER CT scan of the long shows progression of disease.  CT scan of the head is negative for any acute process.  Also lab is significant for hyponatremia with sodium of 116.    Hyponatremia:  Etiology felt to be lung malignancy.  Notably has substantial drop within the last 2 weeks.  Receiving IV fluid.  Fluid restriction.  Likely will need fluid restriction upon discharge.  Currently relatively euvolemic.    Postobstructive pneumonia:  Right lower lobe groundglass opacity seen on imaging and rhonchi and cough noted on examination.  Continue Zosyn.  Clinically improving with antibiotics.    Metastatic lung malignancy:  Oncology following.  Appreciate recommendation.  Radiation oncology following.  They have recommended inpatient radiation which is initiated.    Normocytic anemia: Reasonably stable.  No bleeding.  Monitor intermittently.    General weakness: Improved with fluids.  Patient declines offer for inpatient physical therapy.  Reports he is walking well.  Continue to monitor.    Additional plan/comments:  Pulmonology has added steroids.  Continue and monitor.  Sodium level is slightly improved from yesterday.  Continue current treatment.  Fluid rate adjusted.  Repeat laboratory studies tomorrow.  CT scan of the head images reviewed, no acute findings.  Lung images reviewed shows left lower lobe infiltrate.  Overall appears to be improving.    DVT Prophylaxis: Lovenox    Disposition: I expect the patient to be discharged likely home in  improved    CODE STATUS:   Code Status and Medical Interventions:   Ordered at: 11/05/19 1523     Level Of Support Discussed With:    Patient     Code Status:    CPR     Medical Interventions (Level of Support Prior to Arrest):    Full     Comments:    patients wife is POA.         Electronically signed by Mele Gandara MD, 11/06/19, 11:38 AM.

## 2019-11-06 NOTE — PROGRESS NOTES
Continued Stay Note   [facility]     Patient Name: Florentin Mcallister  MRN: [id number]  Today's Date: 11/6/2019    Admit Date: 11/5/2019    Discharge Plan     Row Name 11/06/19 1554       Plan    Plan    made a referral to the [facility], [phone] fax: [phone] for Mr. Mcallister and his spouse to stay at the [facility] for treatment.    Patient/Family in Agreement with Plan  yes        Discharge Codes    No documentation.       Expected Discharge Date and Time     Expected Discharge Date Expected Discharge Time    Nov 9, 2019             LUIS Bhatia

## 2019-11-06 NOTE — PROGRESS NOTES
Discharge Planning Assessment  [facility]     Patient Name: Florentin Mcallister  MRN: [id number]  Today's Date: 11/6/2019    Admit Date: 11/5/2019    Discharge Needs Assessment     Row Name 11/06/19 1012       Living Environment    Lives With  spouse    Name(s) of Who Lives With Patient  Flores Mcallister    Current Living Arrangements  home/apartment/condo    Primary Care Provided by  self;spouse/significant other    Provides Primary Care For  no one    Family Caregiver if Needed  spouse    Family Caregiver Names  Flores Mcallister    Quality of Family Relationships  helpful;involved;supportive    Able to Return to Prior Arrangements  yes       Resource/Environmental Concerns    Resource/Environmental Concerns  none       Transition Planning    Patient/Family Anticipates Transition to  home with family    Patient/Family Anticipated Services at Transition  none    Transportation Anticipated  family or friend will provide       Discharge Needs Assessment    Readmission Within the Last 30 Days  no previous admission in last 30 days    Concerns to be Addressed  no discharge needs identified;denies needs/concerns at this time    Equipment Currently Used at Home  cane, straight;nebulizer    Anticipated Changes Related to Illness  none    Equipment Needed After Discharge  none    Offered/Gave Vendor List  yes    Discharge Coordination/Progress  Home        Discharge Plan     Row Name 11/06/19 1013       Plan    Plan  Home    Patient/Family in Agreement with Plan  yes    Plan Comments  Spoke with patient and wife at bedside regarding discharge planning.  Patient denies use of HH and has a Straight Cane and Nebulizer at home.  Patient reports that he does not have prescription coverage but intend to sign up for  Rx coverage before close of enrollment this year.  Patient requesting information regarding the [facility] to stay at while undergoing radiation due to living so far away.  Patient lives with his wife in a single level  house with 3 steps to access and denies concerns regarding home safety and reports that he does not use his straight cane.  Discussed HH services for PT/OT however, patient and wife denies need for HH.  No discharge needs verbalized.  Called LUIS Joy, who will see patient regarding [facility] facilities.  CM following.  Patient plan is to discharge to [facility] if available to complete treatments and then go home via car with family to transport when medically ready.     Final Discharge Disposition Code  01 - home or self-care        Destination      No service coordination in this encounter.      Durable Medical Equipment      No service coordination in this encounter.      Dialysis/Infusion      No service coordination in this encounter.      Home Medical Care      No service coordination in this encounter.      Therapy      No service coordination in this encounter.      Community Resources      No service coordination in this encounter.        Expected Discharge Date and Time     Expected Discharge Date Expected Discharge Time    Nov 9, 2019         Demographic Summary     Row Name 11/06/19 1009       General Information    Admission Type  inpatient    Arrived From  home    Referral Source  admission list    Reason for Consult  discharge planning    Preferred Language  English     Used During This Interaction  no    General Information Comments  Sommer Smith PA-C        Functional Status     Row Name 11/06/19 1012       Functional Status    Usual Activity Tolerance  good    Current Activity Tolerance  moderate       Functional Status, IADL    Medications  independent;assistive person    Meal Preparation  independent    Housekeeping  independent    Laundry  independent    Shopping  independent       Employment/    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       Abuse/Neglect    No documentation.       Legal    No documentation.        Substance Abuse    No documentation.       Patient Forms    No documentation.           Carmen Tripp RN

## 2019-11-06 NOTE — PLAN OF CARE
Problem: Patient Care Overview  Goal: Plan of Care Review  Outcome: Ongoing (interventions implemented as appropriate)   11/06/19 0510   Coping/Psychosocial   Plan of Care Reviewed With patient   OTHER   Outcome Summary Pt c/o shortness of breath and wheezes present. Magnesium replaced per protocol, labs to be rechecked this AM. Room air, NS at 100 with PO fluid intake restricted to 1500ml. VSS, will continue to monitor.     Goal: Individualization and Mutuality  Outcome: Ongoing (interventions implemented as appropriate)    Goal: Discharge Needs Assessment  Outcome: Ongoing (interventions implemented as appropriate)    Goal: Interprofessional Rounds/Family Conf  Outcome: Ongoing (interventions implemented as appropriate)

## 2019-11-06 NOTE — PROGRESS NOTES
DATE OF VISIT: 11/6/2019    Chief Complaint: Followup for extensive stage small cell lung cancer     SUBJECTIVE: The patient has been doing fairly well.  He  denied any fever or  chills, no night sweats, denied any headaches    REVIEW OF SYSTEMS: All the other 9 systems are reviewed by me and negative  except what is mentioned in HPI.     PAST MEDICAL HISTORY/SOCIAL HISTORY/FAMILY HISTORY: Unchanged from my prior documentation done on November 5, 2019      Current Facility-Administered Medications:   •  acetaminophen (TYLENOL) tablet 650 mg, 650 mg, Oral, Q4H PRN **OR** acetaminophen (TYLENOL) 160 MG/5ML solution 650 mg, 650 mg, Oral, Q4H PRN **OR** acetaminophen (TYLENOL) suppository 650 mg, 650 mg, Rectal, Q4H PRN, Justice Silva MD  •  aspirin chewable tablet 81 mg, 81 mg, Oral, Daily, Justice Silva MD, 81 mg at 11/06/19 0923  •  atorvastatin (LIPITOR) tablet 20 mg, 20 mg, Oral, Nightly, Justice Silva MD, 20 mg at 11/05/19 2018  •  budesonide-formoterol (SYMBICORT) 160-4.5 MCG/ACT inhaler 2 puff, 2 puff, Inhalation, BID - RT, Justice Silva MD, 2 puff at 11/06/19 0902  •  colchicine tablet 0.6 mg, 0.6 mg, Oral, Daily PRN, Justice Silva MD  •  enoxaparin (LOVENOX) syringe 40 mg, 40 mg, Subcutaneous, Daily, Mele Gandara MD  •  HYDROcodone-acetaminophen (NORCO) 7.5-325 MG per tablet 1 tablet, 1 tablet, Oral, Q4H PRN, Justice Silva MD  •  ipratropium-albuterol (DUO-NEB) nebulizer solution 3 mL, 3 mL, Nebulization, 4x Daily - RT, Justice Silva MD, 3 mL at 11/06/19 0902  •  lisinopril (PRINIVIL,ZESTRIL) tablet 20 mg, 20 mg, Oral, Daily, Justice Silva MD, 20 mg at 11/06/19 0923  •  Magnesium Sulfate 2 gram Bolus, followed by 8 gram infusion (total Mg dose 10 grams)- Mg less than or equal to 1mg/dL, 2 g, Intravenous, PRN **OR** Magnesium Sulfate 2 gram / 50mL Infusion (GIVE X 3 BAGS TO EQUAL 6GM TOTAL DOSE) - Mg 1.1 - 1.5 mg/dl, 2 g, Intravenous, PRN, Last Rate: 25 mL/hr at  11/06/19 0010, 2 g at 11/06/19 0010 **OR** Magnesium Sulfate 4 gram infusion- Mg 1.6-1.9 mg/dL, 4 g, Intravenous, PRN, Justice Silva MD  •  methylPREDNISolone sodium succinate (SOLU-Medrol) injection 125 mg, 125 mg, Intravenous, Daily, Ulysses Argueta MD, 125 mg at 11/06/19 0923  •  morphine injection 1 mg, 1 mg, Intravenous, Q4H PRN **AND** naloxone (NARCAN) injection 0.4 mg, 0.4 mg, Intravenous, Q5 Min PRN, Justice Silva MD  •  OCUVITE-LUTEIN (OCUVITE) tablet 1 tablet, 1 tablet, Oral, Daily, Justice Silva MD, 1 tablet at 11/06/19 0923  •  ondansetron (ZOFRAN) tablet 4 mg, 4 mg, Oral, Q6H PRN **OR** ondansetron (ZOFRAN) injection 4 mg, 4 mg, Intravenous, Q6H PRN, Justice Silva MD  •  pantoprazole (PROTONIX) injection 40 mg, 40 mg, Intravenous, Q AM, Ulysses Argueta MD, 40 mg at 11/06/19 0642  •  piperacillin-tazobactam (ZOSYN) 3.375 g in iso-osmotic dextrose 50 ml (premix), 3.375 g, Intravenous, Q8H, Justice Silva MD, 3.375 g at 11/06/19 0807  •  potassium chloride (MICRO-K) CR capsule 40 mEq, 40 mEq, Oral, PRN **OR** potassium chloride (KLOR-CON) packet 40 mEq, 40 mEq, Oral, PRN **OR** potassium chloride 10 mEq in 100 mL IVPB, 10 mEq, Intravenous, Q1H PRN, Justice Silva MD  •  potassium phosphate 45 mmol in sodium chloride 0.9 % 500 mL infusion, 45 mmol, Intravenous, PRN **OR** potassium phosphate 30 mmol in sodium chloride 0.9 % 250 mL infusion, 30 mmol, Intravenous, PRN **OR** potassium phosphate 15 mmol in sodium chloride 0.9 % 100 mL infusion, 15 mmol, Intravenous, PRN **OR** sodium phosphates 45 mmol in sodium chloride 0.9 % 250 mL IVPB, 45 mmol, Intravenous, PRN **OR** sodium phosphates 30 mmol in sodium chloride 0.9 % 250 mL IVPB, 30 mmol, Intravenous, PRN **OR** sodium phosphates 15 mmol in sodium chloride 0.9 % 250 mL IVPB, 15 mmol, Intravenous, PRN, Justice Silva MD  •  sodium chloride 0.9 % flush 10 mL, 10 mL, Intravenous, PRN, Emergency, Triage Protocol, MD  •  sodium  "chloride 0.9 % flush 10 mL, 10 mL, Intravenous, Q12H, Justice Silva MD  •  sodium chloride 0.9 % flush 10 mL, 10 mL, Intravenous, PRN, Justice Silva MD  •  sodium chloride 0.9 % infusion, 75 mL/hr, Intravenous, Continuous, Mele Gandara MD, Last Rate: 100 mL/hr at 11/06/19 0813, 100 mL/hr at 11/06/19 0813    PHYSICAL EXAMINATION:   /72   Pulse 71   Temp 97.8 °F (36.6 °C) (Oral)   Resp 16   Ht 172.7 cm (68\")   Wt 97.5 kg (215 lb)   SpO2 97%   BMI 32.69 kg/m²    ECOG Performance Status: 1 - Symptomatic but completely ambulatory  GENERAL: Age appropriate. No acute distress.   NEURO/PSYCH: A&O x 3, strength 5/5 in all muscle groups  HEENT: Head atraumatic, normocephalic.   NECK: Supple. No JVD. No lymphadenopathy.   LUNGS: Clear to auscultation bilaterally. No wheezing. No rhonchi.   HEART: Regular rate and rhythm. S1, S2, no murmurs.   ABDOMEN: Soft, nontender, nondistended. Bowel sounds positive. No  hepatosplenomegaly.   EXTREMITIES: No clubbing, cyanosis, or edema.   SKIN: No rashes. No purpura.       Admission on 11/05/2019   Component Date Value Ref Range Status   • Extra Tube 11/05/2019 hold for add-on   Final    Auto resulted   • Extra Tube 11/05/2019 Hold for add-ons.   Final    Auto resulted.   • Extra Tube 11/05/2019 hold for add-on   Final    Auto resulted   • Extra Tube 11/05/2019 Hold for add-ons.   Final    Auto resulted.   • Influenza A Ag, EIA 11/05/2019 Negative  Negative Final   • Influenza B Ag, EIA 11/05/2019 Negative  Negative Final   • Troponin T 11/05/2019 <0.010  0.000 - 0.030 ng/mL Final   • Glucose 11/05/2019 119* 65 - 99 mg/dL Final   • BUN 11/05/2019 15  8 - 23 mg/dL Final   • Creatinine 11/05/2019 0.94  0.76 - 1.27 mg/dL Final   • Sodium 11/05/2019 116* 136 - 145 mmol/L Final   • Potassium 11/05/2019 4.7  3.5 - 5.2 mmol/L Final   • Chloride 11/05/2019 81* 98 - 107 mmol/L Final   • CO2 11/05/2019 21.0* 22.0 - 29.0 mmol/L Final   • Calcium 11/05/2019 9.8  8.6 - " 10.5 mg/dL Final   • Total Protein 11/05/2019 7.9  6.0 - 8.5 g/dL Final   • Albumin 11/05/2019 4.50  3.50 - 5.20 g/dL Final   • ALT (SGPT) 11/05/2019 18  1 - 41 U/L Final   • AST (SGOT) 11/05/2019 24  1 - 40 U/L Final   • Alkaline Phosphatase 11/05/2019 79  39 - 117 U/L Final   • Total Bilirubin 11/05/2019 0.7  0.2 - 1.2 mg/dL Final   • eGFR Non African Amer 11/05/2019 80  >60 mL/min/1.73 Final   • Globulin 11/05/2019 3.4  gm/dL Final   • A/G Ratio 11/05/2019 1.3  g/dL Final   • BUN/Creatinine Ratio 11/05/2019 16.0  7.0 - 25.0 Final   • Anion Gap 11/05/2019 14.0  5.0 - 15.0 mmol/L Final   • proBNP 11/05/2019 98.3  5.0 - 900.0 pg/mL Final   • Creatine Kinase 11/05/2019 318* 20 - 200 U/L Final   • Magnesium 11/05/2019 1.5* 1.6 - 2.4 mg/dL Final   • Blood Culture 11/05/2019 No growth at 24 hours   Preliminary   • Blood Culture 11/05/2019 No growth at 24 hours   Preliminary   • Lactate 11/05/2019 0.9  0.5 - 2.0 mmol/L Final    Falsely depressed results may occur on samples drawn from patients receiving N-Acetylcysteine (NAC) or Metamizole.   • WBC 11/05/2019 9.12  3.40 - 10.80 10*3/mm3 Final   • RBC 11/05/2019 4.19  4.14 - 5.80 10*6/mm3 Final   • Hemoglobin 11/05/2019 12.9* 13.0 - 17.7 g/dL Final   • Hematocrit 11/05/2019 37.1* 37.5 - 51.0 % Final   • MCV 11/05/2019 88.5  79.0 - 97.0 fL Final   • MCH 11/05/2019 30.8  26.6 - 33.0 pg Final   • MCHC 11/05/2019 34.8  31.5 - 35.7 g/dL Final   • RDW 11/05/2019 12.9  12.3 - 15.4 % Final   • RDW-SD 11/05/2019 41.6  37.0 - 54.0 fl Final   • MPV 11/05/2019 9.7  6.0 - 12.0 fL Final   • Platelets 11/05/2019 174  140 - 450 10*3/mm3 Final   • Neutrophil % 11/05/2019 71.9  42.7 - 76.0 % Final   • Lymphocyte % 11/05/2019 16.7* 19.6 - 45.3 % Final   • Monocyte % 11/05/2019 10.7  5.0 - 12.0 % Final   • Eosinophil % 11/05/2019 0.2* 0.3 - 6.2 % Final   • Basophil % 11/05/2019 0.2  0.0 - 1.5 % Final   • Immature Grans % 11/05/2019 0.3  0.0 - 0.5 % Final   • Neutrophils, Absolute 11/05/2019  6.55  1.70 - 7.00 10*3/mm3 Final   • Lymphocytes, Absolute 11/05/2019 1.52  0.70 - 3.10 10*3/mm3 Final   • Monocytes, Absolute 11/05/2019 0.98* 0.10 - 0.90 10*3/mm3 Final   • Eosinophils, Absolute 11/05/2019 0.02  0.00 - 0.40 10*3/mm3 Final   • Basophils, Absolute 11/05/2019 0.02  0.00 - 0.20 10*3/mm3 Final   • Immature Grans, Absolute 11/05/2019 0.03  0.00 - 0.05 10*3/mm3 Final   • nRBC 11/05/2019 0.0  0.0 - 0.2 /100 WBC Final   • Troponin T 11/05/2019 <0.010  0.000 - 0.030 ng/mL Final   • Glucose 11/06/2019 152* 65 - 99 mg/dL Final   • BUN 11/06/2019 19  8 - 23 mg/dL Final   • Creatinine 11/06/2019 0.92  0.76 - 1.27 mg/dL Final   • Sodium 11/06/2019 117* 136 - 145 mmol/L Final   • Potassium 11/06/2019 5.0  3.5 - 5.2 mmol/L Final   • Chloride 11/06/2019 86* 98 - 107 mmol/L Final   • CO2 11/06/2019 17.0* 22.0 - 29.0 mmol/L Final   • Calcium 11/06/2019 8.8  8.6 - 10.5 mg/dL Final   • eGFR Non African Amer 11/06/2019 82  >60 mL/min/1.73 Final   • BUN/Creatinine Ratio 11/06/2019 20.7  7.0 - 25.0 Final   • Anion Gap 11/06/2019 14.0  5.0 - 15.0 mmol/L Final   • WBC 11/06/2019 9.82  3.40 - 10.80 10*3/mm3 Final   • RBC 11/06/2019 3.73* 4.14 - 5.80 10*6/mm3 Final   • Hemoglobin 11/06/2019 11.6* 13.0 - 17.7 g/dL Final   • Hematocrit 11/06/2019 33.9* 37.5 - 51.0 % Final   • MCV 11/06/2019 90.9  79.0 - 97.0 fL Final   • MCH 11/06/2019 31.1  26.6 - 33.0 pg Final   • MCHC 11/06/2019 34.2  31.5 - 35.7 g/dL Final   • RDW 11/06/2019 12.8  12.3 - 15.4 % Final   • RDW-SD 11/06/2019 42.6  37.0 - 54.0 fl Final   • MPV 11/06/2019 9.6  6.0 - 12.0 fL Final   • Platelets 11/06/2019 159  140 - 450 10*3/mm3 Final   • Neutrophil % 11/06/2019 85.4* 42.7 - 76.0 % Final   • Lymphocyte % 11/06/2019 7.9* 19.6 - 45.3 % Final   • Monocyte % 11/06/2019 6.2  5.0 - 12.0 % Final   • Eosinophil % 11/06/2019 0.0* 0.3 - 6.2 % Final   • Basophil % 11/06/2019 0.0  0.0 - 1.5 % Final   • Immature Grans % 11/06/2019 0.5  0.0 - 0.5 % Final   • Neutrophils,  Absolute 11/06/2019 8.38* 1.70 - 7.00 10*3/mm3 Final   • Lymphocytes, Absolute 11/06/2019 0.78  0.70 - 3.10 10*3/mm3 Final   • Monocytes, Absolute 11/06/2019 0.61  0.10 - 0.90 10*3/mm3 Final   • Eosinophils, Absolute 11/06/2019 0.00  0.00 - 0.40 10*3/mm3 Final   • Basophils, Absolute 11/06/2019 0.00  0.00 - 0.20 10*3/mm3 Final   • Immature Grans, Absolute 11/06/2019 0.05  0.00 - 0.05 10*3/mm3 Final   • nRBC 11/06/2019 0.0  0.0 - 0.2 /100 WBC Final   • Phosphorus 11/06/2019 2.6  2.5 - 4.5 mg/dL Final   • Magnesium 11/06/2019 2.5* 1.6 - 2.4 mg/dL Final       No results found.    ASSESSMENT: The patient is a very pleasant 69 y.o. male  with progressive small cell lung cancer      PLAN:  1.  Dr. Vaz to see today to initiate palliative radiation right hilar mass hopefully this will help his symptoms, clear the pneumonia quicker, and his hyponatremia.  2.  I will set the patient up to follow-up with me as an outpatient with a second line treatment using Opdivo plus Yervoy.  3.  Continue IV antibiotics as well as IV steroids  4.  Monitor sodium level.      Ulysses Argueta MD  11/6/2019

## 2019-11-06 NOTE — CONSULTS
CONSULTATION NOTE    NAME:      Florentin Mcallister  :                                                          1950  DATE OF CONSULTATION:                       19  REQUESTING PHYSICIAN:                   Ulysses Argueta MD  REASON FOR CONSULTATION:           Palliative radiotherapy to a right small cell carcinoma lung mass     BRIEF HISTORY:  Florentin Mcallister  is a very pleasant 69 y.o. male  he was been under treatment for extensive stage small cell carcinoma lung.  He initially presented with lung disease and liver metastases.  He has recently developed increased shortness of breath, cough, yellow sputum and presented to the [facility] emergency department.  CT angios revealed no PE but he had significant recurrent soft tissue lesion in the right infrahilar and right lower lobe measuring 6.8 x 4.9 cm.  There was significant narrowing of the right middle and right lower lobe pulmonary arteries.  He was noted to have postobstructive pneumonitis.  CT of the head without contrast was negative for metastatic disease.        Social History     Tobacco Use   • Smoking status: Former Smoker     Packs/day: 1.00     Types: Cigarettes     Last attempt to quit: 2009     Years since quitting: 10.8   • Smokeless tobacco: Never Used   • Tobacco comment: smoked 1ppd x 15 years; quit in    Substance Use Topics   • Alcohol use: No     Frequency: Never     Comment: hx of abuse; drank 2-3 beers and 2-3 mixed drinks about 3/4 x/week; stopped 1 month ago (had tapered drinking over past year)    • Drug use: No         Past Medical History:   Diagnosis Date   • Arthritis    • Black lung disease (CMS/HCC)    • History of alcohol abuse    • Hyperlipidemia    • Hypertension        family history includes COPD in his mother; Heart disease in his father.     Past Surgical History:   Procedure Laterality Date   • BRONCHOSCOPY N/A 6/10/2019    Procedure: BRONCHOSCOPY WITH ENDOBRONCHIAL ULTRASOUND;  Surgeon:  Bar Gage MD;  Location: [facility];  Service: Pulmonary   • BRONCHOSCOPY     • CATARACT EXTRACTION, BILATERAL     • LUNG BIOPSY     • NO PAST SURGERIES     • TEETH EXTRACTION     • WISDOM TOOTH EXTRACTION          Review of Systems - Oncology        Objective   VITAL SIGNS:   Vitals:    11/05/19 2332 11/06/19 0410 11/06/19 0642 11/06/19 0810   BP: 148/75 150/67 170/75 158/72   BP Location: Right arm Right arm Right arm    Patient Position: Lying Lying Lying    Pulse: 66 60 70 67   Resp: 18 18 16    Temp: 97.9 °F (36.6 °C) 97.5 °F (36.4 °C) 97.8 °F (36.6 °C)    TempSrc: Oral Oral Oral    SpO2:   96% 97%   Weight:       Height:            KPS       80%    Physical Exam   Constitutional: He appears well-developed and well-nourished.   Cardiovascular: Normal rate, regular rhythm and normal heart sounds.   Pulmonary/Chest: Effort normal. He has wheezes.   Nursing note and vitals reviewed.           The following portions of the patient's history were reviewed and updated as appropriate: allergies, current medications, past family history, past medical history, past social history, past surgical history and problem list.    Assessment      IMPRESSION:   Florentin Mcallister  is a  69 y.o. male  he was been under treatment for extensive stage small cell carcinoma lung.  He initially presented with lung disease and liver metastases.  He has recently developed increased shortness of breath, cough, yellow sputum and presented to the [facility] emergency department.  CT angios revealed no PE but he had significant recurrent soft tissue lesion in the right infrahilar and right lower lobe measuring 6.8 x 4.9 cm.  There was significant narrowing of the right middle and right lower lobe pulmonary arteries.  He was noted to have postobstructive pneumonitis.  CT of the head without contrast was negative for metastatic disease.    RECOMMENDATIONS: I recommend palliative radiotherapy to try to increase  aeration.  The pros and cons, risks and benefits were discussed with Mr. Mcallister and his wife and informed consent obtained.  He [place] half hours away from [place] so I anticipate 20 Gray in 5 fractions.  Thank you very much for asking me to see Mr. Mcallister.  We will set up his treatments today.         Janel Vaz MD      Errors in dictation may reflect use of voice recognition software and not all errors in transcription may have been detected prior to signing.

## 2019-11-07 NOTE — PROGRESS NOTES
[facility]  PROGRESS NOTE    Patient Name: Florentin Mcallister  : 1950  MRN: [id number]    Date of Admission: 2019  Primary Care Physician: Bar Gage MD    Subjective   Subjective   CC:  Follow-up cough and shortness of breath     HPI:  Patient sitting up in bed in NAD.  Wife is in the room.  Patient has not had BM in several days and feels distended, otherwise patient states he is feeling much better.  Patient asking about going home over the weekend after his radiation treatments are done this week explained that his sodium is still too low to let him go home at this time. Already had radiation treatment today.    Review of Systems  Gen- No fevers, chills  CV- No chest pain, palpitations  Resp- No cough, dyspnea  GI- No N/V/D, abd pain; +constipation and distension  All 14 systems were reviewed and the pertinent positives and negatives will be listed above in the ROS in HPI    Objective   Objective   Vital Signs:   Temp:  [96 °F (35.6 °C)-98.6 °F (37 °C)] 96 °F (35.6 °C)  Heart Rate:  [67-75] 67  Resp:  [16-18] 16  BP: (146-174)/(63-84) 174/84  Physical Exam:  Constitutional: Alert, WD, WN male in NAD  Eyes: PERRLA, EOMI, sclerae anicteric, no conjunctival injection  Head: NCAT  ENT: [place], moist mucous membranes   Neck: Supple, non-tender, no thyromegaly, no lymphadenopathy, trachea midline  Respiratory: Non-labored, symmetrical chest expansion, scattered wheezing bilaterally and diminished in RLL  Cardiovascular: RRR, no M/R/G, +DP pulses bilaterally  Gastrointestinal: Soft, NT, distended, +hypoactive BS  Musculoskeletal: VALENCIA; no LE edema bilaterally  Neurologic: Oriented x4, strength symmetric in all extremities, follows all commands,  speech clear  Skin: No rashes on exposed skin  Psychiatric: Pleasant and cooperative; normal affect    Results Reviewed:  Results from last 7 days   Lab Units 19  0553 19  0811 19  0953   WBC  10*3/mm3 11.44* 9.82 9.12   HEMOGLOBIN g/dL 10.9* 11.6* 12.9*   HEMATOCRIT % 32.0* 33.9* 37.1*   PLATELETS 10*3/mm3 169 159 174     Results from last 7 days   Lab Units 11/07/19  0553 11/06/19  0811 11/05/19  1217 11/05/19  0953   SODIUM mmol/L 118* 117*  --  116*   POTASSIUM mmol/L 4.9 5.0  --  4.7   CHLORIDE mmol/L 85* 86*  --  81*   CO2 mmol/L 20.0* 17.0*  --  21.0*   BUN mg/dL 19 19  --  15   CREATININE mg/dL 0.93 0.92  --  0.94   GLUCOSE mg/dL 131* 152*  --  119*   CALCIUM mg/dL 8.6 8.8  --  9.8   ALT (SGPT) U/L  --   --   --  18   AST (SGOT) U/L  --   --   --  24   TROPONIN T ng/mL  --   --  <0.010 <0.010   PROBNP pg/mL  --   --   --  98.3     Estimated Creatinine Clearance: 86 mL/min (by C-G formula based on SCr of 0.93 mg/dL).    Microbiology Results Abnormal     Procedure Component Value - Date/Time    Blood Culture - Blood, Arm, Right [[id number]] Collected:  11/05/19 1115    Lab Status:  Preliminary result Specimen:  Blood from Arm, Right Updated:  11/06/19 1130     Blood Culture No growth at 24 hours    Blood Culture - Blood, Hand, Left [[id number]] Collected:  11/05/19 1120    Lab Status:  Preliminary result Specimen:  Blood from Hand, Left Updated:  11/06/19 1130     Blood Culture No growth at 24 hours    Influenza Antigen, Rapid - Swab, Nasopharynx [[id number]]  (Normal) Collected:  11/05/19 0954    Lab Status:  Final result Specimen:  Swab from Nasopharynx Updated:  11/05/19 1034     Influenza A Ag, EIA Negative     Influenza B Ag, EIA Negative        Imaging Results (Last 24 Hours)     ** No results found for the last 24 hours. **        I have reviewed the medications:  Scheduled Meds:    aspirin 81 mg Oral Daily   atorvastatin 20 mg Oral Nightly   budesonide-formoterol 2 puff Inhalation BID - RT   enoxaparin 40 mg Subcutaneous Daily   ipratropium-albuterol 3 mL Nebulization 4x Daily - RT   lisinopril 20 mg Oral Daily   methylPREDNISolone sodium succinate 125 mg Intravenous Daily   OCUVITE-LUTEIN 1  tablet Oral Daily   pantoprazole 40 mg Intravenous Q AM   piperacillin-tazobactam 3.375 g Intravenous Q8H   sodium chloride 10 mL Intravenous Q12H     Continuous Infusions:    sodium chloride 75 mL/hr Last Rate: 75 mL/hr (11/06/19 1500)     PRN Meds:.•  acetaminophen **OR** acetaminophen **OR** acetaminophen  •  colchicine  •  HYDROcodone-acetaminophen  •  lactulose  •  magnesium sulfate **OR** magnesium sulfate **OR** magnesium sulfate  •  Morphine **AND** naloxone  •  ondansetron **OR** ondansetron  •  potassium chloride **OR** potassium chloride **OR** potassium chloride  •  potassium phosphate infusion greater than 15 mMoles **OR** potassium phosphate infusion greater than 15 mMoles **OR** potassium phosphate **OR** sodium phosphate IVPB **OR** sodium phosphate IVPB **OR** sodium phosphate IVPB  •  sodium chloride  •  sodium chloride    Assessment/Plan   Assessment / Plan     Active Hospital Problems    Diagnosis  POA   • Postobstructive pneumonia related to malignancy [J18.9]  Yes   • General weakness [R53.1]  Yes   • Hyponatremia [E87.1]  Yes   • HTN (hypertension) [I10]  Yes   • HLD (hyperlipidemia) [E78.5]  Yes   • Anemia [D64.9]  Yes   • Pneumonia of right lower lobe due to infectious organism (CMS/HCC) [J18.1]  Yes      Resolved Hospital Problems   No resolved problems to display.     Brief Hospital Course to date:  Florentin Mcallsiter is a 69 y.o. male with past medical history of hypertension, hyperlipidemia, diagnosis of extensive stage small cell lung cancer with metastasis to the liver.  Chemotherapy was started on June 19, 2019.  Latest chemotherapy was Monday about a week ago and since then patient has been feeling poorly.  Patient is being admitted for increasing shortness of breath and cough, weakness, unsteady gait.  At the ER CT scan of the long shows progression of disease.  CT scan of the head is negative for any acute process.  Also lab was significant for hyponatremia with sodium of  116.    This is the first day this patient has been seen by me.    Hyponatremia  --Na 118 up from 117  --Etiology felt to be lung malignancy.  --Notably has substantial drop within the last 2 weeks; 132-136 in October  --Receiving IV fluid  --Fluid restriction; Will likely need fluid restriction upon discharge.  --Currently relatively euvolemic  --Nephrology consult today; has followed before and was given Samsca  --AM BMP    Postobstructive pneumonia  --clinically improving  --Extensive increase in right infrahilar and right lower lobe groundglass opacity seen on imaging   --Continue IV Zosyn through 11/10/2019     Metastatic lung malignancy  --Oncology following; Dr Argueta to set up OP appointment to start Opdivo plus Yervoy  --Radiation oncology following; recommended palliative radiation to increase aeration; inpatient radiation started 11/6    Normocytic anemia   --Hgb 10.9 down from 11.6  --Reasonably stable  --No active bleeding    --AM CBC    General weakness  --Improved with fluids.    --Patient declines offer for inpatient physical therapy.    --Reports he is walking well.    --Continue to monitor.    DVT Prophylaxis: Lovenox    Disposition: I expect the patient to be discharged likely home in improved    CODE STATUS:   Code Status and Medical Interventions:   Ordered at: 11/05/19 1523     Level Of Support Discussed With:    Patient     Code Status:    CPR     Medical Interventions (Level of Support Prior to Arrest):    Full     Comments:    patients wife is POA.         Electronically signed by EARNEST Izaguirre Student, 11/07/19, 10:38 AM.

## 2019-11-07 NOTE — PLAN OF CARE
Problem: Chemotherapy Effects (Adult)  Goal: Signs and Symptoms of Listed Potential Problems Will be Absent, Minimized or Managed (Chemotherapy Effects)  Outcome: Ongoing (interventions implemented as appropriate)   11/07/19 0132   Goal/Outcome Evaluation   Problems Assessed (Chemotherapy Effects) all   Problems Present (Chemotherapy Effects) constipation;fatigue

## 2019-11-07 NOTE — PROGRESS NOTES
Continued Stay Note  [facility]     Patient Name: Florentin Mcallister  MRN: [id number]  Today's Date: 11/7/2019    Admit Date: 11/5/2019    Discharge Plan     Row Name 11/07/19 1413       Plan    Plan  update    Patient/Family in Agreement with Plan  yes    Plan Comments  Spoke with patient and wife at bedside regading discharge plan who are not sure when he will be discharged and indicates that they were seen yesterday by  regarding referral to [facility].  No discharge needs verbalized.   Plan is to discharge to the [facility] if accepted via car with family to transport pending improvement and further workup.      Final Discharge Disposition Code  01 - home or self-care        Discharge Codes    No documentation.       Expected Discharge Date and Time     Expected Discharge Date Expected Discharge Time    Nov 9, 2019             Carmen Tripp RN

## 2019-11-07 NOTE — SIGNIFICANT NOTE
Patients wife informed me she gave patient two ibuprofen a few minutes ago for back pain...educated wife about why you dont do that.   Cerebral Infarct

## 2019-11-07 NOTE — PLAN OF CARE
Problem: Patient Care Overview  Goal: Plan of Care Review  Outcome: Ongoing (interventions implemented as appropriate)   11/07/19 5460   Coping/Psychosocial   Plan of Care Reviewed With patient;spouse;daughter   OTHER   Outcome Summary Pt continues to have SOA and rhonci in right lung field. Pt had radiation today. Hyponatremia still present. Will collect urine Na+, K+ and osmoality. Pt complained of bowel distension and constipation. Took lactulose, but not helping. Pt ambulated around unit with spouse. VSS during shift.

## 2019-11-07 NOTE — NURSING NOTE
VSS. Patient has rested well overnight. Patient complained of back pain earlier in my shift and wife gave patient ibuprofen. Nurse educated patient and wife on dangers of giving medications without our knowledge. Patient and wife expressed understanding. Nurse gave patient pain pill and patient stated his pain was much better.

## 2019-11-07 NOTE — CONSULTS
Brief consultation note    Full not to following    Reason of consultation   Hyponatremia       I was asked to see Mr Ary rowe who is admitted in the hospital w gait instability, unsteadiness, confusion. Noted to have Hyponatremia Na 117-118, K 5.0 Cr 0.92. 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 Currently on Check point inh therapy.       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 PD-1 inhibitor can cause autoimmune adrenalitis. Will check cortisol and TSH levels as well.   Continue free water restriction 1.2 liter  Check Urine Na, K, Cr and chlorde daily  BMP q 6hr  If Na drops and patient develops AMS. Will need 3% saline  Goal Na correction 6 points in 24hr.   Encourage good po intake

## 2019-11-07 NOTE — PLAN OF CARE
Problem: Patient Care Overview  Goal: Plan of Care Review  Outcome: Ongoing (interventions implemented as appropriate)   11/07/19 0132   Coping/Psychosocial   Plan of Care Reviewed With patient   Plan of Care Review   Progress no change

## 2019-11-08 NOTE — CONSULTS
Patient Care Team:  Bar Gage MD as PCP - General (Pulmonary Disease)    Chief complaint: Hyponatremia    Subjective     Mr Mcallister is a  68 yo gentleman with past medical hx of recurrent hyponatremia, recurrent small cell lung cancer w mets to the liver currently on immune check point inhibitor. He is admitted at the hospital with worsening respiratory symptoms w cough, sputum production, weakness, gait instability. On admission note to have Na 117. Nephrology consulted for the management of hyponatremia. 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 Previously it was treated with few doses of ADH antagonist. Per the patient he has been feeling generally well prior to recent illness, no n/v or diarrhea. Appetite has been relatively ok. He was recently started on immune check point inhibitor therapy for recurrence of small cell lung cancer.   During the hospital stay Na has improved from 117>118>120. Urine lytes Na 99, K 33, Urine osm 580, Serum osm 259. Uric acid 3.1. CT chest showed worsening of ground glass opacity and possibility of worsening underlying disease. Heme/onc and radiation oncology following plan for palliative radiation.         Review of Systems   Constitutional: Positive for fatigue. Negative for activity change, appetite change and fever.   HENT: Negative.    Respiratory: Positive for cough. Negative for choking, chest tightness and shortness of breath.    Cardiovascular: Negative for chest pain, palpitations and leg swelling.   Gastrointestinal: Negative for abdominal distention, abdominal pain, blood in stool, constipation, nausea and vomiting.   Genitourinary: Negative.    Musculoskeletal: Negative.    Skin: Negative.    Neurological: Negative.         Past Medical History:   Diagnosis Date   • Arthritis    • Black lung disease (CMS/HCC)    • History of alcohol abuse    • Hyperlipidemia    • Hypertension    ,   Past Surgical  History:   Procedure Laterality Date   • BRONCHOSCOPY N/A 6/10/2019    Procedure: BRONCHOSCOPY WITH ENDOBRONCHIAL ULTRASOUND;  Surgeon: Bar Gage MD;  Location: [facility] ENDOSCOPY;  Service: Pulmonary   • BRONCHOSCOPY     • CATARACT EXTRACTION, BILATERAL     • LUNG BIOPSY     • NO PAST SURGERIES     • TEETH EXTRACTION     • WISDOM TOOTH EXTRACTION     ,   Family History   Problem Relation Age of Onset   • COPD Mother    • Heart disease Father     and   Social History     Tobacco Use   • Smoking status: Former Smoker     Packs/day: 1.00     Types: Cigarettes     Last attempt to quit: 2009     Years since quitting: 10.8   • Smokeless tobacco: Never Used   • Tobacco comment: smoked 1ppd x 15 years; quit in 2009   Substance Use Topics   • Alcohol use: No     Frequency: Never     Comment: hx of abuse; drank 2-3 beers and 2-3 mixed drinks about 3/4 x/week; stopped 1 month ago (had tapered drinking over past year)    • Drug use: No       Objective     Vital Signs  Temp:  [96.5 °F (35.8 °C)-98 °F (36.7 °C)] 97.8 °F (36.6 °C)  Heart Rate:  [64-77] 73  Resp:  [14-16] 16  BP: (131-192)/(62-88) 166/88    I/O this shift:  In: 240 [P.O.:240]  Out: -   I/O last 3 completed shifts:  In: 1000 [I.V.:1000]  Out: -     Physical Exam   Constitutional: He is oriented to person, place, and time. He appears well-developed and well-nourished.   HENT:   Head: Normocephalic and atraumatic.   Eyes: EOM are normal. Pupils are equal, round, and reactive to light.   Neck: Normal range of motion. Neck supple.   Cardiovascular: Normal rate, regular rhythm and normal heart sounds.   Pulmonary/Chest: Effort normal and breath sounds normal.   Abdominal: Soft. Bowel sounds are normal.   Musculoskeletal: Normal range of motion. He exhibits no edema or deformity.   Neurological: He is alert and oriented to person, place, and time.   Skin: Skin is warm and dry. No rash noted. No erythema. No pallor.   Psychiatric: He has a normal mood and  affect. His behavior is normal.       Results Review:    I reviewed the patient's new clinical results.    WBC WBC   Date Value Ref Range Status   11/08/2019 10.64 3.40 - 10.80 10*3/mm3 Final   11/07/2019 11.44 (H) 3.40 - 10.80 10*3/mm3 Final   11/06/2019 9.82 3.40 - 10.80 10*3/mm3 Final      HGB Hemoglobin   Date Value Ref Range Status   11/08/2019 10.9 (L) 13.0 - 17.7 g/dL Final   11/07/2019 10.9 (L) 13.0 - 17.7 g/dL Final   11/06/2019 11.6 (L) 13.0 - 17.7 g/dL Final      HCT Hematocrit   Date Value Ref Range Status   11/08/2019 32.1 (L) 37.5 - 51.0 % Final   11/07/2019 32.0 (L) 37.5 - 51.0 % Final   11/06/2019 33.9 (L) 37.5 - 51.0 % Final      Platlets No results found for: LABPLAT   MCV MCV   Date Value Ref Range Status   11/08/2019 90.7 79.0 - 97.0 fL Final   11/07/2019 92.2 79.0 - 97.0 fL Final   11/06/2019 90.9 79.0 - 97.0 fL Final          Sodium Sodium   Date Value Ref Range Status   11/08/2019 120 (L) 136 - 145 mmol/L Final   11/07/2019 118 (C) 136 - 145 mmol/L Final   11/06/2019 117 (C) 136 - 145 mmol/L Final      Potassium Potassium   Date Value Ref Range Status   11/08/2019 4.0 3.5 - 5.2 mmol/L Final   11/07/2019 4.9 3.5 - 5.2 mmol/L Final   11/06/2019 5.0 3.5 - 5.2 mmol/L Final      Chloride Chloride   Date Value Ref Range Status   11/08/2019 86 (L) 98 - 107 mmol/L Final   11/07/2019 85 (L) 98 - 107 mmol/L Final   11/06/2019 86 (L) 98 - 107 mmol/L Final      CO2 CO2   Date Value Ref Range Status   11/08/2019 19.0 (L) 22.0 - 29.0 mmol/L Final   11/07/2019 20.0 (L) 22.0 - 29.0 mmol/L Final   11/06/2019 17.0 (L) 22.0 - 29.0 mmol/L Final      BUN BUN   Date Value Ref Range Status   11/08/2019 16 8 - 23 mg/dL Final   11/07/2019 19 8 - 23 mg/dL Final   11/06/2019 19 8 - 23 mg/dL Final      Creatinine Creatinine   Date Value Ref Range Status   11/08/2019 0.86 0.76 - 1.27 mg/dL Final   11/07/2019 0.93 0.76 - 1.27 mg/dL Final   11/06/2019 0.92 0.76 - 1.27 mg/dL Final      Calcium Calcium   Date Value Ref Range  Status   11/08/2019 8.7 8.6 - 10.5 mg/dL Final   11/07/2019 8.6 8.6 - 10.5 mg/dL Final   11/06/2019 8.8 8.6 - 10.5 mg/dL Final      PO4 No results found for: CAPO4   Albumin No results found for: ALBUMIN   Magnesium Magnesium   Date Value Ref Range Status   11/06/2019 2.5 (H) 1.6 - 2.4 mg/dL Final      Uric Acid Uric Acid   Date Value Ref Range Status   11/08/2019 3.1 (L) 3.4 - 7.0 mg/dL Final     Comment:     Falsely depressed results may occur on samples drawn from patients receiving N-Acetylcysteine (NAC) or Metamizole.            Assessment/Plan       HTN (hypertension)    HLD (hyperlipidemia)    Anemia    Hyponatremia    Pneumonia of right lower lobe due to infectious organism (CMS/HCC)    Postobstructive pneumonia related to malignancy    General weakness      Assessment:    Condition: In serious condition.  Improving.   (Hyponatremia:  Euvolemic hyposmolar hyponatremia   - Urine studies consistent with SIADH in the setting of underlying SCL Ca  - Cortisol low 1.52 ( Recently started on prednisone--??)  - TSH normal   #Hyperkalemia: Improved  #Me acidosis: Improved  #Metastatic lung cancer: On immune check point inhibitor therapy   Plan for palliative radiation        ).     Plan:   (Plan   #Hyponatremia likely related to SIADH in the setting of SCLL. With high urine osmolarity giving isotonic will be counterintuitive and actually make hypoNa worse. In the current scenario with urine Na+K higher than serum Na. Kidney is in positive free water balance. Therefore free water restriction alone will eventually correct the hyponatremia.   #Will give low dose of lasix IV to reduce urine osm and induces further positive free water clearance    #Interestingly patient has low cortisol low ( diagnostic of AI). PD-1 inh known to cause autoimmune problems. however He was started on prednisone 2 days ago unclear how much that would have effected. Regardless on steriods for now. May need fludrocortisone in furture if  hyponatremia is not improving or he develops hyperkalemia.   #Goal Na correction 6 points in 24hr.   #Encourage good po intake. Add protein supplementation twice daily    ).       I discussed the patients findings and my recommendations with patient, family and nursing staff    Kevin Castaneda MD  11/08/19  10:11 AM

## 2019-11-08 NOTE — PROGRESS NOTES
DATE OF VISIT: 11/8/2019    Chief Complaint: Followup for extensive stage small cell lung cancer     SUBJECTIVE: The patient is feeling better.  He  denied any fever or  chills, no night sweats, denied any headaches.     REVIEW OF SYSTEMS: All the other 9 systems are reviewed by me and negative  except what is mentioned in HPI.     PAST MEDICAL HISTORY/SOCIAL HISTORY/FAMILY HISTORY: Unchanged from my prior documentation done on November 5, 2019      Current Facility-Administered Medications:   •  acetaminophen (TYLENOL) tablet 650 mg, 650 mg, Oral, Q4H PRN **OR** acetaminophen (TYLENOL) 160 MG/5ML solution 650 mg, 650 mg, Oral, Q4H PRN **OR** acetaminophen (TYLENOL) suppository 650 mg, 650 mg, Rectal, Q4H PRN, Justice Silva MD  •  amLODIPine (NORVASC) tablet 5 mg, 5 mg, Oral, Q24H, Kevin Castaneda MD, 5 mg at 11/08/19 1120  •  aspirin chewable tablet 81 mg, 81 mg, Oral, Daily, Justice Silva MD, 81 mg at 11/08/19 0932  •  atorvastatin (LIPITOR) tablet 20 mg, 20 mg, Oral, Nightly, Justice Silva MD, 20 mg at 11/07/19 2100  •  bisacodyl (DULCOLAX) suppository 10 mg, 10 mg, Rectal, Daily, Yue, Gabriella, APRN, 10 mg at 11/08/19 0103  •  budesonide-formoterol (SYMBICORT) 160-4.5 MCG/ACT inhaler 2 puff, 2 puff, Inhalation, BID - RT, Justice Silva MD, 2 puff at 11/08/19 0920  •  calcium carbonate EX (TUMS EX) chewable tablet 1,500 mg, 1,500 mg, Oral, TID PRN, Yue, Gabriella, APRN  •  colchicine tablet 0.6 mg, 0.6 mg, Oral, Daily PRN, Justice Silva MD  •  enoxaparin (LOVENOX) syringe 40 mg, 40 mg, Subcutaneous, Daily, Mele Gandara MD, 40 mg at 11/08/19 0932  •  HYDROcodone-acetaminophen (NORCO) 7.5-325 MG per tablet 1 tablet, 1 tablet, Oral, Q4H PRN, Justice Silva MD, 1 tablet at 11/08/19 0102  •  ipratropium-albuterol (DUO-NEB) nebulizer solution 3 mL, 3 mL, Nebulization, 4x Daily - RT, Justice Silva MD, 3 mL at 11/08/19 1310  •  lactulose (CHRONULAC) 10 GM/15ML solution 10 g, 10  g, Oral, Daily PRN, Ulysses Argueta MD, 10 g at 11/07/19 1503  •  Magnesium Sulfate 2 gram Bolus, followed by 8 gram infusion (total Mg dose 10 grams)- Mg less than or equal to 1mg/dL, 2 g, Intravenous, PRN **OR** Magnesium Sulfate 2 gram / 50mL Infusion (GIVE X 3 BAGS TO EQUAL 6GM TOTAL DOSE) - Mg 1.1 - 1.5 mg/dl, 2 g, Intravenous, PRN, Last Rate: 25 mL/hr at 11/06/19 0010, 2 g at 11/06/19 0010 **OR** Magnesium Sulfate 4 gram infusion- Mg 1.6-1.9 mg/dL, 4 g, Intravenous, PRN, Justice Silva MD  •  methylPREDNISolone sodium succinate (SOLU-Medrol) injection 125 mg, 125 mg, Intravenous, Daily, Ulysses Argueta MD, 125 mg at 11/08/19 0932  •  morphine injection 1 mg, 1 mg, Intravenous, Q4H PRN **AND** naloxone (NARCAN) injection 0.4 mg, 0.4 mg, Intravenous, Q5 Min PRN, Justice Silva MD  •  OCUVITE-LUTEIN (OCUVITE) tablet 1 tablet, 1 tablet, Oral, Daily, Justice Silva MD, 1 tablet at 11/08/19 0932  •  ondansetron (ZOFRAN) tablet 4 mg, 4 mg, Oral, Q6H PRN, 4 mg at 11/07/19 2103 **OR** ondansetron (ZOFRAN) injection 4 mg, 4 mg, Intravenous, Q6H PRN, Justice Silva MD  •  pantoprazole (PROTONIX) EC tablet 40 mg, 40 mg, Oral, Q AM, Elvira Ward, APRN, 40 mg at 11/08/19 0534  •  piperacillin-tazobactam (ZOSYN) 3.375 g in iso-osmotic dextrose 50 ml (premix), 3.375 g, Intravenous, Q8H, Mele Gandara MD, 3.375 g at 11/08/19 0932  •  potassium chloride (MICRO-K) CR capsule 40 mEq, 40 mEq, Oral, PRN **OR** potassium chloride (KLOR-CON) packet 40 mEq, 40 mEq, Oral, PRN **OR** potassium chloride 10 mEq in 100 mL IVPB, 10 mEq, Intravenous, Q1H PRN, Justice Silva MD  •  potassium phosphate 45 mmol in sodium chloride 0.9 % 500 mL infusion, 45 mmol, Intravenous, PRN **OR** potassium phosphate 30 mmol in sodium chloride 0.9 % 250 mL infusion, 30 mmol, Intravenous, PRN **OR** potassium phosphate 15 mmol in sodium chloride 0.9 % 100 mL infusion, 15 mmol, Intravenous, PRN **OR** sodium phosphates 45 mmol in  "sodium chloride 0.9 % 250 mL IVPB, 45 mmol, Intravenous, PRN **OR** sodium phosphates 30 mmol in sodium chloride 0.9 % 250 mL IVPB, 30 mmol, Intravenous, PRN **OR** sodium phosphates 15 mmol in sodium chloride 0.9 % 250 mL IVPB, 15 mmol, Intravenous, PRN, Justice Silva MD  •  sodium chloride 0.9 % flush 10 mL, 10 mL, Intravenous, PRN, Emergency, Triage Protocol, MD  •  sodium chloride 0.9 % flush 10 mL, 10 mL, Intravenous, Q12H, Justice Silva MD, 10 mL at 11/08/19 0933  •  sodium chloride 0.9 % flush 10 mL, 10 mL, Intravenous, PRN, Justice Silva MD    PHYSICAL EXAMINATION:   /61 (BP Location: Right arm, Patient Position: Lying)   Pulse 76   Temp 98 °F (36.7 °C) (Oral)   Resp 16   Ht 172.7 cm (68\")   Wt 97.5 kg (215 lb)   SpO2 100%   BMI 32.69 kg/m²    ECOG Performance Status: 1 - Symptomatic but completely ambulatory  GENERAL: Age appropriate. No acute distress.   NEURO/PSYCH: A&O x 3, strength 5/5 in all muscle groups  HEENT: Head atraumatic, normocephalic.   NECK: Supple. No JVD. No lymphadenopathy.   LUNGS: Clear to auscultation bilaterally. No wheezing. No rhonchi.   HEART: Regular rate and rhythm. S1, S2, no murmurs.   ABDOMEN: Soft, nontender, nondistended. Bowel sounds positive. No  hepatosplenomegaly.   EXTREMITIES: No clubbing, cyanosis, or edema.   SKIN: No rashes. No purpura.       Admission on 11/05/2019   Component Date Value Ref Range Status   • Extra Tube 11/05/2019 hold for add-on   Final    Auto resulted   • Extra Tube 11/05/2019 Hold for add-ons.   Final    Auto resulted.   • Extra Tube 11/05/2019 hold for add-on   Final    Auto resulted   • Extra Tube 11/05/2019 Hold for add-ons.   Final    Auto resulted.   • Influenza A Ag, EIA 11/05/2019 Negative  Negative Final   • Influenza B Ag, EIA 11/05/2019 Negative  Negative Final   • Troponin T 11/05/2019 <0.010  0.000 - 0.030 ng/mL Final   • Glucose 11/05/2019 119* 65 - 99 mg/dL Final   • BUN 11/05/2019 15  8 - 23 mg/dL Final "   • Creatinine 11/05/2019 0.94  0.76 - 1.27 mg/dL Final   • Sodium 11/05/2019 116* 136 - 145 mmol/L Final   • Potassium 11/05/2019 4.7  3.5 - 5.2 mmol/L Final   • Chloride 11/05/2019 81* 98 - 107 mmol/L Final   • CO2 11/05/2019 21.0* 22.0 - 29.0 mmol/L Final   • Calcium 11/05/2019 9.8  8.6 - 10.5 mg/dL Final   • Total Protein 11/05/2019 7.9  6.0 - 8.5 g/dL Final   • Albumin 11/05/2019 4.50  3.50 - 5.20 g/dL Final   • ALT (SGPT) 11/05/2019 18  1 - 41 U/L Final   • AST (SGOT) 11/05/2019 24  1 - 40 U/L Final   • Alkaline Phosphatase 11/05/2019 79  39 - 117 U/L Final   • Total Bilirubin 11/05/2019 0.7  0.2 - 1.2 mg/dL Final   • eGFR Non African Amer 11/05/2019 80  >60 mL/min/1.73 Final   • Globulin 11/05/2019 3.4  gm/dL Final   • A/G Ratio 11/05/2019 1.3  g/dL Final   • BUN/Creatinine Ratio 11/05/2019 16.0  7.0 - 25.0 Final   • Anion Gap 11/05/2019 14.0  5.0 - 15.0 mmol/L Final   • proBNP 11/05/2019 98.3  5.0 - 900.0 pg/mL Final   • Creatine Kinase 11/05/2019 318* 20 - 200 U/L Final   • Magnesium 11/05/2019 1.5* 1.6 - 2.4 mg/dL Final   • Blood Culture 11/05/2019 No growth at 3 days   Preliminary   • Blood Culture 11/05/2019 No growth at 3 days   Preliminary   • Lactate 11/05/2019 0.9  0.5 - 2.0 mmol/L Final    Falsely depressed results may occur on samples drawn from patients receiving N-Acetylcysteine (NAC) or Metamizole.   • WBC 11/05/2019 9.12  3.40 - 10.80 10*3/mm3 Final   • RBC 11/05/2019 4.19  4.14 - 5.80 10*6/mm3 Final   • Hemoglobin 11/05/2019 12.9* 13.0 - 17.7 g/dL Final   • Hematocrit 11/05/2019 37.1* 37.5 - 51.0 % Final   • MCV 11/05/2019 88.5  79.0 - 97.0 fL Final   • MCH 11/05/2019 30.8  26.6 - 33.0 pg Final   • MCHC 11/05/2019 34.8  31.5 - 35.7 g/dL Final   • RDW 11/05/2019 12.9  12.3 - 15.4 % Final   • RDW-SD 11/05/2019 41.6  37.0 - 54.0 fl Final   • MPV 11/05/2019 9.7  6.0 - 12.0 fL Final   • Platelets 11/05/2019 174  140 - 450 10*3/mm3 Final   • Neutrophil % 11/05/2019 71.9  42.7 - 76.0 % Final   •  Lymphocyte % 11/05/2019 16.7* 19.6 - 45.3 % Final   • Monocyte % 11/05/2019 10.7  5.0 - 12.0 % Final   • Eosinophil % 11/05/2019 0.2* 0.3 - 6.2 % Final   • Basophil % 11/05/2019 0.2  0.0 - 1.5 % Final   • Immature Grans % 11/05/2019 0.3  0.0 - 0.5 % Final   • Neutrophils, Absolute 11/05/2019 6.55  1.70 - 7.00 10*3/mm3 Final   • Lymphocytes, Absolute 11/05/2019 1.52  0.70 - 3.10 10*3/mm3 Final   • Monocytes, Absolute 11/05/2019 0.98* 0.10 - 0.90 10*3/mm3 Final   • Eosinophils, Absolute 11/05/2019 0.02  0.00 - 0.40 10*3/mm3 Final   • Basophils, Absolute 11/05/2019 0.02  0.00 - 0.20 10*3/mm3 Final   • Immature Grans, Absolute 11/05/2019 0.03  0.00 - 0.05 10*3/mm3 Final   • nRBC 11/05/2019 0.0  0.0 - 0.2 /100 WBC Final   • Troponin T 11/05/2019 <0.010  0.000 - 0.030 ng/mL Final   • Glucose 11/06/2019 152* 65 - 99 mg/dL Final   • BUN 11/06/2019 19  8 - 23 mg/dL Final   • Creatinine 11/06/2019 0.92  0.76 - 1.27 mg/dL Final   • Sodium 11/06/2019 117* 136 - 145 mmol/L Final   • Potassium 11/06/2019 5.0  3.5 - 5.2 mmol/L Final   • Chloride 11/06/2019 86* 98 - 107 mmol/L Final   • CO2 11/06/2019 17.0* 22.0 - 29.0 mmol/L Final   • Calcium 11/06/2019 8.8  8.6 - 10.5 mg/dL Final   • eGFR Non African Amer 11/06/2019 82  >60 mL/min/1.73 Final   • BUN/Creatinine Ratio 11/06/2019 20.7  7.0 - 25.0 Final   • Anion Gap 11/06/2019 14.0  5.0 - 15.0 mmol/L Final   • WBC 11/06/2019 9.82  3.40 - 10.80 10*3/mm3 Final   • RBC 11/06/2019 3.73* 4.14 - 5.80 10*6/mm3 Final   • Hemoglobin 11/06/2019 11.6* 13.0 - 17.7 g/dL Final   • Hematocrit 11/06/2019 33.9* 37.5 - 51.0 % Final   • MCV 11/06/2019 90.9  79.0 - 97.0 fL Final   • MCH 11/06/2019 31.1  26.6 - 33.0 pg Final   • MCHC 11/06/2019 34.2  31.5 - 35.7 g/dL Final   • RDW 11/06/2019 12.8  12.3 - 15.4 % Final   • RDW-SD 11/06/2019 42.6  37.0 - 54.0 fl Final   • MPV 11/06/2019 9.6  6.0 - 12.0 fL Final   • Platelets 11/06/2019 159  140 - 450 10*3/mm3 Final   • Neutrophil % 11/06/2019 85.4* 42.7 -  76.0 % Final   • Lymphocyte % 11/06/2019 7.9* 19.6 - 45.3 % Final   • Monocyte % 11/06/2019 6.2  5.0 - 12.0 % Final   • Eosinophil % 11/06/2019 0.0* 0.3 - 6.2 % Final   • Basophil % 11/06/2019 0.0  0.0 - 1.5 % Final   • Immature Grans % 11/06/2019 0.5  0.0 - 0.5 % Final   • Neutrophils, Absolute 11/06/2019 8.38* 1.70 - 7.00 10*3/mm3 Final   • Lymphocytes, Absolute 11/06/2019 0.78  0.70 - 3.10 10*3/mm3 Final   • Monocytes, Absolute 11/06/2019 0.61  0.10 - 0.90 10*3/mm3 Final   • Eosinophils, Absolute 11/06/2019 0.00  0.00 - 0.40 10*3/mm3 Final   • Basophils, Absolute 11/06/2019 0.00  0.00 - 0.20 10*3/mm3 Final   • Immature Grans, Absolute 11/06/2019 0.05  0.00 - 0.05 10*3/mm3 Final   • nRBC 11/06/2019 0.0  0.0 - 0.2 /100 WBC Final   • Phosphorus 11/06/2019 2.6  2.5 - 4.5 mg/dL Final   • Magnesium 11/06/2019 2.5* 1.6 - 2.4 mg/dL Final   • WBC 11/07/2019 11.44* 3.40 - 10.80 10*3/mm3 Final   • RBC 11/07/2019 3.47* 4.14 - 5.80 10*6/mm3 Final   • Hemoglobin 11/07/2019 10.9* 13.0 - 17.7 g/dL Final   • Hematocrit 11/07/2019 32.0* 37.5 - 51.0 % Final   • MCV 11/07/2019 92.2  79.0 - 97.0 fL Final   • MCH 11/07/2019 31.4  26.6 - 33.0 pg Final   • MCHC 11/07/2019 34.1  31.5 - 35.7 g/dL Final   • RDW 11/07/2019 12.9  12.3 - 15.4 % Final   • RDW-SD 11/07/2019 43.5  37.0 - 54.0 fl Final   • MPV 11/07/2019 9.7  6.0 - 12.0 fL Final   • Platelets 11/07/2019 169  140 - 450 10*3/mm3 Final   • Glucose 11/07/2019 131* 65 - 99 mg/dL Final   • BUN 11/07/2019 19  8 - 23 mg/dL Final   • Creatinine 11/07/2019 0.93  0.76 - 1.27 mg/dL Final   • Sodium 11/07/2019 118* 136 - 145 mmol/L Final   • Potassium 11/07/2019 4.9  3.5 - 5.2 mmol/L Final   • Chloride 11/07/2019 85* 98 - 107 mmol/L Final   • CO2 11/07/2019 20.0* 22.0 - 29.0 mmol/L Final   • Calcium 11/07/2019 8.6  8.6 - 10.5 mg/dL Final   • eGFR Non African Amer 11/07/2019 81  >60 mL/min/1.73 Final   • BUN/Creatinine Ratio 11/07/2019 20.4  7.0 - 25.0 Final   • Anion Gap 11/07/2019 13.0  5.0  - 15.0 mmol/L Final   • Osmolality, Urine 11/08/2019 589  300-1,100 mOsm/kg Final   • Sodium, Urine 11/08/2019 99  mmol/L Final   • Potassium, Urine 11/08/2019 33.3  mmol/L Final   • Cortisol - AM 11/08/2019 1.52  mcg/dL Final   • WBC 11/08/2019 10.64  3.40 - 10.80 10*3/mm3 Final   • RBC 11/08/2019 3.54* 4.14 - 5.80 10*6/mm3 Final   • Hemoglobin 11/08/2019 10.9* 13.0 - 17.7 g/dL Final   • Hematocrit 11/08/2019 32.1* 37.5 - 51.0 % Final   • MCV 11/08/2019 90.7  79.0 - 97.0 fL Final   • MCH 11/08/2019 30.8  26.6 - 33.0 pg Final   • MCHC 11/08/2019 34.0  31.5 - 35.7 g/dL Final   • RDW 11/08/2019 13.0  12.3 - 15.4 % Final   • RDW-SD 11/08/2019 43.1  37.0 - 54.0 fl Final   • MPV 11/08/2019 9.7  6.0 - 12.0 fL Final   • Platelets 11/08/2019 181  140 - 450 10*3/mm3 Final   • Glucose 11/08/2019 116* 65 - 99 mg/dL Final   • BUN 11/08/2019 16  8 - 23 mg/dL Final   • Creatinine 11/08/2019 0.86  0.76 - 1.27 mg/dL Final   • Sodium 11/08/2019 120* 136 - 145 mmol/L Final   • Potassium 11/08/2019 4.0  3.5 - 5.2 mmol/L Final   • Chloride 11/08/2019 86* 98 - 107 mmol/L Final   • CO2 11/08/2019 19.0* 22.0 - 29.0 mmol/L Final   • Calcium 11/08/2019 8.7  8.6 - 10.5 mg/dL Final   • eGFR Non  Amer 11/08/2019 88  >60 mL/min/1.73 Final   • BUN/Creatinine Ratio 11/08/2019 18.6  7.0 - 25.0 Final   • Anion Gap 11/08/2019 15.0  5.0 - 15.0 mmol/L Final   • TSH 11/08/2019 1.310  0.270 - 4.200 uIU/mL Final   • Uric Acid 11/08/2019 3.1* 3.4 - 7.0 mg/dL Final    Falsely depressed results may occur on samples drawn from patients receiving N-Acetylcysteine (NAC) or Metamizole.   • Osmolality 11/08/2019 257* 275 - 295 mOsm/kg Final       No results found.    ASSESSMENT: The patient is a very pleasant 69 y.o. male  with progressive small cell lung cancer      PLAN:  1.  Continue palliative radiation, patient received treatment number 3 out of 5 planned.  2.  I will set the patient up to follow-up with me as an outpatient with a second line  treatment using Opdivo plus Yervoy.  3.  Continue IV antibiotics as well as IV steroids  4.  Monitor sodium level.      Ulysses Argueta MD  11/8/2019

## 2019-11-08 NOTE — PROGRESS NOTES
Continued Stay Note   [facility]     Patient Name: Florentin Mcallister  MRN: [id number]  Today's Date: 11/8/2019    Admit Date: 11/5/2019    Discharge Plan     Row Name 11/08/19 1157       Plan    Plan  update    Patient/Family in Agreement with Plan  yes    Plan Comments  Spoke with patient at bedside regarding discharge plan.  Patient indicates that he will be in the hospital through the weekend.  Advised patient that SW is still following and has been in contact with the [facility] to assist with their stay there.  No new discharge needs verbalized.  CM following.  Patient plan is to discharge to the [facility] beds at the [facility] if accomadations are offered when condition improved.      Final Discharge Disposition Code  01 - home or self-care        Discharge Codes    No documentation.       Expected Discharge Date and Time     Expected Discharge Date Expected Discharge Time    Nov 9, 2019             Carmen Tripp RN

## 2019-11-08 NOTE — PROGRESS NOTES
[facility] Medicine Services  PROGRESS NOTE    Patient Name: Florentin Mcallister  : 1950  MRN: [id number]    Date of Admission: 2019  Primary Care Physician: Bar Gage MD    Subjective   Subjective     CC:  Follow-up cough and shortness of breath     HPI:  States he feels about the same today.  Was awaiting radiation when I evaluated him.  Denies any confusion currently.  Reports breathing remains better.  No other new complaints.      Review of Systems  No current fevers or chills  No current nausea, vomiting, or diarrhea  No current chest pain or palpitations  Improved shortness of breath and cough      Objective   Objective     Vital Signs:   Temp:  [96.5 °F (35.8 °C)-98 °F (36.7 °C)] 97.8 °F (36.6 °C)  Heart Rate:  [64-77] 73  Resp:  [14-16] 16  BP: (131-192)/(62-88) 166/88        Physical Exam:  Constitutional:Awake, alert  HENT: NCAT, mucous membranes moist, neck supple  Respiratory: Some rhonchus and rales noted diffusely, moderate inspiration  Cardiovascular: Regular, normal radial pulses  Gastrointestinal: Positive bowel sounds, soft, nontender, nondistended  Musculoskeletal: Obesity normal musculature for age, no lower extremity edema, BMI 32  Psychiatric: Appropriate affect, cooperative, conversational  Neurologic: No slurred speech or facial droop, follows commands, oriented  Skin: No rashes or jaundice, warm      Results Reviewed:    Results from last 7 days   Lab Units 19  0543 19  0553 19  0811   WBC 10*3/mm3 10.64 11.44* 9.82   HEMOGLOBIN g/dL 10.9* 10.9* 11.6*   HEMATOCRIT % 32.1* 32.0* 33.9*   PLATELETS 10*3/mm3 181 169 159     Results from last 7 days   Lab Units 19  0543 19  0553 19  0811 19  1217 19  0953   SODIUM mmol/L 120* 118* 117*  --  116*   POTASSIUM mmol/L 4.0 4.9 5.0  --  4.7   CHLORIDE mmol/L 86* 85* 86*  --  81*   CO2 mmol/L 19.0* 20.0* 17.0*  --  21.0*   BUN mg/dL 16 19 19  --  15    CREATININE mg/dL 0.86 0.93 0.92  --  0.94   GLUCOSE mg/dL 116* 131* 152*  --  119*   CALCIUM mg/dL 8.7 8.6 8.8  --  9.8   ALT (SGPT) U/L  --   --   --   --  18   AST (SGOT) U/L  --   --   --   --  24   TROPONIN T ng/mL  --   --   --  <0.010 <0.010   PROBNP pg/mL  --   --   --   --  98.3     Estimated Creatinine Clearance: 93 mL/min (by C-G formula based on SCr of 0.86 mg/dL).    Microbiology Results Abnormal     Procedure Component Value - Date/Time    Blood Culture - Blood, Arm, Right [[id number]] Collected:  11/05/19 1115    Lab Status:  Preliminary result Specimen:  Blood from Arm, Right Updated:  11/07/19 1130     Blood Culture No growth at 2 days    Blood Culture - Blood, Hand, Left [[id number]] Collected:  11/05/19 1120    Lab Status:  Preliminary result Specimen:  Blood from Hand, Left Updated:  11/07/19 1130     Blood Culture No growth at 2 days    Influenza Antigen, Rapid - Swab, Nasopharynx [[id number]]  (Normal) Collected:  11/05/19 0954    Lab Status:  Final result Specimen:  Swab from Nasopharynx Updated:  11/05/19 1034     Influenza A Ag, EIA Negative     Influenza B Ag, EIA Negative          Imaging Results (Last 24 Hours)     ** No results found for the last 24 hours. **               I have reviewed the medications:  Scheduled Meds:    aspirin 81 mg Oral Daily   atorvastatin 20 mg Oral Nightly   bisacodyl 10 mg Rectal Daily   budesonide-formoterol 2 puff Inhalation BID - RT   enoxaparin 40 mg Subcutaneous Daily   ipratropium-albuterol 3 mL Nebulization 4x Daily - RT   lisinopril 20 mg Oral Daily   methylPREDNISolone sodium succinate 125 mg Intravenous Daily   OCUVITE-LUTEIN 1 tablet Oral Daily   pantoprazole 40 mg Oral Q AM   piperacillin-tazobactam 3.375 g Intravenous Q8H   sodium chloride 10 mL Intravenous Q12H     Continuous Infusions:    sodium chloride 75 mL/hr Last Rate: 75 mL/hr (11/07/19 1502)     PRN Meds:.•  acetaminophen **OR** acetaminophen **OR** acetaminophen  •  calcium carbonate EX  •   colchicine  •  HYDROcodone-acetaminophen  •  lactulose  •  magnesium sulfate **OR** magnesium sulfate **OR** magnesium sulfate  •  Morphine **AND** naloxone  •  ondansetron **OR** ondansetron  •  potassium chloride **OR** potassium chloride **OR** potassium chloride  •  potassium phosphate infusion greater than 15 mMoles **OR** potassium phosphate infusion greater than 15 mMoles **OR** potassium phosphate **OR** sodium phosphate IVPB **OR** sodium phosphate IVPB **OR** sodium phosphate IVPB  •  sodium chloride  •  sodium chloride      Assessment/Plan   Assessment / Plan     Active Hospital Problems    Diagnosis  POA   • Postobstructive pneumonia related to malignancy [J18.9]  Yes   • General weakness [R53.1]  Yes   • Hyponatremia [E87.1]  Yes   • HTN (hypertension) [I10]  Yes   • HLD (hyperlipidemia) [E78.5]  Yes   • Anemia [D64.9]  Yes   • Pneumonia of right lower lobe due to infectious organism (CMS/HCC) [J18.1]  Yes      Resolved Hospital Problems   No resolved problems to display.        Brief Hospital Course to date:  Florentin Mcallister is a 69 y.o. male with past medical history of hypertension, hyperlipidemia, diagnosis of extensive stage small cell lung cancer with metastasis to the liver.  Chemotherapy was started on June 19, 2019.  Latest chemotherapy was Monday about a week ago and since then patient has been feeling poorly.  Patient is being admitted for increasing shortness of breath and cough, weakness, unsteady gait.  At the ER CT scan of the lungs shows progression of disease.  CT scan of the head is negative for any acute process.  Also lab is significant for hyponatremia with sodium of 116.    Hyponatremia:  Etiology felt to be lung malignancy and SIADH.  Notably has substantial drop within the last 2 weeks.  Receiving IV fluid.  Fluid restriction.  Likely will need fluid restriction upon discharge.  Currently relatively euvolemic.  Nephrology assisting with management.    Postobstructive  pneumonia:  Right lower lobe groundglass opacity seen on imaging and rhonchi and cough noted on examination.  Continue Zosyn.  Clinically improving with antibiotics.    Metastatic lung malignancy:  Oncology following.  Appreciate recommendation.  Radiation oncology following.  They have recommended inpatient radiation which is initiated.    Normocytic anemia: Reasonably stable.  No bleeding.  Monitor intermittently.    General weakness: Improved with fluids.  Patient declines offer for inpatient physical therapy.  Reports he is walking well.  Continue to monitor.    Additional plan/comments:  Continue current treatments recommended by specialist.  Discussed with nephrology today patient needs fluid restriction and will need education on fluid restriction at discharge.  Pulmonology has added steroids.  Discontinue IV fluid.  Low cortisol noted.  Steroids may be helping.  Unclear if patient needs Florinef will defer to nephrology.  Encouraging oral intake.  Plan to follow results of radiation treatment.  No current hypoxia.  Metabolic acidosis noted and reasonably stable.  Images from CT scan of the head personally reviewed and showed no intracranial process.  Repeat laboratory studies tomorrow.    DVT Prophylaxis: Lovenox    Disposition: I expect the patient to be discharged likely home in improved    CODE STATUS:   Code Status and Medical Interventions:   Ordered at: 11/05/19 1523     Level Of Support Discussed With:    Patient     Code Status:    CPR     Medical Interventions (Level of Support Prior to Arrest):    Full     Comments:    patients wife is POA.         Electronically signed by Mele Gandara MD, 11/08/19, 10:06 AM.

## 2019-11-08 NOTE — PROGRESS NOTES
Continued Stay Note  [facility]     Patient Name: Florentin Mcallister  MRN: [id number]  Today's Date: 11/8/2019    Admit Date: 11/5/2019    Discharge Plan     Row Name 11/08/19 1213       Plan    Plan  Social work spoke with the [facility] in [place] and they have received the referral from  at [facility] and they have been in contact by telephone with the patient.  [facility] can accept the patient if the patient decided he needed to stay with them and they had an opening at the time the patient wanted to stay at the [facility]. The patient is not ready for discharge.    Patient/Family in Agreement with Plan  yes    Row Name 11/08/19 5890       Plan    Plan  update    Patient/Family in Agreement with Plan  yes    Plan Comments  Spoke with patient at bedside regarding discharge plan.  Patient indicates that he will be in the hospital through the weekend.  Advised patient that SW is still following and has been in contact with the [facility] to assist with their stay there.  No new discharge needs verbalized.  CM following.  Patient plan is to discharge to the [facility] if accomadations are offered when condition improved.      Final Discharge Disposition Code  01 - home or self-care        Discharge Codes    No documentation.       Expected Discharge Date and Time     Expected Discharge Date Expected Discharge Time    Nov 9, 2019             LUIS Bhatia

## 2019-11-08 NOTE — PLAN OF CARE
Problem: Patient Care Overview  Goal: Plan of Care Review  Outcome: Ongoing (interventions implemented as appropriate)   11/08/19 0246   Coping/Psychosocial   Plan of Care Reviewed With patient   Plan of Care Review   Progress no change   OTHER   Outcome Summary Patient has had back pain 8/10 pain. Patient also c/o abdomen pain r/t constipation. Provider called and got order for suppository and administered. Patient c/o heartburn & acid reflux. Got order for one time protonix and Tums. Patient refused Tums but took Protonix. Patient states pain is currently better, heartburn is much better, nausea is better (patient was nauseated earlier). Patient has retained suppository for awhile and still no bowel movement. VSS       Problem: Fall Risk (Adult)  Goal: Identify Related Risk Factors and Signs and Symptoms  Outcome: Ongoing (interventions implemented as appropriate)   11/08/19 0246   Fall Risk (Adult)   Related Risk Factors (Fall Risk) gait/mobility problems;sensory deficits;environment unfamiliar   Signs and Symptoms (Fall Risk) presence of risk factors

## 2019-11-08 NOTE — PLAN OF CARE
Problem: Patient Care Overview  Goal: Plan of Care Review  Outcome: Ongoing (interventions implemented as appropriate)   11/08/19 8390   Coping/Psychosocial   Plan of Care Reviewed With patient;spouse   Plan of Care Review   Progress no change   OTHER   Outcome Summary Pt went for radiation early this morning. Pain/cramping in ABD better. No nausea. Maintenance fluids stopped. Will continue to check urine Na+, K+ and osmoality. VSS during shift.

## 2019-11-09 PROBLEM — E22.2 SIADH (SYNDROME OF INAPPROPRIATE ADH PRODUCTION) (HCC): Status: ACTIVE | Noted: 2019-01-01

## 2019-11-09 PROBLEM — C78.7 LIVER METASTASIS: Status: ACTIVE | Noted: 2019-01-01

## 2019-11-09 PROBLEM — D72.829 LEUKOCYTOSIS: Status: ACTIVE | Noted: 2019-01-01

## 2019-11-09 NOTE — PROGRESS NOTES
" LOS: 4 days   Patient Care Team:  Bar Gage MD as PCP - General (Pulmonary Disease)    Chief Complaint: Hyponatremia     Subjective     Na improving appropriately with free water restriction and lasix.         Subjective:  Symptoms:  Stable.  No shortness of breath, chest pain, weakness or headache.    Diet:  Adequate intake.  No nausea or vomiting.    Activity level: Normal.    Pain:  He reports no pain.        History taken from: patient family    Objective     Vital Sign Min/Max for last 24 hours  Temp  Min: 97.1 °F (36.2 °C)  Max: 98.2 °F (36.8 °C)   BP  Min: 131/62  Max: 143/70   Pulse  Min: 61  Max: 81   Resp  Min: 16  Max: 18   No Data Recorded   No Data Recorded   No Data Recorded     Constitutional: He is oriented to person, place, and time. He appears well-developed and well-nourished.   HENT:   Head: Normocephalic and atraumatic.   Eyes: EOM are normal. Pupils are equal, round, and reactive to light.   Neck: Normal range of motion. Neck supple.   Cardiovascular: Normal rate, regular rhythm and normal heart sounds.   Pulmonary/Chest: Effort normal and breath sounds normal.   Abdominal: Soft. Bowel sounds are normal.   Musculoskeletal: Normal range of motion. He exhibits no edema or deformity.   Neurological: He is alert and oriented to person, place, and time.   Skin: Skin is warm and dry. No rash noted. No erythema. No pallor.   Psychiatric: He has a normal mood and affect. His behavior is normal.      Flowsheet Rows      First Filed Value   Admission Height  172.7 cm (68\") Documented at 11/05/2019 0943   Admission Weight  97.5 kg (215 lb) Documented at 11/05/2019 0943          I/O this shift:  In: 240 [P.O.:240]  Out: 375 [Urine:375]  I/O last 3 completed shifts:  In: 410 [P.O.:360; IV Piggyback:50]  Out: -     Objective    Results Review:     I reviewed the patient's new clinical results.    WBC WBC   Date Value Ref Range Status   11/09/2019 7.37 3.40 - 10.80 10*3/mm3 Final   11/08/2019 " 10.64 3.40 - 10.80 10*3/mm3 Final   11/07/2019 11.44 (H) 3.40 - 10.80 10*3/mm3 Final      HGB Hemoglobin   Date Value Ref Range Status   11/09/2019 10.9 (L) 13.0 - 17.7 g/dL Final   11/08/2019 10.9 (L) 13.0 - 17.7 g/dL Final   11/07/2019 10.9 (L) 13.0 - 17.7 g/dL Final      HCT Hematocrit   Date Value Ref Range Status   11/09/2019 32.2 (L) 37.5 - 51.0 % Final   11/08/2019 32.1 (L) 37.5 - 51.0 % Final   11/07/2019 32.0 (L) 37.5 - 51.0 % Final      Platlets No results found for: LABPLAT   MCV MCV   Date Value Ref Range Status   11/09/2019 90.4 79.0 - 97.0 fL Final   11/08/2019 90.7 79.0 - 97.0 fL Final   11/07/2019 92.2 79.0 - 97.0 fL Final          Sodium Sodium   Date Value Ref Range Status   11/09/2019 125 (L) 136 - 145 mmol/L Final   11/08/2019 120 (L) 136 - 145 mmol/L Final   11/07/2019 118 (C) 136 - 145 mmol/L Final      Potassium Potassium   Date Value Ref Range Status   11/09/2019 3.9 3.5 - 5.2 mmol/L Final   11/08/2019 4.0 3.5 - 5.2 mmol/L Final   11/07/2019 4.9 3.5 - 5.2 mmol/L Final      Chloride Chloride   Date Value Ref Range Status   11/09/2019 89 (L) 98 - 107 mmol/L Final   11/08/2019 86 (L) 98 - 107 mmol/L Final   11/07/2019 85 (L) 98 - 107 mmol/L Final      CO2 CO2   Date Value Ref Range Status   11/09/2019 24.0 22.0 - 29.0 mmol/L Final   11/08/2019 19.0 (L) 22.0 - 29.0 mmol/L Final   11/07/2019 20.0 (L) 22.0 - 29.0 mmol/L Final      BUN BUN   Date Value Ref Range Status   11/09/2019 16 8 - 23 mg/dL Final   11/08/2019 16 8 - 23 mg/dL Final   11/07/2019 19 8 - 23 mg/dL Final      Creatinine Creatinine   Date Value Ref Range Status   11/09/2019 0.93 0.76 - 1.27 mg/dL Final   11/08/2019 0.86 0.76 - 1.27 mg/dL Final   11/07/2019 0.93 0.76 - 1.27 mg/dL Final      Calcium Calcium   Date Value Ref Range Status   11/09/2019 9.0 8.6 - 10.5 mg/dL Final   11/08/2019 8.7 8.6 - 10.5 mg/dL Final   11/07/2019 8.6 8.6 - 10.5 mg/dL Final      PO4 No results found for: CAPO4   Albumin No results found for: ALBUMIN    Magnesium No results found for: MG   Uric Acid Uric Acid   Date Value Ref Range Status   11/08/2019 3.1 (L) 3.4 - 7.0 mg/dL Final     Comment:     Falsely depressed results may occur on samples drawn from patients receiving N-Acetylcysteine (NAC) or Metamizole.        Medication Review: Yes    Assessment/Plan       HTN (hypertension)    HLD (hyperlipidemia)    Anemia    Hyponatremia    Pneumonia of right lower lobe due to infectious organism (CMS/HCC)    Small cell lung cancer (CMS/HCC)    Postobstructive pneumonia related to malignancy    General weakness    SIADH (syndrome of inappropriate ADH production) (CMS/HCC)    Liver metastasis (CMS/HCC)    Leukocytosis      Assessment & Plan    Condition: In serious condition.  Improving.     #Hyponatremia:  Euvolemic hyposmolar hyponatremia   - Urine studies consistent with SIADH in the setting of underlying SCL Ca  - Cortisol low 1.52 ( Recently started on prednisone--??)  - TSH normal   #Hyperkalemia: Improved  #Me acidosis: Improved  #Metastatic lung cancer: On immune check point inhibitor therapy   Plan for palliative radiation                Plan:     #Hyponatremia likely related to SIADH in the setting of SCLL.  Urine Na+K higher than serum Na. Suggesting positive free water balance. Therefore free water restriction alone will eventually correct the hyponatremia.   #Repeat dose of lasix to reduce urine osmolarity and permit free water restriction.   #Interestingly patient has low cortisol low ( diagnostic of AI). PD-1 inh known to cause autoimmune problems therefor possible adrenal insufficency.He was started on prednisone 2 days ago unclear how much that would have effected. Regardless he is steriods for now. May need fludrocortisone in furture if hyponatremia is not improving or he develops hyperkalemia.   #Goal Na correction 6-8 points in 24hr.   #Encourage good po intake.   # continue Ensure protein supplement twice a day    I discussed the patients findings  and my recommendations with patient, family and nursing staff    Kevin Castaneda MD  11/09/19  11:14 AM

## 2019-11-09 NOTE — PROGRESS NOTES
[facility] Medicine Services  PROGRESS NOTE    Patient Name: Florentin Mcallister  : 1950  MRN: [id number]    Date of Admission: 2019  Primary Care Physician: Bar Gage MD    Subjective     CC: f/u SCC with liver mets, postobstructive pneumonia, hyponatremia     HPI:  Sitting up in bed, says he is feeling pretty good. No chest pain, dyspnea, cough improving, denied nausea/vomiting or diarrhea. Anxiously awaiting results of this morning's BMP.     Review of Systems  Gen- No fevers, chills  CV- No chest pain, palpitations  Resp- (+) cough/dyspnea improved   GI- No N/V/D, abd pain, no constipation     Objective     Vital Signs:   Temp:  [97.1 °F (36.2 °C)-98.2 °F (36.8 °C)] 97.5 °F (36.4 °C)  Heart Rate:  [61-90] 68  Resp:  [16] 16  BP: (140-146)/(59-70) 140/65        Physical Exam:  Constitutional: No acute distress, awake, alert and conversant. Sitting upright in bed.   HENT: NCAT, mucous membranes moist  Respiratory: Mild expiratory wheezing and rhonchi, no rales. Normal respiratory effort on room air    Cardiovascular: RRR, no murmurs, rubs, or gallops, palpable pedal pulses bilaterally  Gastrointestinal: Positive bowel sounds, soft, nontender, nondistended  Musculoskeletal: No bilateral ankle edema or BLE tenderness to palpation  Psychiatric: Appropriate affect, cooperative  Neurologic: Oriented x 3, strength symmetric in all extremities, Cranial Nerves grossly intact to confrontation, speech clear  Skin: No rashes    Results Reviewed:    Results from last 7 days   Lab Units 19  0719 19  0543 19  0553   WBC 10*3/mm3 7.37 10.64 11.44*   HEMOGLOBIN g/dL 10.9* 10.9* 10.9*   HEMATOCRIT % 32.2* 32.1* 32.0*   PLATELETS 10*3/mm3 165 181 169     Results from last 7 days   Lab Units 19  0543 19  0553 19  0811 19  1217 19  0953   SODIUM mmol/L 120* 118* 117*  --  116*   POTASSIUM mmol/L 4.0 4.9 5.0  --  4.7   CHLORIDE mmol/L  86* 85* 86*  --  81*   CO2 mmol/L 19.0* 20.0* 17.0*  --  21.0*   BUN mg/dL 16 19 19  --  15   CREATININE mg/dL 0.86 0.93 0.92  --  0.94   GLUCOSE mg/dL 116* 131* 152*  --  119*   CALCIUM mg/dL 8.7 8.6 8.8  --  9.8   ALT (SGPT) U/L  --   --   --   --  18   AST (SGOT) U/L  --   --   --   --  24   TROPONIN T ng/mL  --   --   --  <0.010 <0.010   PROBNP pg/mL  --   --   --   --  98.3     Estimated Creatinine Clearance: 93 mL/min (by C-G formula based on SCr of 0.86 mg/dL).    Microbiology Results Abnormal     Procedure Component Value - Date/Time    Blood Culture - Blood, Arm, Right [[id number]] Collected:  11/05/19 1115    Lab Status:  Preliminary result Specimen:  Blood from Arm, Right Updated:  11/08/19 1130     Blood Culture No growth at 3 days    Blood Culture - Blood, Hand, Left [[id number]] Collected:  11/05/19 1120    Lab Status:  Preliminary result Specimen:  Blood from Hand, Left Updated:  11/08/19 1130     Blood Culture No growth at 3 days    Influenza Antigen, Rapid - Swab, Nasopharynx [[id number]]  (Normal) Collected:  11/05/19 0954    Lab Status:  Final result Specimen:  Swab from Nasopharynx Updated:  11/05/19 1034     Influenza A Ag, EIA Negative     Influenza B Ag, EIA Negative        Imaging Results (Last 24 Hours)     ** No results found for the last 24 hours. **        I have reviewed the medications:  Scheduled Meds:    amLODIPine 5 mg Oral Q24H   aspirin 81 mg Oral Daily   atorvastatin 20 mg Oral Nightly   bisacodyl 10 mg Rectal Daily   budesonide-formoterol 2 puff Inhalation BID - RT   enoxaparin 40 mg Subcutaneous Daily   ipratropium-albuterol 3 mL Nebulization 4x Daily - RT   methylPREDNISolone sodium succinate 125 mg Intravenous Daily   OCUVITE-LUTEIN 1 tablet Oral Daily   pantoprazole 40 mg Oral Q AM   piperacillin-tazobactam 3.375 g Intravenous Q8H   sodium chloride 10 mL Intravenous Q12H     Continuous Infusions:     PRN Meds:.•  acetaminophen **OR** acetaminophen **OR** acetaminophen  •   calcium carbonate EX  •  colchicine  •  HYDROcodone-acetaminophen  •  lactulose  •  magnesium sulfate **OR** magnesium sulfate **OR** magnesium sulfate  •  Morphine **AND** naloxone  •  ondansetron **OR** ondansetron  •  potassium chloride **OR** potassium chloride **OR** potassium chloride  •  potassium phosphate infusion greater than 15 mMoles **OR** potassium phosphate infusion greater than 15 mMoles **OR** potassium phosphate **OR** sodium phosphate IVPB **OR** sodium phosphate IVPB **OR** sodium phosphate IVPB  •  sodium chloride  •  sodium chloride    Assessment / Plan     Active Hospital Problems    Diagnosis  POA   • Postobstructive pneumonia related to malignancy [J18.9]  Yes   • General weakness [R53.1]  Yes   • Hyponatremia [E87.1]  Yes   • HTN (hypertension) [I10]  Yes   • HLD (hyperlipidemia) [E78.5]  Yes   • Anemia [D64.9]  Yes   • Pneumonia of right lower lobe due to infectious organism (CMS/HCC) [J18.1]  Yes      Resolved Hospital Problems   No resolved problems to display.     Brief Hospital Course to date:  Mr. Florentin Mcallister is a 69yoM with PMH significant for HTN, HLD and recently diagnosed small cell lung cancer with liver metastasis. He is followed by Dr. Argueta. He started chemotherapy on 6/19/2019. He had been feeling poorly since his most recent session of chemotherapy on 10/28/2019. Presented to [facility] ED 11/5/19. CT showed progression of disease and postobstructive pneumonia. Labs significant for .     Hyponatremia  - Appreciate nephrology assistance  - Etiology felt to be related to lung malignancy/SIADH  - He is euvolemic - IV fluids discontinued   - s/p Lasix 10mg IV x 1 dose   - Continue fluid restriction - will likely need fluid restriction on discharge   - On IV Solu-Medrol   -  May need Florinef     Leukocytosis, resolved   Postobstructive pneumonia  - RLL ground glass opacity on CT chest   - Continue IV Zosyn, can convert to PO at discharge if needed     Small cell lung  cancer  Liver metastasis  - Appreciate oncology and radiation oncology assistance  - Inpatient radiation with intention of shrinking hilar mass to help open his airway and clear the pneumonia as well as perhaps improve his SIADH/hyponatremia   - He has completed 3 of 5 sessions of radiation - final 2 planned for 11/11 and 11/12     Normocytic anemia  - Stable. Continue to monitor    General weakness, improving.   - He is now walking well. He has declined inpatient physical therapy  - Continue encourage ambulation and OOB     DVT Prophylaxis: Lovenox  Disposition: I expect the patient to be discharged likely home in a few days. Will be here through at least through 11/12 while he completes inpatient radiation    CODE STATUS:   Code Status and Medical Interventions:   Ordered at: 11/05/19 1523     Level Of Support Discussed With:    Patient     Code Status:    CPR     Medical Interventions (Level of Support Prior to Arrest):    Full     Comments:    patients wife is POA.     Electronically signed by Salma Pat PA-C, 11/09/19, 9:18 AM.

## 2019-11-09 NOTE — PLAN OF CARE
Problem: Patient Care Overview  Goal: Plan of Care Review  Outcome: Ongoing (interventions implemented as appropriate)   11/09/19 0441   Coping/Psychosocial   Plan of Care Reviewed With patient   Plan of Care Review   Progress no change   OTHER   Outcome Summary Pt's VSS. He is satting well on room air. No acute overnight events. Plan to cont to monitor.       Problem: Fall Risk (Adult)  Goal: Absence of Fall  Outcome: Ongoing (interventions implemented as appropriate)

## 2019-11-09 NOTE — PLAN OF CARE
Problem: Patient Care Overview  Goal: Plan of Care Review  Outcome: Ongoing (interventions implemented as appropriate)   11/09/19 9348   Coping/Psychosocial   Plan of Care Reviewed With patient;spouse   Plan of Care Review   Progress no change   OTHER   Outcome Summary Pt has no complaints. No radiation today. Radiation will resume on Monday. Antibiotics continued. IV Lasixs dose lowered. Urine Na+ improved. Pt ambulated with spouse in artis. VSS during shift.

## 2019-11-10 NOTE — PLAN OF CARE
Problem: Patient Care Overview  Goal: Plan of Care Review  Outcome: Ongoing (interventions implemented as appropriate)   11/10/19 7044   Coping/Psychosocial   Plan of Care Reviewed With patient;spouse   Plan of Care Review   Progress improving   OTHER   Outcome Summary VSS. No complaints of pain/discomfort during shift. Continuing with IV abx. Pt tolerated RA all day. Planning for radiation tomorrow. Will continue to monitor.

## 2019-11-10 NOTE — PROGRESS NOTES
LOS: 5 days   Patient Care Team:  Bar Gage MD as PCP - General (Pulmonary Disease)    Chief Complaint: Hyponatremia     Subjective     Na improving appropriately with free water restriction and lasix. Na 129 today. Doing well clinically.      Headache    Associated symptoms include coughing. Pertinent negatives include no nausea, vomiting or weakness.   Cough   Associated symptoms include headaches. Pertinent negatives include no chest pain or shortness of breath.   Fatigue   Associated symptoms include coughing, fatigue and headaches. Pertinent negatives include no chest pain, nausea, vomiting or weakness.       Subjective:  Symptoms:  Stable.  He reports cough.  No shortness of breath, chest pain, weakness or headache.    Diet:  Adequate intake.  No nausea or vomiting.    Activity level: Normal.    Pain:  He reports no pain.        History taken from: patient family    Objective     Vital Sign Min/Max for last 24 hours  Temp  Min: 97.3 °F (36.3 °C)  Max: 98.3 °F (36.8 °C)   BP  Min: 135/69  Max: 150/72   Pulse  Min: 58  Max: 89   Resp  Min: 16  Max: 18   No Data Recorded   No Data Recorded   No Data Recorded     Constitutional: He is oriented to person, place, and time. He appears well-developed and well-nourished.   HENT:   Head: Normocephalic and atraumatic.   Eyes: EOM are normal. Pupils are equal, round, and reactive to light.   Neck: Normal range of motion. Neck supple.   Cardiovascular: Normal rate, regular rhythm and normal heart sounds.   Pulmonary/Chest: Effort normal and breath sounds normal.   Abdominal: Soft. Bowel sounds are normal.   Musculoskeletal: Normal range of motion. He exhibits no edema or deformity.   Neurological: He is alert and oriented to person, place, and time.   Skin: Skin is warm and dry. No rash noted. No erythema. No pallor.   Psychiatric: He has a normal mood and affect. His behavior is normal.      Flowsheet Rows      First Filed Value   Admission Height  172.7  "cm (68\") Documented at 11/05/2019 0943   Admission Weight  97.5 kg (215 lb) Documented at 11/05/2019 0943          I/O this shift:  In: 240 [P.O.:240]  Out: -   I/O last 3 completed shifts:  In: 580 [P.O.:480; IV Piggyback:100]  Out: 375 [Urine:375]    Objective:  Vital signs: (most recent): Blood pressure 150/72, pulse 70, temperature 98.3 °F (36.8 °C), temperature source Oral, resp. rate 18, height 172.7 cm (68\"), weight 97.5 kg (215 lb), SpO2 97 %.              Results Review:     I reviewed the patient's new clinical results.    WBC WBC   Date Value Ref Range Status   11/09/2019 7.37 3.40 - 10.80 10*3/mm3 Final   11/08/2019 10.64 3.40 - 10.80 10*3/mm3 Final      HGB Hemoglobin   Date Value Ref Range Status   11/09/2019 10.9 (L) 13.0 - 17.7 g/dL Final   11/08/2019 10.9 (L) 13.0 - 17.7 g/dL Final      HCT Hematocrit   Date Value Ref Range Status   11/09/2019 32.2 (L) 37.5 - 51.0 % Final   11/08/2019 32.1 (L) 37.5 - 51.0 % Final      Platlets No results found for: LABPLAT   MCV MCV   Date Value Ref Range Status   11/09/2019 90.4 79.0 - 97.0 fL Final   11/08/2019 90.7 79.0 - 97.0 fL Final          Sodium Sodium   Date Value Ref Range Status   11/10/2019 129 (L) 136 - 145 mmol/L Final   11/09/2019 125 (L) 136 - 145 mmol/L Final   11/08/2019 120 (L) 136 - 145 mmol/L Final      Potassium Potassium   Date Value Ref Range Status   11/10/2019 3.7 3.5 - 5.2 mmol/L Final   11/09/2019 3.9 3.5 - 5.2 mmol/L Final   11/08/2019 4.0 3.5 - 5.2 mmol/L Final      Chloride Chloride   Date Value Ref Range Status   11/10/2019 91 (L) 98 - 107 mmol/L Final   11/09/2019 89 (L) 98 - 107 mmol/L Final   11/08/2019 86 (L) 98 - 107 mmol/L Final      CO2 CO2   Date Value Ref Range Status   11/10/2019 24.0 22.0 - 29.0 mmol/L Final   11/09/2019 24.0 22.0 - 29.0 mmol/L Final   11/08/2019 19.0 (L) 22.0 - 29.0 mmol/L Final      BUN BUN   Date Value Ref Range Status   11/10/2019 21 8 - 23 mg/dL Final   11/09/2019 16 8 - 23 mg/dL Final   11/08/2019 16 " 8 - 23 mg/dL Final      Creatinine Creatinine   Date Value Ref Range Status   11/10/2019 1.03 0.76 - 1.27 mg/dL Final   11/09/2019 0.93 0.76 - 1.27 mg/dL Final   11/08/2019 0.86 0.76 - 1.27 mg/dL Final      Calcium Calcium   Date Value Ref Range Status   11/10/2019 9.4 8.6 - 10.5 mg/dL Final   11/09/2019 9.0 8.6 - 10.5 mg/dL Final   11/08/2019 8.7 8.6 - 10.5 mg/dL Final      PO4 No results found for: CAPO4   Albumin No results found for: ALBUMIN   Magnesium No results found for: MG   Uric Acid Uric Acid   Date Value Ref Range Status   11/08/2019 3.1 (L) 3.4 - 7.0 mg/dL Final     Comment:     Falsely depressed results may occur on samples drawn from patients receiving N-Acetylcysteine (NAC) or Metamizole.        Medication Review: Yes    Assessment/Plan       HTN (hypertension)    HLD (hyperlipidemia)    Anemia    Hyponatremia    Pneumonia of right lower lobe due to infectious organism (CMS/HCC)    Small cell lung cancer (CMS/HCC)    Postobstructive pneumonia related to malignancy    General weakness    SIADH (syndrome of inappropriate ADH production) (CMS/HCC)    Liver metastasis (CMS/HCC)    Leukocytosis      Assessment & Plan    Condition: In serious condition.  Improving.     #Hyponatremia:  Euvolemic hyposmolar hyponatremia   - Urine studies consistent with SIADH in the setting of underlying SCL Ca  - Cortisol low 1.52 ( Recently started on prednisone--??)  - TSH normal   #Hyperkalemia: Improved  #Me acidosis: Improved  #Metastatic lung cancer: On immune check point inhibitor therapy   Plan for palliative radiation                Plan:     #Hyponatremia likely related to SIADH in the setting of SCLL.  Urine Na+K higher than serum Na. Suggesting positive free water balance. Therefore free water restriction alone will eventually correct the hyponatremia.   #Given 2 doses of lasix to lower urine osm and promote free water loss   #Interestingly patient has low cortisol low ( diagnostic of AI). PD-1 inh known to  cause autoimmune problems therefor possible adrenal insufficency.He was started on prednisone 2 days prior to admission unclear how much that would have effected. Regardless he is steriods for now. May need fludrocortisone in furture if hyponatremia is not improving or he develops hyperkalemia.   #Encourage good po intake.   # continue Ensure protein supplement twice a day this will promote free water loss.    I discussed the patients findings and my recommendations with patient, family and nursing staff    Kevin Castaneda MD  11/10/19  12:27 PM

## 2019-11-10 NOTE — PLAN OF CARE
Problem: Patient Care Overview  Goal: Plan of Care Review  Outcome: Ongoing (interventions implemented as appropriate)   11/10/19 0338   Coping/Psychosocial   Plan of Care Reviewed With patient   Plan of Care Review   Progress improving   OTHER   Outcome Summary Pt pleasant and cooperative. VSS and pt satting well on room air. No complaints of pain and no acute overnight events. Plan to cont to monitor.       Problem: Fall Risk (Adult)  Goal: Absence of Fall  Outcome: Ongoing (interventions implemented as appropriate)

## 2019-11-10 NOTE — PROGRESS NOTES
[facility] Medicine Services  PROGRESS NOTE    Patient Name: Florentin Mcallister  : 1950  MRN: [id number]    Date of Admission: 2019  Primary Care Physician: Bar Gage MD    Subjective     CC: f/u SCC with liver mets, postobstructive pneumonia, hyponatremia     HPI:  Sitting up in bed, says he is feeling pretty good. No chest pain. Starting to cough up some phlegm - feels like coughing is worst right after neb treatments. Mild dyspnea. No nausea or vomiting. Bowels moving and now having some diarrhea. Na 129 today.     Review of Systems  Gen- No fevers, chills  CV- No chest pain, palpitations  Resp- (+) cough/dyspnea improving, starting to produce some sputum   GI- No N/V/D, abd pain, no constipation     Objective     Vital Signs:   Temp:  [97.3 °F (36.3 °C)-98.3 °F (36.8 °C)] 97.3 °F (36.3 °C)  Heart Rate:  [58-89] 63  Resp:  [16-18] 18  BP: (131-145)/(61-72) 135/69        Physical Exam:  Constitutional: No acute distress, awake, alert and conversant. Sitting upright in bed.   HENT: NCAT, mucous membranes moist. Hard of hearing.   Respiratory: Mild expiratory wheezing and rhonchi, no rales. Normal respiratory effort on room air    Cardiovascular: RRR, no murmurs, rubs, or gallops, palpable pedal pulses bilaterally  Gastrointestinal: Positive bowel sounds, soft, nontender, nondistended  Musculoskeletal: No bilateral ankle edema or BLE tenderness to palpation  Psychiatric: Appropriate affect, cooperative  Neurologic: Oriented x 3, strength symmetric in all extremities, Cranial Nerves grossly intact to confrontation, speech clear  Skin: No rashes    Results Reviewed:    Results from last 7 days   Lab Units 19  0719 19  0543 19  0553   WBC 10*3/mm3 7.37 10.64 11.44*   HEMOGLOBIN g/dL 10.9* 10.9* 10.9*   HEMATOCRIT % 32.2* 32.1* 32.0*   PLATELETS 10*3/mm3 165 181 169     Results from last 7 days   Lab Units 11/10/19  0905 19  0719 19  0543   11/05/19  1217 11/05/19  0953   SODIUM mmol/L 129* 125* 120*   < >  --  116*   POTASSIUM mmol/L 3.7 3.9 4.0   < >  --  4.7   CHLORIDE mmol/L 91* 89* 86*   < >  --  81*   CO2 mmol/L 24.0 24.0 19.0*   < >  --  21.0*   BUN mg/dL 21 16 16   < >  --  15   CREATININE mg/dL 1.03 0.93 0.86   < >  --  0.94   GLUCOSE mg/dL 111* 105* 116*   < >  --  119*   CALCIUM mg/dL 9.4 9.0 8.7   < >  --  9.8   ALT (SGPT) U/L  --   --   --   --   --  18   AST (SGOT) U/L  --   --   --   --   --  24   TROPONIN T ng/mL  --   --   --   --  <0.010 <0.010   PROBNP pg/mL  --   --   --   --   --  98.3    < > = values in this interval not displayed.     Estimated Creatinine Clearance: 77.7 mL/min (by C-G formula based on SCr of 1.03 mg/dL).    Microbiology Results Abnormal     Procedure Component Value - Date/Time    Blood Culture - Blood, Arm, Right [[id number]] Collected:  11/05/19 1115    Lab Status:  Preliminary result Specimen:  Blood from Arm, Right Updated:  11/09/19 1130     Blood Culture No growth at 4 days    Blood Culture - Blood, Hand, Left [[id number]] Collected:  11/05/19 1120    Lab Status:  Preliminary result Specimen:  Blood from Hand, Left Updated:  11/09/19 1130     Blood Culture No growth at 4 days    Influenza Antigen, Rapid - Swab, Nasopharynx [[id number]]  (Normal) Collected:  11/05/19 0954    Lab Status:  Final result Specimen:  Swab from Nasopharynx Updated:  11/05/19 1034     Influenza A Ag, EIA Negative     Influenza B Ag, EIA Negative        Imaging Results (Last 24 Hours)     ** No results found for the last 24 hours. **        I have reviewed the medications:  Scheduled Meds:    amLODIPine 5 mg Oral Q24H   aspirin 81 mg Oral Daily   atorvastatin 20 mg Oral Nightly   budesonide-formoterol 2 puff Inhalation BID - RT   enoxaparin 40 mg Subcutaneous Daily   guaiFENesin 600 mg Oral Q12H   ipratropium-albuterol 3 mL Nebulization 4x Daily - RT   methylPREDNISolone sodium succinate 125 mg Intravenous Daily   OCUVITE-LUTEIN 1  tablet Oral Daily   pantoprazole 40 mg Oral Q AM   piperacillin-tazobactam 3.375 g Intravenous Q8H   sodium chloride 10 mL Intravenous Q12H     Continuous Infusions:     PRN Meds:.•  acetaminophen **OR** acetaminophen **OR** acetaminophen  •  bisacodyl  •  calcium carbonate EX  •  colchicine  •  HYDROcodone-acetaminophen  •  lactulose  •  magnesium sulfate **OR** magnesium sulfate **OR** magnesium sulfate  •  Morphine **AND** naloxone  •  ondansetron **OR** ondansetron  •  potassium chloride **OR** potassium chloride **OR** potassium chloride  •  potassium phosphate infusion greater than 15 mMoles **OR** potassium phosphate infusion greater than 15 mMoles **OR** potassium phosphate **OR** sodium phosphate IVPB **OR** sodium phosphate IVPB **OR** sodium phosphate IVPB  •  sodium chloride  •  sodium chloride    Assessment / Plan     Active Hospital Problems    Diagnosis  POA   • SIADH (syndrome of inappropriate ADH production) (CMS/HCC) [E22.2]  Yes   • Liver metastasis (CMS/HCC) [C78.7]  Yes   • Leukocytosis [D72.829]  Yes   • Postobstructive pneumonia related to malignancy [J18.9]  Yes   • General weakness [R53.1]  Yes   • Small cell lung cancer (CMS/HCC) [C34.90]  Yes   • Hyponatremia [E87.1]  Yes   • HTN (hypertension) [I10]  Yes   • HLD (hyperlipidemia) [E78.5]  Yes   • Anemia [D64.9]  Yes   • Pneumonia of right lower lobe due to infectious organism (CMS/HCC) [J18.1]  Yes      Resolved Hospital Problems   No resolved problems to display.     Brief Hospital Course to date:  Mr. Florentin Mcallister is a 69yoM with PMH significant for HTN, HLD and recently diagnosed small cell lung cancer with liver metastasis. He is followed by Dr. Argueta. He started chemotherapy on 6/19/2019. He had been feeling poorly since his most recent session of chemotherapy on 10/28/2019. Presented to [facility] ED 11/5/19. CT showed progression of disease and postobstructive pneumonia. Labs significant for .     Hyponatremia  - Appreciate  nephrology assistance  - Etiology felt to be related to lung malignancy/SIADH  - He is euvolemic - IV fluids discontinued   - s/p Lasix 10mg IV x 2 dose s  - Continue fluid restriction - will likely need fluid restriction on discharge   - Na 129 today   - On IV Sol[facility]Medrol [facility] [place] [facility]. Will decrease to prednisone 60mg PO daily   -  May need Florinef     Leukocytosis, resolved   Postobstructive pneumonia  - RLL ground glass opacity on CT chest   - Continue IV Zosyn, can convert to PO at discharge if needed   - Continue Symbicort inhaler and scheduled nebs  - Add mucinex to assist with sputum clearance     Small cell lung cancer  Liver metastasis  - Appreciate oncology and radiation oncology assistance  - Inpatient radiation with intention of shrinking hilar mass to help open his airway and clear the pneumonia as well as perhaps improve his SIADH/hyponatremia   - He has completed 3 of 5 sessions of radiation - final 2 planned for 11/11 and 11/12     Normocytic anemia  - Stable. Continue to monitor    General weakness, improving.   - He is now walking well. He has declined inpatient physical therapy  - Continue encourage ambulation and OOB     DVT Prophylaxis: Lovenox  Disposition: I expect the patient to be discharged likely home in a few days. Will be here through at least through 11/12 while he completes inpatient radiation    CODE STATUS:   Code Status and Medical Interventions:   Ordered at: 11/05/19 1523     Level Of Support Discussed With:    Patient     Code Status:    CPR     Medical Interventions (Level of Support Prior to Arrest):    Full     Comments:    patients wife is POA.     Electronically signed by Salma Pat PA-C, 11/10/19, 10:25 AM.

## 2019-11-11 NOTE — PROGRESS NOTES
DATE OF VISIT: 11/11/2019    Chief Complaint: Followup for extensive stage small cell lung cancer     SUBJECTIVE: The patient is feeling better.  His wife and daughter are at the bedside.  His breathing as well as cough has improved.  He  denied any fever or  chills, no night sweats, denied any headaches.     REVIEW OF SYSTEMS: All the other 9 systems are reviewed by me and negative  except what is mentioned in HPI.     PAST MEDICAL HISTORY/SOCIAL HISTORY/FAMILY HISTORY: Unchanged from my prior documentation done on November 5, 2019      Current Facility-Administered Medications:   •  acetaminophen (TYLENOL) tablet 650 mg, 650 mg, Oral, Q4H PRN **OR** acetaminophen (TYLENOL) 160 MG/5ML solution 650 mg, 650 mg, Oral, Q4H PRN **OR** acetaminophen (TYLENOL) suppository 650 mg, 650 mg, Rectal, Q4H PRN, Justice Silva MD  •  amLODIPine (NORVASC) tablet 5 mg, 5 mg, Oral, Q24H, Kevin Castaneda MD, 5 mg at 11/11/19 0838  •  aspirin chewable tablet 81 mg, 81 mg, Oral, Daily, Justice Silva MD, 81 mg at 11/11/19 0839  •  atorvastatin (LIPITOR) tablet 20 mg, 20 mg, Oral, Nightly, Justice Silva MD, 20 mg at 11/10/19 2143  •  bisacodyl (DULCOLAX) suppository 10 mg, 10 mg, Rectal, Daily PRN, Salma Pat PA-C  •  budesonide-formoterol (SYMBICORT) 160-4.5 MCG/ACT inhaler 2 puff, 2 puff, Inhalation, BID - RT, Justice Silva MD, 2 puff at 11/11/19 0748  •  calcium carbonate EX (TUMS EX) chewable tablet 1,500 mg, 1,500 mg, Oral, TID PRN, James Turnera, APRN  •  colchicine tablet 0.6 mg, 0.6 mg, Oral, Daily PRN, Justice Silva MD  •  enoxaparin (LOVENOX) syringe 40 mg, 40 mg, Subcutaneous, Daily, Mele Gandara MD, 40 mg at 11/11/19 0838  •  guaiFENesin (MUCINEX) 12 hr tablet 600 mg, 600 mg, Oral, Q12H, Salma Pat PA-C, 600 mg at 11/11/19 0838  •  HYDROcodone-acetaminophen (NORCO) 7.5-325 MG per tablet 1 tablet, 1 tablet, Oral, Q4H PRN, Justice Silva MD, 1 tablet at 11/10/19  2150  •  ipratropium-albuterol (DUO-NEB) nebulizer solution 3 mL, 3 mL, Nebulization, 4x Daily - RT, Justice Silva MD, 3 mL at 11/11/19 0747  •  lactulose (CHRONULAC) 10 GM/15ML solution 10 g, 10 g, Oral, Daily PRN, Ulysses Argueta MD, 10 g at 11/07/19 1503  •  Magnesium Sulfate 2 gram Bolus, followed by 8 gram infusion (total Mg dose 10 grams)- Mg less than or equal to 1mg/dL, 2 g, Intravenous, PRN **OR** Magnesium Sulfate 2 gram / 50mL Infusion (GIVE X 3 BAGS TO EQUAL 6GM TOTAL DOSE) - Mg 1.1 - 1.5 mg/dl, 2 g, Intravenous, PRN, Last Rate: 25 mL/hr at 11/06/19 0010, 2 g at 11/06/19 0010 **OR** Magnesium Sulfate 4 gram infusion- Mg 1.6-1.9 mg/dL, 4 g, Intravenous, PRN, Justice Silva MD  •  morphine injection 1 mg, 1 mg, Intravenous, Q4H PRN **AND** naloxone (NARCAN) injection 0.4 mg, 0.4 mg, Intravenous, Q5 Min PRN, Justice Silva MD  •  OCUVITE-LUTEIN (OCUVITE) tablet 1 tablet, 1 tablet, Oral, Daily, Justice Silva MD, 1 tablet at 11/11/19 0839  •  ondansetron (ZOFRAN) tablet 4 mg, 4 mg, Oral, Q6H PRN, 4 mg at 11/07/19 2103 **OR** ondansetron (ZOFRAN) injection 4 mg, 4 mg, Intravenous, Q6H PRN, Justice Silva MD  •  pantoprazole (PROTONIX) EC tablet 40 mg, 40 mg, Oral, Q AM, Elvira Ward, APRN, 40 mg at 11/11/19 0600  •  piperacillin-tazobactam (ZOSYN) 3.375 g in iso-osmotic dextrose 50 ml (premix), 3.375 g, Intravenous, Q8H, Mele Gandara MD, 3.375 g at 11/11/19 0838  •  potassium chloride (MICRO-K) CR capsule 40 mEq, 40 mEq, Oral, PRN **OR** potassium chloride (KLOR-CON) packet 40 mEq, 40 mEq, Oral, PRN **OR** potassium chloride 10 mEq in 100 mL IVPB, 10 mEq, Intravenous, Q1H PRN, Justice Silva MD  •  potassium phosphate 45 mmol in sodium chloride 0.9 % 500 mL infusion, 45 mmol, Intravenous, PRN **OR** potassium phosphate 30 mmol in sodium chloride 0.9 % 250 mL infusion, 30 mmol, Intravenous, PRN **OR** potassium phosphate 15 mmol in sodium chloride 0.9 % 100 mL infusion, 15 mmol,  "Intravenous, PRN **OR** sodium phosphates 45 mmol in sodium chloride 0.9 % 250 mL IVPB, 45 mmol, Intravenous, PRN **OR** sodium phosphates 30 mmol in sodium chloride 0.9 % 250 mL IVPB, 30 mmol, Intravenous, PRN **OR** sodium phosphates 15 mmol in sodium chloride 0.9 % 250 mL IVPB, 15 mmol, Intravenous, PRN, Justice Silva MD  •  predniSONE (DELTASONE) tablet 60 mg, 60 mg, Oral, Daily With Breakfast, Salma Pat PA-C, 60 mg at 11/11/19 0838  •  sodium chloride 0.9 % flush 10 mL, 10 mL, Intravenous, PRN, Emergency, Triage Protocol, MD  •  sodium chloride 0.9 % flush 10 mL, 10 mL, Intravenous, Q12H, Justice Silva MD, 10 mL at 11/11/19 0839  •  sodium chloride 0.9 % flush 10 mL, 10 mL, Intravenous, PRN, Justice Silva MD    PHYSICAL EXAMINATION:   /69 (BP Location: Right arm, Patient Position: Lying)   Pulse 64   Temp 97.8 °F (36.6 °C) (Oral)   Resp 16   Ht 172.7 cm (68\")   Wt 97.5 kg (215 lb)   SpO2 97%   BMI 32.69 kg/m²    ECOG Performance Status: 1 - Symptomatic but completely ambulatory  GENERAL: Age appropriate. No acute distress.   NEURO/PSYCH: A&O x 3, strength 5/5 in all muscle groups  HEENT: Head atraumatic, normocephalic.   NECK: Supple. No JVD. No lymphadenopathy.   LUNGS: Clear to auscultation bilaterally. No wheezing. No rhonchi.   HEART: Regular rate and rhythm. S1, S2, no murmurs.   ABDOMEN: Soft, nontender, nondistended. Bowel sounds positive. No  hepatosplenomegaly.   EXTREMITIES: No clubbing, cyanosis, or edema.   SKIN: No rashes. No purpura.       Admission on 11/05/2019   Component Date Value Ref Range Status   • Extra Tube 11/05/2019 hold for add-on   Final    Auto resulted   • Extra Tube 11/05/2019 Hold for add-ons.   Final    Auto resulted.   • Extra Tube 11/05/2019 hold for add-on   Final    Auto resulted   • Extra Tube 11/05/2019 Hold for add-ons.   Final    Auto resulted.   • Influenza A Ag, EIA 11/05/2019 Negative  Negative Final   • Influenza B Ag, EIA " 11/05/2019 Negative  Negative Final   • Troponin T 11/05/2019 <0.010  0.000 - 0.030 ng/mL Final   • Glucose 11/05/2019 119* 65 - 99 mg/dL Final   • BUN 11/05/2019 15  8 - 23 mg/dL Final   • Creatinine 11/05/2019 0.94  0.76 - 1.27 mg/dL Final   • Sodium 11/05/2019 116* 136 - 145 mmol/L Final   • Potassium 11/05/2019 4.7  3.5 - 5.2 mmol/L Final   • Chloride 11/05/2019 81* 98 - 107 mmol/L Final   • CO2 11/05/2019 21.0* 22.0 - 29.0 mmol/L Final   • Calcium 11/05/2019 9.8  8.6 - 10.5 mg/dL Final   • Total Protein 11/05/2019 7.9  6.0 - 8.5 g/dL Final   • Albumin 11/05/2019 4.50  3.50 - 5.20 g/dL Final   • ALT (SGPT) 11/05/2019 18  1 - 41 U/L Final   • AST (SGOT) 11/05/2019 24  1 - 40 U/L Final   • Alkaline Phosphatase 11/05/2019 79  39 - 117 U/L Final   • Total Bilirubin 11/05/2019 0.7  0.2 - 1.2 mg/dL Final   • eGFR Non African Amer 11/05/2019 80  >60 mL/min/1.73 Final   • Globulin 11/05/2019 3.4  gm/dL Final   • A/G Ratio 11/05/2019 1.3  g/dL Final   • BUN/Creatinine Ratio 11/05/2019 16.0  7.0 - 25.0 Final   • Anion Gap 11/05/2019 14.0  5.0 - 15.0 mmol/L Final   • proBNP 11/05/2019 98.3  5.0 - 900.0 pg/mL Final   • Creatine Kinase 11/05/2019 318* 20 - 200 U/L Final   • Magnesium 11/05/2019 1.5* 1.6 - 2.4 mg/dL Final   • Blood Culture 11/05/2019 No growth at 5 days   Final   • Blood Culture 11/05/2019 No growth at 5 days   Final   • Lactate 11/05/2019 0.9  0.5 - 2.0 mmol/L Final    Falsely depressed results may occur on samples drawn from patients receiving N-Acetylcysteine (NAC) or Metamizole.   • WBC 11/05/2019 9.12  3.40 - 10.80 10*3/mm3 Final   • RBC 11/05/2019 4.19  4.14 - 5.80 10*6/mm3 Final   • Hemoglobin 11/05/2019 12.9* 13.0 - 17.7 g/dL Final   • Hematocrit 11/05/2019 37.1* 37.5 - 51.0 % Final   • MCV 11/05/2019 88.5  79.0 - 97.0 fL Final   • MCH 11/05/2019 30.8  26.6 - 33.0 pg Final   • MCHC 11/05/2019 34.8  31.5 - 35.7 g/dL Final   • RDW 11/05/2019 12.9  12.3 - 15.4 % Final   • RDW-SD 11/05/2019 41.6  37.0 -  54.0 fl Final   • MPV 11/05/2019 9.7  6.0 - 12.0 fL Final   • Platelets 11/05/2019 174  140 - 450 10*3/mm3 Final   • Neutrophil % 11/05/2019 71.9  42.7 - 76.0 % Final   • Lymphocyte % 11/05/2019 16.7* 19.6 - 45.3 % Final   • Monocyte % 11/05/2019 10.7  5.0 - 12.0 % Final   • Eosinophil % 11/05/2019 0.2* 0.3 - 6.2 % Final   • Basophil % 11/05/2019 0.2  0.0 - 1.5 % Final   • Immature Grans % 11/05/2019 0.3  0.0 - 0.5 % Final   • Neutrophils, Absolute 11/05/2019 6.55  1.70 - 7.00 10*3/mm3 Final   • Lymphocytes, Absolute 11/05/2019 1.52  0.70 - 3.10 10*3/mm3 Final   • Monocytes, Absolute 11/05/2019 0.98* 0.10 - 0.90 10*3/mm3 Final   • Eosinophils, Absolute 11/05/2019 0.02  0.00 - 0.40 10*3/mm3 Final   • Basophils, Absolute 11/05/2019 0.02  0.00 - 0.20 10*3/mm3 Final   • Immature Grans, Absolute 11/05/2019 0.03  0.00 - 0.05 10*3/mm3 Final   • nRBC 11/05/2019 0.0  0.0 - 0.2 /100 WBC Final   • Troponin T 11/05/2019 <0.010  0.000 - 0.030 ng/mL Final   • Glucose 11/06/2019 152* 65 - 99 mg/dL Final   • BUN 11/06/2019 19  8 - 23 mg/dL Final   • Creatinine 11/06/2019 0.92  0.76 - 1.27 mg/dL Final   • Sodium 11/06/2019 117* 136 - 145 mmol/L Final   • Potassium 11/06/2019 5.0  3.5 - 5.2 mmol/L Final   • Chloride 11/06/2019 86* 98 - 107 mmol/L Final   • CO2 11/06/2019 17.0* 22.0 - 29.0 mmol/L Final   • Calcium 11/06/2019 8.8  8.6 - 10.5 mg/dL Final   • eGFR Non African Amer 11/06/2019 82  >60 mL/min/1.73 Final   • BUN/Creatinine Ratio 11/06/2019 20.7  7.0 - 25.0 Final   • Anion Gap 11/06/2019 14.0  5.0 - 15.0 mmol/L Final   • WBC 11/06/2019 9.82  3.40 - 10.80 10*3/mm3 Final   • RBC 11/06/2019 3.73* 4.14 - 5.80 10*6/mm3 Final   • Hemoglobin 11/06/2019 11.6* 13.0 - 17.7 g/dL Final   • Hematocrit 11/06/2019 33.9* 37.5 - 51.0 % Final   • MCV 11/06/2019 90.9  79.0 - 97.0 fL Final   • MCH 11/06/2019 31.1  26.6 - 33.0 pg Final   • MCHC 11/06/2019 34.2  31.5 - 35.7 g/dL Final   • RDW 11/06/2019 12.8  12.3 - 15.4 % Final   • RDW-SD  11/06/2019 42.6  37.0 - 54.0 fl Final   • MPV 11/06/2019 9.6  6.0 - 12.0 fL Final   • Platelets 11/06/2019 159  140 - 450 10*3/mm3 Final   • Neutrophil % 11/06/2019 85.4* 42.7 - 76.0 % Final   • Lymphocyte % 11/06/2019 7.9* 19.6 - 45.3 % Final   • Monocyte % 11/06/2019 6.2  5.0 - 12.0 % Final   • Eosinophil % 11/06/2019 0.0* 0.3 - 6.2 % Final   • Basophil % 11/06/2019 0.0  0.0 - 1.5 % Final   • Immature Grans % 11/06/2019 0.5  0.0 - 0.5 % Final   • Neutrophils, Absolute 11/06/2019 8.38* 1.70 - 7.00 10*3/mm3 Final   • Lymphocytes, Absolute 11/06/2019 0.78  0.70 - 3.10 10*3/mm3 Final   • Monocytes, Absolute 11/06/2019 0.61  0.10 - 0.90 10*3/mm3 Final   • Eosinophils, Absolute 11/06/2019 0.00  0.00 - 0.40 10*3/mm3 Final   • Basophils, Absolute 11/06/2019 0.00  0.00 - 0.20 10*3/mm3 Final   • Immature Grans, Absolute 11/06/2019 0.05  0.00 - 0.05 10*3/mm3 Final   • nRBC 11/06/2019 0.0  0.0 - 0.2 /100 WBC Final   • Phosphorus 11/06/2019 2.6  2.5 - 4.5 mg/dL Final   • Magnesium 11/06/2019 2.5* 1.6 - 2.4 mg/dL Final   • WBC 11/07/2019 11.44* 3.40 - 10.80 10*3/mm3 Final   • RBC 11/07/2019 3.47* 4.14 - 5.80 10*6/mm3 Final   • Hemoglobin 11/07/2019 10.9* 13.0 - 17.7 g/dL Final   • Hematocrit 11/07/2019 32.0* 37.5 - 51.0 % Final   • MCV 11/07/2019 92.2  79.0 - 97.0 fL Final   • MCH 11/07/2019 31.4  26.6 - 33.0 pg Final   • MCHC 11/07/2019 34.1  31.5 - 35.7 g/dL Final   • RDW 11/07/2019 12.9  12.3 - 15.4 % Final   • RDW-SD 11/07/2019 43.5  37.0 - 54.0 fl Final   • MPV 11/07/2019 9.7  6.0 - 12.0 fL Final   • Platelets 11/07/2019 169  140 - 450 10*3/mm3 Final   • Glucose 11/07/2019 131* 65 - 99 mg/dL Final   • BUN 11/07/2019 19  8 - 23 mg/dL Final   • Creatinine 11/07/2019 0.93  0.76 - 1.27 mg/dL Final   • Sodium 11/07/2019 118* 136 - 145 mmol/L Final   • Potassium 11/07/2019 4.9  3.5 - 5.2 mmol/L Final   • Chloride 11/07/2019 85* 98 - 107 mmol/L Final   • CO2 11/07/2019 20.0* 22.0 - 29.0 mmol/L Final   • Calcium 11/07/2019 8.6   8.6 - 10.5 mg/dL Final   • eGFR Non African Amer 11/07/2019 81  >60 mL/min/1.73 Final   • BUN/Creatinine Ratio 11/07/2019 20.4  7.0 - 25.0 Final   • Anion Gap 11/07/2019 13.0  5.0 - 15.0 mmol/L Final   • Osmolality, Urine 11/08/2019 589  300-1,100 mOsm/kg Final   • Sodium, Urine 11/08/2019 99  mmol/L Final   • Potassium, Urine 11/08/2019 33.3  mmol/L Final   • Cortisol - AM 11/08/2019 1.52  mcg/dL Final   • WBC 11/08/2019 10.64  3.40 - 10.80 10*3/mm3 Final   • RBC 11/08/2019 3.54* 4.14 - 5.80 10*6/mm3 Final   • Hemoglobin 11/08/2019 10.9* 13.0 - 17.7 g/dL Final   • Hematocrit 11/08/2019 32.1* 37.5 - 51.0 % Final   • MCV 11/08/2019 90.7  79.0 - 97.0 fL Final   • MCH 11/08/2019 30.8  26.6 - 33.0 pg Final   • MCHC 11/08/2019 34.0  31.5 - 35.7 g/dL Final   • RDW 11/08/2019 13.0  12.3 - 15.4 % Final   • RDW-SD 11/08/2019 43.1  37.0 - 54.0 fl Final   • MPV 11/08/2019 9.7  6.0 - 12.0 fL Final   • Platelets 11/08/2019 181  140 - 450 10*3/mm3 Final   • Glucose 11/08/2019 116* 65 - 99 mg/dL Final   • BUN 11/08/2019 16  8 - 23 mg/dL Final   • Creatinine 11/08/2019 0.86  0.76 - 1.27 mg/dL Final   • Sodium 11/08/2019 120* 136 - 145 mmol/L Final   • Potassium 11/08/2019 4.0  3.5 - 5.2 mmol/L Final   • Chloride 11/08/2019 86* 98 - 107 mmol/L Final   • CO2 11/08/2019 19.0* 22.0 - 29.0 mmol/L Final   • Calcium 11/08/2019 8.7  8.6 - 10.5 mg/dL Final   • eGFR Non  Amer 11/08/2019 88  >60 mL/min/1.73 Final   • BUN/Creatinine Ratio 11/08/2019 18.6  7.0 - 25.0 Final   • Anion Gap 11/08/2019 15.0  5.0 - 15.0 mmol/L Final   • TSH 11/08/2019 1.310  0.270 - 4.200 uIU/mL Final   • Uric Acid 11/08/2019 3.1* 3.4 - 7.0 mg/dL Final    Falsely depressed results may occur on samples drawn from patients receiving N-Acetylcysteine (NAC) or Metamizole.   • Osmolality 11/08/2019 257* 275 - 295 mOsm/kg Final   • Sodium, Urine 11/09/2019 92  mmol/L Final   • Potassium, Urine 11/09/2019 9.6  mmol/L Final   • Osmolality, Urine 11/09/2019 466   300-1,100 mOsm/kg Final   • WBC 11/09/2019 7.37  3.40 - 10.80 10*3/mm3 Final   • RBC 11/09/2019 3.56* 4.14 - 5.80 10*6/mm3 Final   • Hemoglobin 11/09/2019 10.9* 13.0 - 17.7 g/dL Final   • Hematocrit 11/09/2019 32.2* 37.5 - 51.0 % Final   • MCV 11/09/2019 90.4  79.0 - 97.0 fL Final   • MCH 11/09/2019 30.6  26.6 - 33.0 pg Final   • MCHC 11/09/2019 33.9  31.5 - 35.7 g/dL Final   • RDW 11/09/2019 12.9  12.3 - 15.4 % Final   • RDW-SD 11/09/2019 43.0  37.0 - 54.0 fl Final   • MPV 11/09/2019 9.4  6.0 - 12.0 fL Final   • Platelets 11/09/2019 165  140 - 450 10*3/mm3 Final   • Glucose 11/09/2019 105* 65 - 99 mg/dL Final   • BUN 11/09/2019 16  8 - 23 mg/dL Final   • Creatinine 11/09/2019 0.93  0.76 - 1.27 mg/dL Final   • Sodium 11/09/2019 125* 136 - 145 mmol/L Final   • Potassium 11/09/2019 3.9  3.5 - 5.2 mmol/L Final   • Chloride 11/09/2019 89* 98 - 107 mmol/L Final   • CO2 11/09/2019 24.0  22.0 - 29.0 mmol/L Final   • Calcium 11/09/2019 9.0  8.6 - 10.5 mg/dL Final   • eGFR Non African Amer 11/09/2019 81  >60 mL/min/1.73 Final   • BUN/Creatinine Ratio 11/09/2019 17.2  7.0 - 25.0 Final   • Anion Gap 11/09/2019 12.0  5.0 - 15.0 mmol/L Final   • Sodium, Urine 11/10/2019 135  mmol/L Final   • Potassium, Urine 11/10/2019 13.5  mmol/L Final   • Osmolality, Urine 11/10/2019 613  300-1,100 mOsm/kg Final   • Glucose 11/10/2019 111* 65 - 99 mg/dL Final   • BUN 11/10/2019 21  8 - 23 mg/dL Final   • Creatinine 11/10/2019 1.03  0.76 - 1.27 mg/dL Final   • Sodium 11/10/2019 129* 136 - 145 mmol/L Final   • Potassium 11/10/2019 3.7  3.5 - 5.2 mmol/L Final   • Chloride 11/10/2019 91* 98 - 107 mmol/L Final   • CO2 11/10/2019 24.0  22.0 - 29.0 mmol/L Final   • Calcium 11/10/2019 9.4  8.6 - 10.5 mg/dL Final   • eGFR Non African Amer 11/10/2019 72  >60 mL/min/1.73 Final   • BUN/Creatinine Ratio 11/10/2019 20.4  7.0 - 25.0 Final   • Anion Gap 11/10/2019 14.0  5.0 - 15.0 mmol/L Final   • Sodium, Urine 11/11/2019 170  mmol/L Final   • Potassium,  Urine 11/11/2019 23.0  mmol/L Final   • Osmolality, Urine 11/11/2019 851  300-1,100 mOsm/kg Final       No results found.    ASSESSMENT: The patient is a very pleasant 69 y.o. male  with progressive small cell lung cancer      PLAN:  1.  Continue palliative radiation, patient received treatment number 4 out of 5 planned.  2.  I will set the patient up to follow-up with me as an outpatient with a second line treatment using Opdivo plus Yervoy.  3.  Continue IV antibiotics as well as IV steroids  4.  Monitor sodium level.  Sodium is up to 131.  5.  We will continue to wean off prednisone slowly.  Patient will start 60 mg daily today and tomorrow he will be discharged home on prednisone 40 mg daily with 10 mg taper every 3 days.  Discussed with the hospitalist nurse practitioner to coordinate patient's care.  Ulysses Argueta MD  11/11/2019

## 2019-11-11 NOTE — PROGRESS NOTES
[facility] Medicine Services  PROGRESS NOTE    Patient Name: Florentin Mcallister  : 1950  MRN: [id number]    Date of Admission: 2019  Primary Care Physician: Bar Gage MD    Subjective     CC: f/u SCC with liver mets, postobstructive pneumonia, hyponatremia     HPI:  Up in chair after radiology this morning. Shortness of breath about the same, coughing up some phlegm. Neb treatments are helpful and he has a nebulizer at home already. Hopeful he'll be able to go home tomorrow.     Review of Systems  Gen- No fevers, chills  CV- No chest pain, palpitations  Resp- (+) cough/dyspnea improving, cough is productive   GI- No N/V/D, abd pain, no constipation     Objective     Vital Signs:   Temp:  [97.8 °F (36.6 °C)-98.1 °F (36.7 °C)] 97.9 °F (36.6 °C)  Heart Rate:  [57-90] 70  Resp:  [16-18] 16  BP: (130-153)/(57-69) 134/57        Physical Exam:  Constitutional: No acute distress, awake, alert and conversant. Sitting upright in bed.   HENT: NCAT, mucous membranes moist. Hard of hearing.   Respiratory: Mild inspiratory and expiratory wheezing, no rales. Normal respiratory effort on room air    Cardiovascular: RRR, no murmurs, rubs, or gallops, palpable pedal pulses bilaterally  Gastrointestinal: Positive bowel sounds, soft, nontender, nondistended  Musculoskeletal: No bilateral ankle edema or BLE tenderness to palpation  Psychiatric: Appropriate affect, cooperative  Neurologic: Oriented x 3, strength symmetric in all extremities, Cranial Nerves grossly intact to confrontation, speech clear  Skin: No rashes    Results Reviewed:    Results from last 7 days   Lab Units 19  0719 19  0543 19  0553   WBC 10*3/mm3 7.37 10.64 11.44*   HEMOGLOBIN g/dL 10.9* 10.9* 10.9*   HEMATOCRIT % 32.2* 32.1* 32.0*   PLATELETS 10*3/mm3 165 181 169     Results from last 7 days   Lab Units 19  0953 11/10/19  0905 19  0719  19  1217 19  0953   SODIUM mmol/L  131* 129* 125*   < >  --  116*   POTASSIUM mmol/L 4.2 3.7 3.9   < >  --  4.7   CHLORIDE mmol/L 93* 91* 89*   < >  --  81*   CO2 mmol/L 26.0 24.0 24.0   < >  --  21.0*   BUN mg/dL 23 21 16   < >  --  15   CREATININE mg/dL 1.13 1.03 0.93   < >  --  0.94   GLUCOSE mg/dL 140* 111* 105*   < >  --  119*   CALCIUM mg/dL 9.8 9.4 9.0   < >  --  9.8   ALT (SGPT) U/L  --   --   --   --   --  18   AST (SGOT) U/L  --   --   --   --   --  24   TROPONIN T ng/mL  --   --   --   --  <0.010 <0.010   PROBNP pg/mL  --   --   --   --   --  98.3    < > = values in this interval not displayed.     Estimated Creatinine Clearance: 70.8 mL/min (by C-G formula based on SCr of 1.13 mg/dL).    Microbiology Results Abnormal     Procedure Component Value - Date/Time    Blood Culture - Blood, Arm, Right [[id number]] Collected:  11/05/19 1115    Lab Status:  Final result Specimen:  Blood from Arm, Right Updated:  11/10/19 1130     Blood Culture No growth at 5 days    Blood Culture - Blood, Hand, Left [[id number]] Collected:  11/05/19 1120    Lab Status:  Final result Specimen:  Blood from Hand, Left Updated:  11/10/19 1130     Blood Culture No growth at 5 days    Influenza Antigen, Rapid - Swab, Nasopharynx [[id number]]  (Normal) Collected:  11/05/19 0954    Lab Status:  Final result Specimen:  Swab from Nasopharynx Updated:  11/05/19 1034     Influenza A Ag, EIA Negative     Influenza B Ag, EIA Negative        Imaging Results (Last 24 Hours)     ** No results found for the last 24 hours. **        I have reviewed the medications:  Scheduled Meds:    amLODIPine 5 mg Oral Q24H   aspirin 81 mg Oral Daily   atorvastatin 20 mg Oral Nightly   budesonide-formoterol 2 puff Inhalation BID - RT   enoxaparin 40 mg Subcutaneous Daily   guaiFENesin 600 mg Oral Q12H   ipratropium-albuterol 3 mL Nebulization 4x Daily - RT   OCUVITE-LUTEIN 1 tablet Oral Daily   pantoprazole 40 mg Oral Q AM   piperacillin-tazobactam 3.375 g Intravenous Q8H   predniSONE 60 mg Oral  Daily With Breakfast   sodium chloride 10 mL Intravenous Q12H     Continuous Infusions:     PRN Meds:  •  acetaminophen **OR** acetaminophen **OR** acetaminophen  •  bisacodyl  •  calcium carbonate EX  •  colchicine  •  HYDROcodone-acetaminophen  •  lactulose  •  magnesium sulfate **OR** magnesium sulfate **OR** magnesium sulfate  •  Morphine **AND** naloxone  •  ondansetron **OR** ondansetron  •  potassium chloride **OR** potassium chloride **OR** potassium chloride  •  potassium phosphate infusion greater than 15 mMoles **OR** potassium phosphate infusion greater than 15 mMoles **OR** potassium phosphate **OR** sodium phosphate IVPB **OR** sodium phosphate IVPB **OR** sodium phosphate IVPB  •  sodium chloride  •  sodium chloride    Assessment / Plan     Active Hospital Problems    Diagnosis  POA   • SIADH (syndrome of inappropriate ADH production) (CMS/HCC) [E22.2]  Yes   • Liver metastasis (CMS/HCC) [C78.7]  Yes   • Leukocytosis [D72.829]  Yes   • Postobstructive pneumonia related to malignancy [J18.9]  Yes   • General weakness [R53.1]  Yes   • Small cell lung cancer (CMS/HCC) [C34.90]  Yes   • Hyponatremia [E87.1]  Yes   • HTN (hypertension) [I10]  Yes   • HLD (hyperlipidemia) [E78.5]  Yes   • Anemia [D64.9]  Yes   • Pneumonia of right lower lobe due to infectious organism (CMS/HCC) [J18.1]  Yes      Resolved Hospital Problems   No resolved problems to display.     Brief Hospital Course to date:  Mr. Florentin Mcallister is a 69yoM with PMH significant for HTN, HLD and recently diagnosed small cell lung cancer with liver metastasis. He is followed by Dr. Argueta. He started chemotherapy on 6/19/2019. He had been feeling poorly since his most recent session of chemotherapy on 10/28/2019. Presented to [facility] ED 11/5/19. CT showed progression of disease and postobstructive pneumonia. Labs significant for .     Hyponatremia  - Appreciate nephrology assistance  - Etiology felt to be related to lung malignancy/SIADH  - He  is euvolemic - IV fluids discontinued   - s/p Lasix 10mg IV x 2 doses  - Continue fluid restriction - will likely need fluid restriction on discharge   - Na 131 today   - s/p IV Solu-Medrol - he has been tapered to prednisone PO. I discussed prednisone taper with Dr. Argueta today - he will outline his plan for prednisone taper in his progress note for today    Leukocytosis, resolved   Postobstructive pneumonia  - RLL ground glass opacity on CT chest   - Continue IV Zosyn, can convert to PO at discharge if needed   - Continue Symbicort inhaler and scheduled nebs - encourage continued scheduled nebs following discharge to keep airways open   - Getting benefit from mucinex to assist with sputum clearance     Small cell lung cancer  Liver metastasis  - Appreciate oncology and radiation oncology assistance  - Inpatient radiation with intention of shrinking hilar mass to help open his airway and clear the pneumonia as well as perhaps improve his SIADH/hyponatremia   - He has completed 3 of 5 sessions of radiation - final 2 planned for 11/11 and 11/12     Normocytic anemia  - Stable. Continue to monitor    General weakness, improving.   - He is now walking well. He has declined inpatient physical therapy  - Continue encourage ambulation and OOB     DVT Prophylaxis: Lovenox  Disposition: I expect the patient to be discharged likely home tomorrow if he is doing well     CODE STATUS:   Code Status and Medical Interventions:   Ordered at: 11/05/19 1523     Level Of Support Discussed With:    Patient     Code Status:    CPR     Medical Interventions (Level of Support Prior to Arrest):    Full     Comments:    patients wife is POA.     Electronically signed by Salma Pat PA-C, 11/11/19, 3:06 PM.

## 2019-11-11 NOTE — PROGRESS NOTES
Continued Stay Note  [facility]     Patient Name: Florentin Mcallister  MRN: [id number]  Today's Date: 11/11/2019    Admit Date: 11/5/2019    Discharge Plan     Row Name 11/11/19 1047       Plan    Plan  The patient is getting radiation treatment today and Tuesday and then the discharge plan is for the patient to be able to return to his home in [place] at discharge.    Patient/Family in Agreement with Plan  yes        Discharge Codes    No documentation.       Expected Discharge Date and Time     Expected Discharge Date Expected Discharge Time    Nov 13, 2019             LUIS Bhatia

## 2019-11-11 NOTE — PLAN OF CARE
Problem: Patient Care Overview  Goal: Plan of Care Review  Outcome: Ongoing (interventions implemented as appropriate)   11/11/19 5291   Coping/Psychosocial   Plan of Care Reviewed With patient   Plan of Care Review   Progress improving   OTHER   Outcome Summary VSS. Pt went for radiation today, planning for another round tomorrow. No complaints of pain/discomfort. Maintained on RA, continuing with IV abx. Will continue to monitor.

## 2019-11-11 NOTE — PROGRESS NOTES
" LOS: 6 days   Patient Care Team:  Bar Gage MD as PCP - General (Pulmonary Disease)    Chief Complaint: Hyponatremia     Subjective   No over night events    History taken from: patient family    Objective     Vital Sign Min/Max for last 24 hours  Temp  Min: 97.8 °F (36.6 °C)  Max: 98.1 °F (36.7 °C)   BP  Min: 130/67  Max: 153/69   Pulse  Min: 57  Max: 90   Resp  Min: 16  Max: 18   No Data Recorded   No Data Recorded   No Data Recorded     Constitutional: He is oriented to person, place, and time. He appears well-developed and well-nourished.   HENT:   Head: Normocephalic and atraumatic.   Eyes: EOM are normal. Pupils are equal, round, and reactive to light.   Neck: Normal range of motion. Neck supple.   Cardiovascular: Normal rate, regular rhythm and normal heart sounds.   Pulmonary/Chest: Effort normal and breath sounds normal.   Abdominal: Soft. Bowel sounds are normal.   Musculoskeletal: Normal range of motion. He exhibits no edema or deformity.   Neurological: He is alert and oriented x3  Skin: Skin is warm and dry. No rash noted. No erythema. No pallor.   Psychiatric: He has a normal mood and affect. His behavior is normal.      Flowsheet Rows      First Filed Value   Admission Height  172.7 cm (68\") Documented at 11/05/2019 0943   Admission Weight  97.5 kg (215 lb) Documented at 11/05/2019 0943          I/O this shift:  In: 50 [IV Piggyback:50]  Out: -   I/O last 3 completed shifts:  In: 1230 [P.O.:1080; IV Piggyback:150]  Out: -     Objective:  Vital signs: (most recent): Blood pressure 134/57, pulse 70, temperature 97.9 °F (36.6 °C), temperature source Oral, resp. rate 16, height 172.7 cm (68\"), weight 97.5 kg (215 lb), SpO2 97 %.              Results Review:     I reviewed the patient's new clinical results.    WBC WBC   Date Value Ref Range Status   11/09/2019 7.37 3.40 - 10.80 10*3/mm3 Final      HGB Hemoglobin   Date Value Ref Range Status   11/09/2019 10.9 (L) 13.0 - 17.7 g/dL Final    "   HCT Hematocrit   Date Value Ref Range Status   11/09/2019 32.2 (L) 37.5 - 51.0 % Final      Platlets No results found for: LABPLAT   MCV MCV   Date Value Ref Range Status   11/09/2019 90.4 79.0 - 97.0 fL Final          Sodium Sodium   Date Value Ref Range Status   11/11/2019 131 (L) 136 - 145 mmol/L Final   11/10/2019 129 (L) 136 - 145 mmol/L Final   11/09/2019 125 (L) 136 - 145 mmol/L Final      Potassium Potassium   Date Value Ref Range Status   11/11/2019 4.2 3.5 - 5.2 mmol/L Final   11/10/2019 3.7 3.5 - 5.2 mmol/L Final   11/09/2019 3.9 3.5 - 5.2 mmol/L Final      Chloride Chloride   Date Value Ref Range Status   11/11/2019 93 (L) 98 - 107 mmol/L Final   11/10/2019 91 (L) 98 - 107 mmol/L Final   11/09/2019 89 (L) 98 - 107 mmol/L Final      CO2 CO2   Date Value Ref Range Status   11/11/2019 26.0 22.0 - 29.0 mmol/L Final   11/10/2019 24.0 22.0 - 29.0 mmol/L Final   11/09/2019 24.0 22.0 - 29.0 mmol/L Final      BUN BUN   Date Value Ref Range Status   11/11/2019 23 8 - 23 mg/dL Final   11/10/2019 21 8 - 23 mg/dL Final   11/09/2019 16 8 - 23 mg/dL Final      Creatinine Creatinine   Date Value Ref Range Status   11/11/2019 1.13 0.76 - 1.27 mg/dL Final   11/10/2019 1.03 0.76 - 1.27 mg/dL Final   11/09/2019 0.93 0.76 - 1.27 mg/dL Final      Calcium Calcium   Date Value Ref Range Status   11/11/2019 9.8 8.6 - 10.5 mg/dL Final   11/10/2019 9.4 8.6 - 10.5 mg/dL Final   11/09/2019 9.0 8.6 - 10.5 mg/dL Final      PO4 No results found for: CAPO4   Albumin No results found for: ALBUMIN   Magnesium No results found for: MG   Uric Acid No results found for: URICACID     Medication Review: Yes    Assessment/Plan       HTN (hypertension)    HLD (hyperlipidemia)    Anemia    Hyponatremia    Pneumonia of right lower lobe due to infectious organism (CMS/HCC)    Small cell lung cancer (CMS/HCC)    Postobstructive pneumonia related to malignancy    General weakness    SIADH (syndrome of inappropriate ADH production) (CMS/HCC)     Liver metastasis (CMS/HCC)    Leukocytosis      Assessment & Plan    Condition: In serious condition.  Improving.     #Hyponatremia:  Euvolemic hyposmolar hyponatremia   - Urine studies consistent with SIADH in the setting of underlying SCL Ca  - Cortisol low 1.52 ( Recently started on prednisone--??)  - TSH normal   #Hyperkalemia: Improved  #Me acidosis: Improved  #Metastatic lung cancer: On immune check point inhibitor therapy   Plan for palliative radiation                Plan:     #Hyponatremia likely related to SIADH in the setting of SCLL.  Urine Na+K higher than serum Na. Suggesting positive free water balance. Therefore free water restriction alone will eventually correct the hyponatremia.   #Given 2 doses of lasix to lower urine osm and promote free water loss   #Interestingly patient has low cortisol low ( diagnostic of AI). PD-1 inh known to cause autoimmune problems therefor possible adrenal insufficency.He was started on prednisone 2 days prior to admission unclear how much that would have effected. Regardless he is on steriods for now. May need fludrocortisone in furture if hyponatremia is not improving or he develops hyperkalemia. bp stable  #Encourage good po intake.   # continue Ensure protein supplement twice a day this will promote free water loss.    I discussed the patients findings and my recommendations with patient, family and nursing staff    Bret York MD  11/11/19  2:08 PM

## 2019-11-11 NOTE — PROGRESS NOTES
Continued Stay Note   [facility]     Patient Name: Florentin Mcallister  MRN: [id number]  Today's Date: 11/11/2019    Admit Date: 11/5/2019    Discharge Plan     Row Name 11/11/19 1413       Plan    Plan  update    Patient/Family in Agreement with Plan  yes    Plan Comments  Spoke with patient and wife at bedside regarding discharge plan.  Patient reports that he will be finishing up his radiation tomorrow and will no longer be needed to go to the [facility].  No other discharge needs verbalized.  SW notified.  CM following  Patient plan is to discharge home via car with family to transport when radiation completed.        Final Discharge Disposition Code  01 - home or self-care        Discharge Codes    No documentation.       Expected Discharge Date and Time     Expected Discharge Date Expected Discharge Time    Nov 13, 2019             Carmen Tripp RN

## 2019-11-11 NOTE — PLAN OF CARE
Problem: Patient Care Overview  Goal: Plan of Care Review  Outcome: Ongoing (interventions implemented as appropriate)   11/11/19 3512   Coping/Psychosocial   Plan of Care Reviewed With patient   Plan of Care Review   Progress no change   OTHER   Outcome Summary Pt's VSS. He satted well on room air. Pt ambulated around artis with wife and sat in chair for a while. Pain was well controlled with prn pain meds. Plan to cont to monitor.       Problem: Chemotherapy Effects (Adult)  Goal: Signs and Symptoms of Listed Potential Problems Will be Absent, Minimized or Managed (Chemotherapy Effects)  Outcome: Ongoing (interventions implemented as appropriate)      Problem: Fall Risk (Adult)  Goal: Absence of Fall  Outcome: Ongoing (interventions implemented as appropriate)

## 2019-11-12 NOTE — DISCHARGE INSTR - APPOINTMENTS
You have an appointment with [facility] on December 3, 2019 @ 1:00 PM.   Call them if you have any questions. Phone: [phone] [address] [address]    The office will mailing out paperwork, please have that filled out prior to appointment.    You have an appointment with Brenna Smith PA  on November 19, 2019 @ 1030 AM.   Call them if you have any questions. Phone: [phone] [address] [place], [place], [address]

## 2019-11-12 NOTE — PROGRESS NOTES
" LOS: 7 days   Patient Care Team:  Bar Gage MD as PCP - General (Pulmonary Disease)    Chief Complaint: Hyponatremia     Subjective   No over night events    No soa or cp      Objective     Vital Sign Min/Max for last 24 hours  Temp  Min: 97.6 °F (36.4 °C)  Max: 98.3 °F (36.8 °C)   BP  Min: 139/72  Max: 155/70   Pulse  Min: 69  Max: 85   Resp  Min: 16  Max: 18   No Data Recorded   No Data Recorded   Weight  Min: 93.8 kg (206 lb 11.2 oz)  Max: 93.8 kg (206 lb 11.2 oz)     Constitutional: He is oriented to person, place, and time. He appears well-developed and well-nourished.   HENT:   Head: Normocephalic and atraumatic.   Eyes: EOM are normal. Pupils are equal, round, and reactive to light.   Neck: Normal range of motion. Neck supple.   Cardiovascular: Normal rate, regular rhythm and normal heart sounds.   Pulmonary/Chest: Effort normal and breath sounds normal.   Abdominal: Soft. Bowel sounds are normal.   Musculoskeletal: Normal range of motion. He exhibits no edema or deformity.   Neurological: He is alert and oriented x3  Skin: Skin is warm and dry. No rash noted. No erythema. No pallor.   Psychiatric: not anxious      Flowsheet Rows      First Filed Value   Admission Height  172.7 cm (68\") Documented at 11/05/2019 0943   Admission Weight  97.5 kg (215 lb) Documented at 11/05/2019 0943          No intake/output data recorded.  I/O last 3 completed shifts:  In: 150 [IV Piggyback:150]  Out: 150 [Urine:150]      Results Review:     I reviewed the patient's new clinical results.    WBC No results found for: WBC   HGB No results found for: HGB   HCT No results found for: HCT   Platlets No results found for: LABPLAT   MCV No results found for: MCV       Sodium Sodium   Date Value Ref Range Status   11/12/2019 132 (L) 136 - 145 mmol/L Final   11/11/2019 131 (L) 136 - 145 mmol/L Final   11/10/2019 129 (L) 136 - 145 mmol/L Final      Potassium Potassium   Date Value Ref Range Status   11/12/2019 3.9 3.5 - " 5.2 mmol/L Final   11/11/2019 4.2 3.5 - 5.2 mmol/L Final   11/10/2019 3.7 3.5 - 5.2 mmol/L Final      Chloride Chloride   Date Value Ref Range Status   11/12/2019 95 (L) 98 - 107 mmol/L Final   11/11/2019 93 (L) 98 - 107 mmol/L Final   11/10/2019 91 (L) 98 - 107 mmol/L Final      CO2 CO2   Date Value Ref Range Status   11/12/2019 23.0 22.0 - 29.0 mmol/L Final   11/11/2019 26.0 22.0 - 29.0 mmol/L Final   11/10/2019 24.0 22.0 - 29.0 mmol/L Final      BUN BUN   Date Value Ref Range Status   11/12/2019 23 8 - 23 mg/dL Final   11/11/2019 23 8 - 23 mg/dL Final   11/10/2019 21 8 - 23 mg/dL Final      Creatinine Creatinine   Date Value Ref Range Status   11/12/2019 1.04 0.76 - 1.27 mg/dL Final   11/11/2019 1.13 0.76 - 1.27 mg/dL Final   11/10/2019 1.03 0.76 - 1.27 mg/dL Final      Calcium Calcium   Date Value Ref Range Status   11/12/2019 9.0 8.6 - 10.5 mg/dL Final   11/11/2019 9.8 8.6 - 10.5 mg/dL Final   11/10/2019 9.4 8.6 - 10.5 mg/dL Final      PO4 No results found for: CAPO4   Albumin No results found for: ALBUMIN   Magnesium No results found for: MG   Uric Acid No results found for: URICACID     Medication Review: Yes    Assessment/Plan       HTN (hypertension)    HLD (hyperlipidemia)    Anemia    Hyponatremia    Pneumonia of right lower lobe due to infectious organism (CMS/HCC)    Small cell lung cancer (CMS/HCC)    Postobstructive pneumonia related to malignancy    General weakness    SIADH (syndrome of inappropriate ADH production) (CMS/HCC)    Liver metastasis (CMS/HCC)    Leukocytosis      Assessment & Plan    Condition: In serious condition.  Improving.     #Hyponatremia:  Euvolemic hyposmolar hyponatremia   - Urine studies consistent with SIADH in the setting of underlying SCL Ca  - Cortisol low 1.52 ( Recently started on prednisone--??)  - TSH normal   #Hyperkalemia: Improved  #Me acidosis: Improved  #Metastatic lung cancer: On immune check point inhibitor therapy   Plan for palliative radiation                 Plan:     #Hyponatremia likely related to SIADH in the setting of SCLL.  Urine Na+K higher than serum Na. Suggesting positive free water balance. Therefore free water restriction alone will eventually correct the hyponatremia.   #Given 2 doses of lasix to lower urine osm and promote free water loss   #Interestingly patient has low cortisol low ( diagnostic of AI). PD-1 inh known to cause autoimmune problems therefor possible adrenal insufficency.He was started on prednisone 2 days prior to admission unclear how much that would have effected. Regardless he is on steriods for now. May need fludrocortisone in furture if hyponatremia is not improving or he develops hyperkalemia. bp stable  #Encourage good po intake.   # continue Ensure protein supplement twice a day this will promote free water loss.    From my end he can be discharged with continued fluid restriction   Fu in 3-4 weeks with [facility]AL with labs    I discussed the patients findings and my recommendations with patient, family and nursing staff    Bret York MD  11/12/19  11:46 AM

## 2019-11-12 NOTE — PLAN OF CARE
Problem: Patient Care Overview  Goal: Plan of Care Review  Outcome: Ongoing (interventions implemented as appropriate)   11/12/19 9605   OTHER   Outcome Summary Plan for 5th radiation treatment today than probable d/c home afterwards. VSS. Will continue to monitor.

## 2019-11-12 NOTE — PROGRESS NOTES
Case Management Discharge Note    Final Note: Spoke with patient and wife at bedside regarding discharge plan.  Patient has orders to go home today, no discharge needs verbalized.  Patient plan is to discharge home today via car with family to transport.      Destination      No service has been selected for the patient.      Durable Medical Equipment      No service has been selected for the patient.      Dialysis/Infusion      No service has been selected for the patient.      Home Medical Care      No service has been selected for the patient.      Therapy      No service has been selected for the patient.      Community Resources      No service has been selected for the patient.             Final Discharge Disposition Code: 01 - home or self-care

## 2019-11-12 NOTE — DISCHARGE SUMMARY
[facility] Medicine Services  DISCHARGE SUMMARY    Patient Name: Florentin Mcallister  : 1950  MRN: [id number]    Date of Admission: 2019  9:47 AM  Date of Discharge:  2019  Primary Care Physician: Bar Gage MD    Consults     Date and Time Order Name Status Description    2019 1139 Inpatient Nephrology Consult Completed     2019 1523 Inpatient Hematology & Oncology Consult Completed           Hospital Course     Presenting Problem:   Hyponatremia [E87.1]    Active Hospital Problems    Diagnosis  POA   • SIADH (syndrome of inappropriate ADH production) (CMS/HCC) [E22.2]  Yes   • Liver metastasis (CMS/HCC) [C78.7]  Yes   • Leukocytosis [D72.829]  Yes   • Postobstructive pneumonia related to malignancy [J18.9]  Yes   • General weakness [R53.1]  Yes   • Small cell lung cancer (CMS/HCC) [C34.90]  Yes   • Hyponatremia [E87.1]  Yes   • HTN (hypertension) [I10]  Yes   • HLD (hyperlipidemia) [E78.5]  Yes   • Anemia [D64.9]  Yes   • Pneumonia of right lower lobe due to infectious organism (CMS/HCC) [J18.1]  Yes      Resolved Hospital Problems   No resolved problems to display.          Hospital Course:  Mr. Florentin Mcallister is a 69yoM with PMH significant for HTN, HLD and recently diagnosed small cell lung cancer with liver metastasis. He is followed by Dr. Argueta. He started chemotherapy on 2019. He had been feeling poorly since his most recent session of chemotherapy on 10/28/2019. Presented to [facility] ED 19. CT showed progression of disease and postobstructive pneumonia. Labs significant for  on admission.     Hyponatremia  - NAL followed during admission  - Etiology felt to be related to lung malignancy/SIADH  - Initially placed on IV fluids but discontinued, s/p Lasix 10mg IV x 2 doses this admission  - Na improved to 132 on discharged, deemed safe per d/c per nephrology, will need to continue 1500cc fluid restriction upon discharge, f/u with  NAL in 3 weeks with labs  - s/p IV Solu-Medrol - he has been tapered to prednisone PO. Taper in place for discharge, will decrease by 10 mg q3 days.      Leukocytosis, resolved   Postobstructive pneumonia  - RLL ground glass opacity on CT chest   - Continue IV Zosyn, can convert to PO at discharge, placed on levaquin for additional 5 days  - Getting benefit from mucinex to assist with sputum clearance      Small cell lung cancer  Liver metastasis  - Appreciate oncology and radiation oncology assistance, follow up in place with both services at discharge  - Inpatient radiation with intention of shrinking hilar mass to help open his airway and clear the pneumonia as well as perhaps improve his SIADH/hyponatremia   - He has completed 3 of 5 sessions of radiation - final planned 11/12     Normocytic anemia  - Stable. Continue to monitor     General weakness, improving.   - He is now walking well. He has declined inpatient physical therapy  - Continue encourage ambulation and OOB       Discharge Follow Up Recommendations for labs/diagnostics:  PCP in 1-2 weeks     Day of Discharge     HPI:   Doing well. Wants to go home. Wife at bedside.     Review of Systems  Gen- No fevers, chills  CV- No chest pain, palpitations  Resp- No cough, dyspnea  GI- No N/V/D, abd pain        Otherwise ROS is negative except as mentioned in the HPI.    Vital Signs:   Temp:  [97.6 °F (36.4 °C)-98.3 °F (36.8 °C)] 97.9 °F (36.6 °C)  Heart Rate:  [69-85] 75  Resp:  [16-18] 18  BP: (139-155)/(66-72) 144/66     Physical Exam:  GEN: NAD, resting in bed, awake  HEENT: on room air, atraumatic, normocephalic  NECK: supple, no masses  RESP: on room air, normal effort  CV: on tele, sinus rhythm  PSYCH: normal affect, appropriate  NEURO: awake, alert, no focal deficits noted  MSK: no edema noted  SKIN: no rashes noted       Pertinent  and/or Most Recent Results     Results from last 7 days   Lab Units 11/12/19  0416 11/11/19  0953 11/10/19  0905  11/09/19  0719 11/08/19  0543 11/07/19  0553 11/06/19  0811   WBC 10*3/mm3  --   --   --  7.37 10.64 11.44* 9.82   HEMOGLOBIN g/dL  --   --   --  10.9* 10.9* 10.9* 11.6*   HEMATOCRIT %  --   --   --  32.2* 32.1* 32.0* 33.9*   PLATELETS 10*3/mm3  --   --   --  165 181 169 159   SODIUM mmol/L 132* 131* 129* 125* 120* 118* 117*   POTASSIUM mmol/L 3.9 4.2 3.7 3.9 4.0 4.9 5.0   CHLORIDE mmol/L 95* 93* 91* 89* 86* 85* 86*   CO2 mmol/L 23.0 26.0 24.0 24.0 19.0* 20.0* 17.0*   BUN mg/dL 23 23 21 16 16 19 19   CREATININE mg/dL 1.04 1.13 1.03 0.93 0.86 0.93 0.92   GLUCOSE mg/dL 145* 140* 111* 105* 116* 131* 152*   CALCIUM mg/dL 9.0 9.8 9.4 9.0 8.7 8.6 8.8           Invalid input(s): PROT, LABALBU        Invalid input(s): TG, LDLCALC, LDLREALC  Results from last 7 days   Lab Units 11/08/19  0543   TSH uIU/mL 1.310       Brief Urine Lab Results  (Last result in the past 365 days)      Color   Clarity   Blood   Leuk Est   Nitrite   Protein   CREAT   Urine HCG        06/04/19 2330 Yellow Clear Negative Negative Negative Negative               Microbiology Results Abnormal     Procedure Component Value - Date/Time    Blood Culture - Blood, Arm, Right [[id number]] Collected:  11/05/19 1115    Lab Status:  Final result Specimen:  Blood from Arm, Right Updated:  11/10/19 1130     Blood Culture No growth at 5 days    Blood Culture - Blood, Hand, Left [[id number]] Collected:  11/05/19 1120    Lab Status:  Final result Specimen:  Blood from Hand, Left Updated:  11/10/19 1130     Blood Culture No growth at 5 days    Influenza Antigen, Rapid - Swab, Nasopharynx [[id number]]  (Normal) Collected:  11/05/19 0954    Lab Status:  Final result Specimen:  Swab from Nasopharynx Updated:  11/05/19 1034     Influenza A Ag, EIA Negative     Influenza B Ag, EIA Negative          Imaging Results (All)     Procedure Component Value Units Date/Time    CT Head Without Contrast [[id number]] Collected:  11/05/19 1456     Updated:  11/06/19 0956    Narrative:        EXAMINATION: CT HEAD WO CONTRAST-      INDICATION: Unsteady gait; history of lung cancer; E87.8-Pqkc-[id number]  and hyponatremia; Z85.118-Personal history of other malignant neoplasm  of bronchus and lung; I10-Essential (primary) hypertension; R91.8-Other  nonspecific abnormal finding of lung field.     TECHNIQUE: Unenhanced CT imaging of the head was performed.     The radiation dose reduction device was turned on for each scan per the  ALARA (As Low as Reasonably Achievable) protocol.     COMPARISON: CT of the head 06/04/2019.     FINDINGS: Unenhanced CT imaging of the head demonstrates no evidence of  midline shift or mass effect. No evidence of hydrocephalus. No  intracranial hemorrhage is identified. No extraaxial fluid collections  are identified. Gray-white matter junction appears maintained without  convincing CT evidence of transcortical infarct. The visualized  calvarium appears intact without evidence for acute osseous abnormality.  The paranasal sinuses are predominantly clear. No surrounding soft  tissue lesion is  identified. The globes and retro-orbital soft tissues  are unremarkable.       Impression:       No acute intracranial process.     D:  11/05/2019  E:  11/06/2019     This report was finalized on 11/6/2019 9:53 AM by Dr. Erickson Astorga MD.       CT Angiogram Chest With & Without Contrast [[id number]] Collected:  11/05/19 1213     Updated:  11/06/19 0823    Narrative:       EXAMINATION: CT ANGIOGRAM CHEST W WO CONTRAST-      INDICATION: Shortness of breath, increased cough; at risk for PE;  history of lung CA.     TECHNIQUE: CTA imaging of the chest utilizing pulmonary arterial  embolism protocol was performed. Additional coronal and sagittal MIP  reformatted imaging was provided for increased diagnostic accuracy     The radiation dose reduction device was turned on for each scan per the  ALARA (As Low as Reasonably Achievable) protocol.     COMPARISON: CT of the chest 08/21/2019, CT of  the chest 06/04/2019.     FINDINGS: PULMONARY ARTERY: There is adequate contrast opacification in  the main pulmonary artery for evaluation. No main pulmonary arterial  filling defect is identified. There is no convincing evidence for  filling defect within the main pulmonary arterial vasculature to the  level of the proximal segmental pulmonary arteries to suggest pulmonary  arterial embolus. There has been, however, significant recurrent soft  tissue lesion within the right infrahilar and right lower lobe measuring  approximately 6.8 x 4.9 cm, although is irregular in shape making exact  measurement difficult. This mass does involve the right middle and right  lower lobe pulmonary arteries where there is significant narrowing and  likely occlusion of some of these arteries particularly involving the  distal segmental and subsegmental vessels where there is incomplete  opacification which may be secondary to underlying narrowing for mass  effect, although distal pulmonary emboli cannot be excluded within the  right lower lobe. There is a small right pleural effusion identified.  The visualized aorta is unremarkable without evidence for acute aortic  pathology as visualized. The heart is unremarkable for acute  abnormality. Coronary arteries demonstrate moderate calcification and  are incompletely evaluated secondary to lack of cardiac gating. Trace  nonspecific pericardial effusion identified. Scattered prominent and  enlarged mediastinal lymph nodes are identified particularly within the  right lower paratracheal region where there is a 9 mm prominent lymph  node and subcarinal lymph node complex measuring up to 1.7 cm.  Additional prominent right hilar lymph nodes are identified. Moderate to  severe centrilobular emphysema is identified throughout the lungs. No  pneumothorax identified. Thoracolumbar degenerative changes are  identified. No aggressive osseous features are appreciated.       Impression:        There has been interval development of extensive  opacification of the right infrahilar and right lower lobe with  associated more peripheral groundglass and nodular airspace disease  which is concerning for recurrent mass within this region with  associated postobstructive pneumonitis. This mass demonstrates  circumferential involvement of the bronchus intermedius as well as  multiple right lower lobe bronchi and abutment of the right middle lobe  bronchus. This mass also abuts and significantly narrows multiple  segmental and subsegmental right lower lobe pulmonary arteries. There is  likely partial occlusion of some of these right lower lobe pulmonary  arteries which may relate to direct tumor invasion versus external  compression. Multiple right lower lobe segmental and subsegmental  pulmonary arteries demonstrate incomplete opacification and is favored  to relate to tumor invasion/compression with altered flow mechanics  rather than pulmonary arterial emboli, although small emboli within the  right lower lobe cannot be entirely excluded. There is no evidence of  pulmonary arterial embolus elsewhere within the main pulmonary artery or  chest.     D:  11/05/2019  E:  11/05/2019              This report was finalized on 11/6/2019 8:20 AM by Dr. Erickson Astorga MD.                              Discharge Details        Discharge Medications      New Medications      Instructions Start Date   amLODIPine 5 MG tablet  Commonly known as:  NORVASC   5 mg, Oral, Every 24 Hours Scheduled   Start Date:  11/13/2019     guaiFENesin 600 MG 12 hr tablet  Commonly known as:  MUCINEX   600 mg, Oral, Every 12 Hours Scheduled      HYDROcodone-acetaminophen 7.5-325 MG per tablet  Commonly known as:  NORCO   1 tablet, Oral, Every 4 Hours PRN      lactulose 10 GM/15ML solution  Commonly known as:  CHRONULAC   10 g, Oral, Daily PRN      levoFLOXacin 750 MG tablet  Commonly known as:  LEVAQUIN   750 mg, Oral, Every 24 Hours   Start  Date:  11/13/2019     ondansetron 4 MG tablet  Commonly known as:  ZOFRAN   4 mg, Oral, Every 6 Hours PRN      pantoprazole 40 MG EC tablet  Commonly known as:  PROTONIX   40 mg, Oral, Daily      predniSONE 20 MG tablet  Commonly known as:  DELTASONE   Take 2 tablets by mouth Daily With Breakfast for 3 days, THEN 1.5 tablets Daily With Breakfast for 3 days.   Start Date:  11/13/2019     predniSONE 20 MG tablet  Commonly known as:  DELTASONE   Take 1 tablet by mouth Daily for 3 days, THEN 0.5 tablets Daily for 3 days.   Start Date:  11/20/2019        Continue These Medications      Instructions Start Date   albuterol 1.25 MG/3ML nebulizer solution  Commonly known as:  ACCUNEB   1 ampule, Nebulization, Every 6 Hours PRN      aspirin 81 MG chewable tablet   81 mg, Oral, Daily      budesonide-formoterol 160-4.5 MCG/ACT inhaler  Commonly known as:  SYMBICORT   2 puffs, Inhalation, 2 Times Daily - RT      colchicine 0.6 MG tablet   0.6 mg, Oral, Daily PRN      MULTIVITAMIN PO   1 tablet, Oral, Daily      simvastatin 20 MG tablet  Commonly known as:  ZOCOR   20 mg, Oral, Nightly         Stop These Medications    lisinopril 20 MG tablet  Commonly known as:  PRINIVIL,ZESTRIL            No Known Allergies      Discharge Disposition:  Home or Self Care    Diet:  Hospital:  Diet Order   Procedures   • Diet Regular   1500 cc fluid restriction    Activity Restrictions or Other Reccomendations:  1500 cc fluid restriction per NAL recs       CODE STATUS:    Code Status and Medical Interventions:   Ordered at: 11/05/19 1523     Level Of Support Discussed With:    Patient     Code Status:    CPR     Medical Interventions (Level of Support Prior to Arrest):    Full     Comments:    patients wife is POA.       Future Appointments   Date Time Provider Department Center   11/18/2019 11:00 AM [facility] CT 1 [facility] [place]   11/19/2019  9:00 AM Ulysses Argueta MD [facility]   11/19/2019  9:30 AM PHARMACY CHEMO EDUCATION  [facility]PI  [facility]   11/19/2019 10:00 AM ROOM C  [facility]   12/12/2019 10:15 AM Janel Vaz MD [facility] None       Additional Instructions for the Follow-ups that You Need to Schedule     Discharge Follow-up with PCP   As directed       Currently Documented PCP:    Bar Gage MD    PCP Phone Number:    [phone]     Follow Up Details:  1-2 weeks         Discharge Follow-up with Specified Provider: Dr Argueta as scheduled   As directed      To:  Dr Argueat as scheduled         Discharge Follow-up with Specified Provider: [facility] Onc; 1 Month   As directed      To:  Elkin Mohr Onc    Follow Up:  1 Month         Discharge Follow-up with Specified Provider: SHARLENE 3-4 weeks with labs; 3 Weeks   As directed      To:  NAL 3-4 weeks with labs    Follow Up:  3 Weeks               Time Spent on Discharge:  35 minutes    Electronically signed by Chelsea Navarrete MD, 11/12/19, 1:10 PM.

## 2019-11-12 NOTE — PROGRESS NOTES
Patient is completed a dose of 20 Gray in 5 fractions, palliative radiotherapy, to the right lung obstructive tumor.  He has tolerated treatment well.  He has had no significant exacerbation of fatigue.  No esophagitis symptoms.  No dermatitis.  Breathing is significantly improved.  He is not using supplemental oxygen.  He is ready to be discharged today.  He will follow-up with Dr. Vaz in approximately 1 month.  He will continue palliative systemic treatment management under the care of his medical oncologist, Dr. Argueta.

## 2019-11-12 NOTE — PROGRESS NOTES
DATE OF VISIT: 11/12/2019    Chief Complaint: Followup for extensive stage small cell lung cancer     SUBJECTIVE: The patient is feeling better.  His breathing and cough has both improved.  He is anxious to get home.  His wife is at the bedside.    REVIEW OF SYSTEMS: All the other 9 systems are reviewed by me and negative  except what is mentioned in HPI.     PAST MEDICAL HISTORY/SOCIAL HISTORY/FAMILY HISTORY: Unchanged from my prior documentation done on November 5, 2019      Current Facility-Administered Medications:   •  acetaminophen (TYLENOL) tablet 650 mg, 650 mg, Oral, Q4H PRN **OR** acetaminophen (TYLENOL) 160 MG/5ML solution 650 mg, 650 mg, Oral, Q4H PRN **OR** acetaminophen (TYLENOL) suppository 650 mg, 650 mg, Rectal, Q4H PRN, Justice Silva MD  •  amLODIPine (NORVASC) tablet 5 mg, 5 mg, Oral, Q24H, Kevin Castaneda MD, 5 mg at 11/11/19 0838  •  aspirin chewable tablet 81 mg, 81 mg, Oral, Daily, Justice Silva MD, 81 mg at 11/11/19 0839  •  atorvastatin (LIPITOR) tablet 20 mg, 20 mg, Oral, Nightly, Justice Silva MD, 20 mg at 11/11/19 2041  •  bisacodyl (DULCOLAX) suppository 10 mg, 10 mg, Rectal, Daily PRN, Salma Pat PA-C  •  budesonide-formoterol (SYMBICORT) 160-4.5 MCG/ACT inhaler 2 puff, 2 puff, Inhalation, BID - RT, Justice Silva MD, 2 puff at 11/12/19 0724  •  calcium carbonate EX (TUMS EX) chewable tablet 1,500 mg, 1,500 mg, Oral, TID PRN, Gabriella Turner APRN  •  colchicine tablet 0.6 mg, 0.6 mg, Oral, Daily PRN, Jsutice Silva MD  •  enoxaparin (LOVENOX) syringe 40 mg, 40 mg, Subcutaneous, Daily, Mele Gandara MD, 40 mg at 11/11/19 0838  •  guaiFENesin (MUCINEX) 12 hr tablet 600 mg, 600 mg, Oral, Q12H, Salma Pat PA-C, 600 mg at 11/11/19 2041  •  HYDROcodone-acetaminophen (NORCO) 7.5-325 MG per tablet 1 tablet, 1 tablet, Oral, Q4H PRN, Justice Silva MD, 1 tablet at 11/11/19 3192  •  ipratropium-albuterol (DUO-NEB) nebulizer solution 3 mL, 3  mL, Nebulization, 4x Daily - RT, Justice Silva MD, 3 mL at 11/12/19 0724  •  lactulose (CHRONULAC) 10 GM/15ML solution 10 g, 10 g, Oral, Daily PRN, Ulysses Argueta MD, 10 g at 11/07/19 1503  •  Magnesium Sulfate 2 gram Bolus, followed by 8 gram infusion (total Mg dose 10 grams)- Mg less than or equal to 1mg/dL, 2 g, Intravenous, PRN **OR** Magnesium Sulfate 2 gram / 50mL Infusion (GIVE X 3 BAGS TO EQUAL 6GM TOTAL DOSE) - Mg 1.1 - 1.5 mg/dl, 2 g, Intravenous, PRN, Last Rate: 25 mL/hr at 11/06/19 0010, 2 g at 11/06/19 0010 **OR** Magnesium Sulfate 4 gram infusion- Mg 1.6-1.9 mg/dL, 4 g, Intravenous, PRN, Justice Silva MD  •  morphine injection 1 mg, 1 mg, Intravenous, Q4H PRN **AND** naloxone (NARCAN) injection 0.4 mg, 0.4 mg, Intravenous, Q5 Min PRN, Justice Sivla MD  •  OCUVITE-LUTEIN (OCUVITE) tablet 1 tablet, 1 tablet, Oral, Daily, Justice Silva MD, 1 tablet at 11/11/19 0839  •  ondansetron (ZOFRAN) tablet 4 mg, 4 mg, Oral, Q6H PRN, 4 mg at 11/07/19 2103 **OR** ondansetron (ZOFRAN) injection 4 mg, 4 mg, Intravenous, Q6H PRN, Justice Silva MD  •  pantoprazole (PROTONIX) EC tablet 40 mg, 40 mg, Oral, Q AM, Elvira Ward APRN, 40 mg at 11/12/19 0524  •  piperacillin-tazobactam (ZOSYN) 3.375 g in iso-osmotic dextrose 50 ml (premix), 3.375 g, Intravenous, Q8H, Mele Gandara MD, 3.375 g at 11/11/19 5302  •  potassium chloride (MICRO-K) CR capsule 40 mEq, 40 mEq, Oral, PRN **OR** potassium chloride (KLOR-CON) packet 40 mEq, 40 mEq, Oral, PRN **OR** potassium chloride 10 mEq in 100 mL IVPB, 10 mEq, Intravenous, Q1H PRN, Justice Silva MD  •  potassium phosphate 45 mmol in sodium chloride 0.9 % 500 mL infusion, 45 mmol, Intravenous, PRN **OR** potassium phosphate 30 mmol in sodium chloride 0.9 % 250 mL infusion, 30 mmol, Intravenous, PRN **OR** potassium phosphate 15 mmol in sodium chloride 0.9 % 100 mL infusion, 15 mmol, Intravenous, PRN **OR** sodium phosphates 45 mmol in sodium  "chloride 0.9 % 250 mL IVPB, 45 mmol, Intravenous, PRN **OR** sodium phosphates 30 mmol in sodium chloride 0.9 % 250 mL IVPB, 30 mmol, Intravenous, PRN **OR** sodium phosphates 15 mmol in sodium chloride 0.9 % 250 mL IVPB, 15 mmol, Intravenous, PRN, Justice Silva MD  •  predniSONE (DELTASONE) tablet 60 mg, 60 mg, Oral, Daily With Breakfast, Salma Pat PA-C, 60 mg at 11/11/19 0838  •  sodium chloride 0.9 % flush 10 mL, 10 mL, Intravenous, PRN, Emergency, Triage Protocol, MD  •  sodium chloride 0.9 % flush 10 mL, 10 mL, Intravenous, Q12H, Justice Silva MD, 10 mL at 11/11/19 2041  •  sodium chloride 0.9 % flush 10 mL, 10 mL, Intravenous, PRN, Justice Silva MD    PHYSICAL EXAMINATION:   /66 (BP Location: Right arm, Patient Position: Lying)   Pulse 73   Temp 97.9 °F (36.6 °C) (Oral)   Resp 18   Ht 172.7 cm (68\")   Wt 93.8 kg (206 lb 11.2 oz)   SpO2 97%   BMI 31.43 kg/m²    ECOG Performance Status: 1 - Symptomatic but completely ambulatory  GENERAL: Age appropriate. No acute distress.   NEURO/PSYCH: A&O x 3, strength 5/5 in all muscle groups  HEENT: Head atraumatic, normocephalic.   NECK: Supple. No JVD. No lymphadenopathy.   LUNGS: Clear to auscultation bilaterally. No wheezing. No rhonchi.   HEART: Regular rate and rhythm. S1, S2, no murmurs.   ABDOMEN: Soft, nontender, nondistended. Bowel sounds positive. No  hepatosplenomegaly.   EXTREMITIES: No clubbing, cyanosis, or edema.   SKIN: No rashes. No purpura.       Admission on 11/05/2019   No results displayed because visit has over 200 results.          No results found.    ASSESSMENT: The patient is a very pleasant 69 y.o. male  with progressive small cell lung cancer      PLAN:  1.  Continue palliative radiation, patient received treatment number 5 out of 5 planned.  2.  Okay to discharge patient home today.  He will follow-up with me as an outpatient with second line treatment using Opdivo plus Yervoy.  3.  Transition to oral " antibiotic with Levaquin 750 mg for 5 more days.  4.  Monitor sodium level.  Sodium is up to 132.  5.  We will continue to wean off prednisone slowly.  He will be discharged home on prednisone 40 mg daily with 10 mg taper every 3 days.    Ulysses Argueta MD  11/12/2019

## 2019-11-14 NOTE — OUTREACH NOTE
Prep Survey      Responses   Facility patient discharged from?  [facility]   Is patient eligible?  Yes   Discharge diagnosis  Syndrome of inappropriate ADH production   Does the patient have one of the following disease processes/diagnoses(primary or secondary)?  Other   Does the patient have Home health ordered?  No   Is there a DME ordered?  No   Prep survey completed?  Yes          Lina Andrew RN

## 2019-11-15 NOTE — OUTREACH NOTE
Medical Week 1 Survey      Responses   Facility patient discharged from?  [facility]   Does the patient have one of the following disease processes/diagnoses(primary or secondary)?  Other   Is there a successful TCM telephone encounter documented?  No   Week 1 attempt successful?  Yes   Call start time  1101   Call end time  1104   Discharge diagnosis  Syndrome of inappropriate ADH production   Is patient permission given to speak with other caregiver?  Yes   Person spoke with today (if not patient) and relationship  Paz/ wife   Meds reviewed with patient/caregiver?  Yes   Is the patient having any side effects they believe may be caused by any medication additions or changes?  No   Does the patient have all medications ordered at discharge?  Yes   Does the patient have a primary care provider?   Yes   Does the patient have an appointment with their PCP within 7 days of discharge?  Yes   Has the patient kept scheduled appointments due by today?  N/A   Has home health visited the patient within 72 hours of discharge?  N/A   Psychosocial issues?  No   Did the patient receive a copy of their discharge instructions?  Yes   Nursing interventions  Reviewed instructions with patient   What is the patient's perception of their health status since discharge?  Same   Is the patient/caregiver able to teach back signs and symptoms related to disease process for when to call PCP?  Yes   Is the patient/caregiver able to teach back signs and symptoms related to disease process for when to call 911?  Yes   Is the patient/caregiver able to teach back the hierarchy of who to call/visit for symptoms/problems? PCP, Specialist, Home health nurse, Urgent Care, ED, 911  Yes   Week 1 call completed?  Yes          Emeterio Mistry RN

## 2019-11-19 NOTE — PROGRESS NOTES
DATE OF VISIT: 11/19/2019    REASON FOR VISIT: Followup for extensive stage small cell lung cancer     HISTORY OF PRESENT ILLNESS: The patient is a very pleasant 69 y.o. male  with past medical history significant for extensive stage small cell lung cancer diagnosed Jackelin 10, 2019.  Staging work-up revealed right lower lobe lung mass with bulky mediastinal adenopathy and liver metastases.  He was started on chemotherapy using carboplatin and etoposide with Tecentriq June 19, 2019 .  He completed 4 cycles then started Tecentriq maintenance September 16, 2019.  He was started on maintenance Tecentriq, completed 3 cycles.  Repeated scan showed progressive disease.  Patient was treated with palliative short course of radiation to large right upper lobe lung mass acute hemoptysis.  Treatment was changed to Opdivo plus Yervoy November 19, 2019.  The patient is here today for scheduled follow-up visit with treatment cycle #1.    SUBJECTIVE: The patient is here today with his wife.  He is feeling significantly better.  His hemoptysis has stopped.    PAST MEDICAL HISTORY/SOCIAL HISTORY/FAMILY HISTORY: Reviewed by me and unchanged from my documentation done on 11/19/19.    Review of Systems   Constitutional: Positive for fatigue. Negative for activity change, appetite change, chills, fever and unexpected weight change.   HENT: Negative for hearing loss, mouth sores, nosebleeds, sore throat and trouble swallowing.    Eyes: Negative for visual disturbance.   Respiratory: Positive for shortness of breath. Negative for cough, chest tightness and wheezing.    Cardiovascular: Negative for chest pain, palpitations and leg swelling.   Gastrointestinal: Negative for abdominal distention, abdominal pain, blood in stool, constipation, diarrhea, nausea, rectal pain and vomiting.   Endocrine: Negative for cold intolerance and heat intolerance.   Genitourinary: Negative for difficulty urinating, dysuria, frequency and urgency.    Musculoskeletal: Positive for arthralgias. Negative for back pain, gait problem, joint swelling and myalgias.   Skin: Negative for rash.   Neurological: Negative for dizziness, tremors, syncope, weakness, light-headedness, numbness and headaches.   Hematological: Negative for adenopathy. Does not bruise/bleed easily.   Psychiatric/Behavioral: Negative for confusion, sleep disturbance and suicidal ideas. The patient is not nervous/anxious.          Current Outpatient Medications:   •  albuterol (ACCUNEB) 1.25 MG/3ML nebulizer solution, Take 1 ampule by nebulization Every 6 (Six) Hours As Needed for Wheezing., Disp: , Rfl:   •  amLODIPine (NORVASC) 5 MG tablet, Take 1 tablet by mouth Daily., Disp: 30 tablet, Rfl: 0  •  aspirin 81 MG chewable tablet, Chew 81 mg Daily., Disp: , Rfl:   •  budesonide-formoterol (SYMBICORT) 160-4.5 MCG/ACT inhaler, Inhale 2 puffs 2 (Two) Times a Day., Disp: 10.2 g, Rfl: 12  •  colchicine 0.6 MG tablet, Take 0.6 mg by mouth Daily As Needed for Muscle / Joint Pain., Disp: , Rfl:   •  guaiFENesin (MUCINEX) 600 MG 12 hr tablet, Take 1 tablet by mouth Every 12 (Twelve) Hours., Disp: 30 tablet, Rfl: 0  •  lactulose (CHRONULAC) 10 GM/15ML solution, Take 15 mL by mouth Daily As Needed (constipation)., Disp: 150 mL, Rfl: 0  •  Multiple Vitamins-Minerals (MULTIVITAMIN PO), Take 1 tablet by mouth Daily., Disp: , Rfl:   •  ondansetron (ZOFRAN) 8 MG tablet, Take 1 tablet by mouth 3 (Three) Times a Day As Needed for Nausea or Vomiting., Disp: 30 tablet, Rfl: 5  •  pantoprazole (PROTONIX) 40 MG EC tablet, Take 1 tablet by mouth Daily., Disp: 30 tablet, Rfl: 0  •  predniSONE (DELTASONE) 20 MG tablet, Take 2 tablets by mouth Daily With Breakfast for 3 days, THEN 1.5 tablets Daily With Breakfast for 3 days., Disp: 12 tablet, Rfl: 0  •  [START ON 11/20/2019] predniSONE (DELTASONE) 20 MG tablet, Take 1 tablet by mouth Daily for 3 days, THEN 0.5 tablets Daily for 3 days., Disp: 5 tablet, Rfl: 0  •   "simvastatin (ZOCOR) 20 MG tablet, Take 1 tablet by mouth Every Night., Disp: 30 tablet, Rfl: 2    PHYSICAL EXAMINATION:   /74   Pulse 101   Temp 97.1 °F (36.2 °C) (Temporal)   Resp 16   Ht 172.7 cm (68\")   Wt 93 kg (205 lb)   SpO2 99%   BMI 31.17 kg/m²    ECOG Performance Status: 1 - Symptomatic but completely ambulatory  General Appearance:  alert, cooperative, no apparent distress and appears stated age   Neurologic/Psychiatric: A&O x 3, gait steady, appropriate affect, strength 5/5 in all muscle groups   HEENT:  Normocephalic, without obvious abnormality, mucous membranes moist   Neck: Supple, symmetrical, trachea midline, no adenopathy;  No thyromegaly, masses, or tenderness   Lungs:   Clear to auscultation bilaterally; respirations regular, even, and unlabored bilaterally   Heart:  Regular rate and rhythm, no murmurs appreciated   Abdomen:   Soft, non-tender, non-distended and no organomegaly   Lymph nodes: No cervical, supraclavicular, inguinal or axillary adenopathy noted   Extremities: Normal, atraumatic; no clubbing, cyanosis, or edema    Skin: No rashes, ulcers, or suspicious lesions noted     Admission on 11/05/2019, Discharged on 11/12/2019   No results displayed because visit has over 200 results.           Ct Head Without Contrast    Result Date: 11/6/2019  Narrative: EXAMINATION: CT HEAD WO CONTRAST-  INDICATION: Unsteady gait; history of lung cancer; E87.1-[id number]-[id number] and hyponatremia; Z85.118-Personal history of other malignant neoplasm of bronchus and lung; I10-Essential (primary) hypertension; R91.8-Other nonspecific abnormal finding of lung field.  TECHNIQUE: Unenhanced CT imaging of the head was performed.  The radiation dose reduction device was turned on for each scan per the ALARA (As Low as Reasonably Achievable) protocol.  COMPARISON: CT of the head 06/04/2019.  FINDINGS: Unenhanced CT imaging of the head demonstrates no evidence of midline shift or mass effect. No evidence " of hydrocephalus. No intracranial hemorrhage is identified. No extraaxial fluid collections are identified. Gray-white matter junction appears maintained without convincing CT evidence of transcortical infarct. The visualized calvarium appears intact without evidence for acute osseous abnormality. The paranasal sinuses are predominantly clear. No surrounding soft tissue lesion is  identified. The globes and retro-orbital soft tissues are unremarkable.      Impression: No acute intracranial process.  D:  11/05/2019 E:  11/06/2019  This report was finalized on 11/6/2019 9:53 AM by Dr. Erickson Astorga MD.      Ct Angiogram Chest With & Without Contrast    Result Date: 11/6/2019  Narrative: EXAMINATION: CT ANGIOGRAM CHEST W WO CONTRAST-  INDICATION: Shortness of breath, increased cough; at risk for PE; history of lung CA.  TECHNIQUE: CTA imaging of the chest utilizing pulmonary arterial embolism protocol was performed. Additional coronal and sagittal MIP reformatted imaging was provided for increased diagnostic accuracy  The radiation dose reduction device was turned on for each scan per the ALARA (As Low as Reasonably Achievable) protocol.  COMPARISON: CT of the chest 08/21/2019, CT of the chest 06/04/2019.  FINDINGS: PULMONARY ARTERY: There is adequate contrast opacification in the main pulmonary artery for evaluation. No main pulmonary arterial filling defect is identified. There is no convincing evidence for filling defect within the main pulmonary arterial vasculature to the level of the proximal segmental pulmonary arteries to suggest pulmonary arterial embolus. There has been, however, significant recurrent soft tissue lesion within the right infrahilar and right lower lobe measuring approximately 6.8 x 4.9 cm, although is irregular in shape making exact measurement difficult. This mass does involve the right middle and right lower lobe pulmonary arteries where there is significant narrowing and likely occlusion  of some of these arteries particularly involving the distal segmental and subsegmental vessels where there is incomplete opacification which may be secondary to underlying narrowing for mass effect, although distal pulmonary emboli cannot be excluded within the right lower lobe. There is a small right pleural effusion identified. The visualized aorta is unremarkable without evidence for acute aortic pathology as visualized. The heart is unremarkable for acute abnormality. Coronary arteries demonstrate moderate calcification and are incompletely evaluated secondary to lack of cardiac gating. Trace nonspecific pericardial effusion identified. Scattered prominent and enlarged mediastinal lymph nodes are identified particularly within the right lower paratracheal region where there is a 9 mm prominent lymph node and subcarinal lymph node complex measuring up to 1.7 cm. Additional prominent right hilar lymph nodes are identified. Moderate to severe centrilobular emphysema is identified throughout the lungs. No pneumothorax identified. Thoracolumbar degenerative changes are identified. No aggressive osseous features are appreciated.      Impression: There has been interval development of extensive opacification of the right infrahilar and right lower lobe with associated more peripheral groundglass and nodular airspace disease which is concerning for recurrent mass within this region with associated postobstructive pneumonitis. This mass demonstrates circumferential involvement of the bronchus intermedius as well as multiple right lower lobe bronchi and abutment of the right middle lobe bronchus. This mass also abuts and significantly narrows multiple segmental and subsegmental right lower lobe pulmonary arteries. There is likely partial occlusion of some of these right lower lobe pulmonary arteries which may relate to direct tumor invasion versus external compression. Multiple right lower lobe segmental and subsegmental  pulmonary arteries demonstrate incomplete opacification and is favored to relate to tumor invasion/compression with altered flow mechanics rather than pulmonary arterial emboli, although small emboli within the right lower lobe cannot be entirely excluded. There is no evidence of pulmonary arterial embolus elsewhere within the main pulmonary artery or chest.  D:  11/05/2019 E:  11/05/2019     This report was finalized on 11/6/2019 8:20 AM by Dr. Erickson Astorga MD.        ASSESSMENT: The patient is a very pleasant 69 y.o. male  with extensive stage small cell lung cancer     PROBLEM LIST:   1.  Right lower lobe small cell lung cancer T4 N3 M1 stage IVb:  A.  Presented with shortness of breath and cough  B.  CT chest done June 4019 revealed 9 cm mass right lower lobe with bulky hilar and mediastinal lymphadenopathy, and liver metastases.  C.  Status post bronchoscopy with biopsy done by Dr. Nash Jackelin 10, 2019  D.  Pathology revealed small cell lung cancer from right lower lobe, level 7 and level 10 mediastinal nodes.  E.  Started palliative chemotherapy with carboplatin and etoposide June 19, 2019, Tecentriq was added cycle #2 and chemo stopped after cycle #4.  Status post 5 cycles  F.  Progressive disease document CT chest done on November 2019  G.  Start Opdivo plus Yervoy November 19, 2019  2.  Hyponatremia  3.  Hypertension  4.  COPD  5.  Constipation      PLAN:  1.  I will proceed with treatment using Opdivo plus Yervoy as scheduled today cycle #1.  2.    The patient will follow-up with me in 3 weeks for cycle #2.    4.  I will monitor the patient's blood work including blood counts, kidney function, liver function, electrolytes, and thyroid functions throughout treatment.  4.  I will repeat the patient's scans after completion of cycle #4.  5.  We discussed potential side effects of immunotherapy including but not limited to immune mediated reactions with thyroiditis, pneumonitis, hepatitis, colitis,  rash, and electrolytes abnormalities, fatigue, multiorgan failure, and possibly death.  6.  I will continue the patient on Colace daily with lactulose as needed for chemotherapy as constipation.  7.  We will continue inhalers as needed for COPD.  8.   We will continue lisinopril 1/2 tablet daily for hypertension  I told him to continue to monitor his blood pressure at home and hold his dose for hypotension.     Ulysses Argueta MD  11/19/2019

## 2019-11-19 NOTE — PLAN OF CARE
CHEMOTHERAPY EDUCATION SHEET    NAME:Florentin Mcallister : 1950 DATE: 19       BOOKLETS GIVEN:   CHEMOTHERAPY AND YOU   [] EATING HINTS   [] SEXUALITY/FERTILITY BOOKS   []     CHEMOTHERAPY/BIOTHERAPY EDUCATION SHEETS: (list all that apply) [facility] for Opdivo and Yervoy    CHEMOTHERAPY REGIMEN: 4 cycles every 21 days for nivolumab and ipilimumab on D1 of each cycle; Then monotherapy nivolumab every 28 days for 6 cycles    TOPICS EDUCATION PROVIDED EDUCATION REINFORCED COMMENTS   ANEMIA:  role of RBC, cause, s/s, ways to manage, role of transfusion [x] []    THROMBOCYTOPENIA:  role of platelet, cause, s/s, ways to prevent bleeding, things to avoid, when to seek help [x] []    NEUTROPENIA:  role of WBC, cause, infection precautions, s/s of infection, when to call MD [x] [] Counseled on importance of maintaining good hand hygiene during cold and flu season and to contact MD with signs/sx of infection   NUTRITION & APPETITE CHANGES:  importance of maintaining healthy diet & weight, ways to manage to improve intake, dietary consult, exercise regimen [x] [] Counseled on the risk of these medications causing decreased appetite.  Patient states he has gained weight recently and if hopefully this continues.  He is also tapering off prednisone.   DIARRHEA:  causes, s/s of dehydration, ways to manage, dietary changes, when to call MD [x] [] Couseled on the risk of colitis occurring due to both medications being monoclonal antibodies. Informed them Imodium and Pepto are good option for short term diarrhea, but if lasts >24 hours to notify MD.  Recently hospitalized for hyponatremia and is on a fluid restriction.  Also talked about the signs of severe colitis such as abdominal pain and blood in stool.   CONSTIPATION:  causes, ways to manage, dietary changes, when to call MD [x] [] Patient has lactulose at home if this were to occur   NAUSEA & VOMITING:  cause, use of antiemetics, dietary changes, when to call MD [x]  "[] Counseled that these agents have low emetogenic risk compared to his previous therapy. He has zofran at home is needed.    MOUTH SORES:  causes, oral care, ways to manage [x] [] Patient has had these before and knows about baking soda and salt rinse, which helped.   ALOPECIA:  cause, ways to manage, resources [] [] Not covered.   INFERTILITY & SEXUALITY:  causes, fertility preservation options, sexuality changes, ways to manage, importance of birth control [x] []  In [facility]   NERVOUS SYSTEM CHANGES:  causes, s/s, neuropathies, cognitive changes, ways to manage [] []    PAIN:  causes, ways to manage [x] [] ?????   SKIN & NAIL CHANGES:  cause, s/s, ways to manage [x] [] Counseled on risk of rash forming.  If small rash and itchy he could use topical creams.  Advised if rash covered large areas of body and became very bothersome to notify MD.   ORGAN TOXICITIES:  cause, s/s, need for diagnostic tests, labs, when to notify MD [x] [] Counseled these are monitored with labs drawn prior to infusion   SURVIVORSHIP:  distress, distress assessment, secondary malignancies, early/late effects, follow-up, social issues, social support [x]  Patient asked about \"how good\" these medications are. Deferred to Dr Argueta. Patient will ask when he sees Dr Argueta next.   HOME CARE:  use of spill kits, storing of PO chemo, how to manage bodily fluids [] []    MISCELLANEOUS:  drug interactions, administration, vesicant, etc. [x] [] Verified home medications.  Patient no longer taking Levaquin and has 6 more days of prednisone taper.  No DDI at this time.     REFERRALS: N/A  NOTES:  Patient asked about \"how good\" these medications are. Deferred to Dr Argueta. Patient will ask when he sees Dr Argueta next.  Patient states he is on a fluid restriction due to his recent hospitalization due to hyponatremia (1500ml), after counseling on maintaining hydration if experiences diarrhea, patient was worried he wouldn't be able to drink enough due to this " restriction.  Advised him to contact following MD if he starts to having diarrhea and need for hydration and to also speak with Dr Argutea about fluid restriction during chemotherapy.    Thank you,  Citlalli Escalante, PharmD  Pharmacy Resident   11/19/2019  10:09 AM

## 2019-11-22 NOTE — OUTREACH NOTE
Medical Week 2 Survey      Responses   Facility patient discharged from?  [facility]   Does the patient have one of the following disease processes/diagnoses(primary or secondary)?  Other   Week 2 attempt successful?  Yes   Call start time  1041   Discharge diagnosis  Syndrome of inappropriate ADH production   Call end time  1045   Meds reviewed with patient/caregiver?  Yes   Is the patient taking all medications as directed (includes completed medication regime)?  Yes   Medication comments  Started low dose Chemo on Tuesday.  Labs checked last week .   Has the patient kept scheduled appointments due by today?  Yes   Did the patient receive a copy of their discharge instructions?  Yes   What is the patient's perception of their health status since discharge?  Improving   Additional teach back comments  Fluid restriction 1500ml   Week 2 Call Completed?  Yes          Annalisa Philip RN

## 2019-11-29 NOTE — OUTREACH NOTE
Medical Week 3 Survey      Responses   Facility patient discharged from?  [facility]   Does the patient have one of the following disease processes/diagnoses(primary or secondary)?  Other   Week 3 attempt successful?  Yes   Call start time  1546   Call end time  1554   Discharge diagnosis  Syndrome of inappropriate ADH production   Is patient permission given to speak with other caregiver?  Yes   Person spoke with today (if not patient) and relationship  Paz/ wife   Meds reviewed with patient/caregiver?  Yes   Is the patient having any side effects they believe may be caused by any medication additions or changes?  No   Does the patient have all medications ordered at discharge?  Yes   Is the patient taking all medications as directed (includes completed medication regime)?  Yes   Does the patient have a primary care provider?   Yes   Does the patient have an appointment with their PCP within 7 days of discharge?  Yes   Has the patient kept scheduled appointments due by today?  Yes   Has home health visited the patient within 72 hours of discharge?  N/A   Psychosocial issues?  No   Comments  Pt wife reports he is running a fever, and tested negative for the flu at the [facility]. Pt's wife reports his temp is as high as 101.2. Reports she is in contact with Dr. Argueta, pt's oncologist.    Did the patient receive a copy of their discharge instructions?  Yes   Nursing interventions  Reviewed instructions with patient   What is the patient's perception of their health status since discharge?  New symptoms unrelated to diagnosis   Is the patient/caregiver able to teach back signs and symptoms related to disease process for when to call PCP?  Yes   Is the patient/caregiver able to teach back signs and symptoms related to disease process for when to call 911?  Yes   Is the patient/caregiver able to teach back the hierarchy of who to call/visit for symptoms/problems? PCP, Specialist, Home health nurse, Urgent Care, ED, 911  Yes    Additional teach back comments  Fluid restriction 1500ml   Week 3 Call Completed?  Yes          Cameron Newell, RN

## 2019-12-02 NOTE — TELEPHONE ENCOUNTER
Patient's wife called triage line and reported that patient has ran a fever off and on since Tuesday of last week.  Wife reported that patient was seen by PCP last week and was tested for flu, that was negative, and had labs.  Wife reported that patient continued with a fever that was controlled with tylenol and ibuprofen, but over the weekend patient presented confused and wife reported a 103 tempeture and she took him to local ER. Patient tested negative for flu and when he got to ER his fever was 99.  Wife reported that patient feels good other that when he has a fever.  Wife denied headache, sore throat, burning with urination, cough.  Discussed with NP who also discussed with Dr. Argueta and was given a verbal for medrol dose pack.  Returned call to patient's wife and education on medication and to return call to clinic with uncontrolled fever.  Wife verbalized understanding.

## 2019-12-07 NOTE — OUTREACH NOTE
Medical Week 4 Survey      Responses   Facility patient discharged from?  [facility]   Does the patient have one of the following disease processes/diagnoses(primary or secondary)?  Other   Week 4 attempt successful?  Yes   Call start time  1714   Call end time  1716   Person spoke with today (if not patient) and relationship  Paz/ wife   Meds reviewed with patient/caregiver?  Yes   Is the patient taking all medications as directed (includes completed medication regime)?  Yes   Medication comments  Chemo every 3 weeks having some side effects   Has the patient kept scheduled appointments due by today?  Yes   Is the patient still receiving Home Health Services?  N/A   What is the patient's perception of their health status since discharge?  Improving   Week 4 Call Completed?  Yes   Would the patient like one additional call?  No   Graduated  Yes   Did the patient feel the follow up calls were helpful during their recovery period?  Yes   Was the number of calls appropriate?  Yes   Wrap up additional comments  Wife says is doing better, no new issues since side effects of chemo, fever.          Ophelia Ashraf, RN

## 2019-12-09 NOTE — TELEPHONE ENCOUNTER
Patient's wife called triage line to report that as soon as patient got to last day of medrol dose pack that patient's intermittent fever returned.  Patient's wife reported that over the weekend patient had at least 3 fevers per day.  Wife gave him ibuprofen and within 15-20 minutes his fever returned to normal.  Wife reported that she can tell that he fever is elevating because patient starts to stumble to the point that he needs assistance with ambulation and become lethargic.  Wife informs that as soon as the fever breaks that patient is able to ambulated without assistance and mental status returns to normal.  [place]ious [place] [facility] [place] discussed with Dr. Argueta concerning returned fever.  Dr. Argueta gave verbal order for medrol dose pack and will order cultures at patient's follow up visit in two days.  Discussed with patient's wife and she verbalized understanding.

## 2019-12-11 NOTE — PROGRESS NOTES
DATE OF VISIT: 12/11/2019    REASON FOR VISIT: Followup for extensive stage small cell lung cancer     HISTORY OF PRESENT ILLNESS: The patient is a very pleasant 69 y.o. male  with past medical history significant for extensive stage small cell lung cancer diagnosed Jackelin 10, 2019.  Staging work-up revealed right lower lobe lung mass with bulky mediastinal adenopathy and liver metastases.  He was started on chemotherapy using carboplatin and etoposide with Tecentriq June 19, 2019 .  He completed 4 cycles then started Tecentriq maintenance September 16, 2019.  He was started on maintenance Tecentriq, completed 3 cycles.  Repeated scan showed progressive disease.  Patient was treated with palliative short course of radiation to large right upper lobe lung mass acute hemoptysis.  Treatment was changed to Opdivo plus Yervoy November 19, 2019.  The patient is here today for scheduled follow-up visit with treatment cycle #2.    SUBJECTIVE: The patient is here today with his wife.  He has been doing fair since his last visit.  After his infusion of Opdivo Yervoy he did experience episodes of intermittent fevers.  He was seen by primary care physician and check for flu as well as other signs of infection, however everything came back negative.  He continued to have episodes of fever up to 104 with weakness and some confusion with his elevated temperatures.  He was started on Medrol Dosepak and his temperatures did improve.  At the end of his taper, however he did begin to notice an increase in his fevers again.  He was restarted on a second Medrol Dosepak on 12/9/2019.  His symptoms have improved.  He did try taking some occasional Advil that did help both his symptoms of arthralgias as well as fever.  He has had no skin rash or diarrhea.  He denies worsening cough or shortness of breath.  He has no nasal congestion or sore throat.  He denies dysuria.  He has had increase in urinary frequency, however he has been drinking  more fluids.  His headaches have overall improved.    PAST MEDICAL HISTORY/SOCIAL HISTORY/FAMILY HISTORY: Reviewed by me and unchanged from my documentation done on 12/11/19.    Review of Systems   Constitutional: Positive for fatigue and fever. Negative for activity change, appetite change, chills and unexpected weight change.   HENT: Negative for hearing loss, mouth sores, nosebleeds, sore throat and trouble swallowing.    Eyes: Negative for visual disturbance.   Respiratory: Positive for shortness of breath. Negative for cough, chest tightness and wheezing.         With activity   Cardiovascular: Negative for chest pain, palpitations and leg swelling.   Gastrointestinal: Negative for abdominal distention, abdominal pain, blood in stool, constipation, diarrhea, nausea, rectal pain and vomiting.   Endocrine: Positive for polydipsia. Negative for cold intolerance and heat intolerance.   Genitourinary: Positive for frequency. Negative for difficulty urinating, dysuria and urgency.   Musculoskeletal: Positive for arthralgias and myalgias. Negative for back pain, gait problem and joint swelling.   Skin: Negative for rash.   Neurological: Negative for dizziness, tremors, syncope, weakness, light-headedness, numbness and headaches.        Confusion with elevated temperature.   Hematological: Negative for adenopathy. Does not bruise/bleed easily.   Psychiatric/Behavioral: Negative for confusion, sleep disturbance and suicidal ideas. The patient is not nervous/anxious.          Current Outpatient Medications:   •  albuterol (ACCUNEB) 1.25 MG/3ML nebulizer solution, Take 1 ampule by nebulization Every 6 (Six) Hours As Needed for Wheezing., Disp: , Rfl:   •  amLODIPine (NORVASC) 5 MG tablet, Take 1 tablet by mouth Daily., Disp: 30 tablet, Rfl: 0  •  aspirin 81 MG chewable tablet, Chew 81 mg Daily., Disp: , Rfl:   •  budesonide-formoterol (SYMBICORT) 160-4.5 MCG/ACT inhaler, Inhale 2 puffs 2 (Two) Times a Day., Disp: 10.2 g,  "Rfl: 12  •  colchicine 0.6 MG tablet, Take 0.6 mg by mouth Daily As Needed for Muscle / Joint Pain., Disp: , Rfl:   •  cyanocobalamin 1000 MCG/ML injection, INJECT 1ML IM DAILY FOR 1 WEEK, THEN ONCE A WEEK FOR 1 MONTH, THEN ONCE A MONTH, Disp: , Rfl: 0  •  fenofibrate (TRICOR) 145 MG tablet, , Disp: , Rfl: 0  •  guaiFENesin (MUCINEX) 600 MG 12 hr tablet, Take 1 tablet by mouth Every 12 (Twelve) Hours., Disp: 30 tablet, Rfl: 0  •  lactulose (CHRONULAC) 10 GM/15ML solution, Take 15 mL by mouth Daily As Needed (constipation)., Disp: 150 mL, Rfl: 0  •  methylPREDNISolone (MEDROL, LORENA,) 4 MG tablet, Take as directed on package instructions., Disp: 21 tablet, Rfl: 0  •  Multiple Vitamins-Minerals (MULTIVITAMIN PO), Take 1 tablet by mouth Daily., Disp: , Rfl:   •  ondansetron (ZOFRAN) 8 MG tablet, Take 1 tablet by mouth 3 (Three) Times a Day As Needed for Nausea or Vomiting., Disp: 30 tablet, Rfl: 5  •  pantoprazole (PROTONIX) 40 MG EC tablet, Take 1 tablet by mouth Daily., Disp: 30 tablet, Rfl: 0  •  simvastatin (ZOCOR) 20 MG tablet, Take 1 tablet by mouth Every Night., Disp: 30 tablet, Rfl: 2  •  vitamin D (ERGOCALCIFEROL) 1.25 MG (34825 UT) capsule capsule, Take 50,000 Units by mouth 1 (One) Time Per Week., Disp: , Rfl: 0    PHYSICAL EXAMINATION:   /73   Pulse 86   Temp 96.7 °F (35.9 °C) (Temporal)   Resp 16   Ht 172.7 cm (68\")   Wt 88 kg (194 lb)   SpO2 98%   BMI 29.50 kg/m²    ECOG Performance Status: 1 - Symptomatic but completely ambulatory  General Appearance:  alert, cooperative, no apparent distress and appears stated age   Neurologic/Psychiatric: A&O x 3, gait steady, appropriate affect, strength 5/5 in all muscle groups   HEENT:  Normocephalic, without obvious abnormality, mucous membranes moist   Neck: Supple, symmetrical, trachea midline, no adenopathy;  No thyromegaly, masses, or tenderness   Lungs:   Clear to auscultation bilaterally; respirations regular, even, and unlabored bilaterally "   Heart:  Regular rate and rhythm, no murmurs appreciated   Abdomen:   Soft, non-tender, non-distended and no organomegaly   Lymph nodes: No cervical, supraclavicular, inguinal or axillary adenopathy noted   Extremities: Normal, atraumatic; no clubbing, cyanosis, or edema    Skin: No rashes, ulcers, or suspicious lesions noted     No visits with results within 2 Week(s) from this visit.   Latest known visit with results is:   Hospital Outpatient Visit on 11/19/2019   Component Date Value Ref Range Status   • Glucose 11/19/2019 133* 65 - 99 mg/dL Final   • BUN 11/19/2019 24* 8 - 23 mg/dL Final   • Creatinine 11/19/2019 0.95  0.76 - 1.27 mg/dL Final   • Sodium 11/19/2019 131* 136 - 145 mmol/L Final   • Potassium 11/19/2019 4.1  3.5 - 5.2 mmol/L Final   • Chloride 11/19/2019 94* 98 - 107 mmol/L Final   • CO2 11/19/2019 24.0  22.0 - 29.0 mmol/L Final   • Calcium 11/19/2019 9.3  8.6 - 10.5 mg/dL Final   • Total Protein 11/19/2019 6.9  6.0 - 8.5 g/dL Final   • Albumin 11/19/2019 4.30  3.50 - 5.20 g/dL Final   • ALT (SGPT) 11/19/2019 24  1 - 41 U/L Final   • AST (SGOT) 11/19/2019 21  1 - 40 U/L Final   • Alkaline Phosphatase 11/19/2019 47  39 - 117 U/L Final   • Total Bilirubin 11/19/2019 0.5  0.2 - 1.2 mg/dL Final   • eGFR Non African Amer 11/19/2019 79  >60 mL/min/1.73 Final   • Globulin 11/19/2019 2.6  gm/dL Final   • A/G Ratio 11/19/2019 1.7  g/dL Final   • BUN/Creatinine Ratio 11/19/2019 25.3* 7.0 - 25.0 Final   • Anion Gap 11/19/2019 13.0  5.0 - 15.0 mmol/L Final   • TSH 11/19/2019 0.281  0.270 - 4.200 uIU/mL Final   • Free T4 11/19/2019 1.57  0.93 - 1.70 ng/dL Final   • WBC 11/19/2019 12.60* 3.40 - 10.80 10*3/mm3 Final   • RBC 11/19/2019 4.02* 4.14 - 5.80 10*6/mm3 Final   • Hemoglobin 11/19/2019 12.9* 13.0 - 17.7 g/dL Final   • Hematocrit 11/19/2019 38.6  37.5 - 51.0 % Final   • RDW 11/19/2019 15.5* 12.3 - 15.4 % Final   • MCV 11/19/2019 95.9  79.0 - 97.0 fL Final   • MCH 11/19/2019 32.2  26.6 - 33.0 pg Final   •  MCHC 11/19/2019 33.6  31.5 - 35.7 g/dL Final   • MPV 11/19/2019 6.1  6.0 - 12.0 fL Final   • Platelets 11/19/2019 109* 140 - 450 10*3/mm3 Final   • Neutrophil % 11/19/2019 91.6* 42.7 - 76.0 % Final   • Lymphocyte % 11/19/2019 5.3* 19.6 - 45.3 % Final   • Monocyte % 11/19/2019 3.1* 5.0 - 12.0 % Final   • Neutrophils, Absolute 11/19/2019 11.50* 1.70 - 7.00 10*3/mm3 Final   • Lymphocytes, Absolute 11/19/2019 0.70  0.70 - 3.10 10*3/mm3 Final   • Monocytes, Absolute 11/19/2019 0.40  0.10 - 0.90 10*3/mm3 Final   • Creatinine 11/19/2019 1.00  0.60 - 1.30 mg/dL Final    Serial Number: [id number]:  [id number]        No results found.    ASSESSMENT: The patient is a very pleasant 69 y.o. male  with extensive stage small cell lung cancer     PROBLEM LIST:   1.  Right lower lobe small cell lung cancer T4 N3 M1 stage IVb:  A.  Presented with shortness of breath and cough  B.  CT chest done June 4019 revealed 9 cm mass right lower lobe with bulky hilar and mediastinal lymphadenopathy, and liver metastases.  C.  Status post bronchoscopy with biopsy done by Dr. Nash Jackelin 10, 2019  D.  Pathology revealed small cell lung cancer from right lower lobe, level 7 and level 10 mediastinal nodes.  E.  Started palliative chemotherapy with carboplatin and etoposide June 19, 2019, Tecentriq was added cycle #2 and chemo stopped after cycle #4.  Status post 5 cycles  F.  Progressive disease document CT chest done on November 2019  G.  Start Opdivo plus Yervoy November 19, 2019  2.  Hyponatremia  3.  Hypertension  4.  COPD  5.  Constipation  6. Fever- likely secondary to immunotherapy    PLAN:  1.  We will push out treatment till next week for cycle #2 of Opdivo plus Yervoy.  This is secondary to patient requiring steroids for what appears to be drug fever.  2.  The patient will continue his current Medrol Dosepak as prescribed.  We will then start the patient on prednisone 10 mg daily to see if this will reduce his inflammatory  response.  3.  I advised the patient to alternate Tylenol and ibuprofen for symptoms of fever and chills.  We will check labs today to assure that he has no kidney dysfunction or transaminitis that may impair his use of these over-the-counter medicines.  I told him I would notify him if he needs to avoid use.  4.  We will check labs today including CBC and CMP.  We will also check cultures to rule out any underlying infection.  We will do blood cultures x2 as well as urine culture.  5.  We will see the patient back in 4 weeks for cycle #3 of treatment.  6.  We will repeat the patient's CAT scans after cycle #4 of treatment.  7.  We discussed potential side effects of immunotherapy including but not limited to immune mediated reactions with thyroiditis, pneumonitis, hepatitis, colitis, rash, and electrolytes abnormalities, fatigue, multiorgan failure, and possibly death.  8.  We will continue the patient on Colace daily with lactulose as needed for chemotherapy as constipation.  9.  We will continue inhalers as well as nebulizer for COPD.  10.   We will continue lisinopril 1/2 tablet daily for hypertension  I told him to continue to monitor his blood pressure at home and hold his dose for hypotension.   11. I told the patient it was ok for him to start vitamin D supplement,ent as well as vitamin B12 injections as recommended by his PCP.     Isi Cuellar, APRN  12/11/2019

## 2019-12-11 NOTE — TELEPHONE ENCOUNTER
Called patient per Isi Page and left message with wife that patient's glucose was elevated, as it was discussed in OV today.  Isi has order labs for next Wednesday and she would like patient to be fasting.  Also if glucose continues to remain elevated there may be a need for medication to control elevated glucose.  Patient/wife can return call with further questions or concerns.

## 2019-12-16 NOTE — TELEPHONE ENCOUNTER
Patient's wife called triage line and reported that she did finger check patient's blood sugar over the weekend and that Sunday patient's BS was in the 300 range and that today it was 451.  She also reported that patient did run a 102 temperature yesterday that was controlled with tylenol.  Discussed with Isi TINOCO.  She wants patient to repeat fasting lab on Wednesday and if BS continues to be elevated despite completion of medrol dose pack and patient taking 10 mg prednisone daily starting on 12/15/19 that she will discuss with patient about adding medication for the elevated BS.  Also educated on low carb, low sugar, high protein diet also.  Wife verbalized understanding.

## 2019-12-18 NOTE — PROGRESS NOTES
12/18/19  Pt concerned about blood sugars.  Discussed with RUDY Fields who instructed pt to follow-up with PCP. Pt's wife concerned about steroid RX.  Instructed pt's wife to call the office.

## 2019-12-19 NOTE — TELEPHONE ENCOUNTER
Patients wife called wanting to know if he should stay on the prenidone even though his glucose is still elevated. Discussed with EARNEST Valdovinos, and advised that he needs to stay on this until he is seen back in the office. He is scheduled to see his PCP today to discuss glucose management, and encouraged them to keep this appointment.

## 2019-12-19 NOTE — PROGRESS NOTES
"Onc Nutrition     Patient:  Florentin Mcallister  YOB: 1950    Diagnosis: extensive stage small cell lung cancer  Treatment:  Opdivo / Yervoy - every 21 days    Weight: 190#; weight loss of ~15# x 4 weeks - will monitor  Nutrition Symptoms:  hyperglycemia    Met with patient and his wife during his immunotherapy infusion appointment.  Note patient has recently started on a new treatment plan due to progressive disease.  Patient has also recently been started on steroids which has resulted in hyperglycemia.  Patient does not have a history of diabetes but has been told his is borderline in the past.  His wife has been checking his blood sugars at home with average readings of ~400 mg/dL.    Provided and reviewed written diet material \"Blood Glucose Management\".  Advised them to continue checking blood sugars regularly and to keep a log to discuss at appointment with primary care physician.  Discussed diabetes diet basics of eating a consistent amount of complex carbohydrate foods and protein at each meal/snack.      Also reviewed the importance of good nutrition during his treatment course.  Advised him to be eating smaller more frequent meals/snacks throughout the day with an emphasis on high protein foods and adequate hydration.    Answered their questions and both voiced understanding of information discussed.  Encouraged him to call RD with questions.  Will monitor as needed during his treatment course.    Avis Barcenas RD  12/19/19    "

## 2019-12-24 NOTE — TELEPHONE ENCOUNTER
Patient's wife called triage line stating that patient's sats are going down and when he puts oxygen on his saturation goes up to 95%. Patient's wife stated that ct scans are moved up to Friday but she had to borrow a friends oxygen she asked that the office send a order in for oxygen.    Returned call to patient's wife after nurse discussed with Isi TINOCO. Informing patient's wife that that patient needed to be evaluated by doctor or APRN before oxygen could be ordered. Informing her that oxygen saturation needed to be documented with activity, resting, and oxygen applied. Patient's wife stated she understood and thought she would try. Informed patient's wife that she could take patient to ER since it the holidays to see if they could assess her in getting oxygen set up.

## 2019-12-26 PROBLEM — J96.01 ACUTE RESPIRATORY FAILURE WITH HYPOXIA (HCC): Status: ACTIVE | Noted: 2019-01-01

## 2019-12-31 PROBLEM — N17.1 ACUTE RENAL FAILURE WITH ACUTE CORTICAL NECROSIS (HCC): Status: ACTIVE | Noted: 2019-01-01

## 2019-12-31 PROBLEM — N17.1 ACUTE RENAL FAILURE WITH ACUTE CORTICAL NECROSIS (HCC): Status: RESOLVED | Noted: 2019-01-01 | Resolved: 2019-01-01

## 2019-12-31 PROBLEM — R65.20 SEPSIS WITH ACUTE ORGAN DYSFUNCTION WITHOUT SEPTIC SHOCK (HCC): Status: ACTIVE | Noted: 2019-01-01

## 2019-12-31 PROBLEM — R65.20 SEPSIS WITH ACUTE ORGAN DYSFUNCTION WITHOUT SEPTIC SHOCK (HCC): Status: RESOLVED | Noted: 2019-01-01 | Resolved: 2019-01-01

## 2019-12-31 PROBLEM — A41.9 SEPSIS WITH ACUTE ORGAN DYSFUNCTION WITHOUT SEPTIC SHOCK (HCC): Status: RESOLVED | Noted: 2019-01-01 | Resolved: 2019-01-01

## 2019-12-31 PROBLEM — A41.9 SEPSIS WITH ACUTE ORGAN DYSFUNCTION WITHOUT SEPTIC SHOCK (HCC): Status: ACTIVE | Noted: 2019-01-01

## 2020-01-01 ENCOUNTER — APPOINTMENT (OUTPATIENT)
Dept: LAB | Facility: HOSPITAL | Age: 70
End: 2020-01-01

## 2020-01-01 ENCOUNTER — READMISSION MANAGEMENT (OUTPATIENT)
Dept: CALL CENTER | Facility: HOSPITAL | Age: 70
End: 2020-01-01

## 2020-01-01 ENCOUNTER — APPOINTMENT (OUTPATIENT)
Dept: ONCOLOGY | Facility: HOSPITAL | Age: 70
End: 2020-01-01

## 2020-01-01 ENCOUNTER — OFFICE VISIT (OUTPATIENT)
Dept: ONCOLOGY | Facility: CLINIC | Age: 70
End: 2020-01-01

## 2020-01-01 ENCOUNTER — HOSPITAL ENCOUNTER (OUTPATIENT)
Dept: ONCOLOGY | Facility: HOSPITAL | Age: 70
Setting detail: INFUSION SERIES
End: 2020-01-01

## 2020-01-01 ENCOUNTER — TELEPHONE (OUTPATIENT)
Dept: ONCOLOGY | Facility: CLINIC | Age: 70
End: 2020-01-01

## 2020-01-01 VITALS
DIASTOLIC BLOOD PRESSURE: 72 MMHG | TEMPERATURE: 96.9 F | SYSTOLIC BLOOD PRESSURE: 117 MMHG | BODY MASS INDEX: 26.37 KG/M2 | WEIGHT: 174 LBS | HEIGHT: 68 IN | OXYGEN SATURATION: 98 % | HEART RATE: 127 BPM | RESPIRATION RATE: 16 BRPM

## 2020-01-01 VITALS
HEIGHT: 68 IN | HEART RATE: 131 BPM | BODY MASS INDEX: 26.52 KG/M2 | SYSTOLIC BLOOD PRESSURE: 107 MMHG | RESPIRATION RATE: 16 BRPM | DIASTOLIC BLOOD PRESSURE: 75 MMHG | OXYGEN SATURATION: 98 % | TEMPERATURE: 97.8 F | WEIGHT: 175 LBS

## 2020-01-01 DIAGNOSIS — C34.90 SMALL CELL LUNG CANCER (HCC): Primary | ICD-10-CM

## 2020-01-01 DIAGNOSIS — C78.7 LIVER METASTASIS: ICD-10-CM

## 2020-01-01 LAB
ALBUMIN SERPL-MCNC: 3.6 G/DL (ref 3.5–5.2)
ALBUMIN/GLOB SERPL: 1.2 G/DL
ALP SERPL-CCNC: 258 U/L (ref 39–117)
ALT SERPL W P-5'-P-CCNC: 145 U/L (ref 1–41)
ANION GAP SERPL CALCULATED.3IONS-SCNC: 19 MMOL/L (ref 5–15)
AST SERPL-CCNC: 138 U/L (ref 1–40)
BILIRUB SERPL-MCNC: 1 MG/DL (ref 0.2–1.2)
BUN BLD-MCNC: 37 MG/DL (ref 8–23)
BUN/CREAT SERPL: 32.5 (ref 7–25)
CALCIUM SPEC-SCNC: 10.4 MG/DL (ref 8.6–10.5)
CHLORIDE SERPL-SCNC: 94 MMOL/L (ref 98–107)
CO2 SERPL-SCNC: 21 MMOL/L (ref 22–29)
CREAT BLD-MCNC: 1.14 MG/DL (ref 0.76–1.27)
ERYTHROCYTE [DISTWIDTH] IN BLOOD BY AUTOMATED COUNT: 19.7 % (ref 12.3–15.4)
GFR SERPL CREATININE-BSD FRML MDRD: 64 ML/MIN/1.73
GLOBULIN UR ELPH-MCNC: 3.1 GM/DL
GLUCOSE BLD-MCNC: 172 MG/DL (ref 65–99)
HCT VFR BLD AUTO: 35.7 % (ref 37.5–51)
HGB BLD-MCNC: 11.6 G/DL (ref 13–17.7)
LYMPHOCYTES # BLD AUTO: 1.6 10*3/MM3 (ref 0.7–3.1)
LYMPHOCYTES NFR BLD AUTO: 14.8 % (ref 19.6–45.3)
MCH RBC QN AUTO: 29.3 PG (ref 26.6–33)
MCHC RBC AUTO-ENTMCNC: 32.6 G/DL (ref 31.5–35.7)
MCV RBC AUTO: 89.9 FL (ref 79–97)
MONOCYTES # BLD AUTO: 0.4 10*3/MM3 (ref 0.1–0.9)
MONOCYTES NFR BLD AUTO: 3.6 % (ref 5–12)
NEUTROPHILS # BLD AUTO: 8.8 10*3/MM3 (ref 1.7–7)
NEUTROPHILS NFR BLD AUTO: 81.6 % (ref 42.7–76)
PLATELET # BLD AUTO: 104 10*3/MM3 (ref 140–450)
PMV BLD AUTO: 7.1 FL (ref 6–12)
POTASSIUM BLD-SCNC: 3.8 MMOL/L (ref 3.5–5.2)
PROT SERPL-MCNC: 6.7 G/DL (ref 6–8.5)
RBC # BLD AUTO: 3.97 10*6/MM3 (ref 4.14–5.8)
SODIUM BLD-SCNC: 134 MMOL/L (ref 136–145)
WBC NRBC COR # BLD: 10.8 10*3/MM3 (ref 3.4–10.8)

## 2020-01-01 PROCEDURE — 36415 COLL VENOUS BLD VENIPUNCTURE: CPT | Performed by: NURSE PRACTITIONER

## 2020-01-01 PROCEDURE — 99215 OFFICE O/P EST HI 40 MIN: CPT | Performed by: INTERNAL MEDICINE

## 2020-01-01 PROCEDURE — 80053 COMPREHEN METABOLIC PANEL: CPT | Performed by: NURSE PRACTITIONER

## 2020-01-01 PROCEDURE — 99214 OFFICE O/P EST MOD 30 MIN: CPT | Performed by: NURSE PRACTITIONER

## 2020-01-01 PROCEDURE — 85025 COMPLETE CBC W/AUTO DIFF WBC: CPT | Performed by: NURSE PRACTITIONER

## 2020-01-01 RX ORDER — SODIUM CHLORIDE 9 MG/ML
250 INJECTION, SOLUTION INTRAVENOUS ONCE
Status: CANCELLED | OUTPATIENT
Start: 2020-02-05

## 2020-01-01 RX ORDER — SODIUM CHLORIDE 9 MG/ML
250 INJECTION, SOLUTION INTRAVENOUS ONCE
Status: CANCELLED | OUTPATIENT
Start: 2020-02-12

## 2020-01-01 RX ORDER — FAMOTIDINE 10 MG/ML
20 INJECTION, SOLUTION INTRAVENOUS ONCE
Status: CANCELLED | OUTPATIENT
Start: 2020-02-19

## 2020-01-01 RX ORDER — SODIUM CHLORIDE 9 MG/ML
250 INJECTION, SOLUTION INTRAVENOUS ONCE
Status: CANCELLED | OUTPATIENT
Start: 2020-02-19

## 2020-01-01 RX ORDER — FAMOTIDINE 10 MG/ML
20 INJECTION, SOLUTION INTRAVENOUS AS NEEDED
Status: CANCELLED | OUTPATIENT
Start: 2020-02-05

## 2020-01-01 RX ORDER — PEN NEEDLE, DIABETIC 32GX 5/32"
NEEDLE, DISPOSABLE MISCELLANEOUS SEE ADMIN INSTRUCTIONS
COMMUNITY
Start: 2020-01-01

## 2020-01-01 RX ORDER — ONDANSETRON HYDROCHLORIDE 8 MG/1
8 TABLET, FILM COATED ORAL 3 TIMES DAILY PRN
Qty: 30 TABLET | Refills: 5 | Status: SHIPPED | OUTPATIENT
Start: 2020-01-01

## 2020-01-01 RX ORDER — DIPHENHYDRAMINE HYDROCHLORIDE 50 MG/ML
50 INJECTION INTRAMUSCULAR; INTRAVENOUS AS NEEDED
Status: CANCELLED | OUTPATIENT
Start: 2020-02-05

## 2020-01-01 RX ORDER — INSULIN GLARGINE 100 [IU]/ML
INJECTION, SOLUTION SUBCUTANEOUS
COMMUNITY
Start: 2020-01-01

## 2020-01-01 RX ORDER — DIPHENHYDRAMINE HYDROCHLORIDE 50 MG/ML
50 INJECTION INTRAMUSCULAR; INTRAVENOUS AS NEEDED
Status: CANCELLED | OUTPATIENT
Start: 2020-02-12

## 2020-01-01 RX ORDER — FAMOTIDINE 10 MG/ML
20 INJECTION, SOLUTION INTRAVENOUS ONCE
Status: CANCELLED | OUTPATIENT
Start: 2020-02-12

## 2020-01-01 RX ORDER — FAMOTIDINE 10 MG/ML
20 INJECTION, SOLUTION INTRAVENOUS AS NEEDED
Status: CANCELLED | OUTPATIENT
Start: 2020-02-19

## 2020-01-01 RX ORDER — FAMOTIDINE 10 MG/ML
20 INJECTION, SOLUTION INTRAVENOUS AS NEEDED
Status: CANCELLED | OUTPATIENT
Start: 2020-02-12

## 2020-01-01 RX ORDER — INSULIN LISPRO 100 [IU]/ML
INJECTION, SOLUTION INTRAVENOUS; SUBCUTANEOUS
COMMUNITY
Start: 2020-01-01

## 2020-01-01 RX ORDER — DIPHENHYDRAMINE HYDROCHLORIDE 50 MG/ML
50 INJECTION INTRAMUSCULAR; INTRAVENOUS AS NEEDED
Status: CANCELLED | OUTPATIENT
Start: 2020-02-19

## 2020-01-01 RX ORDER — HYDROCODONE BITARTRATE AND ACETAMINOPHEN 7.5; 325 MG/1; MG/1
1 TABLET ORAL EVERY 6 HOURS PRN
Qty: 120 TABLET | Refills: 0 | Status: SHIPPED | OUTPATIENT
Start: 2020-01-01

## 2020-01-01 RX ORDER — FAMOTIDINE 10 MG/ML
20 INJECTION, SOLUTION INTRAVENOUS ONCE
Status: CANCELLED | OUTPATIENT
Start: 2020-02-05

## 2020-01-01 NOTE — OUTREACH NOTE
Prep Survey      Responses   Facility patient discharged from?  [facility]   Is patient eligible?  Yes   Discharge diagnosis  Pneumonia   Does the patient have one of the following disease processes/diagnoses(primary or secondary)?  COPD/Pneumonia   Does the patient have Home health ordered?  No   What is the Home health agency?   Pt declined   Is there a DME ordered?  Yes   What DME was ordered?  O2 Ordered through [facility] in [place], will deliver to pt room   Comments regarding appointments  See AVS   Prep survey completed?  Yes          Rosa Maria Shah RN

## 2020-01-03 NOTE — OUTREACH NOTE
COPD/PN Week 1 Survey      Responses   Facility patient discharged from?  [facility]   Does the patient have one of the following disease processes/diagnoses(primary or secondary)?  COPD/Pneumonia   Is there a successful TCM telephone encounter documented?  No   Was the primary reason for admission:  Pneumonia   Week 1 attempt successful?  Yes   Call start time  0916   Call end time  0924   General alerts for this patient  Pt is very hard of hearing   Discharge diagnosis  Pneumonia   Is patient permission given to speak with other caregiver?  Yes   List who call center can speak with  Flores Mcallister-wife   Person spoke with today (if not patient) and relationship  Flores-wife   Meds reviewed with patient/caregiver?  Yes   Is the patient having any side effects they believe may be caused by any medication additions or changes?  No   Does the patient have all medications ordered at discharge?  Yes   Is the patient taking all medications as directed (includes completed medication regime)?  Yes   Does the patient have a primary care provider?   Yes   Does the patient have an appointment with their PCP or pulmonologist within 7 days of discharge?  Yes   Comments regarding PCP  Appointment with 1/7/19   Has the patient kept scheduled appointments due by today?  N/A   Comments  Appointment with Pulmonary will be shcheduled after appointment with Dr. Argueta on 1/7/20   What DME was ordered?  O2 Ordered through [facility] in [place], will deliver to pt room   Has all DME been delivered?  Yes   Psychosocial issues?  No   Did the patient receive a copy of their discharge instructions?  Yes   Nursing interventions  Reviewed instructions with patient, Educated on MyChart   What is the patient's perception of their health status since discharge?  Improving   Nursing Interventions  Nurse provided patient education   Are the patient's immunizations up to date?   Yes   Nursing interventions  Educated on importance of maintaining up to  date immunizations as advised by provider   If the patient is a current smoker, are they able to teach back resources for cessation?  -- [Pt is a nonsmoker]   Is the patient/caregiver able to teach back the hierarchy of who to call/visit for symptoms/problems? PCP, Specialist, Home health nurse, Urgent Care, ED, 911  Yes   Is the patient/caregiver able to teach back signs and symptoms of worsening condition:  Fever/chills, Shortness of breath, Chest pain [Pt is still having shortness of breath. It has improved. ]   Is the patient/caregiver able to teach back importance of completing antibiotic course of treatment?  Yes   Week 1 call completed?  Yes          Iraida Foster RN

## 2020-01-07 NOTE — PROGRESS NOTES
DATE OF VISIT: 1/7/2020    REASON FOR VISIT: Followup for extensive stage small cell lung cancer     HISTORY OF PRESENT ILLNESS: The patient is a very pleasant 69 y.o. male  with past medical history significant for extensive stage small cell lung cancer diagnosed Jackelin 10, 2019.  Staging work-up revealed right lower lobe lung mass with bulky mediastinal adenopathy and liver metastases.  He was started on chemotherapy using carboplatin and etoposide with Tecentriq June 19, 2019 .  He completed 4 cycles then started Tecentriq maintenance September 16, 2019.  He was started on maintenance Tecentriq, completed 3 cycles.  Repeated scan showed progressive disease.  Patient was treated with palliative short course of radiation to large right upper lobe lung mass acute hemoptysis.  Treatment was changed to Opdivo plus Yervoy November 19, 2019.  This was discontinued after 2 cycles secondary to grade 3 bilateral pneumonitis.  The patient is here today for scheduled follow-up visit.    SUBJECTIVE: The patient is here today with his wife.  He is feeling little bit better.  He came to complain of fatigue as well as shortness breath exertion.  No more fevers.  Sugars been running high between 204 100.    PAST MEDICAL HISTORY/SOCIAL HISTORY/FAMILY HISTORY: Reviewed by me and unchanged from my documentation done on 01/07/20.    Review of Systems   Constitutional: Positive for fatigue and fever. Negative for activity change, appetite change, chills and unexpected weight change.   HENT: Negative for hearing loss, mouth sores, nosebleeds, sore throat and trouble swallowing.    Eyes: Negative for visual disturbance.   Respiratory: Positive for shortness of breath. Negative for cough, chest tightness and wheezing.         With activity   Cardiovascular: Negative for chest pain, palpitations and leg swelling.   Gastrointestinal: Negative for abdominal distention, abdominal pain, blood in stool, constipation, diarrhea, nausea, rectal  pain and vomiting.   Endocrine: Positive for polydipsia. Negative for cold intolerance and heat intolerance.   Genitourinary: Positive for frequency. Negative for difficulty urinating, dysuria and urgency.   Musculoskeletal: Positive for arthralgias and myalgias. Negative for back pain, gait problem and joint swelling.   Skin: Negative for rash.   Neurological: Negative for dizziness, tremors, syncope, weakness, light-headedness, numbness and headaches.        Confusion with elevated temperature.   Hematological: Negative for adenopathy. Does not bruise/bleed easily.   Psychiatric/Behavioral: Negative for confusion, sleep disturbance and suicidal ideas. The patient is not nervous/anxious.          Current Outpatient Medications:   •  albuterol (ACCUNEB) 1.25 MG/3ML nebulizer solution, Take 1 ampule by nebulization Every 6 (Six) Hours As Needed for Wheezing., Disp: , Rfl:   •  amLODIPine (NORVASC) 5 MG tablet, Take 1 tablet by mouth Daily., Disp: 30 tablet, Rfl: 0  •  aspirin 81 MG chewable tablet, Chew 81 mg Daily., Disp: , Rfl:   •  BD PEN NEEDLE MORALES U/F 32G X 4 MM misc, See Admin Instructions., Disp: , Rfl:   •  budesonide-formoterol (SYMBICORT) 160-4.5 MCG/ACT inhaler, Inhale 2 puffs 2 (Two) Times a Day., Disp: 10.2 g, Rfl: 12  •  colchicine 0.6 MG tablet, Take 0.6 mg by mouth Daily As Needed for Muscle / Joint Pain., Disp: , Rfl:   •  Insulin Glargine (BASAGLAR KWIKPEN) 100 UNIT/ML injection pen, INJECT 10 UNITS SUBCUTANEOUSLY ONCE A DAY, Disp: , Rfl:   •  lactulose (CHRONULAC) 10 GM/15ML solution, Take 15 mL by mouth Daily As Needed (constipation)., Disp: 150 mL, Rfl: 0  •  metFORMIN (GLUCOPHAGE) 1000 MG tablet, Take 1,000 mg by mouth 2 (Two) Times a Day., Disp: , Rfl:   •  Multiple Vitamins-Minerals (MULTIVITAMIN PO), Take 1 tablet by mouth Daily., Disp: , Rfl:   •  ondansetron (ZOFRAN) 8 MG tablet, Take 1 tablet by mouth 3 (Three) Times a Day As Needed for Nausea or Vomiting., Disp: 30 tablet, Rfl: 5  •   "pantoprazole (PROTONIX) 40 MG EC tablet, Take 1 tablet by mouth Daily., Disp: 30 tablet, Rfl: 0  •  predniSONE (DELTASONE) 20 MG tablet, Take 3 tablets by mouth Daily With Breakfast., Disp: 90 tablet, Rfl: 1  •  simvastatin (ZOCOR) 20 MG tablet, Take 1 tablet by mouth Every Night., Disp: 30 tablet, Rfl: 2  •  vitamin D (ERGOCALCIFEROL) 1.25 MG (42990 UT) capsule capsule, Take 50,000 Units by mouth 1 (One) Time Per Week. Wednesdays, Disp: , Rfl: 0    PHYSICAL EXAMINATION:   /72   Pulse (!) 127   Temp 96.9 °F (36.1 °C) (Temporal)   Resp 16   Ht 172.7 cm (67.99\")   Wt 78.9 kg (174 lb)   SpO2 98%   BMI 26.46 kg/m²    ECOG Performance Status: 1 - Symptomatic but completely ambulatory  General Appearance:  alert, cooperative, no apparent distress and appears stated age   Neurologic/Psychiatric: A&O x 3, gait steady, appropriate affect, strength 5/5 in all muscle groups   HEENT:  Normocephalic, without obvious abnormality, mucous membranes moist   Neck: Supple, symmetrical, trachea midline, no adenopathy;  No thyromegaly, masses, or tenderness   Lungs:   Clear to auscultation bilaterally; respirations regular, even, and unlabored bilaterally   Heart:  Regular rate and rhythm, no murmurs appreciated   Abdomen:   Soft, non-tender, non-distended and no organomegaly   Lymph nodes: No cervical, supraclavicular, inguinal or axillary adenopathy noted   Extremities: Normal, atraumatic; no clubbing, cyanosis, or edema    Skin: No rashes, ulcers, or suspicious lesions noted     No results displayed because visit has over 200 results.           Xr Shoulder 2+ View Left    Result Date: 12/29/2019  Narrative:  EXAMINATION: XR LEFT HUMERUS, XR LEFT SHOULDER 2 VIEWS  - 12/28/2019  INDICATION: J18.9-Pneumonia, unspecified organism; J96.01-Acute respiratory failure with hypoxia. Fall, left arm pain.  COMPARISON: None.  FINDINGS: AP and lateral views of the left humerus along with internal rotation, external rotation and " scapular Y views of the left shoulder reveal grossly anatomic alignment of the left shoulder and clavicular interval without interval joint space widening or acute cortical irregularity of the left shoulder or left humerus which has preserved cortical margins and without displaced fracture or acute cortical irregularity evident. Visualized portions of the left hemithorax grossly clear.         Impression: No acute osseous abnormalities of the left shoulder or left humerus, specifically no displaced fracture in grossly anatomic alignment.  DICTATED:   12/28/2019 EDITED/ls :   12/29/2019  This report was finalized on 12/29/2019 2:12 PM by Dr. Lakhwinder Crawford.      Xr Humerus Left    Result Date: 12/29/2019  Narrative:  EXAMINATION: XR LEFT HUMERUS, XR LEFT SHOULDER 2 VIEWS  - 12/28/2019  INDICATION: J18.9-Pneumonia, unspecified organism; J96.01-Acute respiratory failure with hypoxia. Fall, left arm pain.  COMPARISON: None.  FINDINGS: AP and lateral views of the left humerus along with internal rotation, external rotation and scapular Y views of the left shoulder reveal grossly anatomic alignment of the left shoulder and clavicular interval without interval joint space widening or acute cortical irregularity of the left shoulder or left humerus which has preserved cortical margins and without displaced fracture or acute cortical irregularity evident. Visualized portions of the left hemithorax grossly clear.         Impression: No acute osseous abnormalities of the left shoulder or left humerus, specifically no displaced fracture in grossly anatomic alignment.  DICTATED:   12/28/2019 EDITED/ls :   12/29/2019  This report was finalized on 12/29/2019 2:12 PM by Dr. Lakhwinder Crawford.      Xr Chest 1 View    Result Date: 12/30/2019  Narrative: EXAMINATION: XR CHEST 1 VW- 12/30/2019  INDICATION: f/u respiratory illness.; J18.9-Pneumonia, unspecified organism; J96.01-Acute respiratory failure with hypoxia  COMPARISON: 12/28/2019   FINDINGS: Portable chest reveals airspace disease again seen throughout the right lung. The left lung remains grossly clear. Cardiac and mediastinal silhouettes within normal limits. No definite pleural effusion or pneumothorax.         Impression: Airspace disease again seen within the right lung unchanged in the interval.  D:  12/30/2019 E:  12/30/2019  This report was finalized on 12/30/2019 4:22 PM by Dr. Avis Cassidy MD.      Xr Chest 1 View    Result Date: 12/27/2019  Narrative: EXAMINATION: XR CHEST 1 VW-12/27/2019:  INDICATION: PNA, lung cancer, hypoxia; J18.9-Pneumonia, unspecified organism; J96.01-Acute respiratory failure with hypoxia.  COMPARISON: Chest radiograph 12/26/2019.  FINDINGS: Single portable chest radiograph was submitted for review. The heart is top normal in size. There has been interval worsening of right upper lobe airspace disease with persistent right lower lobe airspace disease. Interstitial opacities within the left lung remain similar. The visualized upper abdomen is unrevealing. No acute osseous abnormality identified.         Impression: Redemonstration of right lower lobe airspace opacification with interval worsening of right upper lobe airspace disease when compared to 12/26/2019.  D:  12/27/2019 E:  12/27/2019  This report was finalized on 12/27/2019 3:54 PM by Dr. Erickson Astorga MD.      Xr Chest 1 View    Result Date: 12/26/2019  Narrative: EXAMINATION: XR CHEST 1 VW-12/26/2019:  INDICATION: SOA, triage protocol.  COMPARISON: 06/11/2019.  FINDINGS: Portable chest reveals cardiac and mediastinal silhouettes to be within normal limits. There is patchy ill-defined opacification seen within the right mid lung. There is no evidence of pleural effusion or pneumothorax. The left lung is clear. Degenerative changes seen within the spine.         Impression: Patchy airspace disease identified within the right mid lung. Findings concerning for infiltrate. Chronic changes seen  within the remainder of the lung fields bilaterally.  D:  12/26/2019 E:  12/26/2019  This report was finalized on 12/26/2019 11:32 AM by Dr. Avis Cassidy MD.      Ct Angiogram Chest    Result Date: 12/26/2019  Narrative: EXAMINATION: CT ANGIOGRAM CHEST-12/26/2019:  INDICATION: SOA, hypoxia; J18.9-Pneumonia, unspecified organism; J96.01-Acute respiratory failure with hypoxia, shortness of breath.  TECHNIQUE: Multiple axial CT imaging was obtained of the chest following the administration of intravenous contrast. 2-D coronal reformatted images were submitted to further facilitate diagnostic accuracy and treatment planning.  The radiation dose reduction device was turned on for each scan per the ALARA (As Low as Reasonably Achievable) protocol.  COMPARISON: 11/05/2019.  FINDINGS: There is adenopathy identified in the right paratracheal region which is increasing in size in the interval. The largest lymph node identified posterior to the trachea in the upper mediastinum measures 1.7 cm and previously measured 7 mm. The adenopathy within the mediastinum has worsened in the interval. There is increasing soft tissue seen in the hilar region. Previously noted patchy airspace disease extending into the posterior aspect of the lung has improved in its aeration. There is now interval increase seen in the disease throughout the right upper lobe. The findings suggest lymphangitic spread of disease. There are nodular densities identified posteriorly throughout the right lung with the mass and soft tissue still present within the right infrahilar region. There is some patchy groundglass opacity as well seen throughout the left lingula and lower lobe. At this time, the findings may suggest spread of malignancy, however, superimposed infectious process and reactive adenopathy in the mediastinum cannot be excluded. There are degenerative changes seen within the spine. The visualized upper abdomen is unremarkable. No evidence of  filling defect in the pulmonary arteries to suggest evidence of pulmonary embolism. Narrowing seen of the right pulmonary arteries in the hilar region from the mass.      Impression: There is interval increase seen in size of the mediastinal adenopathy suggesting either reactive adenopathy or metastatic spread of disease. Clinical correlation is needed. The previously noted postobstructive process in the right posterior lung has improved, however, there is diffuse increased markings seen throughout the right lung with some nodular density seen in the right lower lobe. The findings may suggest lymphangitic spread of disease, however, superimposed infectious process cannot be excluded. There are some markings as well seen with fibronodular densities within the lingula and left lower lobe. Close interval followup is recommended. No evidence of pulmonary embolism. Mass effect on the pulmonary vessels in the right hilar region from the surrounding extraluminal compression.  D:  12/26/2019 E:  12/26/2019  This report was finalized on 12/26/2019 1:11 PM by Dr. Avis Cassidy MD.        ASSESSMENT: The patient is a very pleasant 69 y.o. male  with extensive stage small cell lung cancer     PROBLEM LIST:   1.  Right lower lobe small cell lung cancer T4 N3 M1 stage IVb:  A.  Presented with shortness of breath and cough  B.  CT chest done June 4019 revealed 9 cm mass right lower lobe with bulky hilar and mediastinal lymphadenopathy, and liver metastases.  C.  Status post bronchoscopy with biopsy done by Dr. Nash Jackelin 10, 2019  D.  Pathology revealed small cell lung cancer from right lower lobe, level 7 and level 10 mediastinal nodes.  E.  Started palliative chemotherapy with carboplatin and etoposide June 19, 2019, Tecentriq was added cycle #2 and chemo stopped after cycle #4.  Status post 5 cycles  F.  Progressive disease document CT chest done on November 2019  G.  Start Opdivo plus Yervoy November 19, 2019, status  post 2 cycles.  Stop secondary to pneumonitis.  H.  Will start Taxol weekly January 21, 2019  2.  Hyponatremia  3.  Hypertension  4.  COPD  5.  Constipation  6. Fever- likely secondary to immunotherapy    PLAN:  1.  I will continue prednisone with taper schedule.  We will go down to 40 mg daily for 1 week and then 10 mg taper every week.  2.  I had long discussion about future treatment options.  The patient CT scan done at the hospital 2 weeks ago revealed slight increase of his mediastinal lymphadenopathy this could be reactive versus progressive disease.  3.  I explained to patient he will not be a candidate for immunotherapy at this point given his severe pneumonitis.  4.  Future treatment options include best supportive care versus third line treatment with Taxol or to petechia and.  Patient is interested in active cancer treatment.  5.  The patient follow-up with me in 2 weeks we will plan to start Taxol weekly 3 weeks on 1 week off if he is feeling better.  6.  We discussed the side effects of this regimen including alopecia, nausea, fatigue, myelosuppression, infusion reaction, neuropathy, and diarrhea.  7.  I will monitor the patient blood work including blood counts kidney function function and electrolytes.  8.  We will continue the patient on Colace daily with lactulose as needed for chemotherapy as constipation.  9.  We will continue inhalers as well as nebulizer for COPD.  10.   We will continue lisinopril 1/2 tablet daily for hypertension  I told him to continue to monitor his blood pressure at home and hold his dose for hypotension.   11. I told the patient it was ok for him to start vitamin D supplement,ent as well as vitamin B12 injections as recommended by his PCP.   12.  I asked him to double his Lantus insulin tonight from 10 units to 20 units given his uncontrolled diabetes.  He scheduled to see endocrinology tomorrow.  Ulysses Argueta MD  1/7/2020

## 2020-01-11 NOTE — OUTREACH NOTE
COPD/PN Week 2 Survey      Responses   Facility patient discharged from?  [facility]   Does the patient have one of the following disease processes/diagnoses(primary or secondary)?  COPD/Pneumonia   Was the primary reason for admission:  Pneumonia   Week 2 attempt successful?  Yes   Call start time  1057   Call end time  1058   General alerts for this patient  Pt is very hard of hearing   Discharge diagnosis  Pneumonia   Person spoke with today (if not patient) and relationship  Flores-wife   Meds reviewed with patient/caregiver?  Yes   Is the patient taking all medications as directed (includes completed medication regime)?  Yes   Has the patient kept scheduled appointments due by today?  Yes   What is the patient's perception of their health status since discharge?  Improving   Is the patient/caregiver able to teach back signs and symptoms of worsening condition:  Fever/chills, Shortness of breath, Chest pain   Is the patient/caregiver able to teach back importance of completing antibiotic course of treatment?  Yes   Week 2 call completed?  Yes          Iraida Foster RN

## 2020-01-22 NOTE — PROGRESS NOTES
DATE OF VISIT: 1/22/2020    REASON FOR VISIT: Followup for extensive stage small cell lung cancer     HISTORY OF PRESENT ILLNESS: The patient is a very pleasant 70 y.o. male  with past medical history significant for extensive stage small cell lung cancer diagnosed Jackelin 10, 2019.  Staging work-up revealed right lower lobe lung mass with bulky mediastinal adenopathy and liver metastases.  He was started on chemotherapy using carboplatin and etoposide with Tecentriq June 19, 2019 .  He completed 4 cycles then started Tecentriq maintenance September 16, 2019.  He was started on maintenance Tecentriq, completed 3 cycles.  Repeated scan showed progressive disease.  Patient was treated with palliative short course of radiation to large right upper lobe lung mass acute hemoptysis.  Treatment was changed to Opdivo plus Yervoy November 19, 2019.  This was discontinued after 2 cycles secondary to grade 3 bilateral pneumonitis.  The patient is here today for scheduled follow-up visit.    SUBJECTIVE: The patient is here today with his wife. He is feeling weaker, requiring assistance with ADL's, He is in a wheelchair today. He is currently on prednisone 20 mg po daily and next Tuesday he decreases prednisone to 10 mg po daily. His blood sugars are still ranging from 200 - 100. It was 138 this morning. He is having increased back pain with the decreased activity. He is requiring hydrocodone 7.5 mg po 1 tablet twice a day for pain control. He continues to complain of shortness of breath with exertion. No fevers or chills.       PAST MEDICAL HISTORY/SOCIAL HISTORY/FAMILY HISTORY: Reviewed by me and unchanged from documentation done on 01/07/2020.    Review of Systems   Constitutional: Positive for activity change and fatigue. Negative for appetite change, chills, fever and unexpected weight change.   HENT: Negative for hearing loss, mouth sores, nosebleeds, sore throat and trouble swallowing.    Eyes: Negative for visual  disturbance.   Respiratory: Positive for cough, shortness of breath and wheezing. Negative for chest tightness.         With activity   Cardiovascular: Negative for chest pain, palpitations and leg swelling.   Gastrointestinal: Negative for abdominal distention, abdominal pain, blood in stool, constipation, diarrhea, nausea, rectal pain and vomiting.   Endocrine: Negative for cold intolerance, heat intolerance and polydipsia.   Genitourinary: Negative for difficulty urinating, dysuria, frequency and urgency.   Musculoskeletal: Positive for arthralgias and myalgias. Negative for back pain, gait problem and joint swelling.   Skin: Negative for rash.   Neurological: Positive for weakness. Negative for dizziness, tremors, syncope, light-headedness, numbness and headaches.   Hematological: Negative for adenopathy. Does not bruise/bleed easily.   Psychiatric/Behavioral: Negative for confusion, sleep disturbance and suicidal ideas. The patient is not nervous/anxious.          Current Outpatient Medications:   •  albuterol (ACCUNEB) 1.25 MG/3ML nebulizer solution, Take 1 ampule by nebulization Every 6 (Six) Hours As Needed for Wheezing., Disp: , Rfl:   •  amLODIPine (NORVASC) 5 MG tablet, Take 1 tablet by mouth Daily., Disp: 30 tablet, Rfl: 0  •  aspirin 81 MG chewable tablet, Chew 81 mg Daily., Disp: , Rfl:   •  BD PEN NEEDLE MORALES U/F 32G X 4 MM misc, See Admin Instructions., Disp: , Rfl:   •  budesonide-formoterol (SYMBICORT) 160-4.5 MCG/ACT inhaler, Inhale 2 puffs 2 (Two) Times a Day., Disp: 10.2 g, Rfl: 12  •  colchicine 0.6 MG tablet, Take 0.6 mg by mouth Daily As Needed for Muscle / Joint Pain., Disp: , Rfl:   •  HUMALOG KWIKPEN 100 UNIT/ML solution pen-injector, INJECT UP TO 18 UNITS THREE TIMES A DAY WITH MEALS, Disp: , Rfl:   •  Insulin Glargine (BASAGLAR KWIKPEN) 100 UNIT/ML injection pen, INJECT 10 UNITS SUBCUTANEOUSLY ONCE A DAY, Disp: , Rfl:   •  lactulose (CHRONULAC) 10 GM/15ML solution, Take 15 mL by mouth  "Daily As Needed (constipation)., Disp: 150 mL, Rfl: 0  •  metFORMIN (GLUCOPHAGE) 1000 MG tablet, Take 1,000 mg by mouth 2 (Two) Times a Day., Disp: , Rfl:   •  Multiple Vitamins-Minerals (MULTIVITAMIN PO), Take 1 tablet by mouth Daily., Disp: , Rfl:   •  ondansetron (ZOFRAN) 8 MG tablet, Take 1 tablet by mouth 3 (Three) Times a Day As Needed for Nausea or Vomiting., Disp: 30 tablet, Rfl: 5  •  ONE TOUCH ULTRA TEST test strip, TEST AS DIRECTED THREE TIMES A DAY AND AS NEEDED, Disp: , Rfl:   •  pantoprazole (PROTONIX) 40 MG EC tablet, Take 1 tablet by mouth Daily., Disp: 30 tablet, Rfl: 0  •  predniSONE (DELTASONE) 20 MG tablet, Take 3 tablets by mouth Daily With Breakfast., Disp: 90 tablet, Rfl: 1  •  simvastatin (ZOCOR) 20 MG tablet, Take 1 tablet by mouth Every Night., Disp: 30 tablet, Rfl: 2  •  vitamin D (ERGOCALCIFEROL) 1.25 MG (53833 UT) capsule capsule, Take 50,000 Units by mouth 1 (One) Time Per Week. Wednesdays, Disp: , Rfl: 0    PHYSICAL EXAMINATION:   /75   Pulse (!) 131   Temp 97.8 °F (36.6 °C) (Oral)   Resp 16   Ht 172.7 cm (67.99\")   Wt 79.4 kg (175 lb)   SpO2 98%   BMI 26.61 kg/m²    ECOG Performance Status: 2  General Appearance:  alert, cooperative, no apparent distress and appears stated age, in a wheelchair    Neurologic/Psychiatric: A&O x 3, gait steady, appropriate affect    HEENT:  Normocephalic, without obvious abnormality, mucous membranes moist   Neck: Supple, symmetrical, trachea midline, no adenopathy;  No thyromegaly, masses, or tenderness   Lungs:   Scattered expiratory wheezes, no rales or rhonchi    Heart:  Regular rate and rhythm, no murmurs appreciated   Abdomen:   Soft, non-tender, non-distended and no organomegaly   Lymph nodes: No cervical, supraclavicular, inguinal or axillary adenopathy noted   Extremities: Normal, atraumatic; no clubbing, cyanosis, or edema    Skin: No rashes, ulcers, or suspicious lesions noted     No visits with results within 2 Week(s) from this " visit.   Latest known visit with results is:   No results displayed because visit has over 200 results.           Xr Shoulder 2+ View Left    Result Date: 12/29/2019  Narrative:  EXAMINATION: XR LEFT HUMERUS, XR LEFT SHOULDER 2 VIEWS  - 12/28/2019  INDICATION: J18.9-Pneumonia, unspecified organism; J96.01-Acute respiratory failure with hypoxia. Fall, left arm pain.  COMPARISON: None.  FINDINGS: AP and lateral views of the left humerus along with internal rotation, external rotation and scapular Y views of the left shoulder reveal grossly anatomic alignment of the left shoulder and clavicular interval without interval joint space widening or acute cortical irregularity of the left shoulder or left humerus which has preserved cortical margins and without displaced fracture or acute cortical irregularity evident. Visualized portions of the left hemithorax grossly clear.         Impression: No acute osseous abnormalities of the left shoulder or left humerus, specifically no displaced fracture in grossly anatomic alignment.  DICTATED:   12/28/2019 EDITED/ls :   12/29/2019  This report was finalized on 12/29/2019 2:12 PM by Dr. Lakhwinder Crawford.      Xr Humerus Left    Result Date: 12/29/2019  Narrative:  EXAMINATION: XR LEFT HUMERUS, XR LEFT SHOULDER 2 VIEWS  - 12/28/2019  INDICATION: J18.9-Pneumonia, unspecified organism; J96.01-Acute respiratory failure with hypoxia. Fall, left arm pain.  COMPARISON: None.  FINDINGS: AP and lateral views of the left humerus along with internal rotation, external rotation and scapular Y views of the left shoulder reveal grossly anatomic alignment of the left shoulder and clavicular interval without interval joint space widening or acute cortical irregularity of the left shoulder or left humerus which has preserved cortical margins and without displaced fracture or acute cortical irregularity evident. Visualized portions of the left hemithorax grossly clear.         Impression: No acute  osseous abnormalities of the left shoulder or left humerus, specifically no displaced fracture in grossly anatomic alignment.  DICTATED:   12/28/2019 EDITED/ls :   12/29/2019  This report was finalized on 12/29/2019 2:12 PM by Dr. Lakhwinder Crawford.      Xr Chest 1 View    Result Date: 12/30/2019  Narrative: EXAMINATION: XR CHEST 1 VW- 12/30/2019  INDICATION: f/u respiratory illness.; J18.9-Pneumonia, unspecified organism; J96.01-Acute respiratory failure with hypoxia  COMPARISON: 12/28/2019  FINDINGS: Portable chest reveals airspace disease again seen throughout the right lung. The left lung remains grossly clear. Cardiac and mediastinal silhouettes within normal limits. No definite pleural effusion or pneumothorax.         Impression: Airspace disease again seen within the right lung unchanged in the interval.  D:  12/30/2019 E:  12/30/2019  This report was finalized on 12/30/2019 4:22 PM by Dr. Avis Cassidy MD.      Xr Chest 1 View    Result Date: 12/27/2019  Narrative: EXAMINATION: XR CHEST 1 VW-12/27/2019:  INDICATION: PNA, lung cancer, hypoxia; J18.9-Pneumonia, unspecified organism; J96.01-Acute respiratory failure with hypoxia.  COMPARISON: Chest radiograph 12/26/2019.  FINDINGS: Single portable chest radiograph was submitted for review. The heart is top normal in size. There has been interval worsening of right upper lobe airspace disease with persistent right lower lobe airspace disease. Interstitial opacities within the left lung remain similar. The visualized upper abdomen is unrevealing. No acute osseous abnormality identified.         Impression: Redemonstration of right lower lobe airspace opacification with interval worsening of right upper lobe airspace disease when compared to 12/26/2019.  D:  12/27/2019 E:  12/27/2019  This report was finalized on 12/27/2019 3:54 PM by Dr. Erickson Astorga MD.      Xr Chest 1 View    Result Date: 12/26/2019  Narrative: EXAMINATION: XR CHEST 1 VW-12/26/2019:   INDICATION: SOA, triage protocol.  COMPARISON: 06/11/2019.  FINDINGS: Portable chest reveals cardiac and mediastinal silhouettes to be within normal limits. There is patchy ill-defined opacification seen within the right mid lung. There is no evidence of pleural effusion or pneumothorax. The left lung is clear. Degenerative changes seen within the spine.         Impression: Patchy airspace disease identified within the right mid lung. Findings concerning for infiltrate. Chronic changes seen within the remainder of the lung fields bilaterally.  D:  12/26/2019 E:  12/26/2019  This report was finalized on 12/26/2019 11:32 AM by Dr. Avis Cassidy MD.      Ct Angiogram Chest    Result Date: 12/26/2019  Narrative: EXAMINATION: CT ANGIOGRAM CHEST-12/26/2019:  INDICATION: SOA, hypoxia; J18.9-Pneumonia, unspecified organism; J96.01-Acute respiratory failure with hypoxia, shortness of breath.  TECHNIQUE: Multiple axial CT imaging was obtained of the chest following the administration of intravenous contrast. 2-D coronal reformatted images were submitted to further facilitate diagnostic accuracy and treatment planning.  The radiation dose reduction device was turned on for each scan per the ALARA (As Low as Reasonably Achievable) protocol.  COMPARISON: 11/05/2019.  FINDINGS: There is adenopathy identified in the right paratracheal region which is increasing in size in the interval. The largest lymph node identified posterior to the trachea in the upper mediastinum measures 1.7 cm and previously measured 7 mm. The adenopathy within the mediastinum has worsened in the interval. There is increasing soft tissue seen in the hilar region. Previously noted patchy airspace disease extending into the posterior aspect of the lung has improved in its aeration. There is now interval increase seen in the disease throughout the right upper lobe. The findings suggest lymphangitic spread of disease. There are nodular densities identified  posteriorly throughout the right lung with the mass and soft tissue still present within the right infrahilar region. There is some patchy groundglass opacity as well seen throughout the left lingula and lower lobe. At this time, the findings may suggest spread of malignancy, however, superimposed infectious process and reactive adenopathy in the mediastinum cannot be excluded. There are degenerative changes seen within the spine. The visualized upper abdomen is unremarkable. No evidence of filling defect in the pulmonary arteries to suggest evidence of pulmonary embolism. Narrowing seen of the right pulmonary arteries in the hilar region from the mass.      Impression: There is interval increase seen in size of the mediastinal adenopathy suggesting either reactive adenopathy or metastatic spread of disease. Clinical correlation is needed. The previously noted postobstructive process in the right posterior lung has improved, however, there is diffuse increased markings seen throughout the right lung with some nodular density seen in the right lower lobe. The findings may suggest lymphangitic spread of disease, however, superimposed infectious process cannot be excluded. There are some markings as well seen with fibronodular densities within the lingula and left lower lobe. Close interval followup is recommended. No evidence of pulmonary embolism. Mass effect on the pulmonary vessels in the right hilar region from the surrounding extraluminal compression.  D:  12/26/2019 E:  12/26/2019  This report was finalized on 12/26/2019 1:11 PM by Dr. Avis Cassidy MD.        ASSESSMENT: The patient is a very pleasant 70 y.o. male  with extensive stage small cell lung cancer     PROBLEM LIST:   1.  Right lower lobe small cell lung cancer T4 N3 M1 stage IVb:  A.  Presented with shortness of breath and cough  B.  CT chest done June 4019 revealed 9 cm mass right lower lobe with bulky hilar and mediastinal lymphadenopathy, and  liver metastases.  C.  Status post bronchoscopy with biopsy done by Dr. Nash Jackelin 10, 2019  D.  Pathology revealed small cell lung cancer from right lower lobe, level 7 and level 10 mediastinal nodes.  E.  Started palliative chemotherapy with carboplatin and etoposide June 19, 2019, Tecentriq was added cycle #2 and chemo stopped after cycle #4.  Status post 5 cycles  F.  Progressive disease document CT chest done on November 2019  G.  Start Opdivo plus Yervoy November 19, 2019, status post 2 cycles.  Stop secondary to pneumonitis.  H.  Will start Taxol weekly February 5, 2020  2.  Hyponatremia  3.  Hypertension  4.  COPD  5.  Constipation  6. Fever- likely secondary to immunotherapy    PLAN:  1.  I will continue prednisone with taper schedule.  He is currently on 20 mg po daily and will decrease to 10 mg po daily 1/28/2020.   2.  He will not be a candidate for immunotherapy at this point given his severe pneumonitis.  3.  Future treatment options include best supportive care versus third line treatment with Taxol.  Patient is interested in active cancer treatment.  4.  We will hold the start of Taxol for 2 weeks since patient has increased weakness. He will follow up with Isi TINOCO in 2 weeks we will plan to start Taxol weekly 3 weeks on 1 week off if he is feeling better.  5.  We discussed the side effects of this regimen including alopecia, nausea, fatigue, myelosuppression, infusion reaction, neuropathy, and diarrhea.  6.  Home health physical therapy for evaluation and strengthening.   7.  I will monitor the patient blood work including blood counts kidney function function and electrolytes.  8.  We will continue the patient on Colace daily with lactulose as needed for chemotherapy as constipation.  9.  We will continue inhalers as well as nebulizer for COPD.  10. We will continue lisinopril 1/2 tablet daily for hypertension  I told him to continue to monitor his blood pressure at home and hold his  dose for hypotension.   11. I told the patient it was ok for him to start vitamin D supplement,ent as well as vitamin B12 injections as recommended by his PCP.   12. Continue Lantus insulin 20 units given his uncontrolled diabetes.    13. Refill hydrocodone 7.5mg po q 6 hours prn pain per Dr. Argueta.       Hilaria Johnson, APRN  1/22/2020

## 2020-01-22 NOTE — OUTREACH NOTE
COPD/PN Week 3 Survey      Responses   Facility patient discharged from?  [place]   Does the patient have one of the following disease processes/diagnoses(primary or secondary)?  COPD/Pneumonia   Was the primary reason for admission:  Pneumonia   Week 3 attempt successful?  Yes   Call start time  1809   Call end time  1817   General alerts for this patient  Pt is very hard of hearing   Discharge diagnosis  Pneumonia   Is patient permission given to speak with other caregiver?  Yes   List who call center can speak with  Flores Mcallister-wife   Person spoke with today (if not patient) and relationship  Flores-wife   Meds reviewed with patient/caregiver?  Yes   Is the patient having any side effects they believe may be caused by any medication additions or changes?  No   Does the patient have all medications ordered at discharge?  Yes   Is the patient taking all medications as directed (includes completed medication regime)?  Yes   Medication comments  He has lung cancer, He is getting chemo   Week 3 call completed?  Yes          Carolyn Metzger RN

## 2020-01-22 NOTE — PROGRESS NOTES
I reviewed lab results with Dr Argueta. He thinks liver metastasis is reason for elevated liver enzymes. He recommends hospice and comfort measures. I thought you could talk to him when you see him in 2 weeks.

## 2020-01-27 NOTE — TELEPHONE ENCOUNTER
[facility] does not cover [facility], so referral faxed to [facility]. They will get into contact to set up initial visit

## 2020-01-27 NOTE — TELEPHONE ENCOUNTER
----- Message from Ashley Ovalle RN sent at 1/27/2020 10:01 AM EST -----  Patient wife called today to state that patient continues to have weakness and has not seemed to improve since he was seen on Wednesday by Milka.  No fevers, he is able to drink fluids, eat small amounts of food.  They asked about Home health and I told her a referral had been done on Wednesday.  I spoke with EARNEST Aguiar and she said to send a message to  [facility].

## 2020-01-29 NOTE — OUTREACH NOTE
COPD/PN Week 4 Survey      Responses   Facility patient discharged from?  [place]   Does the patient have one of the following disease processes/diagnoses(primary or secondary)?  COPD/Pneumonia   Was the primary reason for admission:  Pneumonia   Week 4 attempt successful?  Yes   Call start time  1003   Revoke  Patient    Call end time  1005   Person spoke with today (if not patient) and relationship  Flores-wife per wife he passed away this am 2020          Emeterio Mistry RN

## 2020-02-05 ENCOUNTER — APPOINTMENT (OUTPATIENT)
Dept: ONCOLOGY | Facility: HOSPITAL | Age: 70
End: 2020-02-05

## 2022-12-16 NOTE — OUTREACH NOTE
Prep Survey      Responses   Facility patient discharged from?  [place]   Is patient eligible?  Yes   Discharge diagnosis  PNA (pneumonia)   Does the patient have one of the following disease processes/diagnoses(primary or secondary)?  COPD/Pneumonia   Does the patient have Home health ordered?  No   Is there a DME ordered?  No   Prep survey completed?  Yes          Eliza Cullen RN         [Procedure: _________] : a [unfilled] procedure visit

## (undated) DEVICE — SINGLE USE SUCTION VALVE MAJ-209: Brand: SINGLE USE SUCTION VALVE (STERILE)

## (undated) DEVICE — Device

## (undated) DEVICE — ST NDL BRONCH ASP VIZISHOT 2 FLX 19GA

## (undated) DEVICE — BRUSH CYTO BRONCOSCOPE

## (undated) DEVICE — SINGLE USE BIOPSY VALVE MAJ-210: Brand: SINGLE USE BIOPSY VALVE (STERILE)

## (undated) DEVICE — LUER-LOK 360°: Brand: CONNECTA, LUER-LOK

## (undated) DEVICE — ADAPT SWVL FIBROPTIC BRONCH

## (undated) DEVICE — TRAP,MUCUS SPECIMEN,40CC: Brand: MEDLINE

## (undated) DEVICE — BOWL UTIL STRL 32OZ

## (undated) DEVICE — Device: Brand: BALLOON

## (undated) DEVICE — ST EXT MICROBORE FIX M LL 38IN